# Patient Record
Sex: FEMALE | Race: WHITE | HISPANIC OR LATINO | Employment: OTHER | URBAN - METROPOLITAN AREA
[De-identification: names, ages, dates, MRNs, and addresses within clinical notes are randomized per-mention and may not be internally consistent; named-entity substitution may affect disease eponyms.]

---

## 2017-02-23 ENCOUNTER — OFFICE VISIT (OUTPATIENT)
Dept: FAMILY MEDICINE | Facility: CLINIC | Age: 77
End: 2017-02-23
Payer: MEDICARE

## 2017-02-23 VITALS
SYSTOLIC BLOOD PRESSURE: 136 MMHG | WEIGHT: 141.56 LBS | HEIGHT: 62 IN | HEART RATE: 57 BPM | DIASTOLIC BLOOD PRESSURE: 93 MMHG | OXYGEN SATURATION: 98 % | BODY MASS INDEX: 26.05 KG/M2

## 2017-02-23 DIAGNOSIS — I70.0 AORTIC ATHEROSCLEROSIS: ICD-10-CM

## 2017-02-23 DIAGNOSIS — Z00.00 ENCOUNTER FOR PREVENTIVE HEALTH EXAMINATION: Primary | ICD-10-CM

## 2017-02-23 DIAGNOSIS — E11.69 HYPERLIPIDEMIA ASSOCIATED WITH TYPE 2 DIABETES MELLITUS: ICD-10-CM

## 2017-02-23 DIAGNOSIS — E11.59 HYPERTENSION ASSOCIATED WITH DIABETES: ICD-10-CM

## 2017-02-23 DIAGNOSIS — I15.2 HYPERTENSION ASSOCIATED WITH DIABETES: ICD-10-CM

## 2017-02-23 DIAGNOSIS — Z28.21 INFLUENZA VACCINATION DECLINED: ICD-10-CM

## 2017-02-23 DIAGNOSIS — E78.5 HYPERLIPIDEMIA ASSOCIATED WITH TYPE 2 DIABETES MELLITUS: ICD-10-CM

## 2017-02-23 DIAGNOSIS — E11.65 CONTROLLED TYPE 2 DIABETES MELLITUS WITH HYPERGLYCEMIA, WITHOUT LONG-TERM CURRENT USE OF INSULIN: ICD-10-CM

## 2017-02-23 DIAGNOSIS — E03.9 HYPOTHYROIDISM, UNSPECIFIED TYPE: ICD-10-CM

## 2017-02-23 DIAGNOSIS — Z23 IMMUNIZATION DUE: ICD-10-CM

## 2017-02-23 PROCEDURE — 3075F SYST BP GE 130 - 139MM HG: CPT | Mod: S$GLB,,, | Performed by: NURSE PRACTITIONER

## 2017-02-23 PROCEDURE — 90732 PPSV23 VACC 2 YRS+ SUBQ/IM: CPT | Mod: S$GLB,,, | Performed by: NURSE PRACTITIONER

## 2017-02-23 PROCEDURE — G0009 ADMIN PNEUMOCOCCAL VACCINE: HCPCS | Mod: S$GLB,,, | Performed by: NURSE PRACTITIONER

## 2017-02-23 PROCEDURE — G0439 PPPS, SUBSEQ VISIT: HCPCS | Mod: S$GLB,,, | Performed by: NURSE PRACTITIONER

## 2017-02-23 PROCEDURE — 99999 PR PBB SHADOW E&M-EST. PATIENT-LVL IV: CPT | Mod: PBBFAC,,, | Performed by: NURSE PRACTITIONER

## 2017-02-23 PROCEDURE — 3080F DIAST BP >= 90 MM HG: CPT | Mod: S$GLB,,, | Performed by: NURSE PRACTITIONER

## 2017-02-23 PROCEDURE — 99499 UNLISTED E&M SERVICE: CPT | Mod: S$GLB,,, | Performed by: NURSE PRACTITIONER

## 2017-02-23 RX ORDER — CALCIUM CARBONATE 500(1250)
1 TABLET ORAL DAILY
COMMUNITY
End: 2018-11-13

## 2017-02-23 RX ORDER — VIT C/E/ZN/COPPR/LUTEIN/ZEAXAN 250MG-90MG
1000 CAPSULE ORAL DAILY
COMMUNITY

## 2017-02-23 NOTE — Clinical Note
Primary Care Providers: Zion Villavicencio MD, MD (General)  Your patient was seen today for a HRA visit. Gap(s) in care (HEDIS gaps) have been identified during this visit that require additional testing and possible follow up.  Orders Placed This Encounter     Pneumococcal polysaccharide vaccine 23-valent greater than or equal to 3yo subcutaneous/IM   These orders were placed using [facility] approved protocol and any results will be forwarded to your office for appropriate follow up. I have included a copy of my visit note; please review the note and feel free to contact me with any questions.   Thank you for allowing me to participate in the care of your patients. Liliana Basurto NP

## 2017-02-23 NOTE — MR AVS SNAPSHOT
[address]  [place] [address]  Phone: [phone]  Fax: [phone]                  Oneyda Shah   2017 10:00 AM   Office Visit    Descripción:  Female : 1940   Personal Médico:  Liliana Basurto NP   Departamento:  [facility]           Razón de la hemanth     Health Risk Assessment           Diagnósticos de Esta Visita        Comentarios    Encounter for preventive health examination    -  Primario     Immunization due                Lista de tareas           Citas próximas        Personal Médico Departamento Tfno del dpto    3/6/2017 9:00 AM Zion Villavicencio MD [facility] [phone]      Metas (5 Years of Data)     Ninguna      Follow-Up and Disposition     Return in 11 days (on 3/6/2017) for follow-up with PCP, HRA visit in 1 year.      [facility]     [facility] de Enfermeras - Asistencia   Enfermeras registradas de [facility] pueden ayudarle a reservar leydi hemanth, proveer educación para la fatmata, asesoría clínica, y otros servicios de asesoramiento.   Llame para leroy servicio gratuito a [phone].             Medicamentos           Mensaje sobre Medicamentos     Verificar los cambios y / o adiciones a newton régimen de medicación son los mismos que discutir con newton médico. Si cualquiera de estos cambios o adiciones son incorrectos, por favor notifique a newton proveedor de atención médica.        DEJAR de guero estos medicamentos     fluconazole (DIFLUCAN) 150 MG Tab Take 1 tablet (150 mg total) by mouth once daily. If symptoms persist take another dose in 3 days.    hydrocodone-acetaminophen 7.5-325mg (NORCO) 7.5-325 mg per tablet     SOLUS V2 CONTROL SOLUTION, LOW Soln     SOLUS V2 LANCING DEVICE Kit            Verifique que la siguiente lista de medicamentos es leydi representación exacta de los medicamentos que está tomando actualmente. Si no hay ningunos reportados, la lista puede estar  "en zepeda. Si no es correcta, por favor póngase en contacto con newton proveedor de atención médica. Lleve esta lista con usted en cholo de emergencia.           Medicamentos Actuales     amlodipine (NORVASC) 10 MG tablet Take 1 tablet (10 mg total) by mouth once daily.    aspirin (ECOTRIN) 81 MG EC tablet Take 81 mg by mouth once daily.      atenolol (TENORMIN) 50 MG tablet Take 1 tablet (50 mg total) by mouth once daily.    blood sugar diagnostic Strp Check daily, disp compatible brand with meter, 100 with 11 refills    blood-glucose meter Misc Check daily sugars, dispense insurance covered blood sugar meter    calcium carbonate (OS-RAVINDER) 500 mg calcium (1,250 mg) tablet Take 1 tablet by mouth once daily.    cholecalciferol, vitamin D3, (VITAMIN D3) 1,000 unit capsule Take 1,000 Units by mouth once daily.    ibuprofen (ADVIL,MOTRIN) 800 MG tablet TAKE 1 TABLET BY MOUTH TWICE DAILY AS NEEDED FOR PAIN    lancets Misc     levothyroxine (SYNTHROID) 25 MCG tablet Take 1 tablet (25 mcg total) by mouth once daily.    losartan (COZAAR) 100 MG tablet Take 1 tablet (100 mg total) by mouth once daily.    metformin (GLUCOPHAGE) 1000 MG tablet Take 1 tablet (1,000 mg total) by mouth 2 (two) times daily.           Información de referencia clínica           Anny signos vitales lor     PS [place] SpO2 BMI (IMC)    136/93 (BP Location: Right arm, Patient Position: Sitting, BP Method: Manual) 57 5' 2" (1.575 m) 64.2 kg (141 lb 8.6 oz) 98% 25.89 kg/m2      Blood Pressure          Most Recent Value    BP  (!)  136/93      Alergias     A partir del:  2/23/2017        Penicillins    Ace Inhibitors    Statins-hmg-coa Reductase Inhibitors      Vacunas     Administradas en la fecha de la visita:  2/23/2017        Nombre Fecha Dosis Fecha del VIS Vía    Pneumococcal Polysaccharide - 23 Valent  Incomplete 0.5 mL 4/24/2015 Intramuscular      Orders Placed During Today's Visit      Órdenes normales de esta visita    Pneumococcal " polysaccharide vaccine 23-valent greater than or equal to 3yo subcutaneous/IM       Instrucciones      Counseling and Referral of Other Preventative  (Italic type indicates deductible and co-insurance are waived)    Patient Name: Oneyda Shah  Today's Date: 2/23/2017      SERVICE LIMITATIONS RECOMMENDATION    Vaccines    · Pneumococcal (once after 65)    · Influenza (annually)    · Hepatitis B (if medium/high risk)    · Prevnar 13      Hepatitis B medium/high risk factors:       - End-stage renal disease       - Hemophiliacs who received Factor VII or         IX concentrates       - Clients of institutions for the mentally             retarded       - Persons who live in the same house as          a HepB carrier       - Homosexual men       - Illicit injectable drug abusers     Pneumococcal: Scheduled - see appointments     Influenza: Recommended to patient, declined     Hepatitis B: N/A Not recommended     Prevnar 13: Done, repeat at next scheduled date    Mammogram (biennial age 50-74)  Annually (age 40 or over)  Done this year, repeat every year    Pap (up to age 70 and after 70 if unknown history or abnormal study last 10 years)    N/A Not recommended     The USPSTF recommends against screening for cervical cancer in women older than age 65 years who have had adequate prior screening and are not otherwise at high risk for cervical cancer.      Colorectal cancer screening (to age 75)    · Fecal occult blood test (annual)  · Flexible sigmoidoscopy (5y)  · Screening colonoscopy (10y)  · Barium enema   N/A Pt thinks she had colonoscopy 5 years ago at [facility]. Get date and results    Diabetes self-management training (no USPSTF recommendations)  Requires referral by treating physician for patient with diabetes or renal disease. 10 hours of initial DSMT sessions of no less than 30 minutes each in a continuous 12-month period. 2 hours of follow-up DSMT in subsequent years.  N/A Not recommended    Bone mass  measurements (age 65 & older, biennial)  Requires diagnosis related to osteoporosis or estrogen deficiency. Biennial benefit unless patient has history of long-term glucocorticoid  Last done 09/03/15, recommend to repeat every 3  years    Glaucoma screening (no USPSTF recommendation)  Diabetes mellitus, family history   , age 50 or over    American, age 65 or over  N/A Done about Sep, Oct 2016. Get date    Medical nutrition therapy for diabetes or renal disease (no recommended schedule)  Requires referral by treating physician for patient with diabetes or renal disease or kidney transplant within the past 3 years.  Can be provided in same year as diabetes self-management training (DSMT), and CMS recommends medical nutrition therapy take place after DSMT. Up to 3 hours for initial year and 2 hours in subsequent years.  N/A Not recommended    Cardiovascular screening blood tests (every 5 years)  · Fasting lipid panel  Order as a panel if possible  Done this year, repeat every year    Diabetes screening tests (at least every 3 years, Medicare covers annually or at 6-month intervals for prediabetic patients)  · Fasting blood sugar (FBS) or glucose tolerance test (GTT)  Patient must be diagnosed with one of the following:       - Hypertension       - Dyslipidemia       - Obesity (BMI 30kg/m2)       - Previous elevated impaired FBS or GTT       ... or any two of the following:       - Overweight (BMI 25 but <30)       - Family history of diabetes       - Age 65 or older       - History of gestational diabetes or birth of baby weighing more than 9 pounds  Done this year, repeat every year    Abdominal aortic aneurysm screening (once)  · Sonogram   Limited to patients who meet one of the following criteria:       - Men who are 65-75 years old and have smoked more than 100 cigarette in their lifetime       - Anyone with a family history of abdominal aortic aneurysm       - Anyone recommended for  screening by the USPSTF  N/A Not recommended    HIV screening (annually for increased risk patients)  · HIV-1 and HIV-2 by EIA, or LATISHA, rapid antibody test or oral mucosa transudate  Patients must be at increased risk for HIV infection per USPSTF guidelines or pregnant. Tests covered annually for patient at increased risk or as requested by the patient. Pregnant patients may receive up to 3 tests during pregnancy.  Risks discussed, screening is not recommended    Smoking cessation counseling (up to 8 sessions per year)  Patients must be asymptomatic of tobacco-related conditions to receive as a preventative service.  Never Smoker    Subsequent annual wellness visit  At least 12 months since last AWV  Return in one year     The following information is provided to all patients.  This information is to help you find resources for any of the problems found today that may be affecting your health:                Living healthy guide: www.[facility].[place].[facility]      Understanding Diabetes: www.diabetes.org      Eating healthy: www.cdc.gov/healthyweight      [facility] home safety checklist: www.cdc.gov/steadi/patient.html      Agency on Aging: www.goea.[place].[facility]      Alcoholics anonymous (AA): www.aa.org      Physical Activity: www.vicente.nih.gov/np2mjzg      Tobacco use: www.quitwithusla.org          Language Assistance Services     ATTENTION: Language assistance services are available, free of charge. Please call [phone].      ATENCIÓN: Si habla español, tiene a newton disposición servicios gratuitos de asistencia lingüística. Llame al [phone].     [facility] Ý: N?u b?n nói Ti?ng Vi?t, có các d?ch v? h? tr? ngôn ng? mi?n phí dành cho b?n. G?i s? [phone].         [facility] Medicine cumple con las leyes federales aplicables de derechos civiles y no discrimina por motivos de katiana, color, origen nacional, edad, discapacidad, o sexo.                 Oneyda Shah   2/23/2017 10:00 AM   Office Visit    Description:   Female : 1940   Provider:  Liliana Basurto NP   Department[facility]           Reason for Visit     Health Risk Assessment           Diagnoses this Visit        Comments    Encounter for preventive health examination    -  Primary     Immunization due                To Do List           Future Appointments        Provider Department Dept Phone    3/6/2017 9:00 AM Zion Villavicencio MD [facility] [phone]      Goals     None      Follow-Up and Disposition     Return in 11 days (on 3/6/2017) for follow-up with PCP, HRA visit in 1 year.      [facility] On Call     [facility] On Call Nurse Care Line -  Assistance  Registered nurses in the [facility] provide clinical advisement, health education, appointment booking, and other advisory services.  Call for this free service at [phone].             Medications           Message regarding Medications     Verify the changes and/or additions to your medication regime listed below are the same as discussed with your clinician today.  If any of these changes or additions are incorrect, please notify your healthcare provider.        STOP taking these medications     fluconazole (DIFLUCAN) 150 MG Tab Take 1 tablet (150 mg total) by mouth once daily. If symptoms persist take another dose in 3 days.    hydrocodone-acetaminophen 7.5-325mg (NORCO) 7.5-325 mg per tablet     SOLUS V2 CONTROL SOLUTION, LOW Soln     SOLUS V2 LANCING DEVICE Kit            Verify that the below list of medications is an accurate representation of the medications you are currently taking.  If none reported, the list may be blank. If incorrect, please contact your healthcare provider. Carry this list with you in case of emergency.           Current Medications     amlodipine (NORVASC) 10 MG tablet Take 1 tablet (10 mg total) by mouth once daily.    aspirin (ECOTRIN) 81 MG EC tablet Take 81 mg by mouth once daily.      atenolol (TENORMIN) 50 MG  "tablet Take 1 tablet (50 mg total) by mouth once daily.    blood sugar diagnostic Strp Check daily, disp compatible brand with meter, 100 with 11 refills    blood-glucose meter Misc Check daily sugars, dispense insurance covered blood sugar meter    calcium carbonate (OS-RAVINDER) 500 mg calcium (1,250 mg) tablet Take 1 tablet by mouth once daily.    cholecalciferol, vitamin D3, (VITAMIN D3) 1,000 unit capsule Take 1,000 Units by mouth once daily.    ibuprofen (ADVIL,MOTRIN) 800 MG tablet TAKE 1 TABLET BY MOUTH TWICE DAILY AS NEEDED FOR PAIN    lancets Misc     levothyroxine (SYNTHROID) 25 MCG tablet Take 1 tablet (25 mcg total) by mouth once daily.    losartan (COZAAR) 100 MG tablet Take 1 tablet (100 mg total) by mouth once daily.    metformin (GLUCOPHAGE) 1000 MG tablet Take 1 tablet (1,000 mg total) by mouth 2 (two) times daily.           Clinical Reference Information           Your Vitals Were     BP Pulse Height Weight SpO2 BMI    136/93 (BP Location: Right arm, Patient Position: Sitting, BP Method: Manual) 57 5' 2" (1.575 m) 64.2 kg (141 lb 8.6 oz) 98% 25.89 kg/m2      Blood Pressure          Most Recent Value    BP  (!)  136/93      Allergies as of 2/23/2017     Penicillins    Ace Inhibitors    Statins-hmg-coa Reductase Inhibitors      Immunizations Administered on Date of Encounter - 2/23/2017     Name Date Dose VIS Date Route    Pneumococcal Polysaccharide - 23 Valent  Incomplete 0.5 mL 4/24/2015 Intramuscular      Orders Placed During Today's Visit      Normal Orders This Visit    Pneumococcal polysaccharide vaccine 23-valent greater than or equal to 1yo subcutaneous/IM       Instructions      Counseling and Referral of Other Preventative  (Italic type indicates deductible and co-insurance are waived)    Patient Name: Oneyda Shah  Today's Date: 2/23/2017      SERVICE LIMITATIONS RECOMMENDATION    Vaccines    · Pneumococcal (once after 65)    · Influenza (annually)    · Hepatitis B (if medium/high " risk)    · Prevnar 13      Hepatitis B medium/high risk factors:       - End-stage renal disease       - Hemophiliacs who received Factor VII or         IX concentrates       - Clients of institutions for the mentally             retarded       - Persons who live in the same house as          a HepB carrier       - Homosexual men       - Illicit injectable drug abusers     Pneumococcal: Scheduled - see appointments     Influenza: Recommended to patient, declined     Hepatitis B: N/A Not recommended     Prevnar 13: Done, repeat at next scheduled date    Mammogram (biennial age 50-74)  Annually (age 40 or over)  Done this year, repeat every year    Pap (up to age 70 and after 70 if unknown history or abnormal study last 10 years)    N/A Not recommended     The USPSTF recommends against screening for cervical cancer in women older than age 65 years who have had adequate prior screening and are not otherwise at high risk for cervical cancer.      Colorectal cancer screening (to age 75)    · Fecal occult blood test (annual)  · Flexible sigmoidoscopy (5y)  · Screening colonoscopy (10y)  · Barium enema   N/A Pt thinks she had colonoscopy 5 years ago at [facility]. Get date and results    Diabetes self-management training (no USPSTF recommendations)  Requires referral by treating physician for patient with diabetes or renal disease. 10 hours of initial DSMT sessions of no less than 30 minutes each in a continuous 12-month period. 2 hours of follow-up DSMT in subsequent years.  N/A Not recommended    Bone mass measurements (age 65 & older, biennial)  Requires diagnosis related to osteoporosis or estrogen deficiency. Biennial benefit unless patient has history of long-term glucocorticoid  Last done 09/03/15, recommend to repeat every 3  years    Glaucoma screening (no USPSTF recommendation)  Diabetes mellitus, family history   , age 50 or over    American, age 65 or over  N/A Done about Sep, Oct 2016. Get  date    Medical nutrition therapy for diabetes or renal disease (no recommended schedule)  Requires referral by treating physician for patient with diabetes or renal disease or kidney transplant within the past 3 years.  Can be provided in same year as diabetes self-management training (DSMT), and CMS recommends medical nutrition therapy take place after DSMT. Up to 3 hours for initial year and 2 hours in subsequent years.  N/A Not recommended    Cardiovascular screening blood tests (every 5 years)  · Fasting lipid panel  Order as a panel if possible  Done this year, repeat every year    Diabetes screening tests (at least every 3 years, Medicare covers annually or at 6-month intervals for prediabetic patients)  · Fasting blood sugar (FBS) or glucose tolerance test (GTT)  Patient must be diagnosed with one of the following:       - Hypertension       - Dyslipidemia       - Obesity (BMI 30kg/m2)       - Previous elevated impaired FBS or GTT       ... or any two of the following:       - Overweight (BMI 25 but <30)       - Family history of diabetes       - Age 65 or older       - History of gestational diabetes or birth of baby weighing more than 9 pounds  Done this year, repeat every year    Abdominal aortic aneurysm screening (once)  · Sonogram   Limited to patients who meet one of the following criteria:       - Men who are 65-75 years old and have smoked more than 100 cigarette in their lifetime       - Anyone with a family history of abdominal aortic aneurysm       - Anyone recommended for screening by the USPSTF  N/A Not recommended    HIV screening (annually for increased risk patients)  · HIV-1 and HIV-2 by EIA, or LATISHA, rapid antibody test or oral mucosa transudate  Patients must be at increased risk for HIV infection per USPSTF guidelines or pregnant. Tests covered annually for patient at increased risk or as requested by the patient. Pregnant patients may receive up to 3 tests during pregnancy.  Risks  discussed, screening is not recommended    Smoking cessation counseling (up to 8 sessions per year)  Patients must be asymptomatic of tobacco-related conditions to receive as a preventative service.  Never Smoker    Subsequent annual wellness visit  At least 12 months since last AWV  Return in one year     The following information is provided to all patients.  This information is to help you find resources for any of the problems found today that may be affecting your health:                Living healthy guide: www.[facility].[place].[facility]      Understanding Diabetes: www.diabetes.org      Eating healthy: www.cdc.gov/healthyweight      CDC home safety checklist: www.cdc.gov/steadi/patient.html      Agency on Aging: www.goea.[place].[facility]      Alcoholics anonymous (AA): www.aa.org      Physical Activity: www.vicente.nih.gov/lk4ryjp      Tobacco use: www.quitwithusla.org          Language Assistance Services     ATTENTION: Language assistance services are available, free of charge. Please call [phone].      ATENCIÓN: Si maryla yelena, tiene a newton disposición servicios gratuitos de asistencia lingüística. Llame al [phone].     CHÚ Ý: N?u b?n nói Ti?ng Vi?t, có các d?ch v? h? tr? ngôn ng? mi?n phí dành cho b?n. G?i s? [phone].         [facility] complies with applicable Federal civil rights laws and does not discriminate on the basis of race, color, national origin, age, disability, or sex.

## 2017-02-23 NOTE — PATIENT INSTRUCTIONS
Counseling and Referral of Other Preventative  (Italic type indicates deductible and co-insurance are waived)    Patient Name: Oneyda Shah  Today's Date: 2/23/2017      SERVICE LIMITATIONS RECOMMENDATION    Vaccines    · Pneumococcal (once after 65)    · Influenza (annually)    · Hepatitis B (if medium/high risk)    · Prevnar 13      Hepatitis B medium/high risk factors:       - End-stage renal disease       - Hemophiliacs who received Factor VII or         IX concentrates       - Clients of institutions for the mentally             retarded       - Persons who live in the same house as          a HepB carrier       - Homosexual men       - Illicit injectable drug abusers     Pneumococcal: Scheduled - see appointments     Influenza: Recommended to patient, declined     Hepatitis B: N/A Not recommended     Prevnar 13: Done, repeat at next scheduled date    Mammogram (biennial age 50-74)  Annually (age 40 or over)  Done this year, repeat every year    Pap (up to age 70 and after 70 if unknown history or abnormal study last 10 years)    N/A Not recommended     The USPSTF recommends against screening for cervical cancer in women older than age 65 years who have had adequate prior screening and are not otherwise at high risk for cervical cancer.      Colorectal cancer screening (to age 75)    · Fecal occult blood test (annual)  · Flexible sigmoidoscopy (5y)  · Screening colonoscopy (10y)  · Barium enema   N/A Pt thinks she had colonoscopy 5 years ago at [facility]. Get date and results    Diabetes self-management training (no USPSTF recommendations)  Requires referral by treating physician for patient with diabetes or renal disease. 10 hours of initial DSMT sessions of no less than 30 minutes each in a continuous 12-month period. 2 hours of follow-up DSMT in subsequent years.  N/A Not recommended    Bone mass measurements (age 65 & older, biennial)  Requires diagnosis related to osteoporosis or estrogen deficiency.  Biennial benefit unless patient has history of long-term glucocorticoid  Last done 09/03/15, recommend to repeat every 3  years    Glaucoma screening (no USPSTF recommendation)  Diabetes mellitus, family history   , age 50 or over    American, age 65 or over  N/A Done about Sep, Oct 2016. Get date    Medical nutrition therapy for diabetes or renal disease (no recommended schedule)  Requires referral by treating physician for patient with diabetes or renal disease or kidney transplant within the past 3 years.  Can be provided in same year as diabetes self-management training (DSMT), and CMS recommends medical nutrition therapy take place after DSMT. Up to 3 hours for initial year and 2 hours in subsequent years.  N/A Not recommended    Cardiovascular screening blood tests (every 5 years)  · Fasting lipid panel  Order as a panel if possible  Done this year, repeat every year    Diabetes screening tests (at least every 3 years, Medicare covers annually or at 6-month intervals for prediabetic patients)  · Fasting blood sugar (FBS) or glucose tolerance test (GTT)  Patient must be diagnosed with one of the following:       - Hypertension       - Dyslipidemia       - Obesity (BMI 30kg/m2)       - Previous elevated impaired FBS or GTT       ... or any two of the following:       - Overweight (BMI 25 but <30)       - Family history of diabetes       - Age 65 or older       - History of gestational diabetes or birth of baby weighing more than 9 pounds  Done this year, repeat every year    Abdominal aortic aneurysm screening (once)  · Sonogram   Limited to patients who meet one of the following criteria:       - Men who are 65-75 years old and have smoked more than 100 cigarette in their lifetime       - Anyone with a family history of abdominal aortic aneurysm       - Anyone recommended for screening by the USPSTF  N/A Not recommended    HIV screening (annually for increased risk patients)  · HIV-1 and  HIV-2 by EIA, or LATISHA, rapid antibody test or oral mucosa transudate  Patients must be at increased risk for HIV infection per USPSTF guidelines or pregnant. Tests covered annually for patient at increased risk or as requested by the patient. Pregnant patients may receive up to 3 tests during pregnancy.  Risks discussed, screening is not recommended    Smoking cessation counseling (up to 8 sessions per year)  Patients must be asymptomatic of tobacco-related conditions to receive as a preventative service.  Never Smoker    Subsequent annual wellness visit  At least 12 months since last AWV  Return in one year     The following information is provided to all patients.  This information is to help you find resources for any of the problems found today that may be affecting your health:                Living healthy guide: www.[facility].[place].[facility]      Understanding Diabetes: www.diabetes.org      Eating healthy: www.cdc.gov/healthyweight      CDC home safety checklist: www.cdc.gov/steadi/patient.html      Agency on Aging: www.goea.[place].[facility]      Alcoholics anonymous (AA): www.aa.org      Physical Activity: www.vicente.nih.gov/lg2mtbt      Tobacco use: www.quitwithusla.org

## 2017-02-24 PROBLEM — I15.2 HYPERTENSION ASSOCIATED WITH DIABETES: Status: ACTIVE | Noted: 2017-02-24

## 2017-02-24 PROBLEM — Z28.21 INFLUENZA VACCINATION DECLINED: Status: ACTIVE | Noted: 2017-02-24

## 2017-02-24 PROBLEM — E11.69 HYPERLIPIDEMIA ASSOCIATED WITH TYPE 2 DIABETES MELLITUS: Status: ACTIVE | Noted: 2017-02-24

## 2017-02-24 PROBLEM — E78.5 HYPERLIPIDEMIA ASSOCIATED WITH TYPE 2 DIABETES MELLITUS: Status: ACTIVE | Noted: 2017-02-24

## 2017-02-24 PROBLEM — E11.65 CONTROLLED TYPE 2 DIABETES MELLITUS WITH HYPERGLYCEMIA, WITHOUT LONG-TERM CURRENT USE OF INSULIN: Status: ACTIVE | Noted: 2017-02-24

## 2017-02-24 PROBLEM — E11.59 HYPERTENSION ASSOCIATED WITH DIABETES: Status: ACTIVE | Noted: 2017-02-24

## 2017-02-24 NOTE — PROGRESS NOTES
"Oneyda Shah presented for a  Medicare AWV and comprehensive Health Risk Assessment today. The following components were reviewed and updated:    · Medical history  · Family History  · Social history  · Allergies and Current Medications  · Health Risk Assessment  · Health Maintenance  · Care Team     ** See Completed Assessments for Annual Wellness Visit within the encounter summary.**       The following assessments were completed:  · Living Situation  · CAGE  · Depression Screening  · Timed Get Up and Go  · Whisper Test  · Cognitive Function Screening  · Nutrition Screening  · ADL Screening  · PAQ Screening    Vitals:    02/23/17 1004   BP: (!) 136/93   BP Location: Right arm   Patient Position: Sitting   BP Method: Manual   Pulse: (!) 57   SpO2: 98%   Weight: 64.2 kg (141 lb 8.6 oz)   Height: 5' 2" (1.575 m)     Body mass index is 25.89 kg/(m^2).     Physical Exam   Constitutional: She is oriented to person, place, and time. She appears well-developed and well-nourished. No distress.   HENT:   Head: Normocephalic and atraumatic.   Eyes: EOM are normal. Pupils are equal, round, and reactive to light.   Neck: Neck supple. No JVD present. No tracheal deviation present.   Cardiovascular: Regular rhythm, normal heart sounds and intact distal pulses.  Bradycardia present.    No murmur heard.  Pulmonary/Chest: Effort normal and breath sounds normal. No respiratory distress. She has no wheezes. She has no rales.   Abdominal: Soft. Bowel sounds are normal. She exhibits no distension and no mass. There is no tenderness.   Musculoskeletal: Normal range of motion. She exhibits no edema or tenderness.   Neurological: She is alert and oriented to person, place, and time. Coordination normal.   Skin: Skin is warm and dry. No erythema. No pallor.   Psychiatric: She has a normal mood and affect. Her behavior is normal. Judgment and thought content normal. Cognition and memory are normal. She expresses no homicidal and no " suicidal ideation.   Nursing note and vitals reviewed.        Diagnoses and health risks identified today and associated recommendations/orders:    1. Encounter for preventive health examination    2. Controlled type 2 diabetes mellitus with hyperglycemia, without long-term current use of insulin  Chronic; stable on medication.  Followed by PCP.    3. Hypertension associated with diabetes  Chronic; stable on medication.  Followed by PCP.    4. Hyperlipidemia associated with type 2 diabetes mellitus  Chronic; stable.  Followed by PCP.    5. Aortic atherosclerosis  Chronic; stable.  As seen on imaging dated 12/06/13.  Followed by PCP.    6. Hypothyroidism, unspecified type  Chronic; stable on medication.  Followed by PCP.    7. Immunization due  Pneumovax vaccination administered today in clinic.  - Pneumococcal polysaccharide vaccine 23-valent greater than or equal to 1yo subcutaneous/IM    8. Influenza vaccination declined    9. BMI 25.0-25.9,adult      Provided [place] with a 5-10 year written screening schedule and personal prevention plan. Recommendations were developed using the USPSTF age appropriate recommendations. Education, counseling, and referrals were provided as needed. After Visit Summary printed and given to patient which includes a list of additional screenings\tests needed.    Return in 11 days (on 3/6/2017) for follow-up with PCP, HRA visit in 1 year.    Liliana Basurto NP

## 2017-03-03 RX ORDER — ATENOLOL 50 MG/1
50 TABLET ORAL DAILY
Qty: 90 TABLET | Refills: 3 | Status: SHIPPED | OUTPATIENT
Start: 2017-03-03 | End: 2017-08-30 | Stop reason: SDUPTHER

## 2017-03-03 RX ORDER — AMLODIPINE BESYLATE 10 MG/1
10 TABLET ORAL DAILY
Qty: 90 TABLET | Refills: 3 | Status: SHIPPED | OUTPATIENT
Start: 2017-03-03 | End: 2018-02-15 | Stop reason: SDUPTHER

## 2017-03-03 RX ORDER — METFORMIN HYDROCHLORIDE 1000 MG/1
1000 TABLET ORAL 2 TIMES DAILY
Qty: 180 TABLET | Refills: 11 | Status: SHIPPED | OUTPATIENT
Start: 2017-03-03 | End: 2018-06-28 | Stop reason: SDUPTHER

## 2017-03-06 ENCOUNTER — OFFICE VISIT (OUTPATIENT)
Dept: FAMILY MEDICINE | Facility: CLINIC | Age: 77
End: 2017-03-06
Payer: MEDICARE

## 2017-03-06 ENCOUNTER — LAB VISIT (OUTPATIENT)
Dept: LAB | Facility: HOSPITAL | Age: 77
End: 2017-03-06
Attending: FAMILY MEDICINE
Payer: MEDICARE

## 2017-03-06 VITALS
BODY MASS INDEX: 26.06 KG/M2 | HEART RATE: 56 BPM | DIASTOLIC BLOOD PRESSURE: 70 MMHG | SYSTOLIC BLOOD PRESSURE: 119 MMHG | OXYGEN SATURATION: 97 % | HEIGHT: 61 IN | TEMPERATURE: 98 F | WEIGHT: 138 LBS | RESPIRATION RATE: 16 BRPM

## 2017-03-06 DIAGNOSIS — E11.65 CONTROLLED TYPE 2 DIABETES MELLITUS WITH HYPERGLYCEMIA, WITHOUT LONG-TERM CURRENT USE OF INSULIN: ICD-10-CM

## 2017-03-06 DIAGNOSIS — I70.0 AORTIC ATHEROSCLEROSIS: ICD-10-CM

## 2017-03-06 DIAGNOSIS — E03.9 HYPOTHYROIDISM, UNSPECIFIED TYPE: Primary | ICD-10-CM

## 2017-03-06 DIAGNOSIS — D64.9 ANEMIA, UNSPECIFIED TYPE: ICD-10-CM

## 2017-03-06 DIAGNOSIS — E78.5 HYPERLIPIDEMIA ASSOCIATED WITH TYPE 2 DIABETES MELLITUS: ICD-10-CM

## 2017-03-06 DIAGNOSIS — E11.69 HYPERLIPIDEMIA ASSOCIATED WITH TYPE 2 DIABETES MELLITUS: ICD-10-CM

## 2017-03-06 DIAGNOSIS — E55.9 VITAMIN D INSUFFICIENCY: ICD-10-CM

## 2017-03-06 DIAGNOSIS — E11.59 HYPERTENSION ASSOCIATED WITH DIABETES: ICD-10-CM

## 2017-03-06 DIAGNOSIS — I15.2 HYPERTENSION ASSOCIATED WITH DIABETES: ICD-10-CM

## 2017-03-06 DIAGNOSIS — K76.0 FATTY LIVER: ICD-10-CM

## 2017-03-06 DIAGNOSIS — E03.9 HYPOTHYROIDISM, UNSPECIFIED TYPE: ICD-10-CM

## 2017-03-06 LAB
ALBUMIN SERPL BCP-MCNC: 4.1 G/DL
ALP SERPL-CCNC: 78 U/L
ALT SERPL W/O P-5'-P-CCNC: 40 U/L
ANION GAP SERPL CALC-SCNC: 6 MMOL/L
AST SERPL-CCNC: 29 U/L
BASOPHILS # BLD AUTO: 0.01 K/UL
BASOPHILS NFR BLD: 0.1 %
BILIRUB SERPL-MCNC: 0.6 MG/DL
BUN SERPL-MCNC: 15 MG/DL
CALCIUM SERPL-MCNC: 10 MG/DL
CHLORIDE SERPL-SCNC: 105 MMOL/L
CHOLEST/HDLC SERPL: 5 {RATIO}
CO2 SERPL-SCNC: 29 MMOL/L
CREAT SERPL-MCNC: 1 MG/DL
DIFFERENTIAL METHOD: NORMAL
EOSINOPHIL # BLD AUTO: 0.2 K/UL
EOSINOPHIL NFR BLD: 2 %
ERYTHROCYTE [DISTWIDTH] IN BLOOD BY AUTOMATED COUNT: 13.2 %
EST. GFR  (AFRICAN AMERICAN): >60 ML/MIN/1.73 M^2
EST. GFR  (NON AFRICAN AMERICAN): 56 ML/MIN/1.73 M^2
ESTIMATED AVG GLUCOSE: 148 MG/DL
GLUCOSE SERPL-MCNC: 138 MG/DL
HBA1C MFR BLD HPLC: 6.8 %
HCT VFR BLD AUTO: 37.9 %
HDL/CHOLESTEROL RATIO: 20.1 %
HDLC SERPL-MCNC: 249 MG/DL
HDLC SERPL-MCNC: 50 MG/DL
HGB BLD-MCNC: 12.6 G/DL
LDLC SERPL CALC-MCNC: 172.2 MG/DL
LYMPHOCYTES # BLD AUTO: 3.2 K/UL
LYMPHOCYTES NFR BLD: 34.9 %
MCH RBC QN AUTO: 30.6 PG
MCHC RBC AUTO-ENTMCNC: 33.2 %
MCV RBC AUTO: 92 FL
MONOCYTES # BLD AUTO: 0.6 K/UL
MONOCYTES NFR BLD: 6.9 %
NEUTROPHILS # BLD AUTO: 5.1 K/UL
NEUTROPHILS NFR BLD: 55.9 %
NONHDLC SERPL-MCNC: 199 MG/DL
PLATELET # BLD AUTO: 287 K/UL
PMV BLD AUTO: 10.7 FL
POTASSIUM SERPL-SCNC: 4.9 MMOL/L
PROT SERPL-MCNC: 8.4 G/DL
RBC # BLD AUTO: 4.12 M/UL
SODIUM SERPL-SCNC: 140 MMOL/L
TRIGL SERPL-MCNC: 134 MG/DL
TSH SERPL DL<=0.005 MIU/L-ACNC: 3.6 UIU/ML
WBC # BLD AUTO: 9.09 K/UL

## 2017-03-06 PROCEDURE — 1160F RVW MEDS BY RX/DR IN RCRD: CPT | Mod: S$GLB,,, | Performed by: FAMILY MEDICINE

## 2017-03-06 PROCEDURE — 1126F AMNT PAIN NOTED NONE PRSNT: CPT | Mod: S$GLB,,, | Performed by: FAMILY MEDICINE

## 2017-03-06 PROCEDURE — 99999 PR PBB SHADOW E&M-EST. PATIENT-LVL III: CPT | Mod: PBBFAC,,, | Performed by: FAMILY MEDICINE

## 2017-03-06 PROCEDURE — 4010F ACE/ARB THERAPY RXD/TAKEN: CPT | Mod: S$GLB,,, | Performed by: FAMILY MEDICINE

## 2017-03-06 PROCEDURE — 3074F SYST BP LT 130 MM HG: CPT | Mod: S$GLB,,, | Performed by: FAMILY MEDICINE

## 2017-03-06 PROCEDURE — 3078F DIAST BP <80 MM HG: CPT | Mod: S$GLB,,, | Performed by: FAMILY MEDICINE

## 2017-03-06 PROCEDURE — 36415 COLL VENOUS BLD VENIPUNCTURE: CPT

## 2017-03-06 PROCEDURE — 80053 COMPREHEN METABOLIC PANEL: CPT

## 2017-03-06 PROCEDURE — 80061 LIPID PANEL: CPT

## 2017-03-06 PROCEDURE — 3044F HG A1C LEVEL LT 7.0%: CPT | Mod: S$GLB,,, | Performed by: FAMILY MEDICINE

## 2017-03-06 PROCEDURE — 2022F DILAT RTA XM EVC RTNOPTHY: CPT | Mod: S$GLB,,, | Performed by: FAMILY MEDICINE

## 2017-03-06 PROCEDURE — 85025 COMPLETE CBC W/AUTO DIFF WBC: CPT

## 2017-03-06 PROCEDURE — 84443 ASSAY THYROID STIM HORMONE: CPT

## 2017-03-06 PROCEDURE — 1157F ADVNC CARE PLAN IN RCRD: CPT | Mod: S$GLB,,, | Performed by: FAMILY MEDICINE

## 2017-03-06 PROCEDURE — 1159F MED LIST DOCD IN RCRD: CPT | Mod: S$GLB,,, | Performed by: FAMILY MEDICINE

## 2017-03-06 PROCEDURE — 99499 UNLISTED E&M SERVICE: CPT | Mod: S$GLB,,, | Performed by: FAMILY MEDICINE

## 2017-03-06 PROCEDURE — 99215 OFFICE O/P EST HI 40 MIN: CPT | Mod: S$GLB,,, | Performed by: FAMILY MEDICINE

## 2017-03-06 PROCEDURE — 83036 HEMOGLOBIN GLYCOSYLATED A1C: CPT

## 2017-03-06 NOTE — MR AVS SNAPSHOT
[address]  [place] [address]  Phone: [phone]  Fax: [phone]                  Oneyda Shah   3/6/2017 9:00 AM   Office Visit    Descripción:  Female : 1940   Personal Médico:  Zion Villavicencio MD   Departamento:  [facility]           Razón de la hemanth     Follow-up           Diagnósticos de Esta Visita        Comentarios    Hypothyroidism, unspecified type    -  Primario     Controlled type 2 diabetes mellitus with hyperglycemia, without long-term current use of insulin         Hypertension associated with diabetes         Hyperlipidemia associated with type 2 diabetes mellitus         Aortic atherosclerosis         Fatty liver         Vitamin D insufficiency         Anemia, unspecified type                Lista de tareas           Citas próximas        Personal Médico Departamento Tfno del dpto    3/6/2017 10:20 AM APPOINTMENT LAB, [place] [facility] [phone]    3/6/2017 10:30 AM APPOINTMENT LAB, [place] [facility] [phone]      Metas (5 Years of Data)     Record home blood pressure     Notes - Note created  3/6/2017  9:22 AM by Zion Villavicencio MD    GOAL BLOOD PRESSURE LESS THAN 140/90        [place]     [facility] [place] [facility] - Asistencia   Enfermeras registradas de [facility] pueden ayudarle a reservar leydi hemanth, proveer educación para la fatmata, asesoría clínica, y otros servicios de asesoramiento.   Llame para leroy servicio gratTsaile Health Centero a [phone].             Medicamentos           Mensaje sobre Medicamentos     Verificar los cambios y / o adiciones a newton régimen de medicación son los mismos que discutir con newton médico. Si cualquiera de estos cambios o adiciones son incorrectos, por favor notifique a newton proveedor de atención médica.             Verifique que la siguiente lista de medicamentos es leydi representación exacta de los medicamentos  "que está tomando actualmente. Si no hay ningunos reportados, la lista puede estar en zepeda. Si no es correcta, por favor póngase en contacto con newton proveedor de atención médica. Lleve esta lista con usted en cholo de emergencia.           Medicamentos Actuales     amlodipine (NORVASC) 10 MG tablet Take 1 tablet (10 mg total) by mouth once daily.    aspirin (ECOTRIN) 81 MG EC tablet Take 81 mg by mouth once daily.      atenolol (TENORMIN) 50 MG tablet Take 1 tablet (50 mg total) by mouth once daily.    blood sugar diagnostic Strp Check daily, disp compatible brand with meter, 100 with 11 refills    blood sugar diagnostic Strp 1 strip by Misc.(Non-Drug; Combo Route) route once daily.    blood-glucose meter Misc Check daily sugars, dispense insurance covered blood sugar meter    calcium carbonate (OS-RAVINDER) 500 mg calcium (1,250 mg) tablet Take 1 tablet by mouth once daily.    cholecalciferol, vitamin D3, (VITAMIN D3) 1,000 unit capsule Take 1,000 Units by mouth once daily.    ibuprofen (ADVIL,MOTRIN) 800 MG tablet TAKE 1 TABLET BY MOUTH TWICE DAILY AS NEEDED FOR PAIN    lancets Misc     levothyroxine (SYNTHROID) 25 MCG tablet Take 1 tablet (25 mcg total) by mouth once daily.    losartan (COZAAR) 100 MG tablet Take 1 tablet (100 mg total) by mouth once daily.    metformin (GLUCOPHAGE) 1000 MG tablet Take 1 tablet (1,000 mg total) by mouth 2 (two) times daily.           Información de referencia clínica           Anny signos vitales lor     PS Pulso Temperatura Resp [place]    119/70 56 98.3 °F (36.8 °C) (Oral) 16 5' 1" (1.549 m) 62.6 kg (138 lb)    SpO2 BMI (IM)                97% 26.07 kg/m2          Blood Pressure          Most Recent Value    BP  119/70      Alergias     A partir del:  3/6/2017        Penicillins    Ace Inhibitors    Statins-hmg-coa Reductase Inhibitors      Vacunas     Administradas en la fecha de la visita:  3/6/2017        None      Orders Placed During Today's Visit     " Exámenes/Procedimientos futuros Se espera el Vence    CBC auto differential  3/6/2017 3/6/2018    Comprehensive metabolic panel  3/6/2017 3/6/2018    Lipid panel  3/6/2017 3/6/2018    Microalbumin/creatinine urine ratio  3/6/2017 (Approximate) 3/6/2018    TSH  3/6/2017 3/6/2018    Hemoglobin A1c  As directed 3/6/2018      Language Assistance Services     ATTENTION: Language assistance services are available, free of charge. Please call [phone].      ATENCIÓN: Si habla español, tiene a newton disposición servicios gratuitos de asistencia lingüística. Llame al [phone].     CHÚ Ý: N?u b?n nói Ti?ng Vi?t, có các d?ch v? h? tr? ngôn ng? mi?n phí dành cho b?n. G?i s? [phone].         [facility] Medicine cumple con las leyes federales aplicables de derechos civiles y no discrimina por motivos de katiana, color, origen nacional, edad, discapacidad, o sexo.                 Oneyda Shah   3/6/2017 9:00 AM   Office Visit    Description:  Female : 1940   Provider:  Zion Villavicencio MD   Department:  [facility]           Reason for Visit     Follow-up           Diagnoses this Visit        Comments    Hypothyroidism, unspecified type    -  Primary     Controlled type 2 diabetes mellitus with hyperglycemia, without long-term current use of insulin         Hypertension associated with diabetes         Hyperlipidemia associated with type 2 diabetes mellitus         Aortic atherosclerosis         Fatty liver         Vitamin D insufficiency         Anemia, unspecified type                To Do List           Future Appointments        Provider Department Dept Phone    3/6/2017 10:20 AM APPOINTMENT LAB, [place] [facility] [phone]    3/6/2017 10:30 AM APPOINTMENT LAB, [place] [facility] [phone]      Goals     Record home blood pressure     Notes - Note created  3/6/2017  9:22 AM by Zion Villvaicencio MD    GOAL BLOOD PRESSURE LESS THAN  140/90        [facility]     [facility] Nurse Care Line - 24/7 Assistance  Registered nurses in the [facility] provide clinical advisement, health education, appointment booking, and other advisory services.  Call for this free service at [phone].             Medications           Message regarding Medications     Verify the changes and/or additions to your medication regime listed below are the same as discussed with your clinician today.  If any of these changes or additions are incorrect, please notify your healthcare provider.             Verify that the below list of medications is an accurate representation of the medications you are currently taking.  If none reported, the list may be blank. If incorrect, please contact your healthcare provider. Carry this list with you in case of emergency.           Current Medications     amlodipine (NORVASC) 10 MG tablet Take 1 tablet (10 mg total) by mouth once daily.    aspirin (ECOTRIN) 81 MG EC tablet Take 81 mg by mouth once daily.      atenolol (TENORMIN) 50 MG tablet Take 1 tablet (50 mg total) by mouth once daily.    blood sugar diagnostic Strp Check daily, disp compatible brand with meter, 100 with 11 refills    blood sugar diagnostic Strp 1 strip by Misc.(Non-Drug; Combo Route) route once daily.    blood-glucose meter Misc Check daily sugars, dispense insurance covered blood sugar meter    calcium carbonate (OS-RAVINDER) 500 mg calcium (1,250 mg) tablet Take 1 tablet by mouth once daily.    cholecalciferol, vitamin D3, (VITAMIN D3) 1,000 unit capsule Take 1,000 Units by mouth once daily.    ibuprofen (ADVIL,MOTRIN) 800 MG tablet TAKE 1 TABLET BY MOUTH TWICE DAILY AS NEEDED FOR PAIN    lancets Misc     levothyroxine (SYNTHROID) 25 MCG tablet Take 1 tablet (25 mcg total) by mouth once daily.    losartan (COZAAR) 100 MG tablet Take 1 tablet (100 mg total) by mouth once daily.    metformin (GLUCOPHAGE) 1000 MG tablet Take 1 tablet (1,000 mg  "total) by mouth 2 (two) times daily.           Clinical Reference Information           Your Vitals Were     BP Pulse Temp Resp Height Weight    119/70 56 98.3 °F (36.8 °C) (Oral) 16 5' 1" (1.549 m) 62.6 kg (138 lb)    SpO2 BMI                97% 26.07 kg/m2          Blood Pressure          Most Recent Value    BP  119/70      Allergies as of 3/6/2017     Penicillins    Ace Inhibitors    Statins-hmg-coa Reductase Inhibitors      Immunizations Administered on Date of Encounter - 3/6/2017     None      Orders Placed During Today's Visit     Future Labs/Procedures Expected by Expires    CBC auto differential  3/6/2017 3/6/2018    Comprehensive metabolic panel  3/6/2017 3/6/2018    Lipid panel  3/6/2017 3/6/2018    Microalbumin/creatinine urine ratio  3/6/2017 (Approximate) 3/6/2018    TSH  3/6/2017 3/6/2018    Hemoglobin A1c  As directed 3/6/2018      Language Assistance Services     ATTENTION: Language assistance services are available, free of charge. Please call [phone].      ATENCIÓN: Si habla español, tiene a newton disposición servicios gratuitos de asistencia lingüística. Llame al [phone].     CYNTHIA Ý: N?u b?n nói Ti?ng Vi?t, có các d?ch v? h? tr? ngôn ng? mi?n phí dành cho b?n. G?i s? [phone].         [facility] complies with applicable Federal civil rights laws and does not discriminate on the basis of race, color, national origin, age, disability, or sex.          "

## 2017-03-06 NOTE — PROGRESS NOTES
(Portions of this note were dictated using voice recognition software and may contain dictation related errors in spelling/grammar/syntax not found on text review)    CC:   Chief Complaint   Patient presents with    Follow-up     3 month       HPI: 76 y.o. female here for three-month follow-up.  Last visit in December 2016.    Diabetes, on metformin 1000 mg twice a day. A1c has been controlled.  Compliant with eye exams and dentist. No neuropathy. Sees the eye doctor and  Dentist  No numbness or tingling in her feet.      Hypertension: On losartan 50 mg daily (half of a 100 mg pill), atenolol 50 mg daily, amlodipine 10 mg daily. Blood pressure controlled on intake.  Was rechecked by me at 152/70.  Patient does not check her blood pressures on ventilatory basis.  Had some issues in the past with blood pressure control but she was somewhat intolerant to increasing some of her medications because of weakness and dizziness.     Hyperlipidemia: Intolerant to all statins tried along with Zetia. Not currently on therapy for this at the moment. We discussed tolerating a suboptimal LDL given her intolerance to medication     History of hypothyroidism, has been controlled with [place]av[place] Thyroid 30 mg. however insurance will not longer cover this.  Switched over to Synthroid 25 µg her pre-conversion TSH was elevated as above but she had not been on her Napavine Thyroid for some time secondary to the insurance coverage issue     elevated LFTs, hepatitis testing negative, imaging late 2013. ultrasound showed fatty liver. There was one abnormal lesion on the gallbladder with CT demonstrated no gallbladder abnormalities. lfts slightly high, stable overall.     Bone density demonstrating osteopenia, up-to-date from 2015     Did have vitamin D insufficiency with a level of 25 Takes vitamin D     Mild stable chronic anemia. Has had normal B12 levels and folate levels. Did have mildly depressed iron saturation in Jan 2016.  Last ferritin  was 46.  Last iron saturation was 17 (December 2016)    Past Medical History:   Diagnosis Date    Aortic atherosclerosis     Diabetes mellitus type II     Fatty liver     High cholesterol     HLD (hyperlipidemia)     HTN (hypertension)     Hypothyroidism     Osteopenia     Thyroid disease        Past Surgical History:   Procedure Laterality Date    BREAST BIOPSY      BREAST SURGERY      biopsy    HYSTERECTOMY      still w/ovaries    ROTATOR CUFF REPAIR Left        Family History   Problem Relation Age of Onset    Diabetes Mother     Hypertension Mother     Diabetes Brother     Coronary artery disease Sister 55     MI    Liver cancer Sister     No Known Problems Father     Diabetes Daughter     No Known Problems Son     No Known Problems Brother        Social History     Social History    Marital status:      Spouse name: N/A    Number of children: N/A    Years of education: N/A     Occupational History    Not on file.     Social History Main Topics    Smoking status: Never Smoker    Smokeless tobacco: Not on file    Alcohol use No    Drug use: No    Sexual activity: Yes     Partners: Male     Other Topics Concern    Not on file     Social History Narrative     SCREENINGS   GYN: Dr. Dickey.   DEXA scan: September 3 2015, osteopenia   Mammogram 12/19/16  Colonoscopy: 2009, Dr. Wright, normal.      immunizations   Tetanus up to date   PVX: UTD   Prevnar: 1/2016    Lab Results   Component Value Date    WBC 8.68 12/05/2016    HGB 12.5 12/05/2016    HCT 38.0 12/05/2016     12/05/2016    CHOL 248 (H) 08/24/2016    TRIG 134 08/24/2016    HDL 42 08/24/2016    ALT 46 (H) 12/05/2016    AST 30 12/05/2016     (H) 12/05/2016    K 4.5 12/05/2016     12/05/2016    CREATININE 0.9 12/05/2016    BUN 13 12/05/2016    CO2 29 12/05/2016    TSH 5.198 (H) 12/05/2016    HGBA1C 6.3 (H) 12/05/2016    LDLCALC 179.2 (H) 08/24/2016     (H) 12/05/2016     Shasha level in December  2016 was 26    ROS:  GENERAL: No fever, chills, fatigability or weight loss.  SKIN: No rashes, no itching.  HEAD: No headaches.  EYES: No visual changes  EARS: No ear pain or changes in hearing.  NOSE: No congestion or rhinorrhea.  MOUTH & THROAT: No hoarseness, change in voice, or sore throat.  NODES: Denies swollen glands.  CHEST: Denies BARTON, cyanosis, wheezing, cough and sputum production.  CARDIOVASCULAR: Denies chest pain, PND, orthopnea.  ABDOMEN: Very occasional right upper quadrant pain possibly from PHN.  URINARY: No flank pain, dysuria or hematuria.  PERIPHERAL VASCULAR: No claudication or cyanosis.  MUSCULOSKELETAL: No joint stiffness or swelling. Denies back pain.  NEUROLOGIC: No weakness or numbness.    Vital signs reviewed  PE:   APPEARANCE: Well nourished, well developed, in no acute distress.    HEAD: Normocephalic, atraumatic.  EYES: PERRL. EOMI.   Conjunctivae noninjected.  EARS: TM's intact. Light reflex normal. No retraction or perforation  NOSE: Mucosa pink. Airway clear.  MOUTH & THROAT: No tonsillar enlargement. No pharyngeal erythema or exudate.   NECK: Supple with no cervical lymphadenopathy.  No carotid bruits.  No thyromegaly  CHEST: Good inspiratory effort. Lungs clear to auscultation with no wheezes or crackles.  CARDIOVASCULAR: Normal S1, S2. No rubs, murmurs, or gallops.  ABDOMEN: Bowel sounds normal. Not distended. Soft. No tenderness or masses. No organomegaly.  EXTREMITIES: No edema, cyanosis, or clubbing.  DIABETIC FOOT EXAM: Protective Sensation (w/ 10 gram monofilament):  Right: Intact  Left: Intact    Visual Inspection:  Normal -  Bilateral    Pedal Pulses:   Right: Present  Left: Present    Posterior tibialis:   Right:Present  Left: Present          IMPRESSION  1. Hypothyroidism, unspecified type    2. Controlled type 2 diabetes mellitus with hyperglycemia, without long-term current use of insulin    3. Hypertension associated with diabetes    4. Hyperlipidemia associated with  type 2 diabetes mellitus    5. Aortic atherosclerosis    6. Fatty liver    7. Vitamin D insufficiency    8. Anemia, unspecified type            PLAN  data review: Last office notes, labs, imaging, health maintenance as above    Orders Placed This Encounter   Procedures    Comprehensive metabolic panel    CBC auto differential    Lipid panel    TSH    Microalbumin/creatinine urine ratio    Hemoglobin A1c     Diabetes health maintenance:  -- advise regular and consistent glucose monitoring and medication compliance.  -- advise daily foot checks  -- advise yearly ophthalmologic exams  -- advise adequate dietary and exercise modification  -- advise regular dental visits  -- advise daily low-dose aspirin use if patient is not on other anticoagulants and there are no other contraindications.  -- Medication management: We will continue metformin 1000 mg twice a day.  Check labs.  Status has been controlled recently     Hyperlipidemia: Tolerating elevated lipids secondary to intolerance to multiple medications for lipid lowering as above    Hypertension:   Continue current therapy.  Advise ambulatory monitoring.  Goal blood pressure less than 140/90.    Hypothyroidism: Check TSH since transitioning over to Synthroid from [place] [facility]    Fatty liver: Continue monitoring LFTs    Mild bone deficiency anemia: Continue to monitor.  Last CBC showed normal H&H    Aortic atherosclerosis, likely from multiple risk factors as above.  Continue low-dose aspirin.  Unfortunately cannot recommend statin therapy as above    Health maintenance reviewed as above.  Will need DEXA scan at the end of this year

## 2017-03-07 ENCOUNTER — PATIENT MESSAGE (OUTPATIENT)
Dept: FAMILY MEDICINE | Facility: CLINIC | Age: 77
End: 2017-03-07

## 2017-03-08 RX ORDER — LEVOTHYROXINE SODIUM 25 UG/1
25 TABLET ORAL DAILY
Qty: 90 TABLET | Refills: 3 | Status: SHIPPED | OUTPATIENT
Start: 2017-03-08 | End: 2017-03-22 | Stop reason: SDUPTHER

## 2017-03-08 RX ORDER — IBUPROFEN 800 MG/1
800 TABLET ORAL 2 TIMES DAILY PRN
Qty: 60 TABLET | Refills: 0 | Status: SHIPPED | OUTPATIENT
Start: 2017-03-08 | End: 2017-03-20 | Stop reason: SDUPTHER

## 2017-03-09 RX ORDER — DEXTROSE 4 G
TABLET,CHEWABLE ORAL
Qty: 1 EACH | Refills: 0 | Status: SHIPPED | OUTPATIENT
Start: 2017-03-09 | End: 2019-05-22 | Stop reason: SDUPTHER

## 2017-03-09 RX ORDER — LANCETS
1 EACH MISCELLANEOUS DAILY
Qty: 90 EACH | Refills: 11 | Status: SHIPPED | OUTPATIENT
Start: 2017-03-09 | End: 2018-04-30 | Stop reason: SDUPTHER

## 2017-03-14 ENCOUNTER — PATIENT MESSAGE (OUTPATIENT)
Dept: FAMILY MEDICINE | Facility: CLINIC | Age: 77
End: 2017-03-14

## 2017-03-20 RX ORDER — IBUPROFEN 800 MG/1
800 TABLET ORAL 2 TIMES DAILY PRN
Qty: 60 TABLET | Refills: 0 | Status: SHIPPED | OUTPATIENT
Start: 2017-03-20 | End: 2018-03-23 | Stop reason: SDUPTHER

## 2017-03-20 NOTE — TELEPHONE ENCOUNTER
----- Message from Patricia Moore sent at 3/20/2017  8:16 AM CDT -----  Contact: [place]/[phone]  She needs a refill on the Ibuprofen 800 mg.

## 2017-03-23 RX ORDER — LEVOTHYROXINE SODIUM 25 UG/1
25 TABLET ORAL DAILY
Qty: 90 TABLET | Refills: 1 | Status: SHIPPED | OUTPATIENT
Start: 2017-03-23 | End: 2017-07-14 | Stop reason: SDUPTHER

## 2017-03-24 RX ORDER — IBUPROFEN 800 MG/1
800 TABLET ORAL 2 TIMES DAILY PRN
Qty: 180 TABLET | Refills: 0 | Status: SHIPPED | OUTPATIENT
Start: 2017-03-24 | End: 2018-01-26 | Stop reason: SDUPTHER

## 2017-05-10 RX ORDER — LOSARTAN POTASSIUM 100 MG/1
TABLET ORAL
Qty: 90 TABLET | Refills: 3 | Status: SHIPPED | OUTPATIENT
Start: 2017-05-10 | End: 2018-08-14

## 2017-06-12 ENCOUNTER — TELEPHONE (OUTPATIENT)
Dept: FAMILY MEDICINE | Facility: CLINIC | Age: 77
End: 2017-06-12

## 2017-06-12 NOTE — TELEPHONE ENCOUNTER
----- Message from Nikhil Jimenez sent at 6/12/2017  2:16 PM CDT -----  Contact: [phone]/self  Pt requesting to speak with the nurse about her 3 month f/u appointment.  Please advise

## 2017-07-10 ENCOUNTER — OFFICE VISIT (OUTPATIENT)
Dept: FAMILY MEDICINE | Facility: CLINIC | Age: 77
End: 2017-07-10
Payer: MEDICARE

## 2017-07-10 VITALS
OXYGEN SATURATION: 96 % | HEIGHT: 61 IN | WEIGHT: 144.63 LBS | HEART RATE: 65 BPM | SYSTOLIC BLOOD PRESSURE: 136 MMHG | DIASTOLIC BLOOD PRESSURE: 72 MMHG | BODY MASS INDEX: 27.3 KG/M2

## 2017-07-10 DIAGNOSIS — E11.59 HYPERTENSION ASSOCIATED WITH DIABETES: ICD-10-CM

## 2017-07-10 DIAGNOSIS — K76.0 FATTY LIVER: ICD-10-CM

## 2017-07-10 DIAGNOSIS — E03.9 HYPOTHYROIDISM, UNSPECIFIED TYPE: ICD-10-CM

## 2017-07-10 DIAGNOSIS — E13.29: Primary | ICD-10-CM

## 2017-07-10 DIAGNOSIS — R80.9: Primary | ICD-10-CM

## 2017-07-10 DIAGNOSIS — I15.2 HYPERTENSION ASSOCIATED WITH DIABETES: ICD-10-CM

## 2017-07-10 PROCEDURE — 99499 UNLISTED E&M SERVICE: CPT | Mod: S$GLB,,, | Performed by: FAMILY MEDICINE

## 2017-07-10 PROCEDURE — 1159F MED LIST DOCD IN RCRD: CPT | Mod: S$GLB,,, | Performed by: FAMILY MEDICINE

## 2017-07-10 PROCEDURE — 1126F AMNT PAIN NOTED NONE PRSNT: CPT | Mod: S$GLB,,, | Performed by: FAMILY MEDICINE

## 2017-07-10 PROCEDURE — 4010F ACE/ARB THERAPY RXD/TAKEN: CPT | Mod: S$GLB,,, | Performed by: FAMILY MEDICINE

## 2017-07-10 PROCEDURE — 3044F HG A1C LEVEL LT 7.0%: CPT | Mod: S$GLB,,, | Performed by: FAMILY MEDICINE

## 2017-07-10 PROCEDURE — 99214 OFFICE O/P EST MOD 30 MIN: CPT | Mod: S$GLB,,, | Performed by: FAMILY MEDICINE

## 2017-07-10 PROCEDURE — 99999 PR PBB SHADOW E&M-EST. PATIENT-LVL III: CPT | Mod: PBBFAC,,, | Performed by: FAMILY MEDICINE

## 2017-07-10 NOTE — PROGRESS NOTES
(Portions of this note were dictated using voice recognition software and may contain dictation related errors in spelling/grammar/syntax not found on text review)    CC:   Chief Complaint   Patient presents with    Follow-up       HPI: 76 y.o. female     Diabetes, on metformin 1000 mg twice a day. A1c has been controlled.  Compliant with eye exams and dentist. No neuropathy. Sees the eye doctor and  Dentist  No numbness or tingling in her feet.      Hypertension: On losartan up to 100 mg now daily, atenolol 50 mg daily, amlodipine 10 mg daily. Blood pressure controlled      Hyperlipidemia: Intolerant to all statins tried along with Zetia. Not currently on therapy for this at the moment. We discussed tolerating a suboptimal LDL given her intolerance to medication     History of hypothyroidism, Synthroid 25 µg      elevated LFTs, hepatitis testing negative, imaging late 2013. ultrasound showed fatty liver. There was one abnormal lesion on the gallbladder with CT demonstrated no gallbladder abnormalities. lfts slightly high, stable overall.     Bone density demonstrating osteopenia, up-to-date from 9/ 2015     Did have vitamin D insufficiency with a level of 26 (12/2016) Takes vitamin D      Very occasionally gets some abdominal pain sometimes epigastric or bilateral lower quadrant.  No association with food.  Pain is not constant.  No systemic symptoms.    Past Medical History:   Diagnosis Date    Aortic atherosclerosis     Diabetes mellitus type II     Fatty liver     High cholesterol     HLD (hyperlipidemia)     HTN (hypertension)     Hypothyroidism     Osteopenia     Thyroid disease        Past Surgical History:   Procedure Laterality Date    BREAST BIOPSY      BREAST SURGERY      biopsy    HYSTERECTOMY      still w/ovaries    ROTATOR CUFF REPAIR Left        Family History   Problem Relation Age of Onset    Diabetes Mother     Hypertension Mother     Diabetes Brother     Coronary artery disease  Sister 55     MI    Liver cancer Sister     No Known Problems Father     Diabetes Daughter     No Known Problems Son     No Known Problems Brother        Social History     Social History    Marital status:      Spouse name: N/A    Number of children: N/A    Years of education: N/A     Occupational History    Not on file.     Social History Main Topics    Smoking status: Never Smoker    Smokeless tobacco: Not on file    Alcohol use No    Drug use: No    Sexual activity: Yes     Partners: Male     Other Topics Concern    Not on file     Social History Narrative    No narrative on file     SCREENINGS   GYN: Dr. Dickey.   DEXA scan: September 3 2015, osteopenia   Mammogram 12/19/16  Colonoscopy: 2009, Dr. Wright, normal.      immunizations   Tetanus up to date   PVX: UTD   Prevnar: 1/2016    Lab Results   Component Value Date    WBC 9.09 03/06/2017    HGB 12.6 03/06/2017    HCT 37.9 03/06/2017     03/06/2017    CHOL 249 (H) 03/06/2017    TRIG 134 03/06/2017    HDL 50 03/06/2017    ALT 40 03/06/2017    AST 29 03/06/2017     03/06/2017    K 4.9 03/06/2017     03/06/2017    CREATININE 1.0 03/06/2017    BUN 15 03/06/2017    CO2 29 03/06/2017    TSH 3.604 03/06/2017    HGBA1C 6.8 (H) 03/06/2017    LDLCALC 172.2 (H) 03/06/2017     (H) 03/06/2017     [facility] 3/6/17: 39.8    ROS:  GENERAL: No fever, chills, fatigability or weight loss.  SKIN: No rashes, no itching.  HEAD: No headaches.  EYES: No visual changes  EARS: No ear pain or changes in hearing.  NOSE: No congestion or rhinorrhea.  MOUTH & THROAT: No hoarseness, change in voice, or sore throat.  NODES: Denies swollen glands.  CHEST: Denies BARTON, cyanosis, wheezing, cough and sputum production.  CARDIOVASCULAR: Denies chest pain, PND, orthopnea.  ABDOMEN: Above.  URINARY: No flank pain, dysuria or hematuria.  PERIPHERAL VASCULAR: No claudication or cyanosis.  MUSCULOSKELETAL: No joint stiffness or swelling. Denies back  pain.  NEUROLOGIC: No weakness or numbness.    Vital signs reviewed  PE:   APPEARANCE: Well nourished, well developed, in no acute distress.    HEAD: Normocephalic, atraumatic.  EYES: PERRL. EOMI.   Conjunctivae noninjected.  EARS: TM's intact. Light reflex normal. No retraction or perforation  NOSE: Mucosa pink. Airway clear.  MOUTH & THROAT: No tonsillar enlargement. No pharyngeal erythema or exudate.   NECK: Supple with no cervical lymphadenopathy.    CHEST: Good inspiratory effort. Lungs clear to auscultation with no wheezes or crackles.  CARDIOVASCULAR: Normal S1, S2. No rubs, murmurs, or gallops.  ABDOMEN: Bowel sounds normal. Not distended. Soft. No tenderness or masses. No organomegaly.  EXTREMITIES: No edema, cyanosis, or clubbing.      IMPRESSION  Encounter Diagnoses   Name Primary?   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  -- advise daily foot checks  -- advise yearly ophthalmologic exams  -- advise adequate dietary and exercise modification  -- advise regular dental visits  -- advise daily low-dose aspirin use if patient is not on other anticoagulants and there are no other contraindications.  -- Medication management: Continue metformin 1000 mg twice a day.  Was enrolled in a study for patients with proteinuria although she states that when doing further testing there she was told that everything was okay.  I will recheck a microalbumin to creatinine ratio given mild microalbuminuria noted on last test here.  Reassess A1c as below    Retention control with current treatment as above    Hypothyroidism: Recheck TSH given change to Synthroid 25 µg from her old [place] Thyroid last year    Very sporadic abdominal pain.  Benign exam.  If symptoms are getting  worse, return for repeat evaluation.  She had some bilateral lower quadrant pain reported very sporadically, could consider pelvic exam and pelvic ultrasound depending on progression of pain.  Of note, she is reported to see Dr. Dickey in OB/GYN although states that she has not seen him in many years now.    Orders Placed This Encounter   Procedures    Comprehensive metabolic panel    Hemoglobin A1c    Microalbumin/creatinine urine ratio    TSH

## 2017-07-12 ENCOUNTER — LAB VISIT (OUTPATIENT)
Dept: LAB | Facility: HOSPITAL | Age: 77
End: 2017-07-12
Attending: FAMILY MEDICINE
Payer: MEDICARE

## 2017-07-12 DIAGNOSIS — K76.0 FATTY LIVER: ICD-10-CM

## 2017-07-12 DIAGNOSIS — E11.59 HYPERTENSION ASSOCIATED WITH DIABETES: ICD-10-CM

## 2017-07-12 DIAGNOSIS — E13.29: ICD-10-CM

## 2017-07-12 DIAGNOSIS — E03.9 HYPOTHYROIDISM, UNSPECIFIED TYPE: ICD-10-CM

## 2017-07-12 DIAGNOSIS — R80.9: ICD-10-CM

## 2017-07-12 DIAGNOSIS — I15.2 HYPERTENSION ASSOCIATED WITH DIABETES: ICD-10-CM

## 2017-07-12 LAB
ALBUMIN SERPL BCP-MCNC: 4.1 G/DL
ALP SERPL-CCNC: 83 U/L
ALT SERPL W/O P-5'-P-CCNC: 37 U/L
ANION GAP SERPL CALC-SCNC: 10 MMOL/L
AST SERPL-CCNC: 29 U/L
BILIRUB SERPL-MCNC: 0.6 MG/DL
BUN SERPL-MCNC: 14 MG/DL
CALCIUM SERPL-MCNC: 10.3 MG/DL
CHLORIDE SERPL-SCNC: 104 MMOL/L
CO2 SERPL-SCNC: 29 MMOL/L
CREAT SERPL-MCNC: 1 MG/DL
EST. GFR  (AFRICAN AMERICAN): >60 ML/MIN/1.73 M^2
EST. GFR  (NON AFRICAN AMERICAN): 56 ML/MIN/1.73 M^2
GLUCOSE SERPL-MCNC: 149 MG/DL
POTASSIUM SERPL-SCNC: 4.5 MMOL/L
PROT SERPL-MCNC: 8.5 G/DL
SODIUM SERPL-SCNC: 143 MMOL/L
T4 FREE SERPL-MCNC: 1.06 NG/DL
TSH SERPL DL<=0.005 MIU/L-ACNC: 5.6 UIU/ML

## 2017-07-12 PROCEDURE — 84439 ASSAY OF FREE THYROXINE: CPT

## 2017-07-12 PROCEDURE — 80053 COMPREHEN METABOLIC PANEL: CPT

## 2017-07-12 PROCEDURE — 83036 HEMOGLOBIN GLYCOSYLATED A1C: CPT

## 2017-07-12 PROCEDURE — 36415 COLL VENOUS BLD VENIPUNCTURE: CPT

## 2017-07-12 PROCEDURE — 84443 ASSAY THYROID STIM HORMONE: CPT

## 2017-07-13 LAB
ESTIMATED AVG GLUCOSE: 148 MG/DL
HBA1C MFR BLD HPLC: 6.8 %

## 2017-07-14 ENCOUNTER — PATIENT MESSAGE (OUTPATIENT)
Dept: FAMILY MEDICINE | Facility: CLINIC | Age: 77
End: 2017-07-14

## 2017-07-14 RX ORDER — LEVOTHYROXINE SODIUM 50 UG/1
50 TABLET ORAL DAILY
Qty: 90 TABLET | Refills: 3 | Status: SHIPPED | OUTPATIENT
Start: 2017-07-14 | End: 2018-07-19 | Stop reason: SDUPTHER

## 2017-08-08 ENCOUNTER — TELEPHONE (OUTPATIENT)
Dept: FAMILY MEDICINE | Facility: CLINIC | Age: 77
End: 2017-08-08

## 2017-08-08 NOTE — TELEPHONE ENCOUNTER
----- Message from Symone Rivers sent at 8/8/2017  1:05 PM CDT -----  Contact: [phone]/ self   Pt called stating she went to the pharmacy to  her tyroid medication levothyroxine and she notice that Dr Villavicencio change the MG to 50 Mg . Pt used to take 25 mg . Pt wants to know why Dr Villavicencio change her medication , states he never told her anything about changing the dose . Please advise

## 2017-08-08 NOTE — TELEPHONE ENCOUNTER
Confirmed with patient that Dr Villavicencio does want her to increase her synthroid to 50ug due to labs showing an underactive thyroid.  Also advised patient that this was sent through [facility] on 7/14/2017. Patient verbalized understanding.

## 2017-08-22 DIAGNOSIS — Z12.11 COLON CANCER SCREENING: ICD-10-CM

## 2017-08-30 ENCOUNTER — TELEPHONE (OUTPATIENT)
Dept: FAMILY MEDICINE | Facility: CLINIC | Age: 77
End: 2017-08-30

## 2017-08-30 RX ORDER — CHLORHEXIDINE GLUCONATE ORAL RINSE 1.2 MG/ML
SOLUTION DENTAL
Refills: 0 | COMMUNITY
Start: 2017-07-28 | End: 2018-03-27

## 2017-08-30 RX ORDER — ATENOLOL 50 MG/1
50 TABLET ORAL DAILY
Qty: 90 TABLET | Refills: 3 | Status: SHIPPED | OUTPATIENT
Start: 2017-08-30 | End: 2018-05-23 | Stop reason: SDUPTHER

## 2017-08-30 NOTE — TELEPHONE ENCOUNTER
----- Message from Stefanie Hill sent at 8/30/2017  2:52 PM CDT -----  Contact: [phone]/self  Patient would like a refill of atenolol (TENORMIN) 50 MG tablet sent to [facility] on [address]. Please advise..

## 2017-08-30 NOTE — TELEPHONE ENCOUNTER
----- Message from Stefanie Hill sent at 8/30/2017  2:52 PM CDT -----  Contact: [phone]/self  Patient would like a refill of atenolol (TENORMIN) 50 MG tablet sent to [facility] on [address]. Please advise..

## 2017-08-31 NOTE — TELEPHONE ENCOUNTER
----- Message from Shasha Hall sent at 8/31/2017 10:16 AM CDT -----  Contact: Self [phone]  Patient is calling to talk to nurse concerning her medication.

## 2017-08-31 NOTE — TELEPHONE ENCOUNTER
----- Message from Shasha Hall sent at 8/31/2017 10:16 AM CDT -----  Contact: Self [phone]  Patient is calling to talk to nurse concerning her medication.

## 2017-08-31 NOTE — TELEPHONE ENCOUNTER
Patient went to pharmacy to  atenolol and they advised her that they do not have 50 mg.  Can this be dispensed differently?  Please advise.

## 2017-09-26 ENCOUNTER — OFFICE VISIT (OUTPATIENT)
Dept: OPTOMETRY | Facility: CLINIC | Age: 77
End: 2017-09-26
Payer: MEDICARE

## 2017-09-26 DIAGNOSIS — H52.03 HYPEROPIA WITH PRESBYOPIA OF BOTH EYES: ICD-10-CM

## 2017-09-26 DIAGNOSIS — H52.4 HYPEROPIA WITH PRESBYOPIA OF BOTH EYES: ICD-10-CM

## 2017-09-26 DIAGNOSIS — E11.9 TYPE 2 DIABETES MELLITUS WITHOUT OPHTHALMIC MANIFESTATIONS: Primary | ICD-10-CM

## 2017-09-26 DIAGNOSIS — H25.13 NUCLEAR SCLEROSIS, BILATERAL: ICD-10-CM

## 2017-09-26 DIAGNOSIS — H11.002 PTERYGIUM EYE, LEFT: ICD-10-CM

## 2017-09-26 PROCEDURE — 92004 COMPRE OPH EXAM NEW PT 1/>: CPT | Mod: S$GLB,,, | Performed by: OPTOMETRIST

## 2017-09-26 PROCEDURE — 99499 UNLISTED E&M SERVICE: CPT | Mod: S$GLB,,, | Performed by: OPTOMETRIST

## 2017-09-26 PROCEDURE — 99999 PR PBB SHADOW E&M-EST. PATIENT-LVL II: CPT | Mod: PBBFAC,,, | Performed by: OPTOMETRIST

## 2017-09-26 PROCEDURE — 92015 DETERMINE REFRACTIVE STATE: CPT | Mod: S$GLB,,, | Performed by: OPTOMETRIST

## 2017-09-26 RX ORDER — ATENOLOL 25 MG/1
TABLET ORAL
COMMUNITY
Start: 2017-09-01 | End: 2017-10-10

## 2017-09-27 NOTE — PROGRESS NOTES
HPI     Last eye exam was approximately 1 year ago.  Pt here for diabetic eye exam. C/o OS tearing, mild pain last week,   treated with oral allergy medication. Mild changes in vision, she likes to   sew and kike, wants to check accuracy of current rx.  Patient denies   flashes, floaters. unsure which gtts she is using. Ptyerguim OS for a   while, unsure how long. Glasses are 1 year old. Father lost vision in one   of his eyes, went white and he could no longer see.     Hemoglobin A1C       Date                     Value               Ref Range             Status                07/12/2017               6.8 (H)             4.0 - 5.6 %           Final                  Last edited by Laura Carcamo, OD on 9/26/2017  2:53 PM. (History)            Assessment /Plan     For exam results, see Encounter Report.    Type 2 diabetes mellitus without ophthalmic manifestations    Nuclear sclerosis, bilateral    Pterygium eye, left    Hyperopia with presbyopia of both eyes            1.  No retinopathy--monitor yearly.   Educated pt eye health correlates with blood sugar control.    2.  Educated on cataracts and affects on vision.  Monitor.  3.  Recommend artificial tears at least 2x/day.  Monitor for now.  4.  Bifocal rx given          RTC 1 year for dm exam.

## 2017-10-09 NOTE — PROGRESS NOTES
(Portions of this note were dictated using voice recognition software and may contain dictation related errors in spelling/grammar/syntax not found on text review)    CC:   Chief Complaint   Patient presents with    Follow-up       HPI: 76 y.o. female  Last office visit 7/10/17    Diabetes, on metformin 1000 mg twice a day. A1c has been controlled.  Compliant with eye exams and dentist. No neuropathy. Sees the eye doctor and  Dentist.  No numbness or tingling in her feet.      Hypertension: On losartan 100 mg daily, atenolol 50 mg daily, amlodipine 10 mg daily      Hyperlipidemia: Intolerant to all statins tried along with Zetia. Not currently on therapy for this at the moment. We discussed tolerating a suboptimal LDL given her intolerance to medication     History of hypothyroidism, Synthroid 50 µg increased from 25 dt hi tsh     elevated LFTs, hepatitis testing negative, imaging late 2013. ultrasound showed fatty liver. There was one abnormal lesion on the gallbladder with CT demonstrated no gallbladder abnormalities. lfts slightly high, stable overall.     Bone density demonstrating osteopenia, up-to-date from 9/ 2015     Did have vitamin D insufficiency with a level of 26 (12/2016) Takes vitamin D    Past Medical History:   Diagnosis Date    Aortic atherosclerosis     Diabetes mellitus type II     Fatty liver     High cholesterol     HLD (hyperlipidemia)     HTN (hypertension)     Hypothyroidism     Osteopenia     Thyroid disease        Past Surgical History:   Procedure Laterality Date    BREAST BIOPSY      BREAST SURGERY      biopsy    HYSTERECTOMY      still w/ovaries    ROTATOR CUFF REPAIR Left        Family History   Problem Relation Age of Onset    Diabetes Mother     Hypertension Mother     Diabetes Brother     Coronary artery disease Sister 55     MI    Liver cancer Sister     No Known Problems Father     Diabetes Daughter     No Known Problems Son     No Known Problems Brother         Social History     Social History    Marital status:      Spouse name: N/A    Number of children: N/A    Years of education: N/A     Occupational History    Not on file.     Social History Main Topics    Smoking status: Never Smoker    Smokeless tobacco: Never Used    Alcohol use No    Drug use: No    Sexual activity: Yes     Partners: Male     Other Topics Concern    Not on file     Social History Narrative    No narrative on file     SCREENINGS   GYN: Dr. Dickey.   DEXA scan: September 3 2015, osteopenia   Mammogram 12/19/16  Colonoscopy: 2009, Dr. Wright, normal.      immunizations   Tetanus up to date   PVX: UTD   Prevnar: 1/2016  Flu: declines    Lab Results   Component Value Date    WBC 9.09 03/06/2017    HGB 12.6 03/06/2017    HCT 37.9 03/06/2017     03/06/2017    CHOL 249 (H) 03/06/2017    TRIG 134 03/06/2017    HDL 50 03/06/2017    ALT 37 07/12/2017    AST 29 07/12/2017     07/12/2017    K 4.5 07/12/2017     07/12/2017    CREATININE 1.0 07/12/2017    BUN 14 07/12/2017    CO2 29 07/12/2017    TSH 5.603 (H) 07/12/2017    HGBA1C 6.8 (H) 07/12/2017    LDLCALC 172.2 (H) 03/06/2017     (H) 07/12/2017     ROS:  GENERAL: No fever, chills, fatigability or weight loss.  SKIN: No rashes, no itching.  HEAD: No headaches.  EYES: No visual changes  EARS: No ear pain or changes in hearing.  NOSE: No congestion or rhinorrhea.  MOUTH & THROAT: No hoarseness, change in voice, or sore throat.  NODES: Denies swollen glands.  CHEST: Denies BARTON, cyanosis, wheezing, cough and sputum production.  CARDIOVASCULAR: Denies chest pain, PND, orthopnea.  ABDOMEN: No nausea, vomiting, or changes in bowel function.  URINARY: No flank pain, dysuria or hematuria.  PERIPHERAL VASCULAR: No claudication or cyanosis.  MUSCULOSKELETAL: No joint stiffness or swelling. Denies back pain.  NEUROLOGIC: No weakness or numbness.    Vital signs reviewed  PE:   APPEARANCE: Well nourished, well developed,  in no acute distress.    HEAD: Normocephalic, atraumatic.  EYES: PERRL. EOMI.   Conjunctivae noninjected.  EARS: TM's intact. Light reflex normal. No retraction or perforation  NOSE: Mucosa pink. Airway clear.  MOUTH & THROAT: No tonsillar enlargement. No pharyngeal erythema or exudate.   NECK: Supple with no cervical lymphadenopathy.    CHEST: Good inspiratory effort. Lungs clear to auscultation with no wheezes or crackles.  CARDIOVASCULAR: Normal S1, S2. No rubs, murmurs, or gallops.  ABDOMEN: Bowel sounds normal. Not distended. Soft. No tenderness or masses. No organomegaly.  EXTREMITIES: No edema, cyanosis, or clubbing.      IMPRESSION  1. Osteopenia, unspecified location    2. Bone disorder    3. Hypertension associated with diabetes    4. Hyperlipidemia associated with type 2 diabetes mellitus    5. Hypothyroidism, unspecified type    6. Fatty liver    7. Controlled type 2 diabetes mellitus with hyperglycemia, without long-term current use of insulin            PLAN  Orders Placed This Encounter   Procedures    DXA Bone Density Spine And Hip    CBC auto differential    Comprehensive metabolic panel    Lipid panel    Hemoglobin A1c    TSH    Vitamin D     Diabetes health maintenance:  -- advise regular and consistent glucose monitoring and medication compliance.  -- advise daily foot checks  -- advise yearly ophthalmologic exams  -- advise adequate dietary and exercise modification  -- advise regular dental visits  -- advise daily low-dose aspirin use if patient is not on other anticoagulants and there are no other contraindications.  -- Medication management: Continue metformin twice a day 1000 mg.  Check labs    Hyperlipidemia: We will have to settle for suboptimal control given lack of tolerance to multiple medications    Hypertension controlled.  Continue current therapy    Recheck DEXA scan given osteopenia    Hypothyroid: Recheck TSH    Health maintenance: Flu shot advise the patient declines

## 2017-10-10 ENCOUNTER — LAB VISIT (OUTPATIENT)
Dept: LAB | Facility: HOSPITAL | Age: 77
End: 2017-10-10
Attending: FAMILY MEDICINE
Payer: MEDICARE

## 2017-10-10 ENCOUNTER — OFFICE VISIT (OUTPATIENT)
Dept: FAMILY MEDICINE | Facility: CLINIC | Age: 77
End: 2017-10-10
Payer: MEDICARE

## 2017-10-10 VITALS
OXYGEN SATURATION: 97 % | BODY MASS INDEX: 27.01 KG/M2 | SYSTOLIC BLOOD PRESSURE: 133 MMHG | WEIGHT: 143.06 LBS | HEART RATE: 58 BPM | HEIGHT: 61 IN | DIASTOLIC BLOOD PRESSURE: 70 MMHG

## 2017-10-10 DIAGNOSIS — I15.2 HYPERTENSION ASSOCIATED WITH DIABETES: ICD-10-CM

## 2017-10-10 DIAGNOSIS — M85.80 OSTEOPENIA, UNSPECIFIED LOCATION: Primary | ICD-10-CM

## 2017-10-10 DIAGNOSIS — K76.0 FATTY LIVER: ICD-10-CM

## 2017-10-10 DIAGNOSIS — E11.59 HYPERTENSION ASSOCIATED WITH DIABETES: ICD-10-CM

## 2017-10-10 DIAGNOSIS — M85.80 OSTEOPENIA, UNSPECIFIED LOCATION: ICD-10-CM

## 2017-10-10 DIAGNOSIS — M89.9 BONE DISORDER: ICD-10-CM

## 2017-10-10 DIAGNOSIS — E11.65 CONTROLLED TYPE 2 DIABETES MELLITUS WITH HYPERGLYCEMIA, WITHOUT LONG-TERM CURRENT USE OF INSULIN: ICD-10-CM

## 2017-10-10 DIAGNOSIS — E78.5 HYPERLIPIDEMIA ASSOCIATED WITH TYPE 2 DIABETES MELLITUS: ICD-10-CM

## 2017-10-10 DIAGNOSIS — E03.9 HYPOTHYROIDISM, UNSPECIFIED TYPE: ICD-10-CM

## 2017-10-10 DIAGNOSIS — E11.69 HYPERLIPIDEMIA ASSOCIATED WITH TYPE 2 DIABETES MELLITUS: ICD-10-CM

## 2017-10-10 LAB
25(OH)D3+25(OH)D2 SERPL-MCNC: 35 NG/ML
ALBUMIN SERPL BCP-MCNC: 3.8 G/DL
ALP SERPL-CCNC: 81 U/L
ALT SERPL W/O P-5'-P-CCNC: 47 U/L
ANION GAP SERPL CALC-SCNC: 9 MMOL/L
AST SERPL-CCNC: 34 U/L
BASOPHILS # BLD AUTO: 0.02 K/UL
BASOPHILS NFR BLD: 0.2 %
BILIRUB SERPL-MCNC: 0.6 MG/DL
BUN SERPL-MCNC: 20 MG/DL
CALCIUM SERPL-MCNC: 9.6 MG/DL
CHLORIDE SERPL-SCNC: 105 MMOL/L
CHOLEST SERPL-MCNC: 230 MG/DL
CHOLEST/HDLC SERPL: 5.3 {RATIO}
CO2 SERPL-SCNC: 28 MMOL/L
CREAT SERPL-MCNC: 0.9 MG/DL
DIFFERENTIAL METHOD: ABNORMAL
EOSINOPHIL # BLD AUTO: 0.2 K/UL
EOSINOPHIL NFR BLD: 2 %
ERYTHROCYTE [DISTWIDTH] IN BLOOD BY AUTOMATED COUNT: 13.5 %
EST. GFR  (AFRICAN AMERICAN): >60 ML/MIN/1.73 M^2
EST. GFR  (NON AFRICAN AMERICAN): >60 ML/MIN/1.73 M^2
ESTIMATED AVG GLUCOSE: 160 MG/DL
GLUCOSE SERPL-MCNC: 144 MG/DL
HBA1C MFR BLD HPLC: 7.2 %
HCT VFR BLD AUTO: 35 %
HDLC SERPL-MCNC: 43 MG/DL
HDLC SERPL: 18.7 %
HGB BLD-MCNC: 11.7 G/DL
LDLC SERPL CALC-MCNC: 159.4 MG/DL
LYMPHOCYTES # BLD AUTO: 2.8 K/UL
LYMPHOCYTES NFR BLD: 33 %
MCH RBC QN AUTO: 29.8 PG
MCHC RBC AUTO-ENTMCNC: 33.4 G/DL
MCV RBC AUTO: 89 FL
MONOCYTES # BLD AUTO: 0.7 K/UL
MONOCYTES NFR BLD: 8.5 %
NEUTROPHILS # BLD AUTO: 4.8 K/UL
NEUTROPHILS NFR BLD: 55.9 %
NONHDLC SERPL-MCNC: 187 MG/DL
PLATELET # BLD AUTO: 288 K/UL
PMV BLD AUTO: 10.9 FL
POTASSIUM SERPL-SCNC: 3.8 MMOL/L
PROT SERPL-MCNC: 8 G/DL
RBC # BLD AUTO: 3.93 M/UL
SODIUM SERPL-SCNC: 142 MMOL/L
TRIGL SERPL-MCNC: 138 MG/DL
TSH SERPL DL<=0.005 MIU/L-ACNC: 1.72 UIU/ML
WBC # BLD AUTO: 8.52 K/UL

## 2017-10-10 PROCEDURE — 36415 COLL VENOUS BLD VENIPUNCTURE: CPT

## 2017-10-10 PROCEDURE — 80053 COMPREHEN METABOLIC PANEL: CPT

## 2017-10-10 PROCEDURE — 99214 OFFICE O/P EST MOD 30 MIN: CPT | Mod: S$GLB,,, | Performed by: FAMILY MEDICINE

## 2017-10-10 PROCEDURE — 83036 HEMOGLOBIN GLYCOSYLATED A1C: CPT

## 2017-10-10 PROCEDURE — 82306 VITAMIN D 25 HYDROXY: CPT

## 2017-10-10 PROCEDURE — 84443 ASSAY THYROID STIM HORMONE: CPT

## 2017-10-10 PROCEDURE — 99999 PR PBB SHADOW E&M-EST. PATIENT-LVL IV: CPT | Mod: PBBFAC,,, | Performed by: FAMILY MEDICINE

## 2017-10-10 PROCEDURE — 85025 COMPLETE CBC W/AUTO DIFF WBC: CPT

## 2017-10-10 PROCEDURE — 80061 LIPID PANEL: CPT

## 2017-10-10 PROCEDURE — 99499 UNLISTED E&M SERVICE: CPT | Mod: S$GLB,,, | Performed by: FAMILY MEDICINE

## 2017-10-10 NOTE — PATIENT INSTRUCTIONS
Maintaining your health, diabetes checklist    -Make sure to follow a proper healthy diet to help control your blood sugars    -Try to get at least 30 minutes of moderate aerobic exercise at least 3-4 days a week    -Take all your medications as prescribed.     -If you are not on blood thinners like Coumadin, don't have a history of bleeding ulcers, and don't have any aspirin allergy, you should take an 81 mg Aspirin daily. (if you are unsure if you have a reason not to be on aspirin, call the office to double check)    -Monitor your blood sugars regularly and write them down in a journal so you can see if there are any patterns with your blood sugar control and other factors such as diet and activity levels.    -Make sure to follow up with your dentist for regular checkups (Diabetes and infections such as tooth and gum infections don't go well together. Also there is a link between tooth and gum disease and heart disease).    -Make sure to follow up with your eye doctor for a full dilated eye exam at least once a year. (Diabetes often can cause eye problems and is a leading cause of blindness)    -Check your feet everyday for a couple of minutes. Make sure you don't have any cuts, scrapes, bruises, or any other skin damage. (Diabetes puts you at high risk for infection, and often times can damage small nerves in your feet to where you may have a problem but not experience any pain or discomfort)

## 2017-10-11 ENCOUNTER — PATIENT MESSAGE (OUTPATIENT)
Dept: FAMILY MEDICINE | Facility: CLINIC | Age: 77
End: 2017-10-11

## 2017-10-12 ENCOUNTER — PATIENT MESSAGE (OUTPATIENT)
Dept: FAMILY MEDICINE | Facility: CLINIC | Age: 77
End: 2017-10-12

## 2017-10-12 ENCOUNTER — HOSPITAL ENCOUNTER (OUTPATIENT)
Dept: RADIOLOGY | Facility: HOSPITAL | Age: 77
Discharge: HOME | End: 2017-10-12
Attending: FAMILY MEDICINE
Payer: MEDICARE

## 2017-10-12 DIAGNOSIS — M85.80 OSTEOPENIA, UNSPECIFIED LOCATION: ICD-10-CM

## 2017-10-12 DIAGNOSIS — M89.9 BONE DISORDER: ICD-10-CM

## 2017-10-12 PROCEDURE — 77080 DXA BONE DENSITY AXIAL: CPT | Mod: 26,,, | Performed by: RADIOLOGY

## 2017-10-12 PROCEDURE — 77080 DXA BONE DENSITY AXIAL: CPT | Mod: TC

## 2017-11-20 ENCOUNTER — TELEPHONE (OUTPATIENT)
Dept: FAMILY MEDICINE | Facility: CLINIC | Age: 77
End: 2017-11-20

## 2017-11-20 DIAGNOSIS — R41.3 MEMORY DISTURBANCE: Primary | ICD-10-CM

## 2017-11-20 NOTE — TELEPHONE ENCOUNTER
Family concerned due to patients intermittant memory loss. She recently was driving and realized she didn't know where she was at nor where she was going. Can you recommend a specialist?

## 2017-11-20 NOTE — TELEPHONE ENCOUNTER
----- Message from Machelle Flynn sent at 11/20/2017 10:02 AM CST -----  Contact: Annalee (Daughter) -[phone]  Please call, patients daughter states that she is experiencing intermittent memory loss. She has been having spells of forgetting where she is or where she is going. Would like to speak to doctor and see who she recommends patient can see for this.

## 2017-12-19 ENCOUNTER — LAB VISIT (OUTPATIENT)
Dept: LAB | Facility: HOSPITAL | Age: 77
End: 2017-12-19
Attending: PSYCHIATRY & NEUROLOGY
Payer: MEDICARE

## 2017-12-19 ENCOUNTER — OFFICE VISIT (OUTPATIENT)
Dept: NEUROLOGY | Facility: CLINIC | Age: 77
End: 2017-12-19
Payer: MEDICARE

## 2017-12-19 VITALS
SYSTOLIC BLOOD PRESSURE: 130 MMHG | DIASTOLIC BLOOD PRESSURE: 73 MMHG | WEIGHT: 141.31 LBS | HEART RATE: 62 BPM | HEIGHT: 61 IN | BODY MASS INDEX: 26.68 KG/M2

## 2017-12-19 DIAGNOSIS — R41.3 MEMORY LOSS: ICD-10-CM

## 2017-12-19 DIAGNOSIS — R41.3 MEMORY LOSS: Primary | ICD-10-CM

## 2017-12-19 LAB — VIT B12 SERPL-MCNC: 408 PG/ML

## 2017-12-19 PROCEDURE — 99203 OFFICE O/P NEW LOW 30 MIN: CPT | Mod: S$GLB,,, | Performed by: PSYCHIATRY & NEUROLOGY

## 2017-12-19 PROCEDURE — 99999 PR PBB SHADOW E&M-EST. PATIENT-LVL IV: CPT | Mod: PBBFAC,,, | Performed by: PSYCHIATRY & NEUROLOGY

## 2017-12-19 PROCEDURE — 82607 VITAMIN B-12: CPT

## 2017-12-19 PROCEDURE — 36415 COLL VENOUS BLD VENIPUNCTURE: CPT

## 2017-12-19 NOTE — LETTER
December 19, 2017      Zion Villavicencio MD  [address]  [address]  [facility] [address]           [facility]  [address] [address]  Phone: [phone]  Fax: [phone]          Patient: Oneyda Shah   MR Number: [id number]   YOB: 1940   Date of Visit: 12/19/2017       Dear Dr. Zion Villavicencio:    Thank you for referring Oneyda Shah to me for evaluation. Attached you will find relevant portions of my assessment and plan of care.    If you have questions, please do not hesitate to call me. I look forward to following Oneyda Shah along with you.    Sincerely,    Villa Peña MD    Enclosure  CC:  No Recipients    If you would like to receive this communication electronically, please contact [email or web address] or [phone] to request more information on [employer] access.    For providers and/or their staff who would like to refer a patient to [facility], please contact us through our one-stop-shop provider referral line, [facility], [place], at [phone].    If you feel you have received this communication in error or would no longer like to receive these types of communications, please e-mail [email or web address]

## 2017-12-20 NOTE — PROGRESS NOTES
"  Oneyda Shah is a 76 y.o. year old female that presents for evaluation of memory loss  Chief Complaint   Patient presents with    Memory Loss   .     HPI:  I had the pleasure of seeing Mrs Oneyda Shah in consultation for short term memory loss. She is accompany by her daughters.  Mrs Shah has a medical history as detailed below as well as short term memory changes that became noticeable to her family at least a year ago and had become  More concerning to them.  Daughter reports that patient gets disoriented, drives around her neighborhood  and then doesn't know where she is or where she is going. She forgets many things, for instance wearing her false teeth when she is going out.  They report episodes in which she got confused about taking her medications, does not keep track of important dates in he family, misplaces things, had lost her wallet, and has " increased frankness, no filters when saying things to other people".  Sometimes losses her train of thoughts but does very well remembering conversations, the content of her conversations always makes sense, and her vocabulary is rather intact.  She is able to do her daily chores without trouble and keeps her house clean and organized. Cooks and no reported fires in the kitchen. Never being found wandering.Able to take care of her own finances and balance her checking account.  Gait is appropriate without falls. Bladder function intact.  No dream enacting behavior, tremors, or altered sense of smell. No fasciculations or visual distortions.   She snores, noted to gasp for air, and sometimes feels sleepy during the daytime.  No prior history of stroke, significant head injury, or alcohol intake.  Patient is completely functional and independent at this time.  Most recent labs reviewed and include normal TSH and normal.      Past Medical History:   Diagnosis Date    Aortic atherosclerosis     Diabetes mellitus type II     Fatty liver     High " "cholesterol     HLD (hyperlipidemia)     HTN (hypertension)     Hypothyroidism     Osteopenia     Thyroid disease      Social History     Social History    Marital status:      Spouse name: N/A    Number of children: N/A    Years of education: N/A     Occupational History    Not on file.     Social History Main Topics    Smoking status: Never Smoker    Smokeless tobacco: Never Used    Alcohol use No    Drug use: No    Sexual activity: Yes     Partners: Male     Other Topics Concern    Not on file     Social History Narrative    No narrative on file     Past Surgical History:   Procedure Laterality Date    BREAST BIOPSY      BREAST SURGERY      biopsy    HYSTERECTOMY      still w/ovaries    ROTATOR CUFF REPAIR Left      Family History   Problem Relation Age of Onset    Diabetes Mother     Hypertension Mother     Diabetes Brother     Coronary artery disease Sister 55     MI    Liver cancer Sister     No Known Problems Father     Diabetes Daughter     No Known Problems Son     No Known Problems Brother            Review of Systems  General ROS: negative for chills, fever or weight loss  Psychological ROS: negative for hallucination, depression or suicidal ideation  Ophthalmic ROS: negative for blurry vision, photophobia or eye pain  ENT ROS: negative for epistaxis, sore throat or rhinorrhea  Respiratory ROS: no cough, shortness of breath, or wheezing  Cardiovascular ROS: no chest pain or dyspnea on exertion  Gastrointestinal ROS: no abdominal pain, change in bowel habits, or black/ bloody stools  Genito-Urinary ROS: no dysuria, trouble voiding, or hematuria  Musculoskeletal ROS: negative for gait disturbance or muscular weakness  Neurological ROS: no syncope or seizures; no ataxia  Dermatological ROS: negative for pruritis, rash and jaundice      Physical Exam:  /73   Pulse 62   Ht 5' 1" (1.549 m)   Wt 64.1 kg (141 lb 5 oz)   BMI 26.70 kg/m²   General appearance: alert, " cooperative, no distress  Constitutional:Oriented to person, place, and time.appears well-developed and well-nourished.   HEENT: Normocephalic, atraumatic, neck symmetrical, no nasal discharge   Eyes: conjunctivae/corneas clear, PERRL, EOM's intact  Lungs: clear to auscultation bilaterally, no dullness to percussion bilaterally  Heart: regular rate and rhythm without rub; no displacement of the PMI   Abdomen: soft, non-tender; bowel sounds normoactive; no organomegaly  Extremities: extremities symmetric; no clubbing, cyanosis, or edema  Integument: Skin color, texture, turgor normal; no rashes; hair distrubution normal  Neurologic:      Mental status: alert and awake, oriented x 4, comprehension, naming, and repetition intact. No right to left confusion .No dysarthria. MMSE 27/30 (missed the 3 words at 5 min)  CN 2-12: pupils 4 mm bilaterally, reactive to light. Fundi without papilledema. Visual fields full to confrontation. EOM full without nystagmus. Face sensation normal in all distributions. Face symmetric. Hearing grossly intact. Palate elevates well. Tongue midline without atrophy or fasciculations.  Motor: 5/5 all over  Sensory: intact in all modalities.  DTR's: 2+ all over.  Plantars: no tested.  Coordination: finger to nose and heel-knee-shin intact.  Gait: no ataxia or bradykinesia  Psychiatric: no pressured speech; normal affect; no evidence of impaired cognition     LABS:    Complete Blood Count  Lab Results   Component Value Date    RBC 3.93 (L) 10/10/2017    HGB 11.7 (L) 10/10/2017    HCT 35.0 (L) 10/10/2017    MCV 89 10/10/2017    MCH 29.8 10/10/2017    MCHC 33.4 10/10/2017    RDW 13.5 10/10/2017     10/10/2017    MPV 10.9 10/10/2017    GRAN 4.8 10/10/2017    GRAN 55.9 10/10/2017    LYMPH 2.8 10/10/2017    LYMPH 33.0 10/10/2017    MONO 0.7 10/10/2017    MONO 8.5 10/10/2017    EOS 0.2 10/10/2017    BASO 0.02 10/10/2017    EOSINOPHIL 2.0 10/10/2017    BASOPHIL 0.2 10/10/2017    DIFFMETHOD  Automated 10/10/2017       Comprehensive Metabolic Panel  Lab Results   Component Value Date     (H) 10/10/2017    BUN 20 10/10/2017    CREATININE 0.9 10/10/2017     10/10/2017    K 3.8 10/10/2017     10/10/2017    PROT 8.0 10/10/2017    ALBUMIN 3.8 10/10/2017    BILITOT 0.6 10/10/2017    AST 34 10/10/2017    ALKPHOS 81 10/10/2017    CO2 28 10/10/2017    ALT 47 (H) 10/10/2017    ANIONGAP 9 10/10/2017    EGFRNONAA >60 10/10/2017    ESTGFRAFRICA >60 10/10/2017       TSH  Lab Results   Component Value Date    TSH 1.722 10/10/2017         Assessment:  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    ICD-10-CM ICD-9-CM    1. Memory loss R41.3 780.93 MRI Brain Without Contrast      Neuropsychological testing      VITAMIN B12     The encounter diagnosis was Memory loss.      Plan:  Orders Placed This Encounter   Procedures    MRI Brain Without Contrast    VITAMIN B12    Neuropsychological testing           Villa Peña MD

## 2017-12-26 ENCOUNTER — HOSPITAL ENCOUNTER (OUTPATIENT)
Dept: RADIOLOGY | Facility: HOSPITAL | Age: 77
Discharge: HOME | End: 2017-12-26
Attending: PSYCHIATRY & NEUROLOGY
Payer: MEDICARE

## 2017-12-26 DIAGNOSIS — R41.3 MEMORY LOSS: ICD-10-CM

## 2017-12-26 PROCEDURE — 70551 MRI BRAIN STEM W/O DYE: CPT | Mod: TC

## 2017-12-26 PROCEDURE — 70551 MRI BRAIN STEM W/O DYE: CPT | Mod: 26,,, | Performed by: RADIOLOGY

## 2018-01-12 ENCOUNTER — TELEPHONE (OUTPATIENT)
Dept: FAMILY MEDICINE | Facility: CLINIC | Age: 78
End: 2018-01-12

## 2018-01-12 NOTE — TELEPHONE ENCOUNTER
----- Message from Stefanie Hill sent at 1/11/2018  5:40 PM CST -----  Contact: [phone]/self  Patient would like to be seen sooner than the next available appointment. Please advise.

## 2018-01-12 NOTE — TELEPHONE ENCOUNTER
Returned patient's call.  Advised that there were no available slots prior to appt scheduled.  Advised would contact her if we got any cancellations.

## 2018-01-12 NOTE — TELEPHONE ENCOUNTER
----- Message from Stefanie Hill sent at 1/12/2018  3:30 PM CST -----  Contact: [phone]/self  Patient called in returning your call. Please advise.

## 2018-01-16 ENCOUNTER — TELEPHONE (OUTPATIENT)
Dept: NEUROLOGY | Facility: CLINIC | Age: 78
End: 2018-01-16

## 2018-01-16 NOTE — TELEPHONE ENCOUNTER
----- Message from Machelle Flynn sent at 1/16/2018 11:35 AM CST -----  Contact: Annalee (patients daughter) -[phone]  Patients daughter called stating that doctor ordered for patient to have an extensive cognitive test done. She would like to know when this appointment will be. Test was ordered in December.

## 2018-01-19 DIAGNOSIS — R41.3 MEMORY LOSS: Primary | ICD-10-CM

## 2018-01-26 ENCOUNTER — OFFICE VISIT (OUTPATIENT)
Dept: FAMILY MEDICINE | Facility: CLINIC | Age: 78
End: 2018-01-26
Payer: MEDICARE

## 2018-01-26 VITALS
TEMPERATURE: 99 F | HEIGHT: 61 IN | SYSTOLIC BLOOD PRESSURE: 134 MMHG | HEART RATE: 56 BPM | WEIGHT: 141.13 LBS | BODY MASS INDEX: 26.65 KG/M2 | OXYGEN SATURATION: 99 % | DIASTOLIC BLOOD PRESSURE: 76 MMHG

## 2018-01-26 DIAGNOSIS — I15.2 HYPERTENSION ASSOCIATED WITH DIABETES: Primary | ICD-10-CM

## 2018-01-26 DIAGNOSIS — E03.9 HYPOTHYROIDISM, UNSPECIFIED TYPE: ICD-10-CM

## 2018-01-26 DIAGNOSIS — E78.5 HYPERLIPIDEMIA ASSOCIATED WITH TYPE 2 DIABETES MELLITUS: ICD-10-CM

## 2018-01-26 DIAGNOSIS — E11.69 HYPERLIPIDEMIA ASSOCIATED WITH TYPE 2 DIABETES MELLITUS: ICD-10-CM

## 2018-01-26 DIAGNOSIS — E11.59 HYPERTENSION ASSOCIATED WITH DIABETES: Primary | ICD-10-CM

## 2018-01-26 PROCEDURE — 99499 UNLISTED E&M SERVICE: CPT | Mod: S$GLB,,, | Performed by: FAMILY MEDICINE

## 2018-01-26 PROCEDURE — 99999 PR PBB SHADOW E&M-EST. PATIENT-LVL III: CPT | Mod: PBBFAC,,, | Performed by: FAMILY MEDICINE

## 2018-01-26 PROCEDURE — 99214 OFFICE O/P EST MOD 30 MIN: CPT | Mod: S$GLB,,, | Performed by: FAMILY MEDICINE

## 2018-01-26 RX ORDER — IBUPROFEN 800 MG/1
800 TABLET ORAL 2 TIMES DAILY PRN
Qty: 60 TABLET | Refills: 0 | Status: SHIPPED | OUTPATIENT
Start: 2018-01-26 | End: 2018-02-23 | Stop reason: SDUPTHER

## 2018-01-26 RX ORDER — ATENOLOL 25 MG/1
TABLET ORAL
Refills: 2 | COMMUNITY
Start: 2017-11-28 | End: 2018-01-26

## 2018-01-26 NOTE — PROGRESS NOTES
(Portions of this note were dictated using voice recognition software and may contain dictation related errors in spelling/grammar/syntax not found on text review)    CC:   Chief Complaint   Patient presents with    Follow-up       HPI: 77 y.o. female lov 10/2017    Diabetes, on metformin 1000 mg twice a day. A1c has been controlled.  Compliant with eye exams and dentist. No neuropathy. Sees the eye doctor and  Dentist.  No numbness or tingling in her feet.      Hypertension: On losartan 100 mg daily, atenolol 50 mg daily, amlodipine 10 mg daily      Hyperlipidemia: Intolerant to all statins tried along with Zetia. Not currently on therapy for this at the moment. We discussed tolerating a suboptimal LDL given her intolerance to medication     History of hypothyroidism, Synthroid 50 µg increased from 25 dt hi tsh     elevated LFTs, hepatitis testing negative, imaging late 2013. ultrasound showed fatty liver. There was one abnormal lesion on the gallbladder with CT demonstrated no gallbladder abnormalities. lfts slightly high, stable overall. occ will get some ruq pain but sporadic and not consistent     Bone density demonstrating osteopenia in 2015, utd 2017     Did have vitamin D insufficiency in past. Takes vitamin D    Past Medical History:   Diagnosis Date    Aortic atherosclerosis     Diabetes mellitus type II     Fatty liver     High cholesterol     HLD (hyperlipidemia)     HTN (hypertension)     Hypothyroidism     Osteopenia     Thyroid disease        Past Surgical History:   Procedure Laterality Date    BREAST BIOPSY      BREAST SURGERY      biopsy    HYSTERECTOMY      still w/ovaries    ROTATOR CUFF REPAIR Left        Family History   Problem Relation Age of Onset    Diabetes Mother     Hypertension Mother     Diabetes Brother     Coronary artery disease Sister 55     MI    Liver cancer Sister     No Known Problems Father     Diabetes Daughter     No Known Problems Son     No Known  Problems Brother        Social History     Social History    Marital status:      Spouse name: N/A    Number of children: N/A    Years of education: N/A     Occupational History    Not on file.     Social History Main Topics    Smoking status: Never Smoker    Smokeless tobacco: Never Used    Alcohol use No    Drug use: No    Sexual activity: Yes     Partners: Male     Other Topics Concern    Not on file     Social History Narrative    No narrative on file     SCREENINGS   GYN: Dr. Dickey.   DEXA scan: 2017 normal   Mammogram 12/19/16  Colonoscopy: 2009, Dr. Wright, normal.      immunizations   Tetanus up to date   PVX: UTD   Prevnar: 1/2016  Flu: declines    Lab Results   Component Value Date    WBC 8.52 10/10/2017    HGB 11.7 (L) 10/10/2017    HCT 35.0 (L) 10/10/2017     10/10/2017    CHOL 230 (H) 10/10/2017    TRIG 138 10/10/2017    HDL 43 10/10/2017    ALT 47 (H) 10/10/2017    AST 34 10/10/2017     10/10/2017    K 3.8 10/10/2017     10/10/2017    CREATININE 0.9 10/10/2017    CALCIUM 9.6 10/10/2017    BUN 20 10/10/2017    CO2 28 10/10/2017    TSH 1.722 10/10/2017    HGBA1C 7.2 (H) 10/10/2017    LDLCALC 159.4 (H) 10/10/2017     (H) 10/10/2017     vitamin D was 35  Vitamin B12 was 408      ROS:  GENERAL: No fever, chills, fatigability or weight loss.  SKIN: No rashes, no itching.  HEAD: No headaches.  EYES: No visual changes  EARS: No ear pain or changes in hearing.  NOSE: No congestion or rhinorrhea.  MOUTH & THROAT: No hoarseness, change in voice, or sore throat.  NODES: Denies swollen glands.  CHEST: Denies BARTON, cyanosis, wheezing, cough and sputum production.  CARDIOVASCULAR: Denies chest pain, PND, orthopnea.  ABDOMEN: Above  URINARY: No flank pain, dysuria or hematuria.  PERIPHERAL VASCULAR: No claudication or cyanosis.  MUSCULOSKELETAL: No joint stiffness or swelling. Denies back pain.  NEUROLOGIC: No weakness or numbness.    Vital signs reviewed  PE:   APPEARANCE:  Well nourished, well developed, in no acute distress.    HEAD: Normocephalic, atraumatic.  EYES: PERRL. EOMI.   Conjunctivae noninjected.  EARS: TM's intact. Light reflex normal. No retraction or perforation  NOSE: Mucosa pink. Airway clear.  MOUTH & THROAT: No tonsillar enlargement. No pharyngeal erythema or exudate.   NECK: Supple with no cervical lymphadenopathy.    CHEST: Good inspiratory effort. Lungs clear to auscultation with no wheezes or crackles.  CARDIOVASCULAR: Normal S1, S2. No rubs, murmurs, or gallops.  ABDOMEN: Bowel sounds normal. Not distended. Soft. No tenderness or masses. No organomegaly.  EXTREMITIES: No edema, cyanosis, or clubbing.  DIABETIC FOOT EXAM: Protective Sensation (w/ 10 gram monofilament):  Right: Intact  Left: Intact    Visual Inspection:  Normal -  Bilateral    Pedal Pulses:   Right: Present  Left: Present    Posterior tibialis:   Right:Present  Left: Present          IMPRESSION  1. Hypertension associated with diabetes    2. Hyperlipidemia associated with type 2 diabetes mellitus    3. Hypothyroidism, unspecified type    4. Uncontrolled type 2 diabetes mellitus without complication, without long-term current use of insulin              PLAN  Diabetes health maintenance:  -- advise regular and consistent glucose monitoring and medication compliance.  -- advise daily foot checks  -- advise yearly ophthalmologic exams  -- advise adequate dietary and exercise modification  -- advise regular dental visits  -- advise daily low-dose aspirin use if patient is not on other anticoagulants and there are no other contraindications.  -- Medication management: Continue metformin 1000 twice a day.  Patient states that she is working on improving her eating habits to hopefully improve her A1c level    Hypertension controlled.  Continue current therapy    Hyperlipidemia: Unfortunate cannot take statins or Zetia because of intolerance.  We will hold off on these medications and will have to  tolerate suboptimal lipid control.  Try best to monitor her eating habits and get exercise to best control cholesterol    Hypothyroidism: Continue Synthroid.  Check TSH    Orders Placed This Encounter   Procedures    Comprehensive metabolic panel    Hemoglobin A1c    TSH    CBC auto differential

## 2018-01-29 ENCOUNTER — LAB VISIT (OUTPATIENT)
Dept: LAB | Facility: HOSPITAL | Age: 78
End: 2018-01-29
Attending: FAMILY MEDICINE
Payer: MEDICARE

## 2018-01-29 ENCOUNTER — PATIENT MESSAGE (OUTPATIENT)
Dept: FAMILY MEDICINE | Facility: CLINIC | Age: 78
End: 2018-01-29

## 2018-01-29 DIAGNOSIS — I15.2 HYPERTENSION ASSOCIATED WITH DIABETES: ICD-10-CM

## 2018-01-29 DIAGNOSIS — E11.59 HYPERTENSION ASSOCIATED WITH DIABETES: ICD-10-CM

## 2018-01-29 DIAGNOSIS — E78.5 HYPERLIPIDEMIA ASSOCIATED WITH TYPE 2 DIABETES MELLITUS: ICD-10-CM

## 2018-01-29 DIAGNOSIS — E11.69 HYPERLIPIDEMIA ASSOCIATED WITH TYPE 2 DIABETES MELLITUS: ICD-10-CM

## 2018-01-29 DIAGNOSIS — E03.9 HYPOTHYROIDISM, UNSPECIFIED TYPE: ICD-10-CM

## 2018-01-29 LAB
ALBUMIN SERPL BCP-MCNC: 3.7 G/DL
ALP SERPL-CCNC: 81 U/L
ALT SERPL W/O P-5'-P-CCNC: 40 U/L
ANION GAP SERPL CALC-SCNC: 8 MMOL/L
AST SERPL-CCNC: 30 U/L
BASOPHILS # BLD AUTO: 0.01 K/UL
BASOPHILS NFR BLD: 0.1 %
BILIRUB SERPL-MCNC: 0.5 MG/DL
BUN SERPL-MCNC: 13 MG/DL
CALCIUM SERPL-MCNC: 10 MG/DL
CHLORIDE SERPL-SCNC: 105 MMOL/L
CO2 SERPL-SCNC: 28 MMOL/L
CREAT SERPL-MCNC: 0.9 MG/DL
DIFFERENTIAL METHOD: ABNORMAL
EOSINOPHIL # BLD AUTO: 0.2 K/UL
EOSINOPHIL NFR BLD: 2.4 %
ERYTHROCYTE [DISTWIDTH] IN BLOOD BY AUTOMATED COUNT: 13.5 %
EST. GFR  (AFRICAN AMERICAN): >60 ML/MIN/1.73 M^2
EST. GFR  (NON AFRICAN AMERICAN): >60 ML/MIN/1.73 M^2
ESTIMATED AVG GLUCOSE: 146 MG/DL
GLUCOSE SERPL-MCNC: 142 MG/DL
HBA1C MFR BLD HPLC: 6.7 %
HCT VFR BLD AUTO: 35.8 %
HGB BLD-MCNC: 11.9 G/DL
LYMPHOCYTES # BLD AUTO: 3.1 K/UL
LYMPHOCYTES NFR BLD: 37 %
MCH RBC QN AUTO: 29.6 PG
MCHC RBC AUTO-ENTMCNC: 33.2 G/DL
MCV RBC AUTO: 89 FL
MONOCYTES # BLD AUTO: 0.7 K/UL
MONOCYTES NFR BLD: 8.7 %
NEUTROPHILS # BLD AUTO: 4.3 K/UL
NEUTROPHILS NFR BLD: 51.7 %
PLATELET # BLD AUTO: 279 K/UL
PMV BLD AUTO: 11.3 FL
POTASSIUM SERPL-SCNC: 4.8 MMOL/L
PROT SERPL-MCNC: 7.8 G/DL
RBC # BLD AUTO: 4.02 M/UL
SODIUM SERPL-SCNC: 141 MMOL/L
TSH SERPL DL<=0.005 MIU/L-ACNC: 3.15 UIU/ML
WBC # BLD AUTO: 8.38 K/UL

## 2018-01-29 PROCEDURE — 36415 COLL VENOUS BLD VENIPUNCTURE: CPT

## 2018-01-29 PROCEDURE — 80053 COMPREHEN METABOLIC PANEL: CPT

## 2018-01-29 PROCEDURE — 85025 COMPLETE CBC W/AUTO DIFF WBC: CPT

## 2018-01-29 PROCEDURE — 83036 HEMOGLOBIN GLYCOSYLATED A1C: CPT

## 2018-01-29 PROCEDURE — 84443 ASSAY THYROID STIM HORMONE: CPT

## 2018-02-15 RX ORDER — AMLODIPINE BESYLATE 10 MG/1
TABLET ORAL
Qty: 90 TABLET | Refills: 3 | Status: SHIPPED | OUTPATIENT
Start: 2018-02-15 | End: 2018-08-14

## 2018-02-23 RX ORDER — IBUPROFEN 800 MG/1
TABLET ORAL
Qty: 60 TABLET | Refills: 0 | Status: SHIPPED | OUTPATIENT
Start: 2018-02-23 | End: 2018-04-29 | Stop reason: SDUPTHER

## 2018-03-07 ENCOUNTER — TELEPHONE (OUTPATIENT)
Dept: NEUROLOGY | Facility: CLINIC | Age: 78
End: 2018-03-07

## 2018-03-07 DIAGNOSIS — R41.3 MEMORY LOSS: Primary | ICD-10-CM

## 2018-03-07 NOTE — TELEPHONE ENCOUNTER
The patient needs the orders for the neuro psych testing, also the patient would like the MRI results the patient would like the daughter to be called [phone] her name is Annalee

## 2018-03-19 ENCOUNTER — PATIENT MESSAGE (OUTPATIENT)
Dept: FAMILY MEDICINE | Facility: CLINIC | Age: 78
End: 2018-03-19

## 2018-03-20 ENCOUNTER — TELEPHONE (OUTPATIENT)
Dept: FAMILY MEDICINE | Facility: CLINIC | Age: 78
End: 2018-03-20

## 2018-03-20 NOTE — TELEPHONE ENCOUNTER
----- Message from Machelle Flynn sent at 3/20/2018  1:11 PM CDT -----  Contact: Annalee (daughter)- [phone]  Patients daughter called requesting for patient to be seen by a cardiologist. Appointment was made  to see Dr Livingston on 3/27 at 9 am. She states that patient has been experiencing fluctuating blood pressure. The last few times she has checked it, it has been low. Patient has not been taking blood pressure medication due to her pressure being low. Patient is scared and is requesting to speak with Dr Villavicencio .

## 2018-03-20 NOTE — TELEPHONE ENCOUNTER
Patient states that her blood pressure had been reading low.  She stopped taking her b/p medication due to her feeling like she is having heart palpitations. Today her b/p readings were as follows: 129/67, 144/63, and 137/57.

## 2018-03-23 RX ORDER — IBUPROFEN 800 MG/1
TABLET ORAL
Qty: 60 TABLET | Refills: 0 | Status: SHIPPED | OUTPATIENT
Start: 2018-03-23 | End: 2018-07-01 | Stop reason: SDUPTHER

## 2018-03-27 ENCOUNTER — OFFICE VISIT (OUTPATIENT)
Dept: CARDIOLOGY | Facility: CLINIC | Age: 78
End: 2018-03-27
Payer: MEDICARE

## 2018-03-27 VITALS
HEIGHT: 64 IN | DIASTOLIC BLOOD PRESSURE: 75 MMHG | HEART RATE: 76 BPM | BODY MASS INDEX: 23.9 KG/M2 | SYSTOLIC BLOOD PRESSURE: 175 MMHG | WEIGHT: 140 LBS

## 2018-03-27 DIAGNOSIS — E11.65 CONTROLLED TYPE 2 DIABETES MELLITUS WITH HYPERGLYCEMIA, WITHOUT LONG-TERM CURRENT USE OF INSULIN: ICD-10-CM

## 2018-03-27 DIAGNOSIS — I15.2 HYPERTENSION DUE TO ENDOCRINE DISORDER: Primary | ICD-10-CM

## 2018-03-27 DIAGNOSIS — R00.2 HEART PALPITATIONS: ICD-10-CM

## 2018-03-27 DIAGNOSIS — E03.9 HYPOTHYROIDISM, UNSPECIFIED TYPE: ICD-10-CM

## 2018-03-27 DIAGNOSIS — I70.0 AORTIC ATHEROSCLEROSIS: ICD-10-CM

## 2018-03-27 DIAGNOSIS — E78.5 HYPERLIPIDEMIA, UNSPECIFIED HYPERLIPIDEMIA TYPE: ICD-10-CM

## 2018-03-27 PROCEDURE — 99499 UNLISTED E&M SERVICE: CPT | Mod: S$GLB,,, | Performed by: INTERNAL MEDICINE

## 2018-03-27 PROCEDURE — 93000 ELECTROCARDIOGRAM COMPLETE: CPT | Mod: S$GLB,,, | Performed by: INTERNAL MEDICINE

## 2018-03-27 PROCEDURE — 3077F SYST BP >= 140 MM HG: CPT | Mod: CPTII,S$GLB,, | Performed by: INTERNAL MEDICINE

## 2018-03-27 PROCEDURE — 3044F HG A1C LEVEL LT 7.0%: CPT | Mod: CPTII,S$GLB,, | Performed by: INTERNAL MEDICINE

## 2018-03-27 PROCEDURE — 3078F DIAST BP <80 MM HG: CPT | Mod: CPTII,S$GLB,, | Performed by: INTERNAL MEDICINE

## 2018-03-27 PROCEDURE — 99204 OFFICE O/P NEW MOD 45 MIN: CPT | Mod: S$GLB,,, | Performed by: INTERNAL MEDICINE

## 2018-03-27 PROCEDURE — 99999 PR PBB SHADOW E&M-EST. PATIENT-LVL III: CPT | Mod: PBBFAC,,, | Performed by: INTERNAL MEDICINE

## 2018-03-27 NOTE — PROGRESS NOTES
Subjective:    Patient ID:  Oneyda Shah is a 77 y.o. female who presents for evaluation of Hypertension      HPI  76 y/o Tuvaluan speaking female with hx of HTN, HLD, DM, hypothyroidism who presents for evaluation of HTN. She has a BP log with her and -130 while taking her meds and 130-60. She has not been taking her meds regularly bc she thinks her BP is too low. She has also been having palpitations. Had palpitations years ago and was started on atenolol with improvement. She denies CP, BARTON, orthopnea, PND, syncope, LE edema.     Review of Systems   Constitution: Negative for weakness and malaise/fatigue.   HENT: Negative for congestion.    Eyes: Negative for blurred vision.   Cardiovascular: Positive for dyspnea on exertion and palpitations. Negative for chest pain, claudication, cyanosis, irregular heartbeat, leg swelling, near-syncope, orthopnea, paroxysmal nocturnal dyspnea and syncope.   Respiratory: Negative for shortness of breath.    Endocrine: Negative for polyuria.   Hematologic/Lymphatic: Negative for bleeding problem.   Skin: Negative for itching and rash.   Musculoskeletal: Negative for joint swelling, muscle cramps and muscle weakness.   Gastrointestinal: Negative for abdominal pain, hematemesis, hematochezia, melena, nausea and vomiting.   Genitourinary: Negative for dysuria and hematuria.   Neurological: Negative for dizziness, focal weakness, headaches, light-headedness and loss of balance.   Psychiatric/Behavioral: Negative for depression. The patient is not nervous/anxious.         Objective:    Physical Exam   Constitutional: She is oriented to person, place, and time. She appears well-developed and well-nourished.   HENT:   Head: Normocephalic and atraumatic.   Neck: Neck supple. No JVD present.   Cardiovascular: Normal rate, regular rhythm and normal heart sounds.    Pulses:       Carotid pulses are 2+ on the right side, and 2+ on the left side.       Radial pulses are 2+ on the  right side, and 2+ on the left side.        Femoral pulses are 2+ on the right side, and 2+ on the left side.       Dorsalis pedis pulses are 2+ on the right side, and 2+ on the left side.        Posterior tibial pulses are 2+ on the right side, and 2+ on the left side.   Pulmonary/Chest: Effort normal and breath sounds normal.   Abdominal: Soft. Bowel sounds are normal.   Musculoskeletal: She exhibits no edema.   Neurological: She is alert and oriented to person, place, and time.   Skin: Skin is warm and dry.   Psychiatric: She has a normal mood and affect. Her behavior is normal. Thought content normal.         Assessment:       1. Hypertension due to endocrine disorder    2. Heart palpitations    3. Hyperlipidemia, unspecified hyperlipidemia type    4. Aortic atherosclerosis    5. Controlled type 2 diabetes mellitus with hyperglycemia, without long-term current use of insulin    6. Hypothyroidism, unspecified type      78 y/o female with hx and presentation as above. We discussed the importance of medication compliance for HTN. Will evaluate symptoms with 2DE and holter monitor.       Plan:       -2DE with CFD  -Holter monitor x 24 hours  -F/u phone review

## 2018-04-05 ENCOUNTER — HOSPITAL ENCOUNTER (OUTPATIENT)
Dept: CARDIOLOGY | Facility: HOSPITAL | Age: 78
Discharge: HOME | End: 2018-04-05
Attending: INTERNAL MEDICINE
Payer: MEDICARE

## 2018-04-05 DIAGNOSIS — I15.2 HYPERTENSION DUE TO ENDOCRINE DISORDER: ICD-10-CM

## 2018-04-05 LAB
DIASTOLIC DYSFUNCTION: YES
ESTIMATED PA SYSTOLIC PRESSURE: 26.81
MITRAL VALVE MOBILITY: NORMAL
RETIRED EF AND QEF - SEE NOTES: 65 (ref 55–65)
TRICUSPID VALVE REGURGITATION: ABNORMAL

## 2018-04-05 PROCEDURE — 93225 XTRNL ECG REC<48 HRS REC: CPT

## 2018-04-05 PROCEDURE — 93227 XTRNL ECG REC<48 HR R&I: CPT | Mod: ,,, | Performed by: INTERNAL MEDICINE

## 2018-04-05 PROCEDURE — 93306 TTE W/DOPPLER COMPLETE: CPT

## 2018-04-05 PROCEDURE — 93306 TTE W/DOPPLER COMPLETE: CPT | Mod: 26,,, | Performed by: INTERNAL MEDICINE

## 2018-04-06 ENCOUNTER — PATIENT MESSAGE (OUTPATIENT)
Dept: CARDIOLOGY | Facility: CLINIC | Age: 78
End: 2018-04-06

## 2018-04-10 ENCOUNTER — TELEPHONE (OUTPATIENT)
Dept: FAMILY MEDICINE | Facility: CLINIC | Age: 78
End: 2018-04-10

## 2018-04-10 ENCOUNTER — PATIENT MESSAGE (OUTPATIENT)
Dept: CARDIOLOGY | Facility: CLINIC | Age: 78
End: 2018-04-10

## 2018-04-10 NOTE — TELEPHONE ENCOUNTER
----- Message from Estefani Jeffers sent at 4/10/2018  1:12 PM CDT -----  Contact: Self/ [phone]    Patient would like to be seen sooner than the next available appointment. She needs a 3 month follow up. And she will be out of town starting May 3.    Please call and advise.

## 2018-04-29 RX ORDER — IBUPROFEN 800 MG/1
TABLET ORAL
Qty: 60 TABLET | Refills: 0 | Status: SHIPPED | OUTPATIENT
Start: 2018-04-29 | End: 2018-06-01 | Stop reason: SDUPTHER

## 2018-04-30 ENCOUNTER — PATIENT MESSAGE (OUTPATIENT)
Dept: FAMILY MEDICINE | Facility: CLINIC | Age: 78
End: 2018-04-30

## 2018-04-30 ENCOUNTER — LAB VISIT (OUTPATIENT)
Dept: LAB | Facility: HOSPITAL | Age: 78
End: 2018-04-30
Attending: FAMILY MEDICINE
Payer: MEDICARE

## 2018-04-30 ENCOUNTER — OFFICE VISIT (OUTPATIENT)
Dept: FAMILY MEDICINE | Facility: CLINIC | Age: 78
End: 2018-04-30
Payer: MEDICARE

## 2018-04-30 VITALS
WEIGHT: 138.44 LBS | HEIGHT: 64 IN | HEART RATE: 54 BPM | DIASTOLIC BLOOD PRESSURE: 80 MMHG | SYSTOLIC BLOOD PRESSURE: 122 MMHG | TEMPERATURE: 98 F | BODY MASS INDEX: 23.64 KG/M2 | OXYGEN SATURATION: 98 %

## 2018-04-30 DIAGNOSIS — E11.65 CONTROLLED TYPE 2 DIABETES MELLITUS WITH HYPERGLYCEMIA, WITHOUT LONG-TERM CURRENT USE OF INSULIN: ICD-10-CM

## 2018-04-30 DIAGNOSIS — I15.2 HYPERTENSION ASSOCIATED WITH DIABETES: ICD-10-CM

## 2018-04-30 DIAGNOSIS — E03.9 HYPOTHYROIDISM, UNSPECIFIED TYPE: ICD-10-CM

## 2018-04-30 DIAGNOSIS — I70.0 AORTIC ATHEROSCLEROSIS: ICD-10-CM

## 2018-04-30 DIAGNOSIS — E78.5 HYPERLIPIDEMIA ASSOCIATED WITH TYPE 2 DIABETES MELLITUS: ICD-10-CM

## 2018-04-30 DIAGNOSIS — E11.69 HYPERLIPIDEMIA ASSOCIATED WITH TYPE 2 DIABETES MELLITUS: ICD-10-CM

## 2018-04-30 DIAGNOSIS — E11.59 HYPERTENSION ASSOCIATED WITH DIABETES: ICD-10-CM

## 2018-04-30 DIAGNOSIS — K76.0 FATTY LIVER: ICD-10-CM

## 2018-04-30 DIAGNOSIS — E11.65 CONTROLLED TYPE 2 DIABETES MELLITUS WITH HYPERGLYCEMIA, WITHOUT LONG-TERM CURRENT USE OF INSULIN: Primary | ICD-10-CM

## 2018-04-30 LAB
ALBUMIN SERPL BCP-MCNC: 4 G/DL
ALP SERPL-CCNC: 87 U/L
ALT SERPL W/O P-5'-P-CCNC: 40 U/L
ANION GAP SERPL CALC-SCNC: 8 MMOL/L
AST SERPL-CCNC: 35 U/L
BILIRUB SERPL-MCNC: 0.6 MG/DL
BUN SERPL-MCNC: 17 MG/DL
CALCIUM SERPL-MCNC: 10.1 MG/DL
CHLORIDE SERPL-SCNC: 103 MMOL/L
CO2 SERPL-SCNC: 29 MMOL/L
CREAT SERPL-MCNC: 0.9 MG/DL
EST. GFR  (AFRICAN AMERICAN): >60 ML/MIN/1.73 M^2
EST. GFR  (NON AFRICAN AMERICAN): >60 ML/MIN/1.73 M^2
ESTIMATED AVG GLUCOSE: 146 MG/DL
GLUCOSE SERPL-MCNC: 122 MG/DL
HBA1C MFR BLD HPLC: 6.7 %
POTASSIUM SERPL-SCNC: 4.6 MMOL/L
PROT SERPL-MCNC: 8 G/DL
SODIUM SERPL-SCNC: 140 MMOL/L

## 2018-04-30 PROCEDURE — 99999 PR PBB SHADOW E&M-EST. PATIENT-LVL IV: CPT | Mod: PBBFAC,,, | Performed by: FAMILY MEDICINE

## 2018-04-30 PROCEDURE — 3074F SYST BP LT 130 MM HG: CPT | Mod: CPTII,S$GLB,, | Performed by: FAMILY MEDICINE

## 2018-04-30 PROCEDURE — 99214 OFFICE O/P EST MOD 30 MIN: CPT | Mod: S$GLB,,, | Performed by: FAMILY MEDICINE

## 2018-04-30 PROCEDURE — 3079F DIAST BP 80-89 MM HG: CPT | Mod: CPTII,S$GLB,, | Performed by: FAMILY MEDICINE

## 2018-04-30 PROCEDURE — 3044F HG A1C LEVEL LT 7.0%: CPT | Mod: CPTII,S$GLB,, | Performed by: FAMILY MEDICINE

## 2018-04-30 PROCEDURE — 36415 COLL VENOUS BLD VENIPUNCTURE: CPT

## 2018-04-30 PROCEDURE — 83036 HEMOGLOBIN GLYCOSYLATED A1C: CPT

## 2018-04-30 PROCEDURE — 99499 UNLISTED E&M SERVICE: CPT | Mod: HCNC,S$GLB,, | Performed by: FAMILY MEDICINE

## 2018-04-30 PROCEDURE — 80053 COMPREHEN METABOLIC PANEL: CPT

## 2018-04-30 RX ORDER — BLOOD SUGAR DIAGNOSTIC
STRIP MISCELLANEOUS
Qty: 100 STRIP | Refills: 2 | Status: SHIPPED | OUTPATIENT
Start: 2018-04-30 | End: 2019-05-22 | Stop reason: SDUPTHER

## 2018-04-30 RX ORDER — LANCETS
EACH MISCELLANEOUS
Qty: 100 EACH | Refills: 11 | Status: SHIPPED | OUTPATIENT
Start: 2018-04-30 | End: 2019-05-22 | Stop reason: SDUPTHER

## 2018-04-30 NOTE — PROGRESS NOTES
(Portions of this note were dictated using voice recognition software and may contain dictation related errors in spelling/grammar/syntax not found on text review)    CC: No chief complaint on file.      HPI: 77 y.o. female last visit in January.  Here for chronic health check    Diabetes, on metformin 1000 mg twice a day. A1c has been controlled.  Compliant with eye exams and dentist. No neuropathy. Sees the eye doctor and  Dentist.  No numbness or tingling in her feet.      Hypertension: On losartan 100 mg daily, atenolol 50 mg daily, amlodipine 10 mg daily  .  Traditionally blood pressures have been better controlled.  She is seen cardiology in march and had significantly elevated blood pressure which she was not taking her medications regularly because she was concerned her blood pressure might be going too low.  Normal LVEF of 60-65% on echo, did have diastolic dysfunction likely because of her hypertension.  Holter monitor was normal.  She is currently on only half of the losartan and half of the amlodipine as she states that her blood pressures were going low at home.  Her lowest blood pressure was 104 systolic.  She did not feel poorly during this time.  Even having reduced her medications as above, heard a pressures at home are typically in the 110 the 120 range systolic over 60-70 range diastolic    Hyperlipidemia: Intolerant to all statins tried along with Zetia. Not currently on therapy for this at the moment. We discussed tolerating a suboptimal LDL given her intolerance to medication     History of hypothyroidism, Synthroid 50 µg increased from 25 dt hi tsh     elevated LFTs, hepatitis testing negative, imaging late 2013. ultrasound showed fatty liver. There was one abnormal lesion on the gallbladder with CT demonstrated no gallbladder abnormalities. lfts slightly high, stable overall. occ will get some ruq pain but sporadic and not consistent     Bone density demonstrating osteopenia in 2015, utd  2017     Did have vitamin D insufficiency in past. Takes vitamin D.  Last D level in October 2017 was normal at 35.     has seen neurology December 2017 for memory loss, consideration of possible MCI.  MRI showed some mild generalized cerebral volume loss and chronic microvascular ischemic changes, no acute intracranial pathology.  Neuropsychological testing was ordered but does not appear to have been done.  Vitamin D level in October was 35, B12 level in December 2017 was 408.    Past Medical History:   Diagnosis Date    Aortic atherosclerosis     Diabetes mellitus type II     Fatty liver     High cholesterol     HLD (hyperlipidemia)     HTN (hypertension)     Hypothyroidism     Osteopenia     Thyroid disease        Past Surgical History:   Procedure Laterality Date    BREAST BIOPSY      BREAST SURGERY      biopsy    HYSTERECTOMY      still w/ovaries    ROTATOR CUFF REPAIR Left        Family History   Problem Relation Age of Onset    Diabetes Mother     Hypertension Mother     Diabetes Brother     Coronary artery disease Sister 55     MI    Liver cancer Sister     No Known Problems Father     Diabetes Daughter     No Known Problems Son     No Known Problems Brother        Social History     Social History    Marital status:      Spouse name: N/A    Number of children: N/A    Years of education: N/A     Occupational History    Not on file.     Social History Main Topics    Smoking status: Never Smoker    Smokeless tobacco: Never Used    Alcohol use No    Drug use: No    Sexual activity: Yes     Partners: Male     Other Topics Concern    Not on file     Social History Narrative    No narrative on file        SCREENINGS   GYN: Dr. Dickey.   DEXA scan: 2017 normal   Mammogram 12/19/16  Colonoscopy: 2009, Dr. Wright, normal.      immunizations   Tetanus up to date   PVX: UTD   Prevnar: 1/2016  Flu: declines    Lab Results   Component Value Date    WBC 8.38 01/29/2018    HGB 11.9  (L) 01/29/2018    HCT 35.8 (L) 01/29/2018     01/29/2018    CHOL 230 (H) 10/10/2017    TRIG 138 10/10/2017    HDL 43 10/10/2017    ALT 40 01/29/2018    AST 30 01/29/2018     01/29/2018    K 4.8 01/29/2018     01/29/2018    CREATININE 0.9 01/29/2018    CALCIUM 10.0 01/29/2018    BUN 13 01/29/2018    CO2 28 01/29/2018    TSH 3.149 01/29/2018    HGBA1C 6.7 (H) 01/29/2018    LDLCALC 159.4 (H) 10/10/2017     (H) 01/29/2018       Hemoglobin   Date Value Ref Range Status   01/29/2018 11.9 (L) 12.0 - 16.0 g/dL Final   10/10/2017 11.7 (L) 12.0 - 16.0 g/dL Final   03/06/2017 12.6 12.0 - 16.0 g/dL Final   12/05/2016 12.5 12.0 - 16.0 g/dL Final   08/24/2016 12.2 12.0 - 16.0 g/dL Final     Hemoglobin A1C   Date Value Ref Range Status   01/29/2018 6.7 (H) 4.0 - 5.6 % Final     Comment:     According to ADA guidelines, hemoglobin A1c <7.0% represents  optimal control in non-pregnant diabetic patients. Different  metrics may apply to specific patient populations.   Standards of Medical Care in Diabetes-2016.  For the purpose of screening for the presence of diabetes:  <5.7%     Consistent with the absence of diabetes  5.7-6.4%  Consistent with increasing risk for diabetes   (prediabetes)  >or=6.5%  Consistent with diabetes  Currently, no consensus exists for use of hemoglobin A1c  for diagnosis of diabetes for children.  This Hemoglobin A1c assay has significant interference with fetal   hemoglobin   (HbF). The results are invalid for patients with abnormal amounts of   HbF,   including those with known Hereditary Persistence   of Fetal Hemoglobin. Heterozygous hemoglobin variants (HbAS, HbAC,   HbAD, HbAE, HbA2) do not significantly interfere with this assay;   however, presence of multiple variants in a sample may impact the %   interference.     10/10/2017 7.2 (H) 4.0 - 5.6 % Final     Comment:     According to ADA guidelines, hemoglobin A1c <7.0% represents  optimal control in non-pregnant diabetic  patients. Different  metrics may apply to specific patient populations.   Standards of Medical Care in Diabetes-2016.  For the purpose of screening for the presence of diabetes:  <5.7%     Consistent with the absence of diabetes  5.7-6.4%  Consistent with increasing risk for diabetes   (prediabetes)  >or=6.5%  Consistent with diabetes  Currently, no consensus exists for use of hemoglobin A1c  for diagnosis of diabetes for children.  This Hemoglobin A1c assay has significant interference with fetal   hemoglobin   (HbF). The results are invalid for patients with abnormal amounts of   HbF,   including those with known Hereditary Persistence   of Fetal Hemoglobin. Heterozygous hemoglobin variants (HbAS, HbAC,   HbAD, HbAE, HbA2) do not significantly interfere with this assay;   however, presence of multiple variants in a sample may impact the %   interference.     07/12/2017 6.8 (H) 4.0 - 5.6 % Final     Comment:     According to ADA guidelines, hemoglobin A1c <7.0% represents  optimal control in non-pregnant diabetic patients. Different  metrics may apply to specific patient populations.   Standards of Medical Care in Diabetes-2016.  For the purpose of screening for the presence of diabetes:  <5.7%     Consistent with the absence of diabetes  5.7-6.4%  Consistent with increasing risk for diabetes   (prediabetes)  >or=6.5%  Consistent with diabetes  Currently, no consensus exists for use of hemoglobin A1c  for diagnosis of diabetes for children.  This Hemoglobin A1c assay has significant interference with fetal   hemoglobin   (HbF). The results are invalid for patients with abnormal amounts of   HbF,   including those with known Hereditary Persistence   of Fetal Hemoglobin. Heterozygous hemoglobin variants (HbAS, HbAC,   HbAD, HbAE, HbA2) do not significantly interfere with this assay;   however, presence of multiple variants in a sample may impact the %   interference.           ROS:  GENERAL: No fever, chills,  fatigability or weight loss.  SKIN: No rashes, no itching.  HEAD: No headaches.  EYES: No visual changes  EARS: No ear pain or changes in hearing.  NOSE: No congestion or rhinorrhea.  MOUTH & THROAT: No hoarseness, change in voice, or sore throat.  NODES: Denies swollen glands.  CHEST: Denies BARTON, cyanosis, wheezing, cough and sputum production.  CARDIOVASCULAR: Denies chest pain, PND, orthopnea.  ABDOMEN: No nausea, vomiting, or changes in bowel function.  URINARY: No flank pain, dysuria or hematuria.  PERIPHERAL VASCULAR: No claudication or cyanosis.  MUSCULOSKELETAL: No joint stiffness or swelling. Denies back pain.  NEUROLOGIC: No weakness or numbness.    Vital signs reviewed  PE:   APPEARANCE: Well nourished, well developed, in no acute distress.    HEAD: Normocephalic, atraumatic.  EYES: PERRL. EOMI.   Conjunctivae noninjected.  EARS: TM's intact. Light reflex normal. No retraction or perforation  NOSE: Mucosa pink. Airway clear.  MOUTH & THROAT: No tonsillar enlargement. No pharyngeal erythema or exudate.   NECK: Supple with no cervical lymphadenopathy.    CHEST: Good inspiratory effort. Lungs clear to auscultation with no wheezes or crackles.  CARDIOVASCULAR: Normal S1, S2. No rubs, murmurs, or gallops.  ABDOMEN: Bowel sounds normal. Not distended. Soft. No tenderness or masses. No organomegaly.  EXTREMITIES: No edema, cyanosis, or clubbing.      IMPRESSION  1. Controlled type 2 diabetes mellitus with hyperglycemia, without long-term current use of insulin    2. Aortic atherosclerosis    3. Hypertension associated with diabetes    4. Hyperlipidemia associated with type 2 diabetes mellitus    5. Hypothyroidism, unspecified type            PLAN  Diabetes health maintenance:  -- advise regular and consistent glucose monitoring and medication compliance.  -- advise daily foot checks  -- advise yearly ophthalmologic exams  -- advise adequate dietary and exercise modification  -- advise regular dental visits  --  advise daily low-dose aspirin use if patient is not on other anticoagulants and there are no other contraindications.  -- Medication management: Continue metformin 1000 g twice a day.  Check labs.    Hypertension: Reviewed home readings.  Repeat blood pressure is improved at 122/80.  Continue losartan half of the 100 mg formulation daily, amlodipine half of the 10 mg formulation daily, and atenolol 50 mg daily    lipidemia: Unfortunately intolerant of all statins and Zetia.  We will have to accept suboptimal lipid control.  She is trying to do her best to eat a healthy diet    Aortic atherosclerosis: Continue aspirin.  Unfortunately cannot take statin as above.  Ensure proper blood pressure control and diabetes control

## 2018-05-01 ENCOUNTER — CLINICAL SUPPORT (OUTPATIENT)
Dept: OTOLARYNGOLOGY | Facility: CLINIC | Age: 78
End: 2018-05-01
Payer: MEDICARE

## 2018-05-01 ENCOUNTER — OFFICE VISIT (OUTPATIENT)
Dept: OTOLARYNGOLOGY | Facility: CLINIC | Age: 78
End: 2018-05-01
Payer: MEDICARE

## 2018-05-01 VITALS
WEIGHT: 139.44 LBS | BODY MASS INDEX: 23.81 KG/M2 | SYSTOLIC BLOOD PRESSURE: 135 MMHG | HEIGHT: 64 IN | HEART RATE: 65 BPM | DIASTOLIC BLOOD PRESSURE: 75 MMHG | TEMPERATURE: 100 F

## 2018-05-01 DIAGNOSIS — H90.3 ASYMMETRIC SNHL (SENSORINEURAL HEARING LOSS): Primary | ICD-10-CM

## 2018-05-01 DIAGNOSIS — H69.93 ETD (EUSTACHIAN TUBE DYSFUNCTION), BILATERAL: ICD-10-CM

## 2018-05-01 DIAGNOSIS — H61.21 IMPACTED CERUMEN OF RIGHT EAR: ICD-10-CM

## 2018-05-01 DIAGNOSIS — J30.89 NON-SEASONAL ALLERGIC RHINITIS, UNSPECIFIED TRIGGER: ICD-10-CM

## 2018-05-01 PROCEDURE — 3078F DIAST BP <80 MM HG: CPT | Mod: CPTII,S$GLB,, | Performed by: OTOLARYNGOLOGY

## 2018-05-01 PROCEDURE — 99999 PR PBB SHADOW E&M-EST. PATIENT-LVL IV: CPT | Mod: PBBFAC,,, | Performed by: OTOLARYNGOLOGY

## 2018-05-01 PROCEDURE — 92553 AUDIOMETRY AIR & BONE: CPT | Mod: S$GLB,,, | Performed by: AUDIOLOGIST-HEARING AID FITTER

## 2018-05-01 PROCEDURE — 69210 REMOVE IMPACTED EAR WAX UNI: CPT | Mod: S$GLB,,, | Performed by: OTOLARYNGOLOGY

## 2018-05-01 PROCEDURE — 99214 OFFICE O/P EST MOD 30 MIN: CPT | Mod: 25,S$GLB,, | Performed by: OTOLARYNGOLOGY

## 2018-05-01 PROCEDURE — 92567 TYMPANOMETRY: CPT | Mod: S$GLB,,, | Performed by: AUDIOLOGIST-HEARING AID FITTER

## 2018-05-01 PROCEDURE — 99999 PR PBB SHADOW E&M-EST. PATIENT-LVL I: CPT | Mod: PBBFAC,,, | Performed by: AUDIOLOGIST-HEARING AID FITTER

## 2018-05-01 PROCEDURE — 3075F SYST BP GE 130 - 139MM HG: CPT | Mod: CPTII,S$GLB,, | Performed by: OTOLARYNGOLOGY

## 2018-05-01 RX ORDER — FLUTICASONE PROPIONATE 50 MCG
2 SPRAY, SUSPENSION (ML) NASAL DAILY
Qty: 1 BOTTLE | Refills: 12 | Status: SHIPPED | OUTPATIENT
Start: 2018-05-01 | End: 2018-11-13

## 2018-05-01 RX ORDER — LEVOCETIRIZINE DIHYDROCHLORIDE 5 MG/1
5 TABLET, FILM COATED ORAL NIGHTLY
Qty: 30 TABLET | Refills: 11 | Status: SHIPPED | OUTPATIENT
Start: 2018-05-01 | End: 2018-11-13

## 2018-05-01 NOTE — PROGRESS NOTES
Chief Complaint   Patient presents with    Ear Fullness     right ear    .     HPI:  Oneyda Shah is a very pleasant 77 y.o. female here to see me today for the first time for evaluation of hearing loss.  She reports hearing loss that has been gradually progressing over the last 5 years.  She has noted any difference in hearing between the ears, with the right ear being the worse hearing ear. She wears a hearing aid on the right ear and feels that her hearing is worsening.   She has not noted any tinnitus in either ear.  She has not had any recent issues with ear pain or ear drainage.  She denies a family history of hearing loss, and has not had any previous otologic surgery.  She denies any history of significant loud noise exposure.She denies issues with dizziness.   She notes aural fullness and is concerned about possible fluid in her ears or wax.  She also relays that she has nasal congestion that has been present for several years but worse in the last week.  She notes difficulty breathing through bilateral nostrils. She denies facial pain or pressure or post-nasal drip.  She notes intermittent rhinorrhea. She has not been on any medication for this.        Past Medical History:   Diagnosis Date    Aortic atherosclerosis     Diabetes mellitus type II     Fatty liver     High cholesterol     HLD (hyperlipidemia)     HTN (hypertension)     Hypothyroidism     Osteopenia     Thyroid disease      Social History     Social History    Marital status:      Spouse name: N/A    Number of children: N/A    Years of education: N/A     Occupational History    Not on file.     Social History Main Topics    Smoking status: Never Smoker    Smokeless tobacco: Never Used    Alcohol use No    Drug use: No    Sexual activity: Yes     Partners: Male     Other Topics Concern    Not on file     Social History Narrative    No narrative on file     Past Surgical History:   Procedure Laterality Date     BREAST BIOPSY      BREAST SURGERY      biopsy    HYSTERECTOMY      still w/ovaries    ROTATOR CUFF REPAIR Left      Family History   Problem Relation Age of Onset    Diabetes Mother     Hypertension Mother     Diabetes Brother     Coronary artery disease Sister 55     MI    Liver cancer Sister     No Known Problems Father     Diabetes Daughter     No Known Problems Son     No Known Problems Brother          Review of Systems  General: negative for chills, fever or weight loss  Psychological: negative for mood changes or depression  Ophthalmic: negative for blurry vision, photophobia or eye pain  ENT: see HPI  Respiratory: no cough, shortness of breath, or wheezing  Cardiovascular: no chest pain or dyspnea on exertion  Gastrointestinal: no abdominal pain, change in bowel habits, or black/ bloody stools  Musculoskeletal: negative for gait disturbance or muscular weakness  Neurological: no syncope or seizures; no ataxia  Dermatological: negative for puritis,  rash and jaundice  Hematologic/lymphatic: no easy bruising, no new lumps or bumps      Physical Exam:    Vitals:    05/01/18 1038   BP: 135/75   Pulse: 65   Temp: 99.7 °F (37.6 °C)       Constitutional: Well appearing / communicating without difficutly.  NAD.  Eyes: EOM I Bilaterally  Head/Face: Normocephalic.  Negative paranasal sinus pressure/tenderness.  Salivary glands WNL.  House Brackmann I Bilaterally.    Right Ear: Auricle normal appearance. External Auditory Canal within normal limits no lesions or masses,TM w/o masses/lesions/perforations. TM mobility noted.   Left Ear: Auricle normal appearance. External Auditory Canal within normal limits no lesions or masses,TM w/o masses/lesions/perforations. TM mobility noted.  Rinne Air conduction >bone conduction bilaterally, Kang midline.   Nose: No gross nasal septal deviation. Inferior Turbinates 3+ bilaterally. No septal perforation. No masses/lesions. External nasal skin appears normal without  masses/lesions.  Oral Cavity: Gingiva/lips within normal limits.  Dentition/gingiva healthy appearing. Mucus membranes moist. Floor of mouth soft, no masses palpated. Oral Tongue mobile. Hard Palate appears normal.    Oropharynx: Base of tongue appears normal. No masses/lesions noted. Tonsillar fossa/pharyngeal wall without lesions. Posterior oropharynx WNL.  Soft palate without masses. Midline uvula.   Neck/Lymphatic: No LAD I-VI bilaterally.  No thyromegaly.  No masses noted on exam.    Mirror laryngoscopy/nasopharyngoscopy: Active gag reflex.  Unable to perform.    Neuro/Psychiatric: AOx3.  Normal mood and affect.   Cardiovascular: Normal carotid pulses bilaterally, no increasing jugular venous distention noted at cervical region bilaterally.    Respiratory: Normal respiratory effort, no stridor, no retractions noted.    Diagnostic testing reviewed:   Audiogram reviewed personally by myself and in detail with the patient today.       MRI brain 12/19/2017: No evidence of acute intracranial pathology. Mild generalized cerebral volume loss and chronic microvascular ischemic changes.      Assessment:    ICD-10-CM ICD-9-CM    1. Asymmetric SNHL (sensorineural hearing loss) H90.5 389.16    2. Non-seasonal allergic rhinitis, unspecified trigger J30.89 477.8    3. ETD (Eustachian tube dysfunction), bilateral H69.83 381.81    4. Impacted cerumen of right ear H61.21 380.4      The primary encounter diagnosis was Asymmetric SNHL (sensorineural hearing loss). Diagnoses of Non-seasonal allergic rhinitis, unspecified trigger, ETD (Eustachian tube dysfunction), bilateral, and Impacted cerumen of right ear were also pertinent to this visit.      Plan:  No orders of the defined types were placed in this encounter.      We reviewed the recent audiogram, and the fact that there is a distinct asymmetry of at least 10 dB across all  frequencies. I have reviewed her recent MRI which does not show evidence of any retrocochlear lesions.   I have recommended continued annual audiologic monitoring and that she follow up with her audiologist to adjust her hearings aids according to her most recent audiogram.     We had a long discussion regarding the anatomy and function of the eustachian tube.  We discussed that the eustachian tube acts as a pump to keep the appropriate amount of pressure behind the ear drum.  I gave the patient a prescription for a Flonase nasal steroid spray to be used on a daily basis, and we discussed that it will take 2-3 weeks of daily use to achieve maximal effectiveness.     AR: Start Xyzal.    Cerumen removal today from right ear.  She was advised to refrain from q-tips and that she may use OTC cerumen removal aids as needed.     Thank you kindly for allowing me to participate in the patient's care.     25 minutes was spent with the patient with more than half of the time spent counseling toward above.     Lurdes Hi MD

## 2018-05-01 NOTE — PROCEDURES
Ear Cerumen Removal  Date/Time: 5/1/2018 12:29 PM  Performed by: KEEGAN JAIMES  Authorized by: KEEGAN JAIMES     Consent Done?:  Yes (Verbal)    Procedure Note    DIAGNOSIS: Right Cerumen Impaction    Description:  The patient was seated in an exam chair.  An ear speculum was placed in the right EAC and was examined under the microscope.  Suction and/or loop curettes were used to remove a large cerumen impaction.  The tympanic membrane was visualized and was normal in appearance.  The patient tolerated the procedure well without complications.

## 2018-05-01 NOTE — PROGRESS NOTES
Jasbir Cunningham, F-AAA  [facility] [address]  [address]  [place] [address]      Patient: Oneyda Shah   MRN: [id number]  : 1940  BARTNO: 2018       AUDIOLOGICAL EVALUATION    CASE HISTORY:    Oneyda Shah, 77 y.o., was seen on the above date for an audiological evaluation. Patient reported her right ear to feel full. A hearing test completed in  revealed an asymmetric hearing loss. No further history significant to hearing loss was reported.    TEST RESULTS:    Otoscopy was unremarkable for both ears. Imittance testing revealed stiff middle ear compliance (Type B) in both ears. Could not test speech due to language barrier.    IMPRESSION:   Audiological testing indicated that Oneyda Shah has a moderate sloping to profound sensorineural hearing loss in her right ear and a mild  to moderate sensorineural hearing loss in her left ear.    RECOMMENDATIONS:   It is recommended that she:  Continue to receive audiological monitoring annually.  Follow up regularly with her audiologist for her hearing aid.  Follow up medically with a physician to assess her symptoms.     If you should have any questions or concerns regarding the above information, please do not hesitate to contact me at [phone].      _______________________________  Arlene Cunningham, CCC-A  Clinical Audiologist    enclosure: audiogram

## 2018-05-24 RX ORDER — ATENOLOL 50 MG/1
TABLET ORAL
Qty: 90 TABLET | Refills: 3 | Status: SHIPPED | OUTPATIENT
Start: 2018-05-24 | End: 2019-03-21 | Stop reason: SDUPTHER

## 2018-06-01 RX ORDER — IBUPROFEN 800 MG/1
TABLET ORAL
Qty: 60 TABLET | Refills: 0 | Status: SHIPPED | OUTPATIENT
Start: 2018-06-01 | End: 2018-08-14 | Stop reason: SDUPTHER

## 2018-06-28 RX ORDER — METFORMIN HYDROCHLORIDE 1000 MG/1
TABLET ORAL
Qty: 180 TABLET | Refills: 11 | Status: SHIPPED | OUTPATIENT
Start: 2018-06-28 | End: 2019-09-05 | Stop reason: SDUPTHER

## 2018-07-01 RX ORDER — IBUPROFEN 800 MG/1
TABLET ORAL
Qty: 60 TABLET | Refills: 0 | Status: SHIPPED | OUTPATIENT
Start: 2018-07-01 | End: 2019-08-21 | Stop reason: SDUPTHER

## 2018-07-19 RX ORDER — LEVOTHYROXINE SODIUM 50 UG/1
TABLET ORAL
Qty: 90 TABLET | Refills: 3 | Status: SHIPPED | OUTPATIENT
Start: 2018-07-19 | End: 2019-09-03 | Stop reason: SDUPTHER

## 2018-08-03 ENCOUNTER — TELEPHONE (OUTPATIENT)
Dept: FAMILY MEDICINE | Facility: CLINIC | Age: 78
End: 2018-08-03

## 2018-08-03 NOTE — TELEPHONE ENCOUNTER
----- Message from Lydia Palmer sent at 8/3/2018  1:11 PM CDT -----  Contact: [phone]/self  Patient would like to be seen sooner than the next available appointment. Please advise.

## 2018-08-14 ENCOUNTER — OFFICE VISIT (OUTPATIENT)
Dept: FAMILY MEDICINE | Facility: CLINIC | Age: 78
End: 2018-08-14
Payer: MEDICARE

## 2018-08-14 VITALS
DIASTOLIC BLOOD PRESSURE: 71 MMHG | WEIGHT: 140.63 LBS | TEMPERATURE: 98 F | OXYGEN SATURATION: 98 % | SYSTOLIC BLOOD PRESSURE: 127 MMHG | HEART RATE: 60 BPM | BODY MASS INDEX: 24.01 KG/M2 | HEIGHT: 64 IN

## 2018-08-14 DIAGNOSIS — I15.2 HYPERTENSION ASSOCIATED WITH DIABETES: ICD-10-CM

## 2018-08-14 DIAGNOSIS — E11.65 CONTROLLED TYPE 2 DIABETES MELLITUS WITH HYPERGLYCEMIA, WITHOUT LONG-TERM CURRENT USE OF INSULIN: Primary | ICD-10-CM

## 2018-08-14 DIAGNOSIS — E11.29 CONTROLLED TYPE 2 DIABETES MELLITUS WITH MICROALBUMINURIA, WITHOUT LONG-TERM CURRENT USE OF INSULIN: ICD-10-CM

## 2018-08-14 DIAGNOSIS — E11.59 HYPERTENSION ASSOCIATED WITH DIABETES: ICD-10-CM

## 2018-08-14 DIAGNOSIS — R80.9 CONTROLLED TYPE 2 DIABETES MELLITUS WITH MICROALBUMINURIA, WITHOUT LONG-TERM CURRENT USE OF INSULIN: ICD-10-CM

## 2018-08-14 PROCEDURE — 99214 OFFICE O/P EST MOD 30 MIN: CPT | Mod: S$GLB,,, | Performed by: FAMILY MEDICINE

## 2018-08-14 PROCEDURE — 3044F HG A1C LEVEL LT 7.0%: CPT | Mod: CPTII,S$GLB,, | Performed by: FAMILY MEDICINE

## 2018-08-14 PROCEDURE — 3078F DIAST BP <80 MM HG: CPT | Mod: CPTII,S$GLB,, | Performed by: FAMILY MEDICINE

## 2018-08-14 PROCEDURE — 99499 UNLISTED E&M SERVICE: CPT | Mod: HCNC,S$GLB,, | Performed by: FAMILY MEDICINE

## 2018-08-14 PROCEDURE — 3074F SYST BP LT 130 MM HG: CPT | Mod: CPTII,S$GLB,, | Performed by: FAMILY MEDICINE

## 2018-08-14 PROCEDURE — 99999 PR PBB SHADOW E&M-EST. PATIENT-LVL IV: CPT | Mod: PBBFAC,,, | Performed by: FAMILY MEDICINE

## 2018-08-14 RX ORDER — LOSARTAN POTASSIUM 50 MG/1
50 TABLET ORAL DAILY
Qty: 90 TABLET | Refills: 3 | Status: SHIPPED | OUTPATIENT
Start: 2018-08-14 | End: 2018-12-05

## 2018-08-14 RX ORDER — ATENOLOL 25 MG/1
TABLET ORAL
COMMUNITY
Start: 2018-05-22 | End: 2018-08-14

## 2018-08-14 RX ORDER — AMLODIPINE BESYLATE 5 MG/1
5 TABLET ORAL DAILY
Qty: 90 TABLET | Refills: 3 | Status: SHIPPED | OUTPATIENT
Start: 2018-08-14 | End: 2018-12-20 | Stop reason: SDUPTHER

## 2018-08-14 NOTE — PROGRESS NOTES
(Portions of this note were dictated using voice recognition software and may contain dictation related errors in spelling/grammar/syntax not found on text review)    CC:   Chief Complaint   Patient presents with    Follow-up     3 month       HPI: 77 y.o. female last visit April 2018.  Here for 3 month check    Diabetes, on metformin 1000 mg twice a day. A1c has been controlled.  Compliant with eye exams and dentist. No neuropathy. Sees the eye doctor and  Dentist.  No numbness or tingling in her feet.      Hypertension: On losartan 100 mg daily, atenolol 50 mg daily, amlodipine 10 mg daily  .  Only taking half of the losartan 100 mg and amlodipine 10 mg.  Takes the full atenolol 50 mg.  Blood pressures at home are as below.  Has had an echo that showed Normal LVEF of 60-65% on echo, did have diastolic dysfunction likely because of her hypertension.  Holter monitor was normal.       Hyperlipidemia: Intolerant to all statins tried along with Zetia. Not currently on therapy for this at the moment. We discussed tolerating a suboptimal LDL given her intolerance to medication     History of hypothyroidism, Synthroid 50 µg increased from 25 dt hi tsh     elevated LFTs, hepatitis testing negative, imaging late 2013. ultrasound showed fatty liver. There was one abnormal lesion on the gallbladder with CT demonstrated no gallbladder abnormalities. lfts slightly high, stable overall. occ will get some ruq pain but sporadic and not consistent     Bone density demonstrating osteopenia in 2015, utd 2017     Did have vitamin D insufficiency in past. Takes vitamin D.  Last D level in October 2017 was normal at 35.     has seen neurology December 2017 for memory loss, consideration of possible MCI.  MRI showed some mild generalized cerebral volume loss and chronic microvascular ischemic changes, no acute intracranial pathology.  Neuropsychological testing was ordered but does not appear to have been done.  Vitamin D level in  October was 35, B12 level in December 2017 was 408.               Past Medical History:   Diagnosis Date    Aortic atherosclerosis     Diabetes mellitus type II     Fatty liver     High cholesterol     HLD (hyperlipidemia)     HTN (hypertension)     Hypothyroidism     Osteopenia     Thyroid disease        Past Surgical History:   Procedure Laterality Date    BREAST BIOPSY      BREAST SURGERY      biopsy    HYSTERECTOMY      still w/ovaries    ROTATOR CUFF REPAIR Left        Family History   Problem Relation Age of Onset    Diabetes Mother     Hypertension Mother     Diabetes Brother     Coronary artery disease Sister 55        MI    Liver cancer Sister     No Known Problems Father     Diabetes Daughter     No Known Problems Son     No Known Problems Brother        Social History     Socioeconomic History    Marital status:      Spouse name: Not on file    Number of children: Not on file    Years of education: Not on file    Highest education level: Not on file   Social Needs    Financial resource strain: Not on file    Food insecurity - worry: Not on file    Food insecurity - inability: Not on file    Transportation needs - medical: Not on file    Transportation needs - non-medical: Not on file   Occupational History    Not on file   Tobacco Use    Smoking status: Never Smoker    Smokeless tobacco: Never Used   Substance and Sexual Activity    Alcohol use: No    Drug use: No    Sexual activity: Yes     Partners: Male   Other Topics Concern    Not on file   Social History Narrative    Not on file     Lab Results   Component Value Date    WBC 8.38 01/29/2018    HGB 11.9 (L) 01/29/2018    HCT 35.8 (L) 01/29/2018     01/29/2018    CHOL 230 (H) 10/10/2017    TRIG 138 10/10/2017    HDL 43 10/10/2017    ALT 40 04/30/2018    AST 35 04/30/2018     04/30/2018    K 4.6 04/30/2018     04/30/2018    CREATININE 0.9 04/30/2018    CALCIUM 10.1 04/30/2018    BUN 17  04/30/2018    CO2 29 04/30/2018    TSH 3.149 01/29/2018    HGBA1C 6.7 (H) 04/30/2018    LDLCALC 159.4 (H) 10/10/2017     (H) 04/30/2018           ROS:  GENERAL: No fever, chills, fatigability or weight loss.  SKIN: No rashes, no itching.  HEAD: No headaches.  EYES: No visual changes  EARS: No ear pain or changes in hearing.  NOSE: No congestion or rhinorrhea.  MOUTH & THROAT: No hoarseness, change in voice, or sore throat.  NODES: Denies swollen glands.  CHEST: Denies BARTON, cyanosis, wheezing, cough and sputum production.  CARDIOVASCULAR: Denies chest pain, PND, orthopnea.  ABDOMEN:  Occasional right upper quadrant pain similar to what is listed above.  URINARY: No flank pain, dysuria or hematuria.  PERIPHERAL VASCULAR: No claudication or cyanosis.  MUSCULOSKELETAL:  Occasional leg pain.  NEUROLOGIC: No weakness or numbness.    Vital signs reviewed  PE:   APPEARANCE: Well nourished, well developed, in no acute distress.    HEAD: Normocephalic, atraumatic.  EYES: PERRL. EOMI.   Conjunctivae noninjected.  EARS: TM's intact. Light reflex normal. No retraction or perforation  NOSE: Mucosa pink. Airway clear.  MOUTH & THROAT: No tonsillar enlargement. No pharyngeal erythema or exudate.   NECK: Supple with no cervical lymphadenopathy.  No carotid bruits.  No thyromegaly  CHEST: Good inspiratory effort. Lungs clear to auscultation with no wheezes or crackles.  CARDIOVASCULAR: Normal S1, S2. No rubs, murmurs, or gallops.  ABDOMEN: Bowel sounds normal. Not distended. Soft. No tenderness or masses. No organomegaly.  EXTREMITIES: No edema, cyanosis, or clubbing.  DIABETIC FOOT EXAM: Protective Sensation (w/ 10 gram monofilament):  Right: Intact  Left: Intact    Visual Inspection:  Normal -  Bilateral    Pedal Pulses:   Right: Present  Left: Present    Posterior tibialis:   Right:Present  Left: Present          IMPRESSION        PLAN  Diabetes health maintenance:  -- advise regular and consistent glucose monitoring and  medication compliance.  -- advise daily foot checks  -- advise yearly ophthalmologic exams  -- advise adequate dietary and exercise modification  -- advise regular dental visits  -- advise daily low-dose aspirin use if patient is not on other anticoagulants and there are no other contraindications.  -- Medication management: Continue metformin 1000 mg twice a day.  Check labs.     Hypertension: chg losartan to 50 mg daily and amlodipine to 5 mg daily so she doesn't have to break these in half. Continue atenolol 50 mg daily.      Dyslipidemia: Unfortunately intolerant of all statins and Zetia.  We will have to accept suboptimal lipid control.  She is trying to do her best to eat a healthy diet     Aortic atherosclerosis: Continue aspirin.  Unfortunately cannot take statin as above.  Ensure proper blood pressure control and diabetes control        SCREENINGS   GYN: Dr. Dickey.   DEXA scan: 2017 normal   Mammogram 12/19/16, declines rpt  Colonoscopy: 2009, Dr. Wright, normal.      immunizations   Tetanus up to date   PVX: 2017   Prevnar: 1/2016  Flu: declines

## 2018-08-15 ENCOUNTER — LAB VISIT (OUTPATIENT)
Dept: LAB | Facility: HOSPITAL | Age: 78
End: 2018-08-15
Attending: FAMILY MEDICINE
Payer: MEDICARE

## 2018-08-15 DIAGNOSIS — I15.2 HYPERTENSION ASSOCIATED WITH DIABETES: ICD-10-CM

## 2018-08-15 DIAGNOSIS — E11.29 CONTROLLED TYPE 2 DIABETES MELLITUS WITH MICROALBUMINURIA, WITHOUT LONG-TERM CURRENT USE OF INSULIN: ICD-10-CM

## 2018-08-15 DIAGNOSIS — E11.59 HYPERTENSION ASSOCIATED WITH DIABETES: ICD-10-CM

## 2018-08-15 DIAGNOSIS — R80.9 CONTROLLED TYPE 2 DIABETES MELLITUS WITH MICROALBUMINURIA, WITHOUT LONG-TERM CURRENT USE OF INSULIN: ICD-10-CM

## 2018-08-15 LAB
ALBUMIN SERPL BCP-MCNC: 3.8 G/DL
ALP SERPL-CCNC: 90 U/L
ALT SERPL W/O P-5'-P-CCNC: 31 U/L
ANION GAP SERPL CALC-SCNC: 7 MMOL/L
AST SERPL-CCNC: 26 U/L
BILIRUB SERPL-MCNC: 0.5 MG/DL
BUN SERPL-MCNC: 17 MG/DL
CALCIUM SERPL-MCNC: 9.7 MG/DL
CHLORIDE SERPL-SCNC: 105 MMOL/L
CO2 SERPL-SCNC: 29 MMOL/L
CREAT SERPL-MCNC: 1 MG/DL
EST. GFR  (AFRICAN AMERICAN): >60 ML/MIN/1.73 M^2
EST. GFR  (NON AFRICAN AMERICAN): 55 ML/MIN/1.73 M^2
ESTIMATED AVG GLUCOSE: 154 MG/DL
GLUCOSE SERPL-MCNC: 138 MG/DL
HBA1C MFR BLD HPLC: 7 %
POTASSIUM SERPL-SCNC: 4.5 MMOL/L
PROT SERPL-MCNC: 7.7 G/DL
SODIUM SERPL-SCNC: 141 MMOL/L

## 2018-08-15 PROCEDURE — 36415 COLL VENOUS BLD VENIPUNCTURE: CPT

## 2018-08-15 PROCEDURE — 83036 HEMOGLOBIN GLYCOSYLATED A1C: CPT

## 2018-08-15 PROCEDURE — 80053 COMPREHEN METABOLIC PANEL: CPT

## 2018-08-24 ENCOUNTER — PATIENT MESSAGE (OUTPATIENT)
Dept: FAMILY MEDICINE | Facility: CLINIC | Age: 78
End: 2018-08-24

## 2018-10-09 ENCOUNTER — TELEPHONE (OUTPATIENT)
Dept: FAMILY MEDICINE | Facility: CLINIC | Age: 78
End: 2018-10-09

## 2018-10-09 DIAGNOSIS — I15.2 HYPERTENSION ASSOCIATED WITH DIABETES: ICD-10-CM

## 2018-10-09 DIAGNOSIS — E11.59 HYPERTENSION ASSOCIATED WITH DIABETES: ICD-10-CM

## 2018-10-09 DIAGNOSIS — E03.9 HYPOTHYROIDISM, UNSPECIFIED TYPE: ICD-10-CM

## 2018-10-09 DIAGNOSIS — E11.65 CONTROLLED TYPE 2 DIABETES MELLITUS WITH HYPERGLYCEMIA, WITHOUT LONG-TERM CURRENT USE OF INSULIN: Primary | ICD-10-CM

## 2018-10-09 NOTE — TELEPHONE ENCOUNTER
----- Message from Iva Basurto sent at 10/9/2018  4:08 PM CDT -----  Contact: self [phone]  Patient would like to be seen sooner than the next available appointment. She stated she is normally seen every three months. Please advise.

## 2018-10-12 DIAGNOSIS — E11.9 TYPE 2 DIABETES MELLITUS WITHOUT COMPLICATION: ICD-10-CM

## 2018-10-15 ENCOUNTER — TELEPHONE (OUTPATIENT)
Dept: FAMILY MEDICINE | Facility: CLINIC | Age: 78
End: 2018-10-15

## 2018-10-15 NOTE — TELEPHONE ENCOUNTER
Called patient to schedule labs prior to appt.  Was able to schedule patient on 11/06/18. Patient verbalized understanding.

## 2018-10-15 NOTE — TELEPHONE ENCOUNTER
Orders Placed This Encounter   Procedures    Comprehensive metabolic panel    Hemoglobin A1c    CBC auto differential    TSH     1 wk prior to appt   (last labs in aug, can do nov)

## 2018-10-29 ENCOUNTER — TELEPHONE (OUTPATIENT)
Dept: ADMINISTRATIVE | Facility: HOSPITAL | Age: 78
End: 2018-10-29

## 2018-10-29 DIAGNOSIS — E11.65 CONTROLLED TYPE 2 DIABETES MELLITUS WITH HYPERGLYCEMIA, WITHOUT LONG-TERM CURRENT USE OF INSULIN: Primary | ICD-10-CM

## 2018-11-06 ENCOUNTER — LAB VISIT (OUTPATIENT)
Dept: LAB | Facility: HOSPITAL | Age: 78
End: 2018-11-06
Attending: FAMILY MEDICINE
Payer: MEDICARE

## 2018-11-06 DIAGNOSIS — E11.59 HYPERTENSION ASSOCIATED WITH DIABETES: ICD-10-CM

## 2018-11-06 DIAGNOSIS — I15.2 HYPERTENSION ASSOCIATED WITH DIABETES: ICD-10-CM

## 2018-11-06 DIAGNOSIS — E11.65 CONTROLLED TYPE 2 DIABETES MELLITUS WITH HYPERGLYCEMIA, WITHOUT LONG-TERM CURRENT USE OF INSULIN: ICD-10-CM

## 2018-11-06 DIAGNOSIS — E03.9 HYPOTHYROIDISM, UNSPECIFIED TYPE: ICD-10-CM

## 2018-11-06 DIAGNOSIS — E11.9 TYPE 2 DIABETES MELLITUS WITHOUT COMPLICATION: ICD-10-CM

## 2018-11-06 LAB
ALBUMIN SERPL BCP-MCNC: 3.9 G/DL
ALP SERPL-CCNC: 91 U/L
ALT SERPL W/O P-5'-P-CCNC: 37 U/L
ANION GAP SERPL CALC-SCNC: 10 MMOL/L
AST SERPL-CCNC: 29 U/L
BASOPHILS # BLD AUTO: 0.02 K/UL
BASOPHILS NFR BLD: 0.3 %
BILIRUB SERPL-MCNC: 0.5 MG/DL
BUN SERPL-MCNC: 15 MG/DL
CALCIUM SERPL-MCNC: 10 MG/DL
CHLORIDE SERPL-SCNC: 105 MMOL/L
CHOLEST SERPL-MCNC: 243 MG/DL
CHOLEST/HDLC SERPL: 5.5 {RATIO}
CO2 SERPL-SCNC: 28 MMOL/L
CREAT SERPL-MCNC: 1 MG/DL
DIFFERENTIAL METHOD: ABNORMAL
EOSINOPHIL # BLD AUTO: 0.2 K/UL
EOSINOPHIL NFR BLD: 2.6 %
ERYTHROCYTE [DISTWIDTH] IN BLOOD BY AUTOMATED COUNT: 13.1 %
EST. GFR  (AFRICAN AMERICAN): >60 ML/MIN/1.73 M^2
EST. GFR  (NON AFRICAN AMERICAN): 55 ML/MIN/1.73 M^2
ESTIMATED AVG GLUCOSE: 154 MG/DL
GLUCOSE SERPL-MCNC: 151 MG/DL
HBA1C MFR BLD HPLC: 7 %
HCT VFR BLD AUTO: 35.9 %
HDLC SERPL-MCNC: 44 MG/DL
HDLC SERPL: 18.1 %
HGB BLD-MCNC: 11.9 G/DL
LDLC SERPL CALC-MCNC: 169.6 MG/DL
LYMPHOCYTES # BLD AUTO: 2.7 K/UL
LYMPHOCYTES NFR BLD: 35.2 %
MCH RBC QN AUTO: 30 PG
MCHC RBC AUTO-ENTMCNC: 33.1 G/DL
MCV RBC AUTO: 90 FL
MONOCYTES # BLD AUTO: 0.8 K/UL
MONOCYTES NFR BLD: 11 %
NEUTROPHILS # BLD AUTO: 3.9 K/UL
NEUTROPHILS NFR BLD: 50.8 %
NONHDLC SERPL-MCNC: 199 MG/DL
PLATELET # BLD AUTO: 286 K/UL
PMV BLD AUTO: 10.8 FL
POTASSIUM SERPL-SCNC: 4.5 MMOL/L
PROT SERPL-MCNC: 8.1 G/DL
RBC # BLD AUTO: 3.97 M/UL
SODIUM SERPL-SCNC: 143 MMOL/L
TRIGL SERPL-MCNC: 147 MG/DL
TSH SERPL DL<=0.005 MIU/L-ACNC: 2.83 UIU/ML
WBC # BLD AUTO: 7.62 K/UL

## 2018-11-06 PROCEDURE — 36415 COLL VENOUS BLD VENIPUNCTURE: CPT | Mod: HCNC

## 2018-11-06 PROCEDURE — 80053 COMPREHEN METABOLIC PANEL: CPT | Mod: HCNC

## 2018-11-06 PROCEDURE — 83036 HEMOGLOBIN GLYCOSYLATED A1C: CPT | Mod: HCNC

## 2018-11-06 PROCEDURE — 84443 ASSAY THYROID STIM HORMONE: CPT | Mod: HCNC

## 2018-11-06 PROCEDURE — 80061 LIPID PANEL: CPT | Mod: HCNC

## 2018-11-06 PROCEDURE — 85025 COMPLETE CBC W/AUTO DIFF WBC: CPT | Mod: HCNC

## 2018-11-13 ENCOUNTER — OFFICE VISIT (OUTPATIENT)
Dept: FAMILY MEDICINE | Facility: CLINIC | Age: 78
End: 2018-11-13
Payer: MEDICARE

## 2018-11-13 VITALS
SYSTOLIC BLOOD PRESSURE: 166 MMHG | TEMPERATURE: 99 F | OXYGEN SATURATION: 98 % | HEART RATE: 57 BPM | DIASTOLIC BLOOD PRESSURE: 71 MMHG | HEIGHT: 60 IN | BODY MASS INDEX: 27.14 KG/M2 | WEIGHT: 138.25 LBS

## 2018-11-13 DIAGNOSIS — K76.0 FATTY LIVER: ICD-10-CM

## 2018-11-13 DIAGNOSIS — E03.9 HYPOTHYROIDISM, UNSPECIFIED TYPE: ICD-10-CM

## 2018-11-13 DIAGNOSIS — E11.59 HYPERTENSION ASSOCIATED WITH DIABETES: Primary | ICD-10-CM

## 2018-11-13 DIAGNOSIS — E11.65 CONTROLLED TYPE 2 DIABETES MELLITUS WITH HYPERGLYCEMIA, WITHOUT LONG-TERM CURRENT USE OF INSULIN: ICD-10-CM

## 2018-11-13 DIAGNOSIS — E78.5 HYPERLIPIDEMIA ASSOCIATED WITH TYPE 2 DIABETES MELLITUS: ICD-10-CM

## 2018-11-13 DIAGNOSIS — E11.69 HYPERLIPIDEMIA ASSOCIATED WITH TYPE 2 DIABETES MELLITUS: ICD-10-CM

## 2018-11-13 DIAGNOSIS — I15.2 HYPERTENSION ASSOCIATED WITH DIABETES: Primary | ICD-10-CM

## 2018-11-13 PROCEDURE — 99214 OFFICE O/P EST MOD 30 MIN: CPT | Mod: HCNC,S$GLB,, | Performed by: FAMILY MEDICINE

## 2018-11-13 PROCEDURE — 3077F SYST BP >= 140 MM HG: CPT | Mod: CPTII,HCNC,S$GLB, | Performed by: FAMILY MEDICINE

## 2018-11-13 PROCEDURE — 3045F PR MOST RECENT HEMOGLOBIN A1C LEVEL 7.0-9.0%: CPT | Mod: CPTII,HCNC,S$GLB, | Performed by: FAMILY MEDICINE

## 2018-11-13 PROCEDURE — 3078F DIAST BP <80 MM HG: CPT | Mod: CPTII,HCNC,S$GLB, | Performed by: FAMILY MEDICINE

## 2018-11-13 PROCEDURE — 1101F PT FALLS ASSESS-DOCD LE1/YR: CPT | Mod: CPTII,HCNC,S$GLB, | Performed by: FAMILY MEDICINE

## 2018-11-13 PROCEDURE — 99999 PR PBB SHADOW E&M-EST. PATIENT-LVL III: CPT | Mod: PBBFAC,HCNC,, | Performed by: FAMILY MEDICINE

## 2018-11-13 NOTE — LETTER
November 13, 2018      Other  [address]  [place]  [place] [address]           [address]  [place] [address]  Phone: [phone]  Fax: [phone]          Patient: Oneyda Shah   MR Number: [id number]   YOB: 1940   Date of Visit: 11/13/2018       Dear Other:    Thank you for referring Oneyda Shah to me for evaluation. Attached you will find relevant portions of my assessment and plan of care.    If you have questions, please do not hesitate to call me. I look forward to following Oneyda Shah along with you.    Sincerely,    Zion Villavicencio MD    Enclosure  CC:  No Recipients    If you would like to receive this communication electronically, please contact [email or web address] or [phone] to request more information on [employer] Link access.    For providers and/or their staff who would like to refer a patient to [facility], please contact us through our one-stop-shop provider referral line, [facility], at [phone].    If you feel you have received this communication in error or would no longer like to receive these types of communications, please e-mail [email or web address]

## 2018-11-13 NOTE — PROGRESS NOTES
(Portions of this note were dictated using voice recognition software and may contain dictation related errors in spelling/grammar/syntax not found on text review)    CC:   Chief Complaint   Patient presents with    Follow-up     3 month f/u, declines flu, eye exam       HPI: 77 y.o. female last visit August of 2018    Diabetes, on metformin 1000 mg twice a day. borderline A1c at 7 0.  Compliant with eye exams and dentist. No neuropathy. Sees the eye doctor and Dentist, last eye exam:   No numbness or tingling in her feet.      Hypertension: On losartan 50 mg daily, atenolol 50 mg daily, amlodipine 5 mg daily .  States that she is consistent with this dosing, takes her meds in the afternoon.  Blood pressure is elevated she states that it is about time for her to take her medications so she wonders if they have all worn out.  She checks her blood pressures at home usually gets good readings in the 130s/60s.    Has had an echo that showed Normal LVEF of 60-65% on echo, did have diastolic dysfunction likely because of her hypertension.  Holter monitor was normal.       Hyperlipidemia: Intolerant to all statins tried along with Zetia. Not currently on therapy for this at the moment. We discussed tolerating a suboptimal LDL given her intolerance to medication     History of hypothyroidism, Synthroid 50 µg      elevated LFTs, hepatitis testing negative, imaging late 2013. ultrasound showed fatty liver. There was one abnormal lesion on the gallbladder with CT demonstrated no gallbladder abnormalities. lfts slightly high, stable overall. occ will get some ruq pain but sporadic and not consistent     Bone density demonstrating osteopenia in 2015, utd 2017     Did have vitamin D insufficiency in past. Takes vitamin D.  Last D level in October 2017 was normal at 35.     has seen neurology December 2017 for memory loss, consideration of possible MCI.  MRI showed some mild generalized cerebral volume loss and chronic  microvascular ischemic changes, no acute intracranial pathology.  Neuropsychological testing was ordered but does not appear to have been done.  Vitamin D level in October was 35, B12 level in December 2017 was 408.    Past Medical History:   Diagnosis Date    Aortic atherosclerosis     Diabetes mellitus type II     Fatty liver     High cholesterol     HLD (hyperlipidemia)     HTN (hypertension)     Hypothyroidism     Osteopenia     Thyroid disease        Past Surgical History:   Procedure Laterality Date    BREAST BIOPSY      BREAST SURGERY      biopsy    HYSTERECTOMY      still w/ovaries    ROTATOR CUFF REPAIR Left        Family History   Problem Relation Age of Onset    Diabetes Mother     Hypertension Mother     Diabetes Brother     Coronary artery disease Sister 55        MI    Liver cancer Sister     No Known Problems Father     Diabetes Daughter     No Known Problems Son     No Known Problems Brother        Social History     Socioeconomic History    Marital status:      Spouse name: Not on file    Number of children: Not on file    Years of education: Not on file    Highest education level: Not on file   Social Needs    Financial resource strain: Not on file    Food insecurity - worry: Not on file    Food insecurity - inability: Not on file    Transportation needs - medical: Not on file    Transportation needs - non-medical: Not on file   Occupational History    Not on file   Tobacco Use    Smoking status: Never Smoker    Smokeless tobacco: Never Used   Substance and Sexual Activity    Alcohol use: No    Drug use: No    Sexual activity: Yes     Partners: Male   Other Topics Concern    Not on file   Social History Narrative    Not on file     Lab Results   Component Value Date    WBC 7.62 11/06/2018    HGB 11.9 (L) 11/06/2018    HCT 35.9 (L) 11/06/2018     11/06/2018    CHOL 243 (H) 11/06/2018    TRIG 147 11/06/2018    HDL 44 11/06/2018    ALT 37 11/06/2018     AST 29 11/06/2018     11/06/2018    K 4.5 11/06/2018     11/06/2018    CREATININE 1.0 11/06/2018    CALCIUM 10.0 11/06/2018    BUN 15 11/06/2018    CO2 28 11/06/2018    TSH 2.834 11/06/2018    HGBA1C 7.0 (H) 11/06/2018    LDLCALC 169.6 (H) 11/06/2018     (H) 11/06/2018       Hemoglobin A1C   Date Value Ref Range Status   11/06/2018 7.0 (H) 4.0 - 5.6 % Final     Comment:     ADA Screening Guidelines:  5.7-6.4%  Consistent with prediabetes  >or=6.5%  Consistent with diabetes  High levels of fetal hemoglobin interfere with the HbA1C  assay. Heterozygous hemoglobin variants (HbS, HgC, etc)do  not significantly interfere with this assay.   However, presence of multiple variants may affect accuracy.     08/15/2018 7.0 (H) 4.0 - 5.6 % Final     Comment:     ADA Screening Guidelines:  5.7-6.4%  Consistent with prediabetes  >or=6.5%  Consistent with diabetes  High levels of fetal hemoglobin interfere with the HbA1C  assay. Heterozygous hemoglobin variants (HbS, HgC, etc)do  not significantly interfere with this assay.   However, presence of multiple variants may affect accuracy.     04/30/2018 6.7 (H) 4.0 - 5.6 % Final     Comment:     According to ADA guidelines, hemoglobin A1c <7.0% represents  optimal control in non-pregnant diabetic patients. Different  metrics may apply to specific patient populations.   Standards of Medical Care in Diabetes-2016.  For the purpose of screening for the presence of diabetes:  <5.7%     Consistent with the absence of diabetes  5.7-6.4%  Consistent with increasing risk for diabetes   (prediabetes)  >or=6.5%  Consistent with diabetes  Currently, no consensus exists for use of hemoglobin A1c  for diagnosis of diabetes for children.  This Hemoglobin A1c assay has significant interference with fetal   hemoglobin   (HbF). The results are invalid for patients with abnormal amounts of   HbF,   including those with known Hereditary Persistence   of Fetal Hemoglobin.  Heterozygous hemoglobin variants (HbAS, HbAC,   HbAD, HbAE, HbA2) do not significantly interfere with this assay;   however, presence of multiple variants in a sample may impact the %   interference.           ROS:  GENERAL: No fever, chills, fatigability or weight loss.  SKIN: No rashes, no itching.  HEAD: No headaches.  EYES: No visual changes  EARS: No ear pain or changes in hearing.  NOSE: No congestion or rhinorrhea.  MOUTH & THROAT: No hoarseness, change in voice, or sore throat.  NODES: Denies swollen glands.  CHEST: Denies BARTON, cyanosis, wheezing, cough and sputum production.  CARDIOVASCULAR: Denies chest pain, PND, orthopnea.  ABDOMEN:  Above  URINARY: No flank pain, dysuria or hematuria.  PERIPHERAL VASCULAR: No claudication or cyanosis.  MUSCULOSKELETAL: No joint stiffness or swelling. Denies back pain.  NEUROLOGIC: No weakness or numbness.    Vital signs reviewed  PE:   APPEARANCE: Well nourished, well developed, in no acute distress.    HEAD: Normocephalic, atraumatic.  EYES: PERRL. EOMI.   Conjunctivae noninjected.  EARS: TM's intact. Light reflex normal. No retraction or perforation  NOSE: Mucosa pink. Airway clear.  MOUTH & THROAT: No tonsillar enlargement. No pharyngeal erythema or exudate.   NECK: Supple with no cervical lymphadenopathy.  No carotid bruits.  No thyromegaly  CHEST: Good inspiratory effort. Lungs clear to auscultation with no wheezes or crackles.  CARDIOVASCULAR: Normal S1, S2. No rubs, murmurs, or gallops.  ABDOMEN: Bowel sounds normal. Not distended. Soft. No tenderness or masses. No organomegaly.  EXTREMITIES: No edema, cyanosis, or clubbing.      IMPRESSION  1. Hypertension associated with diabetes    2. Hypothyroidism, unspecified type    3. Controlled type 2 diabetes mellitus with hyperglycemia, without long-term current use of insulin    4. Hyperlipidemia associated with type 2 diabetes mellitus    5. Fatty liver            PLAN  Diabetes:  Continue metformin 1000 b.i.d..   Continue to monitor A1c which is borderline    Hypertension:  Elevated pressures today but better controlled at home per patient.  She forgot to bring her blood pressure log which she did at last appointment.  Not sure if her readings are elevated because she is about due to take her medications this afternoon.  I would recommended 2-3 week nurse's visit for recheck of blood pressure, preferably in the morning.  Bring blood pressure log for review and home blood pressure cuff for comparison office readings    Dyslipidemia:  Unfortunately can't take statins or Zetia.  Will have to tolerate suboptimal levels    Fatty liver:  Healthy diet, regular exercise, weight control    Hypothyroidism:  Continue Synthroid 50 mcg daily    SCREENINGS     DEXA scan: 2017 normal   Mammogram 12/19/16, declines rpt  Colonoscopy: 2009, Dr. Wright, normal.      immunizations   Tetanus up to date   PVX: 2017   Prevnar: 1/2016  Flu: declines

## 2018-11-26 ENCOUNTER — OFFICE VISIT (OUTPATIENT)
Dept: OPTOMETRY | Facility: CLINIC | Age: 78
End: 2018-11-26
Payer: COMMERCIAL

## 2018-11-26 DIAGNOSIS — H52.4 HYPEROPIA WITH PRESBYOPIA OF BOTH EYES: ICD-10-CM

## 2018-11-26 DIAGNOSIS — E11.9 TYPE 2 DIABETES MELLITUS WITHOUT OPHTHALMIC MANIFESTATIONS: Primary | ICD-10-CM

## 2018-11-26 DIAGNOSIS — H25.13 NUCLEAR SCLEROSIS, BILATERAL: ICD-10-CM

## 2018-11-26 DIAGNOSIS — H11.002 PTERYGIUM EYE, LEFT: ICD-10-CM

## 2018-11-26 DIAGNOSIS — H52.03 HYPEROPIA WITH PRESBYOPIA OF BOTH EYES: ICD-10-CM

## 2018-11-26 PROCEDURE — 92014 COMPRE OPH EXAM EST PT 1/>: CPT | Mod: S$GLB,,, | Performed by: OPTOMETRIST

## 2018-11-26 PROCEDURE — 92015 DETERMINE REFRACTIVE STATE: CPT | Mod: S$GLB,,, | Performed by: OPTOMETRIST

## 2018-11-26 PROCEDURE — 99999 PR PBB SHADOW E&M-EST. PATIENT-LVL II: CPT | Mod: PBBFAC,,, | Performed by: OPTOMETRIST

## 2018-11-26 NOTE — PROGRESS NOTES
HPI     DLS: 9/26/17  Pt states Va is good as long as she has her glasses on   No f/f   Artifical tears   DM  LBS: did not check today   Hemoglobin A1C       Date                     Value               Ref Range             Status                11/06/2018               7.0 (H)             4.0 - 5.6 %           Final              Comment:    ADA Screening Guidelines:  5.7-6.4%  Consistent with   prediabetes  >or=6.5%  Consistent with diabetes  High levels of fetal   hemoglobin interfere with the HbA1C  assay. Heterozygous hemoglobin   variants (HbS, HgC, etc)do  not significantly interfere with this assay.     However, presence of multiple variants may affect accuracy.         08/15/2018               7.0 (H)             4.0 - 5.6 %           Final              Comment:    ADA Screening Guidelines:  5.7-6.4%  Consistent with   prediabetes  >or=6.5%  Consistent with diabetes  High levels of fetal   hemoglobin interfere with the HbA1C  assay. Heterozygous hemoglobin   variants (HbS, HgC, etc)do  not significantly interfere with this assay.     However, presence of multiple variants may affect accuracy.         04/30/2018               6.7 (H)             4.0 - 5.6 %           Final              Comment:    According to ADA guidelines, hemoglobin A1c <7.0% represents  optimal   control in non-pregnant diabetic patients. Different  metrics may apply to   specific patient populations.   Standards of Medical Care in   Diabetes-2016.  For the purpose of screening for the presence of   diabetes:  <5.7%     Consistent with the absence of diabetes  5.7-6.4%    Consistent with increasing risk for diabetes   (prediabetes)  >or=6.5%    Consistent with diabetes  Currently, no consensus exists for use of   hemoglobin A1c  for diagnosis of diabetes for children.  This Hemoglobin   A1c assay has significant interference with fetal   hemoglobin   (HbF).   The results are invalid for patients with abnormal amounts of   HbF,  "    including those with known Hereditary Persistence   of Fetal Hemoglobin.   Heterozygous hemoglobin variants (HbAS, HbAC,   HbAD, HbAE, HbA2) do not   significantly interfere with this assay;   however, presence of multiple   variants in a sample may impact the %   interference.    ----------      Last edited by Fabián Pulliam, OD on 11/26/2018  1:35 PM. (History)        ROS     Positive for: Endocrine (DM)    Negative for: Constitutional, Gastrointestinal, Neurological, Skin,   Genitourinary, Musculoskeletal, HENT, Cardiovascular, Eyes, Respiratory,   Psychiatric, Allergic/Imm, Heme/Lymph    Last edited by Fabián Pulliam, OD on 11/26/2018  1:41 PM. (History)        Assessment /Plan     For exam results, see Encounter Report.    Type 2 diabetes mellitus without ophthalmic manifestations    Nuclear sclerosis, bilateral    Pterygium eye, left    Hyperopia with presbyopia of both eyes      1. Cat OS>OD--pt wishes new Rx (pt says she has a "lazy left eye", but think she means it is just not seeing as well.  I asked her if the vision in that eye was bad when she was a child, and she said "no".  Also BCVA last year OS was 20/20 w Dr Carcamo  2. Pterygium OS  3. DM- WITHOUT RETINOPATHY.  Advised yearly DFE    PLAN:    rtc 1 yr                   "

## 2018-12-05 ENCOUNTER — OFFICE VISIT (OUTPATIENT)
Dept: FAMILY MEDICINE | Facility: CLINIC | Age: 78
End: 2018-12-05
Payer: MEDICARE

## 2018-12-05 VITALS — DIASTOLIC BLOOD PRESSURE: 74 MMHG | SYSTOLIC BLOOD PRESSURE: 162 MMHG

## 2018-12-05 DIAGNOSIS — I10 ESSENTIAL HYPERTENSION: Primary | ICD-10-CM

## 2018-12-05 DIAGNOSIS — F43.9 STRESS AT HOME: ICD-10-CM

## 2018-12-05 PROCEDURE — 99999 PR PBB SHADOW E&M-EST. PATIENT-LVL II: CPT | Mod: PBBFAC,HCNC,, | Performed by: NURSE PRACTITIONER

## 2018-12-05 PROCEDURE — 99214 OFFICE O/P EST MOD 30 MIN: CPT | Mod: HCNC,S$GLB,, | Performed by: NURSE PRACTITIONER

## 2018-12-05 PROCEDURE — 3078F DIAST BP <80 MM HG: CPT | Mod: CPTII,HCNC,S$GLB, | Performed by: NURSE PRACTITIONER

## 2018-12-05 PROCEDURE — 3077F SYST BP >= 140 MM HG: CPT | Mod: CPTII,HCNC,S$GLB, | Performed by: NURSE PRACTITIONER

## 2018-12-05 PROCEDURE — 1101F PT FALLS ASSESS-DOCD LE1/YR: CPT | Mod: CPTII,HCNC,S$GLB, | Performed by: NURSE PRACTITIONER

## 2018-12-05 RX ORDER — LOSARTAN POTASSIUM 50 MG/1
100 TABLET ORAL DAILY
Qty: 90 TABLET | Refills: 3 | Status: SHIPPED | OUTPATIENT
Start: 2018-12-05 | End: 2019-05-17 | Stop reason: SDUPTHER

## 2018-12-05 NOTE — PROGRESS NOTES
Subjective:       Patient ID: Oneyda Shah is a 77 y.o. female.    Chief Complaint: Hypertension    Hypertension   This is a new problem. The current episode started more than 1 year ago. The problem has been gradually worsening since onset. The problem is uncontrolled. Associated symptoms include anxiety. Agents associated with hypertension include thyroid hormones. Risk factors for coronary artery disease include diabetes mellitus, dyslipidemia, post-menopausal state, sedentary lifestyle and stress. Past treatments include beta blockers, calcium channel blockers and angiotensin blockers. The current treatment provides moderate improvement. Compliance problems include exercise.      Review of Systems   Psychiatric/Behavioral: The patient is nervous/anxious.    All other systems reviewed and are negative.      Objective:      Physical Exam   Constitutional: She is oriented to person, place, and time. She appears well-developed and well-nourished. No distress.   HENT:   Head: Normocephalic and atraumatic.   Eyes: EOM are normal. Pupils are equal, round, and reactive to light.   Neck: Neck supple. No JVD present. No tracheal deviation present.   Cardiovascular: Normal rate, regular rhythm, normal heart sounds and intact distal pulses.   No murmur heard.  Pulmonary/Chest: Effort normal and breath sounds normal. No respiratory distress. She has no wheezes. She has no rales.   Abdominal: Soft. Bowel sounds are normal. She exhibits no distension and no mass. There is no tenderness.   Musculoskeletal: Normal range of motion. She exhibits no edema or tenderness.   Neurological: She is alert and oriented to person, place, and time. Coordination normal.   Skin: Skin is warm and dry. No erythema. No pallor.   Psychiatric: She has a normal mood and affect. Her behavior is normal. Judgment and thought content normal. Cognition and memory are normal. She expresses no homicidal and no suicidal ideation.   Nursing note and  vitals reviewed.            Assessment:       1. Essential hypertension    2. Stress at home        Plan:       [place] was seen today for hypertension.    Diagnoses and all orders for this visit:    Essential hypertension  Chronic; uncontrolled on medication.  Losartan increased to 100mg daily.  Continue amlodipine and atenolol as previously prescribed. Followed by PCP.  -     Diabetes Digital Medicine (DDMP) Enrollment Order  -     Hypertension Digital Medicine (HDMP) Enrollment Order  -     Hypertension Digital Medicine (HDMP): Assign Onboarding Questionnaires  -     losartan (COZAAR) 50 MG tablet; Take 2 tablets (100 mg total) by mouth once daily.    Stress at home  Acute; stable.  Pt has been under stress recently in last 2 months.  Her daughter is awaiting results of a liver biopsy and her son is awaiting a deportation.  Stress relieving techniques like physical activity were discussed.    RTC in 2 weeks with blood pressure log for blood pressure check.

## 2018-12-19 ENCOUNTER — CLINICAL SUPPORT (OUTPATIENT)
Dept: FAMILY MEDICINE | Facility: CLINIC | Age: 78
End: 2018-12-19
Payer: MEDICARE

## 2018-12-19 ENCOUNTER — TELEPHONE (OUTPATIENT)
Dept: FAMILY MEDICINE | Facility: CLINIC | Age: 78
End: 2018-12-19

## 2018-12-19 NOTE — TELEPHONE ENCOUNTER
Pt came in for a BP check. Pt's Pt was 157/74. BP was checked in the Left arm, Sitting and Automatic. Pt left a log of her BP's for Dr. Villavicencio. Pt states that she has been compliant with her medications. Pls advise.

## 2018-12-19 NOTE — PROGRESS NOTES
Patient arrived to clinic for a blood pressure check.  Blood pressure 157/74.  Blood pressure sent to Dr Villavicencio for further recommendations.

## 2018-12-20 VITALS — DIASTOLIC BLOOD PRESSURE: 61 MMHG | SYSTOLIC BLOOD PRESSURE: 132 MMHG

## 2018-12-20 RX ORDER — AMLODIPINE BESYLATE 10 MG/1
10 TABLET ORAL DAILY
Qty: 90 TABLET | Refills: 3 | Status: SHIPPED | OUTPATIENT
Start: 2018-12-20 | End: 2019-03-17 | Stop reason: SDUPTHER

## 2018-12-20 NOTE — TELEPHONE ENCOUNTER
Called patient to notify of medication change.  Also assisted with scheduling follow up.  Patient verbalized understanding.

## 2018-12-20 NOTE — TELEPHONE ENCOUNTER
Reviewed blood pressure readings, see below.  Looks like her losartan was just recently increased on 12/05 to 100 mg daily.  BP still a bit elevated at times.  Would like her to increase her amlodipine to 10 mg daily.  Will send new prescription to pharmacy.  Would advise nurse. check of blood pressure in around 4 weeks.  She can continue to log her blood pressures and bring the report of the readings.  Goal blood pressure should be below 140/90.

## 2019-01-09 ENCOUNTER — OFFICE VISIT (OUTPATIENT)
Dept: URGENT CARE | Facility: CLINIC | Age: 79
End: 2019-01-09
Payer: MEDICARE

## 2019-01-09 VITALS
WEIGHT: 138 LBS | BODY MASS INDEX: 27.09 KG/M2 | TEMPERATURE: 98 F | OXYGEN SATURATION: 96 % | HEART RATE: 60 BPM | DIASTOLIC BLOOD PRESSURE: 67 MMHG | SYSTOLIC BLOOD PRESSURE: 170 MMHG | RESPIRATION RATE: 18 BRPM | HEIGHT: 60 IN

## 2019-01-09 DIAGNOSIS — J02.9 SORE THROAT: ICD-10-CM

## 2019-01-09 DIAGNOSIS — I10 ELEVATED BLOOD PRESSURE READING IN OFFICE WITH DIAGNOSIS OF HYPERTENSION: ICD-10-CM

## 2019-01-09 DIAGNOSIS — J02.0 STREP PHARYNGITIS: Primary | ICD-10-CM

## 2019-01-09 LAB
CTP QC/QA: YES
S PYO RRNA THROAT QL PROBE: POSITIVE

## 2019-01-09 PROCEDURE — 87880 POCT RAPID STREP A: ICD-10-PCS | Mod: QW,S$GLB,, | Performed by: PHYSICIAN ASSISTANT

## 2019-01-09 PROCEDURE — 1101F PT FALLS ASSESS-DOCD LE1/YR: CPT | Mod: CPTII,S$GLB,, | Performed by: PHYSICIAN ASSISTANT

## 2019-01-09 PROCEDURE — 87880 STREP A ASSAY W/OPTIC: CPT | Mod: QW,S$GLB,, | Performed by: PHYSICIAN ASSISTANT

## 2019-01-09 PROCEDURE — 3077F SYST BP >= 140 MM HG: CPT | Mod: CPTII,S$GLB,, | Performed by: PHYSICIAN ASSISTANT

## 2019-01-09 PROCEDURE — 99214 PR OFFICE/OUTPT VISIT, EST, LEVL IV, 30-39 MIN: ICD-10-PCS | Mod: S$GLB,,, | Performed by: PHYSICIAN ASSISTANT

## 2019-01-09 PROCEDURE — 3077F PR MOST RECENT SYSTOLIC BLOOD PRESSURE >= 140 MM HG: ICD-10-PCS | Mod: CPTII,S$GLB,, | Performed by: PHYSICIAN ASSISTANT

## 2019-01-09 PROCEDURE — 3078F DIAST BP <80 MM HG: CPT | Mod: CPTII,S$GLB,, | Performed by: PHYSICIAN ASSISTANT

## 2019-01-09 PROCEDURE — 99214 OFFICE O/P EST MOD 30 MIN: CPT | Mod: S$GLB,,, | Performed by: PHYSICIAN ASSISTANT

## 2019-01-09 PROCEDURE — 3078F PR MOST RECENT DIASTOLIC BLOOD PRESSURE < 80 MM HG: ICD-10-PCS | Mod: CPTII,S$GLB,, | Performed by: PHYSICIAN ASSISTANT

## 2019-01-09 PROCEDURE — 1101F PR PT FALLS ASSESS DOC 0-1 FALLS W/OUT INJ PAST YR: ICD-10-PCS | Mod: CPTII,S$GLB,, | Performed by: PHYSICIAN ASSISTANT

## 2019-01-09 RX ORDER — CLINDAMYCIN HYDROCHLORIDE 300 MG/1
300 CAPSULE ORAL 3 TIMES DAILY
Qty: 30 CAPSULE | Refills: 0 | Status: SHIPPED | OUTPATIENT
Start: 2019-01-09 | End: 2019-01-19

## 2019-01-09 NOTE — PROGRESS NOTES
Subjective:       Patient ID: Oneyda Shah is a 78 y.o. female.    Vitals:  height is 5' (1.524 m) and weight is 62.6 kg (138 lb). Her temperature is 97.6 °F (36.4 °C). Her blood pressure is 170/67 (abnormal) and her pulse is 60. Her respiration is 18 and oxygen saturation is 96%.     Chief Complaint: Sore Throat    Patient presents with sore throat for 3 days.  She states her granddaughter had strep.      Sore Throat    This is a new problem. The current episode started in the past 7 days (3 days). The problem has been gradually worsening. There has been no fever. The pain is at a severity of 4/10. The pain is mild. Associated symptoms include trouble swallowing. Pertinent negatives include no abdominal pain, congestion, coughing, diarrhea, ear pain, headaches, neck pain, shortness of breath, stridor or vomiting. She has had no exposure to strep or mono. She has tried nothing for the symptoms.       Constitution: Negative for chills, sweating, fatigue and fever.   HENT: Positive for sore throat and trouble swallowing. Negative for ear pain, congestion, sinus pain, sinus pressure and voice change.    Neck: Negative for neck pain and painful lymph nodes.   Cardiovascular: Negative for chest trauma and palpitations.   Eyes: Negative for eye redness.   Respiratory: Negative for chest tightness, cough, sputum production, bloody sputum, COPD, shortness of breath, stridor, wheezing and asthma.    Gastrointestinal: Negative for abdominal pain, nausea, vomiting and diarrhea.   Musculoskeletal: Negative for trauma, joint pain and muscle ache.   Skin: Negative for color change and rash.   Allergic/Immunologic: Negative for seasonal allergies and asthma.   Neurological: Negative for speech difficulty and headaches.   Hematologic/Lymphatic: Negative for swollen lymph nodes.       Objective:      Physical Exam   Constitutional: She is oriented to person, place, and time. She appears well-developed and well-nourished. She is  cooperative.  Non-toxic appearance. She does not appear ill. No distress.   HENT:   Head: Normocephalic and atraumatic.   Right Ear: Hearing, tympanic membrane, external ear and ear canal normal.   Left Ear: Hearing, tympanic membrane, external ear and ear canal normal.   Nose: Nose normal. No mucosal edema, rhinorrhea or nasal deformity. No epistaxis. Right sinus exhibits no maxillary sinus tenderness and no frontal sinus tenderness. Left sinus exhibits no maxillary sinus tenderness and no frontal sinus tenderness.   Mouth/Throat: Uvula is midline and mucous membranes are normal. No trismus in the jaw. Normal dentition. No uvula swelling. Posterior oropharyngeal edema and posterior oropharyngeal erythema present.   Eyes: Conjunctivae and lids are normal. Right eye exhibits no discharge. Left eye exhibits no discharge. No scleral icterus.   Sclera clear bilat   Neck: Trachea normal, normal range of motion, full passive range of motion without pain and phonation normal. Neck supple.   Cardiovascular: Normal rate, regular rhythm, normal heart sounds, intact distal pulses and normal pulses.   Pulmonary/Chest: Effort normal and breath sounds normal. No respiratory distress.   Abdominal: Soft. Normal appearance and bowel sounds are normal. She exhibits no distension, no pulsatile midline mass and no mass. There is no tenderness.   Musculoskeletal: Normal range of motion. She exhibits no edema or deformity.   Neurological: She is alert and oriented to person, place, and time. She exhibits normal muscle tone. Coordination normal.   Skin: Skin is warm, dry and intact. She is not diaphoretic. No pallor.   Psychiatric: She has a normal mood and affect. Her speech is normal and behavior is normal. Judgment and thought content normal. Cognition and memory are normal.   Nursing note and vitals reviewed.        Results for orders placed or performed in visit on 01/09/19   POCT rapid strep A   Result Value Ref Range    Rapid Strep  A Screen Positive (A) Negative     Acceptable Yes        Assessment:       1. Strep pharyngitis    2. Sore throat    3. Elevated blood pressure reading in office with diagnosis of hypertension        Plan:         Strep pharyngitis  -     (Magic mouthwash) 1:1:1 Benadryl 12.5mg/5ml liq, aluminum & magnesium hydroxide-simehticone (Maalox), lidocaine viscous 2%; Swish and spit 5 mLs every 4 (four) hours as needed (sore throat).  Dispense: 60 mL; Refill: 0  -     clindamycin (CLEOCIN) 300 MG capsule; Take 1 capsule (300 mg total) by mouth 3 (three) times daily. for 10 days  Dispense: 30 capsule; Refill: 0    Sore throat  -     POCT rapid strep A    Elevated blood pressure reading in office with diagnosis of hypertension      Patient Instructions     Faringitis Por Estreptococos [Pharyngitis, Strep - Confirmed]    [place] prueba evelyne positiva a la infección por estreptococos. Se trata de leydi enfermedad contagiosa. La puede contagiar al toser, al besar o al tocar a otras personas después de haberse tocado la boca o la nariz. Los síntomas incluyen dolor de garganta que empeora al tragar, dolor en todo el cuerpo, dolor de jackson y fiebre. [place] tratamiento, le darán un antibiótico con el que debería empezar a sentirse mejor dentro de 1 ó 2 días.  Cuidados En [place]:  · Descanse en newton casa y [place] abundante cantidad de líquidos para evitar la deshidratación.  · Deberá ausentarse del trabajo o la escuela los dos primeros días del tratamiento con antibióticos. Después de eso, la enfermedad ya no es contagiosa y, si se siente mejor, puede regresar a la escuela o el trabajo.  · [place] los antibióticos halley los 10 días completos, incluso aunque se sienta prashant después de los primeros swetha de tratamiento. [place] es muy importante para evitar las afecciones del corazón o los riñones que pueden surgir amie complicación de leydi infección de garganta por estreptococo no curada.  · Niños: Use acetaminofén (Tylenol) para aliviar  la fiebre, el nerviosismo y el malestar. En niños mayores de seis meses puede usar ibuprofeno (kelton Motrin infantil) en vez de Tylenol.  [NOTA: Si el cathy tiene leydi enfermedad hepática o renal crónica, o ha tenido alguna vez leydi úlcera estomacal o sangrado gastrointestinal, consulte con newton médico antes de darle estos medicamentos.] (La aspirina no debe usarse nunca en personas menores de 18 años enfermas con fiebre, ya que puede causar daños graves al hígado.)Adultos: Puede usar acetaminofén (Tylenol) o ibuprofeno (Motrin o Advil) para controlar la fiebre o el dolor, a menos que le hayan recetado otro medicamento. [NOTA: Si tiene leydi enfermedad hepática o renal crónica, o ha tenido alguna vez leydi úlcera estomacal o sangrado gastrointestinal, consulte con newton médico antes de gueor estos medicamentos.]  · Hay comprimidos o sprays para la garganta (Chloraseptic y otros) que ayudarán a reducir el dolor. También puede hacer gárgaras con agua tibia con sal para aliviar el dolor de garganta. Disuelva ½ cucharadita de sal en 1 vaso de agua tibia. Resulta especialmente útil hacerlo antes de las comidas.  Programe leydi VISITA DE CONTROL con newton médico, o según le indique nuestro personal médico, si no empieza a sentirse mejor halley la semana próxima.  Busque Prontamente Atención Médica  si algo de lo siguiente ocurre:  · Fiebre de 100.4°F (38°C) o más serge, tomada oralmente, que no mejora con los medicamentos.  · Dolor nuevo o que va empeorando en los oídos, los senos paranasales o la jackson.  · Bultos dolorosos en la parte de atrás del gadiel.  · No puede tragar líquidos ni abrir prashant la boca debido al dolor de garganta.  · Tiene problemas para respirar o hace ruidos al respirar.  · Disfonía (cambios en la voz).  · Leydi nueva erupción cutánea (salpullido).  Date Last Reviewed: 4/13/2015  © 3239-2454 [employer]. [address] [place] [address], [place] [address]. Todos los derechos reservados. Esta información no pretende  sustituir la atención médica profesional. Sólo newton médico puede diagnosticar y tratar un problema de fatmata.

## 2019-01-09 NOTE — PATIENT INSTRUCTIONS
Faringitis Por Estreptococos [Pharyngitis, Strep - Confirmed]    [place] prueba evelyne positiva a la infección por estreptococos. Se trata de leydi enfermedad contagiosa. La puede contagiar al toser, al besar o al tocar a otras personas después de haberse tocado la boca o la nariz. Los síntomas incluyen dolor de garganta que empeora al tragar, dolor en todo el cuerpo, dolor de jackson y fiebre. [place] tratamiento, le darán un antibiótico con el que debería empezar a sentirse mejor dentro de 1 ó 2 días.  Cuidados En [place]:  · Descanse en [place] casa y [place] abundante cantidad de líquidos para evitar la deshidratación.  · Deberá ausentarse del trabajo o la escuela los dos primeros días del tratamiento con antibióticos. Después de eso, la enfermedad ya no es contagiosa y, si se siente mejor, puede regresar a la escuela o el trabajo.  · [place] los antibióticos halley los 10 días completos, incluso aunque se sienta prashant después de los primeros swetha de tratamiento. [place] es muy importante para evitar las afecciones del corazón o los riñones que pueden surgir [place] complicación de leydi infección de garganta por estreptococo no curada.  · Niños: Use acetaminofén (Tylenol) para aliviar la fiebre, el nerviosismo y el malestar. En niños mayores de seis meses puede usar ibuprofeno (kelton Motrin infantil) en vez de Tylenol.  [NOTA: Si el cathy tiene leydi enfermedad hepática o renal crónica, o ha tenido alguna vez leydi úlcera estomacal o sangrado gastrointestinal, consulte con newton médico antes de darle estos medicamentos.] (La aspirina no debe usarse nunca en personas menores de 18 años enfermas con fiebre, ya que puede causar daños graves al hígado.)Adultos: Puede usar acetaminofén (Tylenol) o ibuprofeno (Motrin o Advil) para controlar la fiebre o el dolor, a menos que le hayan recetado otro medicamento. [NOTA: Si tiene leydi enfermedad hepática o renal crónica, o ha tenido alguna vez leydi úlcera estomacal o sangrado gastrointestinal, consulte con newton  médico antes de guero estos medicamentos.]  · Hay comprimidos o sprays para la garganta (Chloraseptic y otros) que ayudarán a reducir el dolor. También puede hacer gárgaras con agua tibia con sal para aliviar el dolor de garganta. Disuelva ½ cucharadita de sal en 1 vaso de agua tibia. Resulta especialmente útil hacerlo antes de las comidas.  Programe leydi VISITA DE CONTROL con newton médico, o según le indique nuestro personal médico, si no empieza a sentirse mejor halley la semana próxima.  Busque Prontamente Atención Médica  si algo de lo siguiente ocurre:  · Fiebre de 100.4°F (38°C) o más serge, tomada oralmente, que no mejora con los medicamentos.  · Dolor nuevo o que va empeorando en los oídos, los senos paranasales o la jackson.  · Bultos dolorosos en la parte de atrás del gadiel.  · No puede tragar líquidos ni abrir prashant la boca debido al dolor de garganta.  · Tiene problemas para respirar o hace ruidos al respirar.  · Disfonía (cambios en la voz).  · Leydi nueva erupción cutánea (salpullido).  Date Last Reviewed: 4/13/2015  © 0258-7376 [employer]. [address] [place] [address], [place] [address]. Todos los derechos reservados. Esta información no pretende sustituir la atención médica profesional. Sólo newton médico puede diagnosticar y tratar un problema de fatmata.

## 2019-01-17 ENCOUNTER — CLINICAL SUPPORT (OUTPATIENT)
Dept: FAMILY MEDICINE | Facility: CLINIC | Age: 79
End: 2019-01-17
Payer: MEDICARE

## 2019-01-17 VITALS — SYSTOLIC BLOOD PRESSURE: 142 MMHG | DIASTOLIC BLOOD PRESSURE: 78 MMHG

## 2019-01-17 PROCEDURE — 99999 PR PBB SHADOW E&M-EST. PATIENT-LVL I: CPT | Mod: PBBFAC,HCNC,,

## 2019-01-17 PROCEDURE — 99999 PR PBB SHADOW E&M-EST. PATIENT-LVL I: ICD-10-PCS | Mod: PBBFAC,HCNC,,

## 2019-01-17 NOTE — PROGRESS NOTES
Patient arrived to clinic for blood pressure check.  Patient's b/p with machine is 148/70.  Waited 5 minutes to do manual.  Manual b/p:  142/78.

## 2019-02-13 ENCOUNTER — TELEPHONE (OUTPATIENT)
Dept: FAMILY MEDICINE | Facility: CLINIC | Age: 79
End: 2019-02-13

## 2019-02-13 NOTE — TELEPHONE ENCOUNTER
----- Message from Estefani Jeffers sent at 2/13/2019  8:55 AM CST -----  Contact: self/ [phone]  Patient asked if she can be seen tomorrow since provider will be out on Friday.    Please call.

## 2019-02-14 ENCOUNTER — OFFICE VISIT (OUTPATIENT)
Dept: FAMILY MEDICINE | Facility: CLINIC | Age: 79
End: 2019-02-14
Payer: MEDICARE

## 2019-02-14 ENCOUNTER — LAB VISIT (OUTPATIENT)
Dept: LAB | Facility: HOSPITAL | Age: 79
End: 2019-02-14
Attending: FAMILY MEDICINE
Payer: MEDICARE

## 2019-02-14 VITALS
HEIGHT: 61 IN | WEIGHT: 139.75 LBS | TEMPERATURE: 98 F | DIASTOLIC BLOOD PRESSURE: 78 MMHG | OXYGEN SATURATION: 96 % | BODY MASS INDEX: 26.39 KG/M2 | SYSTOLIC BLOOD PRESSURE: 138 MMHG | HEART RATE: 54 BPM

## 2019-02-14 DIAGNOSIS — E03.9 HYPOTHYROIDISM, UNSPECIFIED TYPE: ICD-10-CM

## 2019-02-14 DIAGNOSIS — G72.0 STATIN MYOPATHY: ICD-10-CM

## 2019-02-14 DIAGNOSIS — E11.65 CONTROLLED TYPE 2 DIABETES MELLITUS WITH HYPERGLYCEMIA, WITHOUT LONG-TERM CURRENT USE OF INSULIN: ICD-10-CM

## 2019-02-14 DIAGNOSIS — I10 ESSENTIAL HYPERTENSION: ICD-10-CM

## 2019-02-14 DIAGNOSIS — T46.6X5A STATIN MYOPATHY: ICD-10-CM

## 2019-02-14 DIAGNOSIS — K76.0 FATTY LIVER: ICD-10-CM

## 2019-02-14 DIAGNOSIS — E11.65 CONTROLLED TYPE 2 DIABETES MELLITUS WITH HYPERGLYCEMIA, WITHOUT LONG-TERM CURRENT USE OF INSULIN: Primary | ICD-10-CM

## 2019-02-14 DIAGNOSIS — M94.0 COSTOCHONDRITIS: ICD-10-CM

## 2019-02-14 DIAGNOSIS — I70.0 AORTIC ATHEROSCLEROSIS: ICD-10-CM

## 2019-02-14 LAB
ALBUMIN SERPL BCP-MCNC: 4 G/DL
ALP SERPL-CCNC: 83 U/L
ALT SERPL W/O P-5'-P-CCNC: 31 U/L
ANION GAP SERPL CALC-SCNC: 8 MMOL/L
AST SERPL-CCNC: 27 U/L
BILIRUB SERPL-MCNC: 0.5 MG/DL
BUN SERPL-MCNC: 18 MG/DL
CALCIUM SERPL-MCNC: 10 MG/DL
CHLORIDE SERPL-SCNC: 104 MMOL/L
CO2 SERPL-SCNC: 28 MMOL/L
CREAT SERPL-MCNC: 1 MG/DL
EST. GFR  (AFRICAN AMERICAN): >60 ML/MIN/1.73 M^2
EST. GFR  (NON AFRICAN AMERICAN): 55 ML/MIN/1.73 M^2
ESTIMATED AVG GLUCOSE: 157 MG/DL
GLUCOSE SERPL-MCNC: 136 MG/DL
HBA1C MFR BLD HPLC: 7.1 %
POTASSIUM SERPL-SCNC: 4.8 MMOL/L
PROT SERPL-MCNC: 7.9 G/DL
SODIUM SERPL-SCNC: 140 MMOL/L

## 2019-02-14 PROCEDURE — 3075F SYST BP GE 130 - 139MM HG: CPT | Mod: HCNC,CPTII,S$GLB, | Performed by: FAMILY MEDICINE

## 2019-02-14 PROCEDURE — 99499 RISK ADDL DX/OHS AUDIT: ICD-10-PCS | Mod: HCNC,S$GLB,, | Performed by: FAMILY MEDICINE

## 2019-02-14 PROCEDURE — 99214 PR OFFICE/OUTPT VISIT, EST, LEVL IV, 30-39 MIN: ICD-10-PCS | Mod: HCNC,S$GLB,, | Performed by: FAMILY MEDICINE

## 2019-02-14 PROCEDURE — 3078F DIAST BP <80 MM HG: CPT | Mod: HCNC,CPTII,S$GLB, | Performed by: FAMILY MEDICINE

## 2019-02-14 PROCEDURE — 80053 COMPREHEN METABOLIC PANEL: CPT | Mod: HCNC

## 2019-02-14 PROCEDURE — 1101F PR PT FALLS ASSESS DOC 0-1 FALLS W/OUT INJ PAST YR: ICD-10-PCS | Mod: HCNC,CPTII,S$GLB, | Performed by: FAMILY MEDICINE

## 2019-02-14 PROCEDURE — 83036 HEMOGLOBIN GLYCOSYLATED A1C: CPT | Mod: HCNC

## 2019-02-14 PROCEDURE — 3078F PR MOST RECENT DIASTOLIC BLOOD PRESSURE < 80 MM HG: ICD-10-PCS | Mod: HCNC,CPTII,S$GLB, | Performed by: FAMILY MEDICINE

## 2019-02-14 PROCEDURE — 3075F PR MOST RECENT SYSTOLIC BLOOD PRESS GE 130-139MM HG: ICD-10-PCS | Mod: HCNC,CPTII,S$GLB, | Performed by: FAMILY MEDICINE

## 2019-02-14 PROCEDURE — 99999 PR PBB SHADOW E&M-EST. PATIENT-LVL IV: CPT | Mod: PBBFAC,HCNC,, | Performed by: FAMILY MEDICINE

## 2019-02-14 PROCEDURE — 99999 PR PBB SHADOW E&M-EST. PATIENT-LVL IV: ICD-10-PCS | Mod: PBBFAC,HCNC,, | Performed by: FAMILY MEDICINE

## 2019-02-14 PROCEDURE — 36415 COLL VENOUS BLD VENIPUNCTURE: CPT | Mod: HCNC

## 2019-02-14 PROCEDURE — 99214 OFFICE O/P EST MOD 30 MIN: CPT | Mod: HCNC,S$GLB,, | Performed by: FAMILY MEDICINE

## 2019-02-14 PROCEDURE — 1101F PT FALLS ASSESS-DOCD LE1/YR: CPT | Mod: HCNC,CPTII,S$GLB, | Performed by: FAMILY MEDICINE

## 2019-02-14 PROCEDURE — 99499 UNLISTED E&M SERVICE: CPT | Mod: HCNC,S$GLB,, | Performed by: FAMILY MEDICINE

## 2019-02-14 NOTE — PROGRESS NOTES
(Portions of this note were dictated using voice recognition software and may contain dictation related errors in spelling/grammar/syntax not found on text review)    CC:   Chief Complaint   Patient presents with    Follow-up     tdap       HPI: 78 y.o. female here for three-month follow-up    Diabetes, on metformin 1000 mg twice a day. borderline A1c at 7 0.  Compliant with eye exams and dentist. No neuropathy. Sees the eye doctor and Dentist, last eye exam:   No numbness or tingling in her feet.      Hypertension: On losartan 50 mg daily, atenolol 50 mg daily, amlodipine 5 mg daily .   blood pressure control     Hyperlipidemia: Intolerant to all statins tried along with Zetia. Not currently on therapy for this at the moment. We discussed tolerating a suboptimal LDL given her intolerance to medication     History of hypothyroidism, Synthroid 50 µg , last TSH was stable from November      fatty liver. There was one abnormal lesion on the gallbladder with CT demonstrated no gallbladder abnormalities. lfts slightly high, stable overall. occ will get some ruq pain but sporadic and not consistent       Past Medical History:   Diagnosis Date    Aortic atherosclerosis     Diabetes mellitus type II     Fatty liver     High cholesterol     HLD (hyperlipidemia)     HTN (hypertension)     Hypothyroidism     Osteopenia     Thyroid disease        Past Surgical History:   Procedure Laterality Date    BREAST BIOPSY      BREAST SURGERY      biopsy    HYSTERECTOMY      still w/ovaries    ROTATOR CUFF REPAIR Left        Family History   Problem Relation Age of Onset    Diabetes Mother     Hypertension Mother     Diabetes Brother     Coronary artery disease Sister 55        MI    Liver cancer Sister     No Known Problems Father     Diabetes Daughter     No Known Problems Son     No Known Problems Brother        Social History     Socioeconomic History    Marital status:      Spouse name: Not on file     Number of children: Not on file    Years of education: Not on file    Highest education level: Not on file   Social Needs    Financial resource strain: Not on file    Food insecurity - worry: Not on file    Food insecurity - inability: Not on file    Transportation needs - medical: Not on file    Transportation needs - non-medical: Not on file   Occupational History    Not on file   Tobacco Use    Smoking status: Never Smoker    Smokeless tobacco: Never Used   Substance and Sexual Activity    Alcohol use: No    Drug use: No    Sexual activity: Yes     Partners: Male   Other Topics Concern    Not on file   Social History Narrative    Not on file     Lab Results   Component Value Date    WBC 7.62 11/06/2018    HGB 11.9 (L) 11/06/2018    HCT 35.9 (L) 11/06/2018     11/06/2018    CHOL 243 (H) 11/06/2018    TRIG 147 11/06/2018    HDL 44 11/06/2018    ALT 37 11/06/2018    AST 29 11/06/2018     11/06/2018    K 4.5 11/06/2018     11/06/2018    CREATININE 1.0 11/06/2018    CALCIUM 10.0 11/06/2018    ALBUMIN 3.9 11/06/2018    BUN 15 11/06/2018    CO2 28 11/06/2018    TSH 2.834 11/06/2018    HGBA1C 7.0 (H) 11/06/2018    LDLCALC 169.6 (H) 11/06/2018     (H) 11/06/2018           ROS:  GENERAL: No fever, chills, fatigability or weight loss.  SKIN: No rashes, no itching.  HEAD: No headaches.  EYES: No visual changes  EARS: No ear pain or changes in hearing.  NOSE: No congestion or rhinorrhea.  MOUTH & THROAT: No hoarseness, change in voice, or sore throat.  NODES: Denies swollen glands.  CHEST:  Occasional sharp left-sided chest wall pain lastly couple seconds and goes away  CARDIOVASCULAR: Denies chest pain, PND, orthopnea.  ABDOMEN: No nausea, vomiting, or changes in bowel function.  Had 1 episode of sharp right lower quadrant pain that lasted very transiently went away.  No further symptoms  URINARY: No flank pain, dysuria or hematuria.  PERIPHERAL VASCULAR: No claudication or  cyanosis.  MUSCULOSKELETAL: No joint stiffness or swelling. Denies back pain.  NEUROLOGIC: No weakness or numbness.    Vital signs reviewed  PE:   APPEARANCE: Well nourished, well developed, in no acute distress.    HEAD: Normocephalic, atraumatic.  EYES:  Conjunctivae noninjected.  NECK: Supple with no cervical lymphadenopathy.    CHEST: Good inspiratory effort. Lungs clear to auscultation with no wheezes or crackles.  Left costochondral junction tenderness, reproduces pain  CARDIOVASCULAR: Normal S1, S2. No rubs, murmurs, or gallops.  ABDOMEN: Bowel sounds normal. Not distended. Soft. No tenderness or masses. No organomegaly.  EXTREMITIES: No edema, cyanosis, or clubbing.      IMPRESSION  1. Controlled type 2 diabetes mellitus with hyperglycemia, without long-term current use of insulin    2. Essential hypertension    3. Hypothyroidism, unspecified type    4. Fatty liver    5. Aortic atherosclerosis    6. Costochondritis            PLAN  Diabetes health maintenance:  -- advise regular and consistent glucose monitoring and medication compliance.  -- advise daily foot checks  -- advise yearly ophthalmologic exams  -- advise adequate dietary and exercise modification  -- advise regular dental visits  -- advise daily low-dose aspirin use if patient is not on other anticoagulants and there are no other contraindications.  -- Medication management:  Continue metformin 2 g daily.  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  Can continue aspirin, blood pressure and diabetes control    Orders Placed This Encounter   Procedures    Hemoglobin A1c    Comprehensive metabolic panel             SCREENINGS      DEXA scan: 2017 normal   Mammogram 12/19/16, declines rpt  Colonoscopy: 2009, Dr. Wright, normal.      immunizations   Tetanus :Check with your pharmacy regarding getting the  tetanus (Tdap) vaccine (once every 10 years)  PVX: 2017   Prevnar: 1/2016  Flu: declines  Zoster: utd

## 2019-03-15 NOTE — TELEPHONE ENCOUNTER
Pt stated that she needs all her medication sent to [facility], I advised pt that all of her medication is sent to [facility] but two which is her amlodipine and her ibuprofen, pt states she needs a refill on her amlodipine. I told her that I will send in a request to . She verbalized understanding.

## 2019-03-15 NOTE — TELEPHONE ENCOUNTER
----- Message from Lydia Palmer sent at 3/15/2019  3:20 PM CDT -----  Contact: [phone]/self  Patient requesting to speak with you regarding medications. She does not know the name. Please advise.

## 2019-03-17 RX ORDER — AMLODIPINE BESYLATE 10 MG/1
10 TABLET ORAL DAILY
Qty: 90 TABLET | Refills: 3 | Status: SHIPPED | OUTPATIENT
Start: 2019-03-17 | End: 2020-04-20

## 2019-03-22 RX ORDER — ATENOLOL 50 MG/1
TABLET ORAL
Qty: 90 TABLET | Refills: 3 | Status: SHIPPED | OUTPATIENT
Start: 2019-03-22 | End: 2019-04-02 | Stop reason: SDUPTHER

## 2019-03-26 ENCOUNTER — OFFICE VISIT (OUTPATIENT)
Dept: URGENT CARE | Facility: CLINIC | Age: 79
End: 2019-03-26
Payer: MEDICARE

## 2019-03-26 VITALS
SYSTOLIC BLOOD PRESSURE: 146 MMHG | OXYGEN SATURATION: 95 % | BODY MASS INDEX: 26.24 KG/M2 | HEIGHT: 61 IN | HEART RATE: 66 BPM | DIASTOLIC BLOOD PRESSURE: 66 MMHG | RESPIRATION RATE: 16 BRPM | WEIGHT: 139 LBS | TEMPERATURE: 100 F

## 2019-03-26 DIAGNOSIS — R50.9 FEVER, UNSPECIFIED FEVER CAUSE: Primary | ICD-10-CM

## 2019-03-26 DIAGNOSIS — R42 DIZZINESS: ICD-10-CM

## 2019-03-26 DIAGNOSIS — R68.89 FLU-LIKE SYMPTOMS: ICD-10-CM

## 2019-03-26 DIAGNOSIS — R05.9 COUGH: ICD-10-CM

## 2019-03-26 LAB
BILIRUB UR QL STRIP: NEGATIVE
CTP QC/QA: YES
FLUAV AG NPH QL: NEGATIVE
FLUBV AG NPH QL: NEGATIVE
GLUCOSE SERPL-MCNC: 168 MG/DL (ref 70–110)
GLUCOSE SERPL-MCNC: 184 MG/DL (ref 70–110)
GLUCOSE UR QL STRIP: NEGATIVE
KETONES UR QL STRIP: NEGATIVE
LEUKOCYTE ESTERASE UR QL STRIP: NEGATIVE
PH, POC UA: 6 (ref 5–8)
POC ANION GAP: 15 MMOL/L (ref 10–20)
POC BLOOD, URINE: NEGATIVE
POC BUN: 10 MMOL/L (ref 8–26)
POC CHLORIDE: 101 MMOL/L (ref 98–109)
POC CREATININE: 0.9 MG/DL (ref 0.6–1.3)
POC HEMATOCRIT: 35 %PCV (ref 37–47)
POC HEMOGLOBIN: 11.9 G/DL (ref 12.5–16)
POC ICA: 1.16 MMOL/L (ref 1.12–1.32)
POC NITRATES, URINE: NEGATIVE
POC POTASSIUM: 4.8 MMOL/L (ref 3.5–4.9)
POC SODIUM: 138 MMOL/L (ref 138–146)
POC TCO2: 28 MMOL/L (ref 24–29)
PROT UR QL STRIP: POSITIVE
SP GR UR STRIP: 1.01 (ref 1–1.03)
UROBILINOGEN UR STRIP-ACNC: NORMAL (ref 0.1–1.1)

## 2019-03-26 PROCEDURE — 93010 EKG 12-LEAD: ICD-10-PCS | Mod: S$GLB,,, | Performed by: STUDENT IN AN ORGANIZED HEALTH CARE EDUCATION/TRAINING PROGRAM

## 2019-03-26 PROCEDURE — 82962 POCT GLUCOSE, HAND-HELD DEVICE: ICD-10-PCS | Mod: S$GLB,,, | Performed by: PHYSICIAN ASSISTANT

## 2019-03-26 PROCEDURE — 3077F PR MOST RECENT SYSTOLIC BLOOD PRESSURE >= 140 MM HG: ICD-10-PCS | Mod: CPTII,S$GLB,, | Performed by: PHYSICIAN ASSISTANT

## 2019-03-26 PROCEDURE — 99499 UNLISTED E&M SERVICE: CPT | Mod: S$GLB,,, | Performed by: PHYSICIAN ASSISTANT

## 2019-03-26 PROCEDURE — 93005 EKG 12-LEAD: ICD-10-PCS | Mod: S$GLB,,, | Performed by: FAMILY MEDICINE

## 2019-03-26 PROCEDURE — 1101F PT FALLS ASSESS-DOCD LE1/YR: CPT | Mod: CPTII,S$GLB,, | Performed by: PHYSICIAN ASSISTANT

## 2019-03-26 PROCEDURE — 87804 INFLUENZA ASSAY W/OPTIC: CPT | Mod: QW,S$GLB,, | Performed by: PHYSICIAN ASSISTANT

## 2019-03-26 PROCEDURE — 3078F PR MOST RECENT DIASTOLIC BLOOD PRESSURE < 80 MM HG: ICD-10-PCS | Mod: CPTII,S$GLB,, | Performed by: PHYSICIAN ASSISTANT

## 2019-03-26 PROCEDURE — 99499 RISK ADDL DX/OHS AUDIT: ICD-10-PCS | Mod: S$GLB,,, | Performed by: PHYSICIAN ASSISTANT

## 2019-03-26 PROCEDURE — 80047 BASIC METABLC PNL IONIZED CA: CPT | Mod: QW,S$GLB,, | Performed by: PHYSICIAN ASSISTANT

## 2019-03-26 PROCEDURE — 93010 ELECTROCARDIOGRAM REPORT: CPT | Mod: S$GLB,,, | Performed by: STUDENT IN AN ORGANIZED HEALTH CARE EDUCATION/TRAINING PROGRAM

## 2019-03-26 PROCEDURE — 3078F DIAST BP <80 MM HG: CPT | Mod: CPTII,S$GLB,, | Performed by: PHYSICIAN ASSISTANT

## 2019-03-26 PROCEDURE — 82962 GLUCOSE BLOOD TEST: CPT | Mod: S$GLB,,, | Performed by: PHYSICIAN ASSISTANT

## 2019-03-26 PROCEDURE — 99213 PR OFFICE/OUTPT VISIT, EST, LEVL III, 20-29 MIN: ICD-10-PCS | Mod: 25,S$GLB,, | Performed by: PHYSICIAN ASSISTANT

## 2019-03-26 PROCEDURE — 99213 OFFICE O/P EST LOW 20 MIN: CPT | Mod: 25,S$GLB,, | Performed by: PHYSICIAN ASSISTANT

## 2019-03-26 PROCEDURE — 87804 POCT INFLUENZA A/B: ICD-10-PCS | Mod: QW,S$GLB,, | Performed by: PHYSICIAN ASSISTANT

## 2019-03-26 PROCEDURE — 81003 URINALYSIS AUTO W/O SCOPE: CPT | Mod: QW,S$GLB,, | Performed by: PHYSICIAN ASSISTANT

## 2019-03-26 PROCEDURE — 93005 ELECTROCARDIOGRAM TRACING: CPT | Mod: S$GLB,,, | Performed by: FAMILY MEDICINE

## 2019-03-26 PROCEDURE — 81003 POCT URINALYSIS, DIPSTICK, AUTOMATED, W/O SCOPE: ICD-10-PCS | Mod: QW,S$GLB,, | Performed by: PHYSICIAN ASSISTANT

## 2019-03-26 PROCEDURE — 1101F PR PT FALLS ASSESS DOC 0-1 FALLS W/OUT INJ PAST YR: ICD-10-PCS | Mod: CPTII,S$GLB,, | Performed by: PHYSICIAN ASSISTANT

## 2019-03-26 PROCEDURE — 3077F SYST BP >= 140 MM HG: CPT | Mod: CPTII,S$GLB,, | Performed by: PHYSICIAN ASSISTANT

## 2019-03-26 PROCEDURE — 80047 POCT CHEMISTRY PANEL: ICD-10-PCS | Mod: QW,S$GLB,, | Performed by: PHYSICIAN ASSISTANT

## 2019-03-26 RX ORDER — IBUPROFEN 200 MG
400 TABLET ORAL
Status: COMPLETED | OUTPATIENT
Start: 2019-03-26 | End: 2019-03-26

## 2019-03-26 RX ORDER — AZITHROMYCIN 250 MG/1
TABLET, FILM COATED ORAL
Qty: 6 TABLET | Refills: 0 | Status: SHIPPED | OUTPATIENT
Start: 2019-03-26 | End: 2019-08-19

## 2019-03-26 RX ADMIN — Medication 400 MG: at 10:03

## 2019-03-26 NOTE — PROGRESS NOTES
"Subjective:       Patient ID: Oneyda Shah is a 78 y.o. female.    Vitals:  height is 5' 1" (1.549 m) and weight is 63 kg (139 lb). Her oral temperature is 99.5 °F (37.5 °C). Her blood pressure is 146/66 (abnormal) and her pulse is 66. Her respiration is 16 and oxygen saturation is 95%.     Chief Complaint: Cough    CC: Cough; Dizziness    HPI:  78-year-old female with DM2, hypertension presents for consideration of cough for 3 days.  She reports nonproductive cough, nasal congestion subjective fever, chills, body aches  which began on Saturday.  She also reports 1 episode of dizziness when she was going from the toilet to the tub.  She denies fall, head injury or loss of consciousness.  She reports attempted treatment with Robitussin with mild relief, last dose taken this morning.    Cough   This is a new problem. Episode onset: 4 days. The problem has been gradually worsening. The problem occurs every few minutes. The cough is productive of sputum. Associated symptoms include chills, a fever, headaches and myalgias. Pertinent negatives include no chest pain, ear pain, eye redness, hemoptysis, postnasal drip, rash, sore throat, shortness of breath or wheezing. Nothing aggravates the symptoms. She has tried OTC cough suppressant for the symptoms. The treatment provided no relief.       Constitution: Positive for appetite change, chills, fatigue and fever. Negative for sweating.   HENT: Positive for congestion. Negative for ear pain, ear discharge, foreign body in ear, hearing loss, mouth sores, postnasal drip, sinus pain, sinus pressure, sore throat and voice change.    Neck: Negative for painful lymph nodes.   Cardiovascular: Negative for chest pain.   Eyes: Negative for eye redness.   Respiratory: Positive for cough. Negative for chest tightness, sputum production, bloody sputum, COPD, shortness of breath, stridor, wheezing and asthma.    Gastrointestinal: Positive for nausea. Negative for abdominal pain, " vomiting, constipation, diarrhea and rectal bleeding.   Genitourinary: Negative for dysuria, vaginal discharge, vaginal bleeding, vaginal odor and genital sore.   Musculoskeletal: Positive for muscle ache.   Skin: Negative for rash.   Allergic/Immunologic: Negative for seasonal allergies and asthma.   Neurological: Positive for dizziness and headaches. Negative for light-headedness, passing out, disorientation, altered mental status, loss of consciousness, numbness and seizures.   Hematologic/Lymphatic: Negative for swollen lymph nodes.   Psychiatric/Behavioral: Negative for altered mental status and disorientation.       Objective:      Physical Exam   Constitutional: She is oriented to person, place, and time. Vital signs are normal. She appears well-developed and well-nourished. She is cooperative.  Non-toxic appearance. She does not have a sickly appearance. She appears ill. No distress.   HENT:   Head: Normocephalic and atraumatic.   Right Ear: Tympanic membrane, external ear and ear canal normal.   Left Ear: Tympanic membrane, external ear and ear canal normal.   Nose: Nose normal. No mucosal edema, rhinorrhea or nasal deformity. No epistaxis. Right sinus exhibits no maxillary sinus tenderness and no frontal sinus tenderness. Left sinus exhibits no maxillary sinus tenderness and no frontal sinus tenderness.   Mouth/Throat: Uvula is midline, oropharynx is clear and moist and mucous membranes are normal. No trismus in the jaw. Normal dentition. No uvula swelling. No oropharyngeal exudate, posterior oropharyngeal edema or posterior oropharyngeal erythema.   + nasal congestion   Eyes: Pupils are equal, round, and reactive to light. Conjunctivae, EOM and lids are normal. Right eye exhibits no discharge. Left eye exhibits no discharge. No scleral icterus.   Sclera clear bilat   Neck: Trachea normal, normal range of motion, full passive range of motion without pain and phonation normal. Neck supple.   Cardiovascular:  Normal rate, regular rhythm, normal heart sounds, intact distal pulses and normal pulses.   Pulmonary/Chest: Effort normal and breath sounds normal. No stridor. No respiratory distress. She has no decreased breath sounds. She has no wheezes. She has no rhonchi. She has no rales.   Abdominal: Soft. Normal appearance and bowel sounds are normal. She exhibits no distension, no pulsatile midline mass and no mass. There is no tenderness. There is no rigidity, no rebound, no guarding and no CVA tenderness. No hernia.   Musculoskeletal: Normal range of motion. She exhibits no edema or deformity.   Patient ambulatory moving all extremities.   Neurological: She is alert and oriented to person, place, and time. She has normal strength. No cranial nerve deficit or sensory deficit. She exhibits normal muscle tone. Coordination and gait normal. GCS eye subscore is 4. GCS verbal subscore is 5. GCS motor subscore is 6.   Skin: Skin is warm, dry and intact. No rash noted. She is not diaphoretic. No pallor.   Psychiatric: She has a normal mood and affect. Her speech is normal and behavior is normal. Judgment and thought content normal. Cognition and memory are normal.   Nursing note and vitals reviewed.      Assessment:       1. Fever, unspecified fever cause    2. Dizziness    3. Flu-like symptoms    4. Cough        Plan:         Fever, unspecified fever cause  -     POCT Influenza A/B    Dizziness  -     POCT Glucose, Hand-Held Device  -     POCT Urinalysis, Dipstick, Automated, W/O Scope  -     POCT Chemistry Panel  -     IN OFFICE EKG 12-LEAD (to Muse)    Flu-like symptoms  -     ibuprofen tablet 400 mg    Cough  -     azithromycin (ZITHROMAX Z-NATHANIEL) 250 MG tablet; Take 2 tablets (500 mg) on  Day 1,  followed by 1 tablet (250 mg) once daily on Days 2 through 5.  Dispense: 6 tablet; Refill: 0     Influenza negative. Urinalysis is unrevealing for infection or hematuria.  There is no evidence of anemia or electrolyte abnormality.   EKG shows normal sinus rhythm without dysrhythmia or evidence of acute ischemia.  Motrin given in the clinic for symptomatic relief.  There is no evidence of dehydration.  I will treat prophylactically to cover for pneumonia.  I will recommend symptomatic care with Tylenol, Motrin and Robitussin cough syrup.  I will also suggest increase fluid rehydration.  I have recommended close follow-up with primary care.  I feel this patient is stable for discharge. Patient is has been accompanied her throughout the visit and translated Qatari.        Results for orders placed or performed in visit on 03/26/19   POCT Influenza A/B   Result Value Ref Range    Rapid Influenza A Ag Negative Negative    Rapid Influenza B Ag Negative Negative     Acceptable Yes    POCT Glucose, Hand-Held Device   Result Value Ref Range    POC Glucose 168 (A) 70 - 110 MG/DL   POCT Urinalysis, Dipstick, Automated, W/O Scope   Result Value Ref Range    POC Blood, Urine Negative Negative    POC Bilirubin, Urine Negative Negative    POC Urobilinogen, Urine normal 0.1 - 1.1    POC Ketones, Urine Negative Negative    POC Protein, Urine Positive (A) Negative    POC Nitrates, Urine Negative Negative    POC Glucose, Urine Negative Negative    pH, UA 6.0 5 - 8    POC Specific Gravity, Urine 1.015 1.003 - 1.029    POC Leukocytes, Urine Negative Negative   POCT Chemistry Panel   Result Value Ref Range    POC Sodium 138 138 - 146 MMOL/L    POC Potassium 4.8 3.5 - 4.9 MMOL/L    POC Chloride 101 98 - 109 MMOL/L    POC BUN 10 (A) 8 - 26 MMOL/L    POC Glucose 184 (A) 70 - 110 MG/DL    POC Creatinine 0.9 0.6 - 1.3 mg/dL    POC iCA 1.16 1.12 - 1.32 MMOL/L    POC TCO2 28 24 - 29 MMOL/L    POC Hematocrit 35 (A) 37 - 47 %PCV    POC Hemoglobin 11.9 (A) 12.5 - 16 g/dL    POC Anion Gap 15 10.0 - 20 MMOL/L       The EKG shows a normal, regular Sinus Rhythm, at a rate of 65, there are not ST Changes. There is a previous EKG for comparison; appears similar  "without any new changes. This EKG was interpreted by me.           Viral Syndrome (Adult)  A viral illness may cause a number of symptoms. The symptoms depend on the part of the body that the virus affects. If it settles in your nose, throat, and lungs, it may cause cough, sore throat, congestion, and sometimes headache. If it settles in your stomach and intestinal tract, it may cause vomiting and diarrhea. Sometimes it causes vague symptoms like "aching all over," feeling tired, loss of appetite, or fever.  A viral illness usually lasts 1 to 2 weeks, but sometimes it lasts longer. In some cases, a more serious infection can look like a viral syndrome in the first few days of the illness. You may need another exam and additional tests to know the difference. Watch for the warning signs listed below.  Home care  Follow these guidelines for taking care of yourself at home:  · If symptoms are severe, rest at home for the first 2 to 3 days.  · Stay away from cigarette smoke - both your smoke and the smoke from others.  · You may use over-the-counter acetaminophen or ibuprofen for fever, muscle aching, and headache, unless another medicine was prescribed for this. If you have chronic liver or kidney disease or ever had a stomach ulcer or GI bleeding, talk with your doctor before using these medicines. No one who is younger than 18 and ill with a fever should take aspirin. It may cause severe disease or death.  · Your appetite may be poor, so a light diet is fine. Avoid dehydration by drinking 8 to 12 8-ounce glasses of fluids each day. This may include water; orange juice; lemonade; apple, grape, and cranberry juice; clear fruit drinks; electrolyte replacement and sports drinks; and decaffeinated teas and coffee. If you have been diagnosed with a kidney disease, ask your doctor how much and what types of fluids you should drink to prevent dehydration. If you have kidney disease, drinking too much fluid can cause it build " up in the your body and be dangerous to your health.  · Over-the-counter remedies won't shorten the length of the illness but may be helpful for cough, sore throat; and nasal and sinus congestion. Don't use decongestants if you have high blood pressure.  Follow-up care  Follow up with your healthcare provider if you do not improve over the next week.  Call 911  Get emergency medical care if any of the following occur:  · Convulsion  · Feeling weak, dizzy, or like you are going to faint  · Chest pain, shortness of breath, wheezing, or difficulty breathing  When to seek medical advice  Call your healthcare provider right away if any of these occur:  · Cough with lots of colored sputum (mucus) or blood in your sputum  · Chest pain, shortness of breath, wheezing, or difficulty breathing  · Severe headache; face, neck, or ear pain  · Severe, constant pain in the lower right side of your belly (abdominal)  · Continued vomiting (cant keep liquids down)  · Frequent diarrhea (more than 5 times a day); blood (red or black color) or mucus in diarrhea  · Feeling weak, dizzy, or like you are going to faint  · Extreme thirst  · Fever of 100.4°F (38°C) or higher, or as directed by your healthcare provider  Date Last Reviewed: 9/25/2015 © 2000-2017 [employer]. [address] [place] [address], [place] [address]. All rights reserved. This information is not intended as a substitute for professional medical care. Always follow your healthcare professional's instructions.      Make sure you are getting rest and drinking lots of fluids-water, sugar-free Gatorade/Powerade.    Take Antibiotics as directed.    You can treat your symptoms with Robitussin cough syrup.    You can also take Ibuprofen every 6-8 hours and Tylenol every 4 hours for body aches/fever control.    Follow-up with your primary care provider as discussed    If for some reason your symptoms worsen or for any new or concerning symptoms, please return to this  clinic or go to the emergency room.

## 2019-03-26 NOTE — PATIENT INSTRUCTIONS
"  Viral Syndrome (Adult)  A viral illness may cause a number of symptoms. The symptoms depend on the part of the body that the virus affects. If it settles in your nose, throat, and lungs, it may cause cough, sore throat, congestion, and sometimes headache. If it settles in your stomach and intestinal tract, it may cause vomiting and diarrhea. Sometimes it causes vague symptoms like "aching all over," feeling tired, loss of appetite, or fever.  A viral illness usually lasts 1 to 2 weeks, but sometimes it lasts longer. In some cases, a more serious infection can look like a viral syndrome in the first few days of the illness. You may need another exam and additional tests to know the difference. Watch for the warning signs listed below.  Home care  Follow these guidelines for taking care of yourself at home:  · If symptoms are severe, rest at home for the first 2 to 3 days.  · Stay away from cigarette smoke - both your smoke and the smoke from others.  · You may use over-the-counter acetaminophen or ibuprofen for fever, muscle aching, and headache, unless another medicine was prescribed for this. If you have chronic liver or kidney disease or ever had a stomach ulcer or GI bleeding, talk with your doctor before using these medicines. No one who is younger than 18 and ill with a fever should take aspirin. It may cause severe disease or death.  · Your appetite may be poor, so a light diet is fine. Avoid dehydration by drinking 8 to 12 8-ounce glasses of fluids each day. This may include water; orange juice; lemonade; apple, grape, and cranberry juice; clear fruit drinks; electrolyte replacement and sports drinks; and decaffeinated teas and coffee. If you have been diagnosed with a kidney disease, ask your doctor how much and what types of fluids you should drink to prevent dehydration. If you have kidney disease, drinking too much fluid can cause it build up in the your body and be dangerous to your " health.  · Over-the-counter remedies won't shorten the length of the illness but may be helpful for cough, sore throat; and nasal and sinus congestion. Don't use decongestants if you have high blood pressure.  Follow-up care  Follow up with your healthcare provider if you do not improve over the next week.  Call 911  Get emergency medical care if any of the following occur:  · Convulsion  · Feeling weak, dizzy, or like you are going to faint  · Chest pain, shortness of breath, wheezing, or difficulty breathing  When to seek medical advice  Call your healthcare provider right away if any of these occur:  · Cough with lots of colored sputum (mucus) or blood in your sputum  · Chest pain, shortness of breath, wheezing, or difficulty breathing  · Severe headache; face, neck, or ear pain  · Severe, constant pain in the lower right side of your belly (abdominal)  · Continued vomiting (cant keep liquids down)  · Frequent diarrhea (more than 5 times a day); blood (red or black color) or mucus in diarrhea  · Feeling weak, dizzy, or like you are going to faint  · Extreme thirst  · Fever of 100.4°F (38°C) or higher, or as directed by your healthcare provider  Date Last Reviewed: 9/25/2015  © 5827-0525 [employer]. [address] [place] [address], [place] [address]. All rights reserved. This information is not intended as a substitute for professional medical care. Always follow your healthcare professional's instructions.      Make sure you are getting rest and drinking lots of fluids-water, sugar-free Gatorade/Powerade.    Take Antibiotics as directed.    You can treat your symptoms with Robitussin cough syrup.    You can also take Ibuprofen every 6-8 hours and Tylenol every 4 hours for body aches/fever control.    Follow-up with your primary care provider as discussed    If for some reason your symptoms worsen or for any new or concerning symptoms, please return to this clinic or go to the emergency room.

## 2019-04-02 ENCOUNTER — TELEPHONE (OUTPATIENT)
Dept: FAMILY MEDICINE | Facility: CLINIC | Age: 79
End: 2019-04-02

## 2019-04-02 RX ORDER — ATENOLOL 50 MG/1
50 TABLET ORAL DAILY
Qty: 30 TABLET | Refills: 0 | Status: SHIPPED | OUTPATIENT
Start: 2019-04-02 | End: 2019-04-26 | Stop reason: SDUPTHER

## 2019-04-02 NOTE — TELEPHONE ENCOUNTER
Pharmacy called stating they are sending out a prescirption for atenolol but it will not get to patient prior to patient running out.  They are requesting a small amount of 10-12 tablets be sent to local pharmacy.  Please advise.

## 2019-04-02 NOTE — TELEPHONE ENCOUNTER
Called pt to let her know a RX for atenolol was sent to [facility] [address]. No answer left message requesting a call back.

## 2019-04-28 RX ORDER — ATENOLOL 50 MG/1
TABLET ORAL
Qty: 30 TABLET | Refills: 11 | Status: SHIPPED | OUTPATIENT
Start: 2019-04-28 | End: 2020-03-10

## 2019-05-17 ENCOUNTER — OFFICE VISIT (OUTPATIENT)
Dept: FAMILY MEDICINE | Facility: CLINIC | Age: 79
End: 2019-05-17
Payer: MEDICARE

## 2019-05-17 VITALS
HEART RATE: 66 BPM | BODY MASS INDEX: 26.47 KG/M2 | HEIGHT: 61 IN | TEMPERATURE: 98 F | DIASTOLIC BLOOD PRESSURE: 68 MMHG | WEIGHT: 140.19 LBS | SYSTOLIC BLOOD PRESSURE: 144 MMHG | OXYGEN SATURATION: 97 %

## 2019-05-17 DIAGNOSIS — I10 ESSENTIAL HYPERTENSION: ICD-10-CM

## 2019-05-17 DIAGNOSIS — S46.811A TRAPEZIUS STRAIN, RIGHT, INITIAL ENCOUNTER: ICD-10-CM

## 2019-05-17 DIAGNOSIS — E03.9 HYPOTHYROIDISM, UNSPECIFIED TYPE: ICD-10-CM

## 2019-05-17 DIAGNOSIS — M72.2 FIBROMATOSIS, PLANTAR: ICD-10-CM

## 2019-05-17 PROCEDURE — 1101F PT FALLS ASSESS-DOCD LE1/YR: CPT | Mod: HCNC,CPTII,S$GLB, | Performed by: FAMILY MEDICINE

## 2019-05-17 PROCEDURE — 99214 PR OFFICE/OUTPT VISIT, EST, LEVL IV, 30-39 MIN: ICD-10-PCS | Mod: HCNC,S$GLB,, | Performed by: FAMILY MEDICINE

## 2019-05-17 PROCEDURE — 3078F DIAST BP <80 MM HG: CPT | Mod: HCNC,CPTII,S$GLB, | Performed by: FAMILY MEDICINE

## 2019-05-17 PROCEDURE — 99499 UNLISTED E&M SERVICE: CPT | Mod: HCNC,S$GLB,, | Performed by: FAMILY MEDICINE

## 2019-05-17 PROCEDURE — 1101F PR PT FALLS ASSESS DOC 0-1 FALLS W/OUT INJ PAST YR: ICD-10-PCS | Mod: HCNC,CPTII,S$GLB, | Performed by: FAMILY MEDICINE

## 2019-05-17 PROCEDURE — 3077F SYST BP >= 140 MM HG: CPT | Mod: HCNC,CPTII,S$GLB, | Performed by: FAMILY MEDICINE

## 2019-05-17 PROCEDURE — 3078F PR MOST RECENT DIASTOLIC BLOOD PRESSURE < 80 MM HG: ICD-10-PCS | Mod: HCNC,CPTII,S$GLB, | Performed by: FAMILY MEDICINE

## 2019-05-17 PROCEDURE — 99214 OFFICE O/P EST MOD 30 MIN: CPT | Mod: HCNC,S$GLB,, | Performed by: FAMILY MEDICINE

## 2019-05-17 PROCEDURE — 99999 PR PBB SHADOW E&M-EST. PATIENT-LVL IV: CPT | Mod: PBBFAC,HCNC,, | Performed by: FAMILY MEDICINE

## 2019-05-17 PROCEDURE — 99499 RISK ADDL DX/OHS AUDIT: ICD-10-PCS | Mod: HCNC,S$GLB,, | Performed by: FAMILY MEDICINE

## 2019-05-17 PROCEDURE — 3077F PR MOST RECENT SYSTOLIC BLOOD PRESSURE >= 140 MM HG: ICD-10-PCS | Mod: HCNC,CPTII,S$GLB, | Performed by: FAMILY MEDICINE

## 2019-05-17 PROCEDURE — 99999 PR PBB SHADOW E&M-EST. PATIENT-LVL IV: ICD-10-PCS | Mod: PBBFAC,HCNC,, | Performed by: FAMILY MEDICINE

## 2019-05-17 RX ORDER — LOSARTAN POTASSIUM 100 MG/1
100 TABLET ORAL DAILY
Qty: 90 TABLET | Refills: 3 | Status: SHIPPED | OUTPATIENT
Start: 2019-05-17 | End: 2019-08-19 | Stop reason: SDUPTHER

## 2019-05-17 NOTE — PROGRESS NOTES
(Portions of this note were dictated using voice recognition software and may contain dictation related errors in spelling/grammar/syntax not found on text review)    CC:   Chief Complaint   Patient presents with    Annual Exam    Immunizations     Tetanus and Pneumo    Medication Management     Atenolol and Losartan       HPI: 78 y.o. female here for medical follow-up    Diabetes, on metformin 1000 mg twice a day. borderline high A1c at 7 1.  Compliant with eye exams and dentist. No neuropathy.  , last eye exam:  November 2018   No numbness or tingling in her feet.      Hypertension: On losartan 100 mg daily, atenolol 50 mg daily, amlodipine 5 mg daily .   blood pressure readings from home as below.  Slightly suboptimal today.  Sometimes gets readings at on heard and 40 or higher but many times less than 140 systolic.  Diastolic consistently less than 90.       Hyperlipidemia: Intolerant to all statins tried along with Zetia. Not currently on therapy for this at the moment. We discussed tolerating a suboptimal LDL given her intolerance to medication     History of hypothyroidism, Synthroid 50 µg , last TSH was stable from November     fatty liver. There was one abnormal lesion on the gallbladder with CT demonstrated no gallbladder abnormalities. lfts have been prior slightly elevated, stable overall. occ will get some ruq pain but sporadic and not consistent     2 month history of right neck and arm pain.  No injury but states that she wonders if it is from sleeping on it.  No numbness or tingling.    Also has noted nodule on needs her right foot which can be painful at times.    Occasionally notices some discomfort along the lateral aspect of the right knee around her lateral patellar edge                Past Medical History:   Diagnosis Date    Aortic atherosclerosis     Diabetes mellitus type II     Fatty liver     High cholesterol     HLD (hyperlipidemia)     HTN (hypertension)     Hypothyroidism      Hypothyroidism     Osteopenia     Statin myopathy        Past Surgical History:   Procedure Laterality Date    BREAST BIOPSY      BREAST SURGERY      biopsy    HYSTERECTOMY      still w/ovaries    ROTATOR CUFF REPAIR Left        Family History   Problem Relation Age of Onset    Diabetes Mother     Hypertension Mother     Diabetes Brother     Coronary artery disease Sister 55        MI    Liver cancer Sister     No Known Problems Father     Diabetes Daughter     No Known Problems Son     No Known Problems Brother        Social History     Socioeconomic History    Marital status:      Spouse name: Not on file    Number of children: Not on file    Years of education: Not on file    Highest education level: Not on file   Occupational History    Not on file   Social Needs    Financial resource strain: Not on file    Food insecurity:     Worry: Not on file     Inability: Not on file    Transportation needs:     Medical: Not on file     Non-medical: Not on file   Tobacco Use    Smoking status: Never Smoker    Smokeless tobacco: Never Used   Substance and Sexual Activity    Alcohol use: No    Drug use: No    Sexual activity: Yes     Partners: Male   Lifestyle    Physical activity:     Days per week: Not on file     Minutes per session: Not on file    Stress: Not on file   Relationships    Social connections:     Talks on phone: Not on file     Gets together: Not on file     Attends Tenriism service: Not on file     Active member of club or organization: Not on file     Attends meetings of clubs or organizations: Not on file     Relationship status: Not on file   Other Topics Concern    Not on file   Social History Narrative    Not on file     Lab Results   Component Value Date    WBC 7.62 11/06/2018    HGB 11.9 (L) 11/06/2018    HCT 35.9 (L) 11/06/2018    MCV 90 11/06/2018     11/06/2018    CHOL 243 (H) 11/06/2018    TRIG 147 11/06/2018    HDL 44 11/06/2018    ALT 31  02/14/2019    AST 27 02/14/2019    BILITOT 0.5 02/14/2019    ALKPHOS 83 02/14/2019     02/14/2019    K 4.8 02/14/2019     02/14/2019    CREATININE 1.0 02/14/2019    CALCIUM 10.0 02/14/2019    ALBUMIN 4.0 02/14/2019    BUN 18 02/14/2019    CO2 28 02/14/2019    TSH 2.834 11/06/2018    HGBA1C 7.1 (H) 02/14/2019    LDLCALC 169.6 (H) 11/06/2018     (H) 02/14/2019           ROS:  GENERAL: No fever, chills, fatigability or weight loss.  SKIN: No rashes, no itching.  HEAD: No headaches.  EYES: No visual changes  EARS: No ear pain or changes in hearing.  NOSE: No congestion or rhinorrhea.  MOUTH & THROAT: No hoarseness, change in voice, or sore throat.  NODES: Denies swollen glands.  CHEST: Denies BARTON, cyanosis, wheezing, cough and sputum production.  CARDIOVASCULAR: Denies chest pain, PND, orthopnea.  ABDOMEN: No nausea, vomiting, or changes in bowel function.  URINARY: No flank pain, dysuria or hematuria.  PERIPHERAL VASCULAR: No claudication or cyanosis.  MUSCULOSKELETAL:  Above  NEUROLOGIC: No weakness or numbness.    Vital signs reviewed  PE:   APPEARANCE: Well nourished, well developed, in no acute distress.    HEAD: Normocephalic, atraumatic.  EYES: PERRL. EOMI.   Conjunctivae noninjected.  EARS: TM's intact. Light reflex normal. No retraction or perforation  NOSE: Mucosa pink. Airway clear.  MOUTH & THROAT: No tonsillar enlargement. No pharyngeal erythema or exudate.   NECK: Supple with no cervical lymphadenopathy.    CHEST: Good inspiratory effort. Lungs clear to auscultation with no wheezes or crackles.  CARDIOVASCULAR: Normal S1, S2. No rubs, murmurs, or gallops.  ABDOMEN: Bowel sounds normal. Not distended. Soft. No tenderness or masses. No organomegaly.  EXTREMITIES: No edema  MSK:  Right shoulder:  No muscle atrophy.  Pain at right trapezius.  Negative painful arc.  Negative empty can test.  Negative external rotation test.  Negative Neer's and Ramírez impingement testing.  No AC joint  tenderness.  Her right knee:  No deformity.  No effusion.  Negative Lachman's and Mercedes's testing.  She does have some lateral joint line tenderness. No patellar crepitus to flexion and extension.  No patellar tendon or quadriceps tendon tenderness.  Right foot:  Does have a plantar fascia nodule noted which is tender to palpation, more pronounced with her big toe dorsiflexed.    IMPRESSION  1. Uncontrolled type 2 diabetes mellitus without complication, without long-term current use of insulin    2. Essential hypertension    3. Trapezius strain, right, initial encounter    4. Fibromatosis, plantar    5. Hypothyroidism, unspecified type            PLAN  Orders Placed This Encounter   Procedures    CBC auto differential    Comprehensive metabolic panel    Hemoglobin A1c    TSH     Diabetes:  Continue metformin 2 g daily.  Check labs    Hypertension:  Suboptimal today although Home readings many times are better controlled.  Continue current therapy with losartan 100 mg daily, atenolol 50 mg daily, amlodipine 10 mg daily for now.  Persistently more elevated than goal of less than 140/90, may increase atenolol to 100 mg daily    Dyslipidemia:  Can't tolerate statins or Zetia.    Hypothyroidism:  Continue Synthroid.  Check labs    Likely right trapezius strain.  Advise stretching exercises, provided.  Can try local heat    Plantar fascia nodule:  Stretches provided.  Advise trial of ice application.  Notify for persistent symptoms        SCREENINGS      DEXA scan: 2017 normal   Mammogram 12/19/16, declines rpt  Colonoscopy: 2009, Dr. Wright, normal.      immunizations   Tetanus :Check with your pharmacy regarding getting the tetanus (Tdap) vaccine (once every 10 years)  PVX: 2017   Prevnar: 1/2016  Flu: declines  Zoster: utd

## 2019-05-17 NOTE — LETTER
May 17, 2019      Other  [address]  [place]  [place] [address]           [address]  [place] [address]  Phone: [phone]  Fax: [phone]          Patient: Oneyda Shah   MR Number: [id number]   YOB: 1940   Date of Visit: 5/17/2019       Dear Other:    Thank you for referring Oneyda Shah to me for evaluation. Attached you will find relevant portions of my assessment and plan of care.    If you have questions, please do not hesitate to call me. I look forward to following Oneyda Shah along with you.    Sincerely,    Zion Villavicencio MD    Enclosure  CC:  No Recipients    If you would like to receive this communication electronically, please contact [email or web address] or [phone] to request more information on [employer] access.    For providers and/or their staff who would like to refer a patient to [facility], please contact us through our one-stop-shop provider referral line, [facility], at [phone].    If you feel you have received this communication in error or would no longer like to receive these types of communications, please e-mail [email or web address]

## 2019-05-17 NOTE — PATIENT INSTRUCTIONS
Check with your pharmacy regarding getting the tetanus (Tdap) vaccine (once every 10 years)]        Shoulder Clock Exercise    To start, stand tall with your ears, shoulders, and hips in line. Your feet should be slightly apart, positioned just under your hips. Focus your eyes directly in front of you.  this position for a few seconds before starting your exercise. This helps increase your awareness of proper posture.  · Imagine that your right shoulder is the center of a clock. With the outer point of your shoulder, roll it around to slowly trace the outer edge of the clock.  · Move clockwise first, then counterclockwise.  · Repeat 3 to 5 times. Switch shoulders.   Date Last Reviewed: 10/2/2015  © 3000-9722 [employer]. [address] [place] [address]. All rights reserved. This information is not intended as a substitute for professional medical care. Always follow your healthcare professional's instructions.        Shoulder Shrug Exercise    To start, sit in a chair with your feet flat on the floor. Shift your weight slightly forward to avoid rounding your back. Relax. Keep your ears, shoulders, and hips aligned:  · Raise both of your shoulders as high as you can, as if you were trying to touch them to your ears. Keep your head and neck still and relaxed.  · Hold for a count of 10. Release.  · Repeat 5 times.  For your safety, check with your healthcare provider before starting an exercise program.   Date Last Reviewed: 8/16/2015  © 2769-2058 [employer]. [address] [place] [address]. All rights reserved. This information is not intended as a substitute for professional medical care. Always follow your healthcare professional's instructions.        Neck Exercises: Neck Flex  To start, sit in a chair with your feet flat on the floor. Your weight should be slightly forward so that youre balanced evenly on your buttocks. Relax your shoulders and keep your  head level. Avoid arching your back or rounding your shoulders. Using a chair with arms may help you keep your balance:  · Rest the back of your left hand against your lower back. Place your right palm on the top of your head.  · Gently pull your head forward and down until you feel a stretch in the muscles in the back of your neck. Dont force the motion.  · Hold for 20 seconds, then return to starting position. Switch arms.    Date Last Reviewed: 10/2/2015  © 2819-3387 [employer]. [address] [place] [address]. All rights reserved. This information is not intended as a substitute for professional medical care. Always follow your healthcare professional's instructions.        Neck Clock (Flexibility)    1. Lie on your back on the floor, with your knees bent and your feet flat on the floor. Place a rolled-up towel or neck roll under your neck.  2. Close your eyes and imagine a clock face. With your nose, slowly trace the outer edge of the clock in a clockwise direction. Move your neck smoothly. Dont force your head or neck.  3. Repeat 5 times, or as instructed.  4. Then switch to a counterclockwise direction, and repeat the exercise 5 times, or as instructed.     Challenge yourself  You can also do this exercise while sitting at your work desk. Sit up straight with your back supported firmly against your chair. You can do the exercise several times throughout your day.   Date Last Reviewed: 3/10/2016  © 4794-4022 [employer]. [address] [place] [address]. All rights reserved. This information is not intended as a substitute for professional medical care. Always follow your healthcare professional's instructions.        Head Tilt / Upper Trapezius Stretch (Flexibility)    5. Sit up straight in a chair with your head and neck in a neutral position, ears in line with shoulders. Hold the edge of your chair seat with your right hand. Tuck your chin in  slightly.  6. Tilt your head to the left, while looking straight ahead.  7. Put your left hand on the right side of your head. Gently pull your head to the left. Hold for 30 to 60 seconds. Use gentle pressure to increase the stretch. Dont force your head into position.  8. Return your head and neck to the neutral position.  9. Repeat this exercise 2 times, or as instructed.  10. Switch sides and repeat 2 times, or as instructed.  Challenge yourself  Tuck one end of a towel under your left arm. Then bring the other end over your right shoulder. Pull the towel down on your right shoulder with both hands as you side-bend your head to the left. Repeat with the other side.   Date Last Reviewed: 3/10/2016  © 5061-3308 [employer]. [address] [place] [address], [place] [address]. All rights reserved. This information is not intended as a substitute for professional medical care. Always follow your healthcare professional's instructions.          Plantar fasciitis rehabilitation exercises    You may begin exercising the muscles of your foot right away by gently stretching them as follows:     Prone hip extension: Lie on your stomach with your legs straight out behind you. Tighten the buttocks and thigh muscles of your injured leg and lift it off the floor about 8 inches. Keep your knee straight. Hold for 5 seconds. Then lower your leg and relax. Do 3 sets of 10.   Towel stretch: Sit on a hard surface with one leg stretched out in front of you. Loop a towel around your toes and the ball of your foot and pull the towel toward your body keeping your knee straight. Hold this position for 15 to 30 seconds then relax. Repeat 3 times.   When the towel stretch becomes too easy, you may begin doing the standing calf stretch.   Standing calf stretch: Facing a wall, put your hands against the wall at about eye level. Keep one leg back with the heel on the floor, and the other leg forward. Turn your back foot slightly inward  (as if you were pigeon-toed) as you slowly lean into the wall until you feel a stretch in the back of your calf. Hold for 15 to 30 seconds. Repeat 3 times and then switch the position of your legs and repeat the exercise 3 times. Do this exercise several times each day.   Sitting plantar fascia stretch: Sit in a chair and cross one foot over your other knee. Grab the base of your toes and pull them back toward your leg until you feel a comfortable stretch. Hold 15 seconds and repeat 3 times.     When you can stand comfortably on your injured foot, you can begin standing to stretch the bottom of your foot using the plantar fascia stretch.       Achilles stretch: Stand with the ball of one foot on a stair. Reach for the bottom step with your heel until you feel a stretch in the arch of your foot. Hold this position for 15 to 30 seconds and then relax. Repeat 3 times.     After you have stretched the bottom muscles of your foot, you can begin strengthening the top muscles of your foot.  Frozen can roll: Roll your bare injured foot back and forth from your heel to your mid-arch over a frozen juice can. Repeat for 3 to 5 minutes. This exercise is particularly helpful if done first thing in the morning.   Towel pickup: With your heel on the ground,  a towel with your toes. Release. Repeat 10 to 20 times. When this gets easy, add more resistance by placing a book or small weight on the towel.   Balance and reach exercises   Stand upright next to a chair with your injured leg farthest from the chair. This will provide you with support if you need it. Stand just on the foot of your injured leg. Try to raise the arch of this foot while keeping your toes on the floor.   1. Keep your foot in this position and reach forward in front of you with the hand farthest away from the chair, allowing your knee to bend. Repeat this 10 times while maintaining the arch height. This exercise can be made more difficult by reaching  farther in front of you. Do 2 sets.   2.  the same position as above. While maintaining your arch height, reach the hand farthest away from the chair across your body toward the chair. The farther you reach, the more challenging the exercise. Do 2 sets of 10.   Heel raise: Balance yourself while standing behind a chair or counter. Using the chair to help you, raise your body up onto your toes and hold for 5 seconds. Then slowly lower yourself down without holding onto the chair. Hold onto the chair or counter if you need to. When this exercise becomes less painful, try lowering on one leg only. Repeat 10 times. Do 3 sets of 10.   Side-lying leg lift: Lying on your uninjured side, tighten the front thigh muscles on your top leg and lift that leg 8 to 10 inches away from the other leg. Keep the leg straight and lower slowly. Do 3 sets of 10.     Written by Flower Giles MS, PT, and Chelsea Cordova PT, [facility], [facility], for [employer].   Published by [employer].  © 2009 [employer] and/or its affiliates. All Rights Reserved

## 2019-05-20 ENCOUNTER — LAB VISIT (OUTPATIENT)
Dept: LAB | Facility: HOSPITAL | Age: 79
End: 2019-05-20
Attending: FAMILY MEDICINE
Payer: MEDICARE

## 2019-05-20 DIAGNOSIS — I10 ESSENTIAL HYPERTENSION: ICD-10-CM

## 2019-05-20 LAB
ALBUMIN SERPL BCP-MCNC: 3.8 G/DL (ref 3.5–5.2)
ALP SERPL-CCNC: 82 U/L (ref 55–135)
ALT SERPL W/O P-5'-P-CCNC: 27 U/L (ref 10–44)
ANION GAP SERPL CALC-SCNC: 5 MMOL/L (ref 8–16)
AST SERPL-CCNC: 23 U/L (ref 10–40)
BASOPHILS # BLD AUTO: 0.02 K/UL (ref 0–0.2)
BASOPHILS NFR BLD: 0.2 % (ref 0–1.9)
BILIRUB SERPL-MCNC: 0.4 MG/DL (ref 0.1–1)
BUN SERPL-MCNC: 16 MG/DL (ref 8–23)
CALCIUM SERPL-MCNC: 10.1 MG/DL (ref 8.7–10.5)
CHLORIDE SERPL-SCNC: 105 MMOL/L (ref 95–110)
CO2 SERPL-SCNC: 30 MMOL/L (ref 23–29)
CREAT SERPL-MCNC: 0.9 MG/DL (ref 0.5–1.4)
DIFFERENTIAL METHOD: ABNORMAL
EOSINOPHIL # BLD AUTO: 0.2 K/UL (ref 0–0.5)
EOSINOPHIL NFR BLD: 2.7 % (ref 0–8)
ERYTHROCYTE [DISTWIDTH] IN BLOOD BY AUTOMATED COUNT: 13.9 % (ref 11.5–14.5)
EST. GFR  (AFRICAN AMERICAN): >60 ML/MIN/1.73 M^2
EST. GFR  (NON AFRICAN AMERICAN): >60 ML/MIN/1.73 M^2
ESTIMATED AVG GLUCOSE: 160 MG/DL (ref 68–131)
GLUCOSE SERPL-MCNC: 146 MG/DL (ref 70–110)
HBA1C MFR BLD HPLC: 7.2 % (ref 4–5.6)
HCT VFR BLD AUTO: 35.1 % (ref 37–48.5)
HGB BLD-MCNC: 11.3 G/DL (ref 12–16)
LYMPHOCYTES # BLD AUTO: 3.4 K/UL (ref 1–4.8)
LYMPHOCYTES NFR BLD: 41.6 % (ref 18–48)
MCH RBC QN AUTO: 28.8 PG (ref 27–31)
MCHC RBC AUTO-ENTMCNC: 32.2 G/DL (ref 32–36)
MCV RBC AUTO: 89 FL (ref 82–98)
MONOCYTES # BLD AUTO: 0.6 K/UL (ref 0.3–1)
MONOCYTES NFR BLD: 7.2 % (ref 4–15)
NEUTROPHILS # BLD AUTO: 3.9 K/UL (ref 1.8–7.7)
NEUTROPHILS NFR BLD: 48.1 % (ref 38–73)
PLATELET # BLD AUTO: 281 K/UL (ref 150–350)
PMV BLD AUTO: 10.7 FL (ref 9.2–12.9)
POTASSIUM SERPL-SCNC: 4.7 MMOL/L (ref 3.5–5.1)
PROT SERPL-MCNC: 7.8 G/DL (ref 6–8.4)
RBC # BLD AUTO: 3.93 M/UL (ref 4–5.4)
SODIUM SERPL-SCNC: 140 MMOL/L (ref 136–145)
WBC # BLD AUTO: 8.17 K/UL (ref 3.9–12.7)

## 2019-05-20 PROCEDURE — 36415 COLL VENOUS BLD VENIPUNCTURE: CPT | Mod: HCNC

## 2019-05-20 PROCEDURE — 83036 HEMOGLOBIN GLYCOSYLATED A1C: CPT | Mod: HCNC

## 2019-05-20 PROCEDURE — 85025 COMPLETE CBC W/AUTO DIFF WBC: CPT | Mod: HCNC

## 2019-05-20 PROCEDURE — 80053 COMPREHEN METABOLIC PANEL: CPT | Mod: HCNC

## 2019-05-22 RX ORDER — DEXTROSE 4 G
TABLET,CHEWABLE ORAL
Qty: 1 EACH | Refills: 0 | Status: SHIPPED | OUTPATIENT
Start: 2019-05-22 | End: 2020-01-02 | Stop reason: SDUPTHER

## 2019-05-22 RX ORDER — LANCETS
EACH MISCELLANEOUS
Qty: 100 EACH | Refills: 11 | Status: SHIPPED | OUTPATIENT
Start: 2019-05-22 | End: 2021-10-13

## 2019-05-23 ENCOUNTER — PATIENT MESSAGE (OUTPATIENT)
Dept: FAMILY MEDICINE | Facility: CLINIC | Age: 79
End: 2019-05-23

## 2019-05-23 NOTE — TELEPHONE ENCOUNTER
Dear Oneyda Shah    Your recent labs were reviewed and released to your account. Your lab results were overall within acceptable range although your diabetes test was very slightly more elevated at 7.2.  A favorable diabetes test (hemoglobin A1c) is typically less than 7.  This is not immediately very concerning although I do think that with closely monitoring your diet, along with regular exercise, can further improve your blood sugar control.  Please continue your metformin 1000 mg twice a day.  Please see the attached instructions on monitoring carbohydrate intake in your meals.  Typically I would like to see your carbohydrate intake be about 45 to 60 g, which is equal to 3 to 4 servings, per meal.  If you're able to monitor your blood sugar, I would recommend doing this once fasting in the morning, and then you can occasionally check it also 2 hr after your biggest meal.  Goals for fasting sugar are typically less than 140, and 2 hr after meal sugars less than 180.  We should recheck your labs in about 3 months    Please let me know if you have any questions.    Zion Villavicencio MD

## 2019-05-23 NOTE — PATIENT INSTRUCTIONS
Diabetes.org (american diabetes association website    SAMPLE BLOOD SUGAR CHART                 DATE  Before breakfast Before lunch Before dinner Before bedtime                                                                                                                                                  CARBOHYDRATE GOALS: around 3-4 servings per meal (1 serving is 15 grams of total carbohydrates)

## 2019-06-28 ENCOUNTER — OFFICE VISIT (OUTPATIENT)
Dept: PODIATRY | Facility: CLINIC | Age: 79
End: 2019-06-28
Payer: MEDICARE

## 2019-06-28 VITALS
WEIGHT: 140 LBS | HEIGHT: 61 IN | DIASTOLIC BLOOD PRESSURE: 71 MMHG | BODY MASS INDEX: 26.43 KG/M2 | HEART RATE: 54 BPM | SYSTOLIC BLOOD PRESSURE: 137 MMHG

## 2019-06-28 DIAGNOSIS — M21.611 HALLUX ABDUCTOVALGUS WITH BUNIONS, RIGHT: ICD-10-CM

## 2019-06-28 DIAGNOSIS — L60.0 ONYCHOCRYPTOSIS: ICD-10-CM

## 2019-06-28 DIAGNOSIS — E11.9 TYPE 2 DIABETES MELLITUS WITHOUT COMPLICATION, WITHOUT LONG-TERM CURRENT USE OF INSULIN: Primary | ICD-10-CM

## 2019-06-28 DIAGNOSIS — M20.11 HALLUX ABDUCTOVALGUS WITH BUNIONS, RIGHT: ICD-10-CM

## 2019-06-28 DIAGNOSIS — M72.2 PLANTAR FASCIAL FIBROMATOSIS OF RIGHT FOOT: ICD-10-CM

## 2019-06-28 PROCEDURE — 3075F PR MOST RECENT SYSTOLIC BLOOD PRESS GE 130-139MM HG: ICD-10-PCS | Mod: HCNC,CPTII,S$GLB, | Performed by: PODIATRIST

## 2019-06-28 PROCEDURE — 99203 OFFICE O/P NEW LOW 30 MIN: CPT | Mod: HCNC,S$GLB,, | Performed by: PODIATRIST

## 2019-06-28 PROCEDURE — 3288F PR FALLS RISK ASSESSMENT DOCUMENTED: ICD-10-PCS | Mod: HCNC,CPTII,S$GLB, | Performed by: PODIATRIST

## 2019-06-28 PROCEDURE — 99999 PR PBB SHADOW E&M-EST. PATIENT-LVL III: CPT | Mod: PBBFAC,HCNC,, | Performed by: PODIATRIST

## 2019-06-28 PROCEDURE — 3075F SYST BP GE 130 - 139MM HG: CPT | Mod: HCNC,CPTII,S$GLB, | Performed by: PODIATRIST

## 2019-06-28 PROCEDURE — 3078F PR MOST RECENT DIASTOLIC BLOOD PRESSURE < 80 MM HG: ICD-10-PCS | Mod: HCNC,CPTII,S$GLB, | Performed by: PODIATRIST

## 2019-06-28 PROCEDURE — 1100F PR PT FALLS ASSESS DOC 2+ FALLS/FALL W/INJURY/YR: ICD-10-PCS | Mod: HCNC,CPTII,S$GLB, | Performed by: PODIATRIST

## 2019-06-28 PROCEDURE — 99203 PR OFFICE/OUTPT VISIT, NEW, LEVL III, 30-44 MIN: ICD-10-PCS | Mod: HCNC,S$GLB,, | Performed by: PODIATRIST

## 2019-06-28 PROCEDURE — 99999 PR PBB SHADOW E&M-EST. PATIENT-LVL III: ICD-10-PCS | Mod: PBBFAC,HCNC,, | Performed by: PODIATRIST

## 2019-06-28 PROCEDURE — 1100F PTFALLS ASSESS-DOCD GE2>/YR: CPT | Mod: HCNC,CPTII,S$GLB, | Performed by: PODIATRIST

## 2019-06-28 PROCEDURE — 3288F FALL RISK ASSESSMENT DOCD: CPT | Mod: HCNC,CPTII,S$GLB, | Performed by: PODIATRIST

## 2019-06-28 PROCEDURE — 3078F DIAST BP <80 MM HG: CPT | Mod: HCNC,CPTII,S$GLB, | Performed by: PODIATRIST

## 2019-06-28 NOTE — PATIENT INSTRUCTIONS
Diabetic Foot Care  Diabetes can lead to a number of foot complications. Fortunately, you can prevent most of these with a little extra foot care. If diabetes is not well controlled, it can cause damage to blood vessels and result in poor circulation to the foot. When the skin does not get enough blood flow, it becomes prone to pressure sores and ulcers, which heal slowly.  Diabetes can also damage nerves, interfering with the ability to feel pain and pressure. When you cant feel your foot normally, it is easy to injure your skin, bones, and joints without knowing it. For these reasons diabetes increases the risk of fungal infections, bunions, and ulcers. An ulcer is a sore or break in the skin. With ulcers, often the skin seems to have worn away. Deep ulcers can lead to bone infection.  Gangrene is the most serious foot complication of diabetes. It usually occurs on the tips of the toes as blackened areas of skin. The black area is dead tissue. In severe cases, gangrene spreads to involve the entire toe, other toes, and the entire foot. Foot or toe amputation may be required. Good foot care and blood sugar control can prevent this.  Home care  · Wear comfortable, well-fitting shoes.  · Wash your feet daily with warm water and mild soap.  · After drying, apply a moisturizing cream or lotion to the top and bottom of your feet. Don't put lotion between toes.  · Check your feet daily for skin breaks, blisters, swelling, or redness. Look between your toes as well. If you cannot see the bottoms of your feet, ask someone to look or use a mirror.  · Wear cotton socks and change them every day.  · Trim toenails carefully, and do not cut your cuticles.  · Strive to keep your blood sugar under control with a combination of medicines, diet, and activity.  · If you smoke and have diabetes, it is very important that you stop. Smoking reduces blood flow to your foot.  · Schedule foot exams at least every year, or more often if  you have foot problems.  · Put your feet up when sitting, wiggle toes, and move ankles to help improve blood flow.  Avoid activities that increase your risk of foot injury:  · Do not walk barefoot.  · Do not use heating pads or hot water bottles on your feet.  · Do not put your foot in a hot tub without first checking the temperature with your hand.  Follow-up care  Follow up with your healthcare provider, or as advised. Be sure to take off your shoes and socks before your appointment starts so your healthcare provider will be sure to check your feet. Report any cut, puncture, scrape, blister, or other injury to your foot. Also report if you have a bunion, hammertoes, ingrown toenail, or ulcer on your foot.  When to seek medical advice  Call your healthcare provider right away if any of these occur:  · Black skin color anywhere on the foot  · Open ulcer with pus draining from the wound  · Increasing foot or leg pain  · New areas of redness or swelling or tender areas of the foot  · Fever of 100.4°F (38°C) or greater  Date Last Reviewed: 5/25/2016  © 1818-7202 [employer]. [address] [place] [address], [place] [address]. All rights reserved. This information is not intended as a substitute for professional medical care. Always follow your healthcare professional's instructions.

## 2019-06-28 NOTE — PROGRESS NOTES
Subjective:      Patient ID: Oneyda Shah is a 78 y.o. female.    Chief Complaint: Diabetes Mellitus (Dr. Villavicencio 5/17/19) and Diabetic Foot Exam    [place]ia is a 78 y.o. female who presents to the clinic for evaluation and treatment of diabetic feet. Virginia has a past medical history of Aortic atherosclerosis, Diabetes mellitus type II, Fatty liver, High cholesterol, HLD (hyperlipidemia), HTN (hypertension), Hypothyroidism, Hypothyroidism, Osteopenia, and Statin myopathy.  Complains of a palpable mass on the bottom of the right foot.  Denies any trauma.    PCP: Zion Villavicencio MD    Date Last Seen by PCP:  05/19/2019    Current shoe gear: Flip-flops    Hemoglobin A1C   Date Value Ref Range Status   05/20/2019 7.2 (H) 4.0 - 5.6 % Final     Comment:     ADA Screening Guidelines:  5.7-6.4%  Consistent with prediabetes  >or=6.5%  Consistent with diabetes  High levels of fetal hemoglobin interfere with the HbA1C  assay. Heterozygous hemoglobin variants (HbS, HgC, etc)do  not significantly interfere with this assay.   However, presence of multiple variants may affect accuracy.     02/14/2019 7.1 (H) 4.0 - 5.6 % Final     Comment:     ADA Screening Guidelines:  5.7-6.4%  Consistent with prediabetes  >or=6.5%  Consistent with diabetes  High levels of fetal hemoglobin interfere with the HbA1C  assay. Heterozygous hemoglobin variants (HbS, HgC, etc)do  not significantly interfere with this assay.   However, presence of multiple variants may affect accuracy.     11/06/2018 7.0 (H) 4.0 - 5.6 % Final     Comment:     ADA Screening Guidelines:  5.7-6.4%  Consistent with prediabetes  >or=6.5%  Consistent with diabetes  High levels of fetal hemoglobin interfere with the HbA1C  assay. Heterozygous hemoglobin variants (HbS, HgC, etc)do  not significantly interfere with this assay.   However, presence of multiple variants may affect accuracy.       Vitals:    06/28/19 0910   BP: 137/71   Pulse: (!) 54   Weight: 63.5 kg (140 lb)  "  Height: 5' 1" (1.549 m)   PainSc: 0-No pain      Past Medical History:   Diagnosis Date    Aortic atherosclerosis     Diabetes mellitus type II     Fatty liver     High cholesterol     HLD (hyperlipidemia)     HTN (hypertension)     Hypothyroidism     Hypothyroidism     Osteopenia     Statin myopathy        Past Surgical History:   Procedure Laterality Date    BREAST BIOPSY      BREAST SURGERY      biopsy    HYSTERECTOMY      still w/ovaries    ROTATOR CUFF REPAIR Left        Family History   Problem Relation Age of Onset    Diabetes Mother     Hypertension Mother     Diabetes Brother     Coronary artery disease Sister 55        MI    Liver cancer Sister     No Known Problems Father     Diabetes Daughter     No Known Problems Son     No Known Problems Brother        Social History     Socioeconomic History    Marital status:      Spouse name: Not on file    Number of children: Not on file    Years of education: Not on file    Highest education level: Not on file   Occupational History    Not on file   Social Needs    Financial resource strain: Not on file    Food insecurity:     Worry: Not on file     Inability: Not on file    Transportation needs:     Medical: Not on file     Non-medical: Not on file   Tobacco Use    Smoking status: Never Smoker    Smokeless tobacco: Never Used   Substance and Sexual Activity    Alcohol use: No    Drug use: No    Sexual activity: Yes     Partners: Male   Lifestyle    Physical activity:     Days per week: Not on file     Minutes per session: Not on file    Stress: Not on file   Relationships    Social connections:     Talks on phone: Not on file     Gets together: Not on file     Attends Confucianist service: Not on file     Active member of club or organization: Not on file     Attends meetings of clubs or organizations: Not on file     Relationship status: Not on file   Other Topics Concern    Not on file   Social History Narrative    " Not on file       Current Outpatient Medications   Medication Sig Dispense Refill    amLODIPine (NORVASC) 10 MG tablet Take 1 tablet (10 mg total) by mouth once daily. 90 tablet 3    aspirin (ECOTRIN) 81 MG EC tablet Take 81 mg by mouth once daily.        atenolol (TENORMIN) 50 MG tablet TAKE 1 TABLET EVERY DAY (Patient taking differently: Pt states that she takes twice daily) 30 tablet 11    blood sugar diagnostic (ACCU-CHEK GERTRUDE PLUS TEST STRP) Strp TEST ONE TIME DAILY 100 strip 2    blood-glucose meter Misc Check daily sugars, dispense insurance covered blood sugar meter 1 each 0    cholecalciferol, vitamin D3, (VITAMIN D3) 1,000 unit capsule Take 1,000 Units by mouth once daily.      ibuprofen (ADVIL,MOTRIN) 800 MG tablet TAKE 1 TABLET(800 MG) BY MOUTH TWICE DAILY AS NEEDED 60 tablet 0    lancets (ACCU-CHEK SOFTCLIX LANCETS) Misc CHECK BLOOD GLUCOSE EVERY  each 11    levothyroxine (SYNTHROID) 50 MCG tablet TAKE 1 TABLET ONE TIME DAILY 90 tablet 3    losartan (COZAAR) 100 MG tablet Take 1 tablet (100 mg total) by mouth once daily. 90 tablet 3    metFORMIN (GLUCOPHAGE) 1000 MG tablet TAKE 1 TABLET TWICE DAILY 180 tablet 11    azithromycin (ZITHROMAX Z-NATHANIEL) 250 MG tablet Take 2 tablets (500 mg) on  Day 1,  followed by 1 tablet (250 mg) once daily on Days 2 through 5. 6 tablet 0     No current facility-administered medications for this visit.        Review of patient's allergies indicates:   Allergen Reactions    Penicillins Other (See Comments)    Ace inhibitors      cough    Statins-hmg-coa reductase inhibitors      Myalgias,           Review of Systems   Constitution: Negative for chills, fever and malaise/fatigue.   HENT: Negative for congestion.    Cardiovascular: Negative for chest pain, claudication and leg swelling.   Respiratory: Negative for cough and shortness of breath.    Skin: Negative for color change, itching and nail changes.   Musculoskeletal: Negative for back pain, joint  pain, muscle cramps and muscle weakness.   Gastrointestinal: Negative for nausea and vomiting.   Neurological: Negative for numbness, paresthesias and weakness.   Psychiatric/Behavioral: Negative for altered mental status.           Objective:      Physical Exam   Constitutional: She is oriented to person, place, and time. She appears well-developed and well-nourished. No distress.   Cardiovascular:   Pulses:       Dorsalis pedis pulses are 2+ on the right side, and 2+ on the left side.        Posterior tibial pulses are 2+ on the right side, and 2+ on the left side.   CFT< 3 secs all toes bilateral foot, skin temp warm bilateral foot, diminished digital hair growth bilateral foot, no lower extremity edema bilateral.     Musculoskeletal:        Right foot: There is decreased range of motion and deformity.        Left foot: There is no deformity.   Large bony prominence palpated medial 1st metatarsal head right foot.  Track bound hallux abductovalgus slightly under Lapping the 2nd toe right foot.    Palpable mass is emanating from the medial band of the plantar fascia at the distal arch right foot.    Slight pain at the distal medial right hallux nail border with surrounding hyperkeratotic skin.   Feet:   Right Foot:   Protective Sensation: 10 sites tested. 10 sites sensed.   Skin Integrity: Positive for callus. Negative for ulcer, blister, skin breakdown, erythema, warmth or dry skin.   Left Foot:   Protective Sensation: 10 sites tested. 10 sites sensed.   Skin Integrity: Negative for ulcer, blister, skin breakdown, erythema, warmth, callus or dry skin.   Neurological: She is alert and oriented to person, place, and time. She has normal strength. No sensory deficit.   Skin: Skin is warm, dry and intact. Capillary refill takes less than 2 seconds. No ecchymosis and no rash noted. She is not diaphoretic. No cyanosis. No pallor. Nails show no clubbing.   Distal right hallux nail border with mild incurvation and  surrounding hyperkeratotic skin. No signs infection noted.    Nails 1-5 bilateral are thickened, well trimmed and pain with nail Polish.    No open lesions or macerations bilateral foot.             Assessment:       Encounter Diagnoses   Name Primary?    Type 2 diabetes mellitus without complication, without long-term current use of insulin Yes    Plantar fascial fibromatosis of right foot     Hallux abductovalgus with bunions, right     Onychocryptosis          Plan:       Virginia was seen today for diabetes mellitus and diabetic foot exam.    Diagnoses and all orders for this visit:    Type 2 diabetes mellitus without complication, without long-term current use of insulin    Plantar fascial fibromatosis of right foot    Hallux abductovalgus with bunions, right    Onychocryptosis      I counseled the patient on her conditions, their implications and medical management.    Shoe inspection. Diabetic Foot Education. Patient reminded of the importance of good nutrition and blood sugar control to help prevent podiatric complications of diabetes. Patient instructed on proper foot hygeine. We discussed wearing proper shoe gear, daily foot inspections, never walking without protective shoe gear, never putting sharp instruments to feet.    As a courtesy with the patient's permission as sterile nail Nipper was utilized to complete a slant back nail procedure of the distal medial nail border right hallux.  She tolerated procedure well without any apparent complications.  No pain during the procedure. Discussed keeping this area of the nail trimmed short.    Discussed appropriate sizing of shoes to ensure that there is no rubbing on the toe.  Due to her bunion deformity and amount of rotation of the toe she is walking on the side of the toe did and this is a contributing factor to the onychocryptosis and pain along that nail border.  Discussed also complaining a bunionectomy and correcting the bunion deformity in order  to help alleviate this pain in the future if it persists.    Discussed continued conservative care versus surgical options for plantar fascial fibromatosis.  Prescribed compound gel with for Verapamil to be used twice daily for next 3-4 months.    Recommend return to clinic once a year for annual diabetic foot checks and as needed per discussion.

## 2019-08-19 ENCOUNTER — OFFICE VISIT (OUTPATIENT)
Dept: FAMILY MEDICINE | Facility: CLINIC | Age: 79
End: 2019-08-19
Payer: MEDICARE

## 2019-08-19 VITALS
WEIGHT: 139.56 LBS | HEART RATE: 59 BPM | OXYGEN SATURATION: 98 % | DIASTOLIC BLOOD PRESSURE: 66 MMHG | BODY MASS INDEX: 26.35 KG/M2 | HEIGHT: 61 IN | SYSTOLIC BLOOD PRESSURE: 134 MMHG

## 2019-08-19 DIAGNOSIS — T46.6X5A STATIN MYOPATHY: ICD-10-CM

## 2019-08-19 DIAGNOSIS — E03.9 HYPOTHYROIDISM, UNSPECIFIED TYPE: ICD-10-CM

## 2019-08-19 DIAGNOSIS — I10 ESSENTIAL HYPERTENSION: ICD-10-CM

## 2019-08-19 DIAGNOSIS — I70.0 AORTIC ATHEROSCLEROSIS: ICD-10-CM

## 2019-08-19 DIAGNOSIS — K76.0 FATTY LIVER: ICD-10-CM

## 2019-08-19 DIAGNOSIS — G72.0 STATIN MYOPATHY: ICD-10-CM

## 2019-08-19 PROCEDURE — 99214 OFFICE O/P EST MOD 30 MIN: CPT | Mod: HCNC,S$GLB,, | Performed by: FAMILY MEDICINE

## 2019-08-19 PROCEDURE — 3078F DIAST BP <80 MM HG: CPT | Mod: HCNC,CPTII,S$GLB, | Performed by: FAMILY MEDICINE

## 2019-08-19 PROCEDURE — 1101F PR PT FALLS ASSESS DOC 0-1 FALLS W/OUT INJ PAST YR: ICD-10-PCS | Mod: HCNC,CPTII,S$GLB, | Performed by: FAMILY MEDICINE

## 2019-08-19 PROCEDURE — 99999 PR PBB SHADOW E&M-EST. PATIENT-LVL III: CPT | Mod: PBBFAC,HCNC,, | Performed by: FAMILY MEDICINE

## 2019-08-19 PROCEDURE — 1101F PT FALLS ASSESS-DOCD LE1/YR: CPT | Mod: HCNC,CPTII,S$GLB, | Performed by: FAMILY MEDICINE

## 2019-08-19 PROCEDURE — 3075F PR MOST RECENT SYSTOLIC BLOOD PRESS GE 130-139MM HG: ICD-10-PCS | Mod: HCNC,CPTII,S$GLB, | Performed by: FAMILY MEDICINE

## 2019-08-19 PROCEDURE — 3075F SYST BP GE 130 - 139MM HG: CPT | Mod: HCNC,CPTII,S$GLB, | Performed by: FAMILY MEDICINE

## 2019-08-19 PROCEDURE — 3078F PR MOST RECENT DIASTOLIC BLOOD PRESSURE < 80 MM HG: ICD-10-PCS | Mod: HCNC,CPTII,S$GLB, | Performed by: FAMILY MEDICINE

## 2019-08-19 PROCEDURE — 99999 PR PBB SHADOW E&M-EST. PATIENT-LVL III: ICD-10-PCS | Mod: PBBFAC,HCNC,, | Performed by: FAMILY MEDICINE

## 2019-08-19 PROCEDURE — 99214 PR OFFICE/OUTPT VISIT, EST, LEVL IV, 30-39 MIN: ICD-10-PCS | Mod: HCNC,S$GLB,, | Performed by: FAMILY MEDICINE

## 2019-08-19 RX ORDER — LOSARTAN POTASSIUM 100 MG/1
100 TABLET ORAL DAILY
Qty: 90 TABLET | Refills: 3 | Status: SHIPPED | OUTPATIENT
Start: 2019-08-19 | End: 2020-08-18 | Stop reason: SDUPTHER

## 2019-08-19 NOTE — PATIENT INSTRUCTIONS
Check with your pharmacy regarding getting the tetanus (Tdap) vaccine (once every 10 years)      Look into getting the Shingles vaccine (SHINGRIX) at your local pharmacy (2 part series, done once at/after age 50)

## 2019-08-19 NOTE — PROGRESS NOTES
(Portions of this note were dictated using voice recognition software and may contain dictation related errors in spelling/grammar/syntax not found on text review)    CC:   Chief Complaint   Patient presents with    Follow-up       HPI: 78 y.o. female here for medical follow-up    Diabetes, on metformin 1000 mg twice a day. borderline high A1c at 7 2.  Compliant with eye exams and dentist. No neuropathy.  , last eye exam:  November 2018   No numbness or tingling in her feet.      Hypertension: On losartan 100 mg daily, atenolol 50 mg daily, amlodipine 5 mg daily .       Hyperlipidemia: Intolerant to all statins tried along with Zetia. Not currently on therapy for this at the moment. We discussed tolerating a suboptimal LDL given her intolerance to medication     History of hypothyroidism, Synthroid 50 µg , last TSH was stable      fatty liver. There was one abnormal lesion on the gallbladder with CT demonstrated no gallbladder abnormalities. lfts have been prior slightly elevated, stable overall. occ will get some ruq pain but sporadic and not consistent     2 month history of right neck and arm pain.  No injury but states that she wonders if it is from sleeping on it.  No numbness or tingling.    Was dizzy few days ago, possibly related to going out in extreme heat. Had to go inside and settle, felt better.           Past Medical History:   Diagnosis Date    Aortic atherosclerosis     Diabetes mellitus type II     Fatty liver     High cholesterol     HLD (hyperlipidemia)     HTN (hypertension)     Hypothyroidism     Hypothyroidism     Osteopenia     Statin myopathy        Past Surgical History:   Procedure Laterality Date    BREAST BIOPSY      BREAST SURGERY      biopsy    HYSTERECTOMY      still w/ovaries    ROTATOR CUFF REPAIR Left        Family History   Problem Relation Age of Onset    Diabetes Mother     Hypertension Mother     Diabetes Brother     Coronary artery disease Sister 55        MI     Liver cancer Sister     No Known Problems Father     Diabetes Daughter     No Known Problems Son     No Known Problems Brother        Social History     Socioeconomic History    Marital status:      Spouse name: Not on file    Number of children: Not on file    Years of education: Not on file    Highest education level: Not on file   Occupational History    Not on file   Social Needs    Financial resource strain: Not on file    Food insecurity:     Worry: Not on file     Inability: Not on file    Transportation needs:     Medical: Not on file     Non-medical: Not on file   Tobacco Use    Smoking status: Never Smoker    Smokeless tobacco: Never Used   Substance and Sexual Activity    Alcohol use: No    Drug use: No    Sexual activity: Yes     Partners: Male   Lifestyle    Physical activity:     Days per week: Not on file     Minutes per session: Not on file    Stress: Not on file   Relationships    Social connections:     Talks on phone: Not on file     Gets together: Not on file     Attends [employer]an service: Not on file     Active member of club or organization: Not on file     Attends meetings of clubs or organizations: Not on file     Relationship status: Not on file   Other Topics Concern    Not on file   Social History Narrative    Not on file     Lab Results   Component Value Date    WBC 8.17 05/20/2019    HGB 11.3 (L) 05/20/2019    HCT 35.1 (L) 05/20/2019    MCV 89 05/20/2019     05/20/2019    CHOL 243 (H) 11/06/2018    TRIG 147 11/06/2018    HDL 44 11/06/2018    ALT 27 05/20/2019    AST 23 05/20/2019    BILITOT 0.4 05/20/2019    ALKPHOS 82 05/20/2019     05/20/2019    K 4.7 05/20/2019     05/20/2019    CREATININE 0.9 05/20/2019    CALCIUM 10.1 05/20/2019    ALBUMIN 3.8 05/20/2019    BUN 16 05/20/2019    CO2 30 (H) 05/20/2019    TSH 2.834 11/06/2018    HGBA1C 7.2 (H) 05/20/2019    LDLCALC 169.6 (H) 11/06/2018     (H) 05/20/2019            ROS:  GENERAL:above  SKIN: No rashes, no itching.  HEAD: No headaches.  EYES: No visual changes  EARS: No ear pain or changes in hearing.  NOSE: No congestion or rhinorrhea.  MOUTH & THROAT: No hoarseness, change in voice, or sore throat.  NODES: Denies swollen glands.  CHEST: Denies BARTON, cyanosis, wheezing, cough and sputum production.  CARDIOVASCULAR: Denies chest pain, PND, orthopnea.  ABDOMEN: No nausea, vomiting, or changes in bowel function.  URINARY: No flank pain, dysuria or hematuria.  PERIPHERAL VASCULAR: No claudication or cyanosis.  MUSCULOSKELETAL:  Above  NEUROLOGIC: No weakness or numbness.    Vital signs reviewed  PE:   APPEARANCE: Well nourished, well developed, in no acute distress.    HEAD: Normocephalic, atraumatic.  EYES: PERRL. EOMI.   Conjunctivae noninjected.  EARS: TM's intact. Light reflex normal. No retraction or perforation  NOSE: Mucosa pink. Airway clear.  MOUTH & THROAT: No tonsillar enlargement. No pharyngeal erythema or exudate.   NECK: Supple with no cervical lymphadenopathy.    CHEST: Good inspiratory effort. Lungs clear to auscultation with no wheezes or crackles.  CARDIOVASCULAR: Normal S1, S2. No rubs, murmurs, or gallops.  ABDOMEN: Bowel sounds normal. Not distended. Soft. No tenderness or masses. No organomegaly.  EXTREMITIES: No edema       IMPRESSION  1. Uncontrolled type 2 diabetes mellitus without complication, without long-term current use of insulin    2. Essential hypertension    3. Hypothyroidism, unspecified type    4. Aortic atherosclerosis    5. Fatty liver    6. Statin myopathy            PLAN  Orders Placed This Encounter   Procedures    Comprehensive metabolic panel    Hemoglobin A1c    TSH     Diabetes:  Continue metformin 2 g daily.  Check labs    Hypertension:  Continue current therapy with losartan 100 mg daily, atenolol 50 mg daily, amlodipine 10 mg daily.     Dyslipidemia:  Can't tolerate statins or Zetia.    Hypothyroidism:  Continue Synthroid.   Check labs    Dizziness, possibly related to being out in the heat.  No current issues.  Will check labs above    SCREENINGS      DEXA scan: 2017 normal   Mammogram 12/19/16, declines rpt  Colonoscopy: 2009, Dr. Wright, normal.      immunizations   Tetanus :Check with your pharmacy regarding getting the tetanus (Tdap) vaccine (once every 10 years)  PVX: 2017   Prevnar: 1/2016  Flu: declines  Zoster: utd

## 2019-08-20 ENCOUNTER — LAB VISIT (OUTPATIENT)
Dept: LAB | Facility: HOSPITAL | Age: 79
End: 2019-08-20
Attending: FAMILY MEDICINE
Payer: MEDICARE

## 2019-08-20 ENCOUNTER — TELEPHONE (OUTPATIENT)
Dept: FAMILY MEDICINE | Facility: CLINIC | Age: 79
End: 2019-08-20

## 2019-08-20 DIAGNOSIS — I10 ESSENTIAL HYPERTENSION: ICD-10-CM

## 2019-08-20 DIAGNOSIS — I70.0 AORTIC ATHEROSCLEROSIS: ICD-10-CM

## 2019-08-20 DIAGNOSIS — E87.5 HYPERKALEMIA: Primary | ICD-10-CM

## 2019-08-20 DIAGNOSIS — E03.9 HYPOTHYROIDISM, UNSPECIFIED TYPE: ICD-10-CM

## 2019-08-20 LAB
ALBUMIN SERPL BCP-MCNC: 4 G/DL (ref 3.5–5.2)
ALP SERPL-CCNC: 87 U/L (ref 55–135)
ALT SERPL W/O P-5'-P-CCNC: 32 U/L (ref 10–44)
ANION GAP SERPL CALC-SCNC: 9 MMOL/L (ref 8–16)
AST SERPL-CCNC: 31 U/L (ref 10–40)
BILIRUB SERPL-MCNC: 0.4 MG/DL (ref 0.1–1)
BUN SERPL-MCNC: 21 MG/DL (ref 8–23)
CALCIUM SERPL-MCNC: 10.1 MG/DL (ref 8.7–10.5)
CHLORIDE SERPL-SCNC: 106 MMOL/L (ref 95–110)
CO2 SERPL-SCNC: 27 MMOL/L (ref 23–29)
CREAT SERPL-MCNC: 1.1 MG/DL (ref 0.5–1.4)
EST. GFR  (AFRICAN AMERICAN): 56 ML/MIN/1.73 M^2
EST. GFR  (NON AFRICAN AMERICAN): 49 ML/MIN/1.73 M^2
ESTIMATED AVG GLUCOSE: 166 MG/DL (ref 68–131)
GLUCOSE SERPL-MCNC: 130 MG/DL (ref 70–110)
HBA1C MFR BLD HPLC: 7.4 % (ref 4–5.6)
POTASSIUM SERPL-SCNC: 5.4 MMOL/L (ref 3.5–5.1)
PROT SERPL-MCNC: 7.9 G/DL (ref 6–8.4)
SODIUM SERPL-SCNC: 142 MMOL/L (ref 136–145)
TSH SERPL DL<=0.005 MIU/L-ACNC: 2.68 UIU/ML (ref 0.4–4)

## 2019-08-20 PROCEDURE — 80053 COMPREHEN METABOLIC PANEL: CPT | Mod: HCNC

## 2019-08-20 PROCEDURE — 84443 ASSAY THYROID STIM HORMONE: CPT | Mod: HCNC

## 2019-08-20 PROCEDURE — 83036 HEMOGLOBIN GLYCOSYLATED A1C: CPT | Mod: HCNC

## 2019-08-20 PROCEDURE — 36415 COLL VENOUS BLD VENIPUNCTURE: CPT | Mod: HCNC

## 2019-08-20 NOTE — TELEPHONE ENCOUNTER
----- Message from Shukri Elizondo sent at 8/20/2019  4:45 PM CDT -----  Contact: self  Pt missed nurse call and would like a callback.

## 2019-08-20 NOTE — TELEPHONE ENCOUNTER
Left message requesting callback.  Calling to review labs as follows:    pls tell pt that labs show still mildly uncontrolled dm, a1c 7.4. Would like less than 7.0 if possible--healthy diet, regular exercise, continue her current metformin     Kidney fx slightly sluggish and potassium level was high--need to repeat in 2 wks (pls verify correct dosing of pt losartan listed as 100 mg once daily but pt said yesterday she was prior getting 50 mg and she was taking 2 a day (please make sure she wasn't actually taking the 100 mg two a day which could account for abnormal labs if she was on too high a dose.

## 2019-08-20 NOTE — TELEPHONE ENCOUNTER
Reviewed labs with patient.  Patient is taking (Qty 2) 50mg tablets of Losartan. Patient also scheduled for repeat labs.

## 2019-08-20 NOTE — TELEPHONE ENCOUNTER
pls tell pt that labs show still mildly uncontrolled dm, a1c 7.4. Would like less than 7.0 if possible--healthy diet, regular exercise, continue her current metformin    Kidney fx slightly sluggish and potassium level was high--need to repeat in 2 wks (pls verify correct dosing of pt losartan listed as 100 mg once daily but pt said yesterday she was prior getting 50 mg and she was taking 2 a day (please make sure she wasn't actually taking the 100 mg two a day which could account for abnormal labs if she was on too high a dose.    Orders Placed This Encounter   Procedures    Basic metabolic panel     In 2 wk

## 2019-08-21 RX ORDER — IBUPROFEN 800 MG/1
TABLET ORAL
Qty: 60 TABLET | Refills: 0 | Status: SHIPPED | OUTPATIENT
Start: 2019-08-21 | End: 2019-08-28 | Stop reason: SDUPTHER

## 2019-08-28 RX ORDER — IBUPROFEN 800 MG/1
TABLET ORAL
Qty: 90 TABLET | Refills: 0 | Status: SHIPPED | OUTPATIENT
Start: 2019-08-28 | End: 2019-09-05 | Stop reason: SDUPTHER

## 2019-08-28 NOTE — TELEPHONE ENCOUNTER
----- Message from Amber Law sent at 8/28/2019  1:34 PM CDT -----  Contact: Flor blanchard/ [facility] [phone]  Type:  RX Refill Request    Who Called: Flor   Refill or New Rx:refill   RX Name and Strength:ibuprofen (ADVIL,MOTRIN) 800 MG tablet   How is the patient currently taking it? (ex. 1XDay):TAKE 1 TABLET(800 MG) BY MOUTH TWICE DAILY AS NEEDED  Is this a 30 day or 90 day RX:  Preferred Pharmacy with phone number:[facility]  Local or Mail Order:Mail  Ordering Provider:Dr. Villavicencio  Would the patient rather a call back or a response via [facility]? Call back   Best Call Back Number:[phone]  Additional Information: Patient is requesting a 90 day supply

## 2019-09-02 ENCOUNTER — NURSE TRIAGE (OUTPATIENT)
Dept: ADMINISTRATIVE | Facility: CLINIC | Age: 79
End: 2019-09-02

## 2019-09-02 NOTE — TELEPHONE ENCOUNTER
Reason for Disposition   Message left on identified voice mail    Additional Information   Negative: Caller is angry or rude (e.g., hangs up, verbally abusive, yelling)   Negative: Caller hangs up   Negative: Caller has already spoken with the PCP and has no further questions.   Negative: Caller has already spoken with another triager and has no further questions.   Negative: Caller has already spoken with another triager or PCP AND has further questions AND triager able to answer questions.   Negative: Busy signal.  First attempt to contact caller.  Follow-up call scheduled within 15 minutes.   Negative: No answer.  First attempt to contact caller.  Follow-up call scheduled within 15 minutes.    Protocols used: NO CONTACT OR DUPLICATE CONTACT CALL-A-AH

## 2019-09-03 ENCOUNTER — PATIENT MESSAGE (OUTPATIENT)
Dept: FAMILY MEDICINE | Facility: CLINIC | Age: 79
End: 2019-09-03

## 2019-09-03 ENCOUNTER — LAB VISIT (OUTPATIENT)
Dept: LAB | Facility: HOSPITAL | Age: 79
End: 2019-09-03
Attending: FAMILY MEDICINE
Payer: MEDICARE

## 2019-09-03 DIAGNOSIS — E87.5 HYPERKALEMIA: ICD-10-CM

## 2019-09-03 LAB
ANION GAP SERPL CALC-SCNC: 11 MMOL/L (ref 8–16)
BUN SERPL-MCNC: 18 MG/DL (ref 8–23)
CALCIUM SERPL-MCNC: 9.9 MG/DL (ref 8.7–10.5)
CHLORIDE SERPL-SCNC: 106 MMOL/L (ref 95–110)
CO2 SERPL-SCNC: 26 MMOL/L (ref 23–29)
CREAT SERPL-MCNC: 1 MG/DL (ref 0.5–1.4)
EST. GFR  (AFRICAN AMERICAN): >60 ML/MIN/1.73 M^2
EST. GFR  (NON AFRICAN AMERICAN): 55 ML/MIN/1.73 M^2
GLUCOSE SERPL-MCNC: 124 MG/DL (ref 70–110)
POTASSIUM SERPL-SCNC: 5 MMOL/L (ref 3.5–5.1)
SODIUM SERPL-SCNC: 143 MMOL/L (ref 136–145)

## 2019-09-03 PROCEDURE — 36415 COLL VENOUS BLD VENIPUNCTURE: CPT | Mod: HCNC

## 2019-09-03 PROCEDURE — 80048 BASIC METABOLIC PNL TOTAL CA: CPT | Mod: HCNC

## 2019-09-03 RX ORDER — LEVOTHYROXINE SODIUM 50 UG/1
TABLET ORAL
Qty: 90 TABLET | Refills: 3 | Status: SHIPPED | OUTPATIENT
Start: 2019-09-03 | End: 2019-09-05 | Stop reason: SDUPTHER

## 2019-09-03 NOTE — TELEPHONE ENCOUNTER
Dear Oneyda Shah    Your recent labs were reviewed and released to your account. Your potassium level is improved.  Kidney function is slightly sluggish but improved from last time.  Please make sure that your diabetes and blood pressure are well controlled    Zion Villavicencio MD

## 2019-09-05 RX ORDER — EZETIMIBE 10 MG/1
TABLET ORAL
Status: CANCELLED | OUTPATIENT
Start: 2019-09-05

## 2019-09-06 RX ORDER — METFORMIN HYDROCHLORIDE 1000 MG/1
1000 TABLET ORAL 2 TIMES DAILY
Qty: 180 TABLET | Refills: 11 | Status: SHIPPED | OUTPATIENT
Start: 2019-09-06 | End: 2019-09-07 | Stop reason: SDUPTHER

## 2019-09-06 RX ORDER — LEVOTHYROXINE SODIUM 50 UG/1
50 TABLET ORAL DAILY
Qty: 90 TABLET | Refills: 3 | Status: SHIPPED | OUTPATIENT
Start: 2019-09-06 | End: 2020-08-18 | Stop reason: SDUPTHER

## 2019-09-06 RX ORDER — IBUPROFEN 800 MG/1
TABLET ORAL
Qty: 90 TABLET | Refills: 0 | Status: SHIPPED | OUTPATIENT
Start: 2019-09-06 | End: 2020-02-21

## 2019-09-09 RX ORDER — METFORMIN HYDROCHLORIDE 1000 MG/1
TABLET ORAL
Qty: 180 TABLET | Refills: 11 | Status: SHIPPED | OUTPATIENT
Start: 2019-09-09 | End: 2020-08-18 | Stop reason: SDUPTHER

## 2019-09-16 RX ORDER — IBUPROFEN 800 MG/1
TABLET ORAL
Qty: 60 TABLET | Refills: 0 | OUTPATIENT
Start: 2019-09-16

## 2019-10-30 ENCOUNTER — PATIENT MESSAGE (OUTPATIENT)
Dept: OTOLARYNGOLOGY | Facility: CLINIC | Age: 79
End: 2019-10-30

## 2019-10-31 ENCOUNTER — PATIENT OUTREACH (OUTPATIENT)
Dept: ADMINISTRATIVE | Facility: OTHER | Age: 79
End: 2019-10-31

## 2019-11-04 ENCOUNTER — OFFICE VISIT (OUTPATIENT)
Dept: OTOLARYNGOLOGY | Facility: CLINIC | Age: 79
End: 2019-11-04
Payer: MEDICARE

## 2019-11-04 ENCOUNTER — CLINICAL SUPPORT (OUTPATIENT)
Dept: OTOLARYNGOLOGY | Facility: CLINIC | Age: 79
End: 2019-11-04
Payer: MEDICARE

## 2019-11-04 VITALS
TEMPERATURE: 98 F | HEART RATE: 62 BPM | HEIGHT: 61 IN | WEIGHT: 139 LBS | SYSTOLIC BLOOD PRESSURE: 154 MMHG | BODY MASS INDEX: 26.24 KG/M2 | DIASTOLIC BLOOD PRESSURE: 77 MMHG

## 2019-11-04 DIAGNOSIS — J34.3 NASAL TURBINATE HYPERTROPHY: ICD-10-CM

## 2019-11-04 DIAGNOSIS — H90.3 ASYMMETRIC SNHL (SENSORINEURAL HEARING LOSS): Primary | ICD-10-CM

## 2019-11-04 DIAGNOSIS — H69.93 ETD (EUSTACHIAN TUBE DYSFUNCTION), BILATERAL: ICD-10-CM

## 2019-11-04 PROCEDURE — 1100F PR PT FALLS ASSESS DOC 2+ FALLS/FALL W/INJURY/YR: ICD-10-PCS | Mod: HCNC,CPTII,S$GLB, | Performed by: OTOLARYNGOLOGY

## 2019-11-04 PROCEDURE — 99999 PR PBB SHADOW E&M-EST. PATIENT-LVL I: CPT | Mod: PBBFAC,HCNC,, | Performed by: AUDIOLOGIST-HEARING AID FITTER

## 2019-11-04 PROCEDURE — 92567 PR TYMPA2METRY: ICD-10-PCS | Mod: HCNC,S$GLB,, | Performed by: AUDIOLOGIST-HEARING AID FITTER

## 2019-11-04 PROCEDURE — 3077F PR MOST RECENT SYSTOLIC BLOOD PRESSURE >= 140 MM HG: ICD-10-PCS | Mod: HCNC,CPTII,S$GLB, | Performed by: OTOLARYNGOLOGY

## 2019-11-04 PROCEDURE — 99999 PR PBB SHADOW E&M-EST. PATIENT-LVL I: ICD-10-PCS | Mod: PBBFAC,HCNC,, | Performed by: AUDIOLOGIST-HEARING AID FITTER

## 2019-11-04 PROCEDURE — 3288F PR FALLS RISK ASSESSMENT DOCUMENTED: ICD-10-PCS | Mod: HCNC,CPTII,S$GLB, | Performed by: OTOLARYNGOLOGY

## 2019-11-04 PROCEDURE — 99213 OFFICE O/P EST LOW 20 MIN: CPT | Mod: HCNC,S$GLB,, | Performed by: OTOLARYNGOLOGY

## 2019-11-04 PROCEDURE — 99999 PR PBB SHADOW E&M-EST. PATIENT-LVL III: CPT | Mod: PBBFAC,HCNC,, | Performed by: OTOLARYNGOLOGY

## 2019-11-04 PROCEDURE — 92567 TYMPANOMETRY: CPT | Mod: HCNC,S$GLB,, | Performed by: AUDIOLOGIST-HEARING AID FITTER

## 2019-11-04 PROCEDURE — 99999 PR PBB SHADOW E&M-EST. PATIENT-LVL III: ICD-10-PCS | Mod: PBBFAC,HCNC,, | Performed by: OTOLARYNGOLOGY

## 2019-11-04 PROCEDURE — 3288F FALL RISK ASSESSMENT DOCD: CPT | Mod: HCNC,CPTII,S$GLB, | Performed by: OTOLARYNGOLOGY

## 2019-11-04 PROCEDURE — 3078F DIAST BP <80 MM HG: CPT | Mod: HCNC,CPTII,S$GLB, | Performed by: OTOLARYNGOLOGY

## 2019-11-04 PROCEDURE — 92553 PR AUDIOMETRY, AIR & BONE: ICD-10-PCS | Mod: HCNC,S$GLB,, | Performed by: AUDIOLOGIST-HEARING AID FITTER

## 2019-11-04 PROCEDURE — 92553 AUDIOMETRY AIR & BONE: CPT | Mod: HCNC,S$GLB,, | Performed by: AUDIOLOGIST-HEARING AID FITTER

## 2019-11-04 PROCEDURE — 99213 PR OFFICE/OUTPT VISIT, EST, LEVL III, 20-29 MIN: ICD-10-PCS | Mod: HCNC,S$GLB,, | Performed by: OTOLARYNGOLOGY

## 2019-11-04 PROCEDURE — 1100F PTFALLS ASSESS-DOCD GE2>/YR: CPT | Mod: HCNC,CPTII,S$GLB, | Performed by: OTOLARYNGOLOGY

## 2019-11-04 PROCEDURE — 3078F PR MOST RECENT DIASTOLIC BLOOD PRESSURE < 80 MM HG: ICD-10-PCS | Mod: HCNC,CPTII,S$GLB, | Performed by: OTOLARYNGOLOGY

## 2019-11-04 PROCEDURE — 3077F SYST BP >= 140 MM HG: CPT | Mod: HCNC,CPTII,S$GLB, | Performed by: OTOLARYNGOLOGY

## 2019-11-04 RX ORDER — LEVOCETIRIZINE DIHYDROCHLORIDE 5 MG/1
5 TABLET, FILM COATED ORAL NIGHTLY
Qty: 30 TABLET | Refills: 11 | Status: SHIPPED | OUTPATIENT
Start: 2019-11-04 | End: 2020-08-18

## 2019-11-04 RX ORDER — FLUTICASONE PROPIONATE 50 MCG
1 SPRAY, SUSPENSION (ML) NASAL 2 TIMES DAILY
Qty: 16 G | Refills: 2 | Status: SHIPPED | OUTPATIENT
Start: 2019-11-04 | End: 2020-08-18

## 2019-11-04 NOTE — PROGRESS NOTES
Otolaryngology Clinic Note    Oneyda Shah  Encounter Date: 11/4/2019   YOB: 1940  Referring Physician: Kady Lawrence Md  [address]  [address]  [place] [address]   PCP: Zion Villavicencio MD    Chief Complaint:   Chief Complaint   Patient presents with    Hearing Loss     right ear       HPI: Oneyda Shah is a 78 y.o. female here for follow up of hearing loss. Last seen in 5/2018 by Dr Paris. She was noted to have an asymmetrical hearing loss and ETD. MRI in the past did not reveal any retrocochlear pathology. She has some nasal congestion at times but is not using flonase. Not doing auto-insufflation. She does report feeling her throat gets dry occasionally. Denies throat pain, voice change or otalgia. No dizziness or tinnitus.     Review of Systems   Constitutional: Negative for chills, fever and weight loss.   HENT: Positive for hearing loss. Negative for congestion, ear discharge, ear pain, sinus pain and tinnitus.    Eyes: Negative for blurred vision and double vision.   Respiratory: Negative for cough and shortness of breath.    Cardiovascular: Negative for chest pain and palpitations.   Gastrointestinal: Negative for nausea and vomiting.   Musculoskeletal: Negative for joint pain and neck pain.   Skin: Negative for rash.   Neurological: Negative for dizziness, focal weakness and headaches.   Psychiatric/Behavioral: Negative for depression. The patient is not nervous/anxious.         Review of patient's allergies indicates:   Allergen Reactions    Penicillins Other (See Comments)    Ace inhibitors      cough    Statins-hmg-coa reductase inhibitors      Myalgias,       Past Medical History:   Diagnosis Date    Aortic atherosclerosis     Diabetes mellitus type II     Fatty liver     High cholesterol     HLD (hyperlipidemia)     HTN (hypertension)     Hypothyroidism     Hypothyroidism     Osteopenia     Statin myopathy        Past Surgical History:   Procedure  Laterality Date    BREAST BIOPSY      BREAST SURGERY      biopsy    HYSTERECTOMY      still w/ovaries    ROTATOR CUFF REPAIR Left        Social History     Socioeconomic History    Marital status:      Spouse name: Not on file    Number of children: Not on file    Years of education: Not on file    Highest education level: Not on file   Occupational History    Not on file   Social Needs    Financial resource strain: Not on file    Food insecurity:     Worry: Not on file     Inability: Not on file    Transportation needs:     Medical: Not on file     Non-medical: Not on file   Tobacco Use    Smoking status: Never Smoker    Smokeless tobacco: Never Used   Substance and Sexual Activity    Alcohol use: No    Drug use: No    Sexual activity: Yes     Partners: Male   Lifestyle    Physical activity:     Days per week: Not on file     Minutes per session: Not on file    Stress: Not on file   Relationships    Social connections:     Talks on phone: Not on file     Gets together: Not on file     Attends [employer] service: Not on file     Active member of club or organization: Not on file     Attends meetings of clubs or organizations: Not on file     Relationship status: Not on file   Other Topics Concern    Not on file   Social History Narrative    Not on file       Family History   Problem Relation Age of Onset    Diabetes Mother     Hypertension Mother     Diabetes Brother     Coronary artery disease Sister 55        MI    Liver cancer Sister     No Known Problems Father     Diabetes Daughter     No Known Problems Son     No Known Problems Brother        Outpatient Encounter Medications as of 11/4/2019   Medication Sig Dispense Refill    amLODIPine (NORVASC) 10 MG tablet Take 1 tablet (10 mg total) by mouth once daily. 90 tablet 3    aspirin (ECOTRIN) 81 MG EC tablet Take 81 mg by mouth once daily.        atenolol (TENORMIN) 50 MG tablet TAKE 1 TABLET EVERY DAY 30 tablet 11     blood sugar diagnostic (ACCU-CHEK GERTRUDE PLUS TEST STRP) Strp TEST ONE TIME DAILY 100 strip 2    blood-glucose meter Misc Check daily sugars, dispense insurance covered blood sugar meter 1 each 0    cholecalciferol, vitamin D3, (VITAMIN D3) 1,000 unit capsule Take 1,000 Units by mouth once daily.      ibuprofen (ADVIL,MOTRIN) 800 MG tablet TAKE 1 TABLET(800 MG) BY MOUTH TWICE DAILY AS NEEDED 90 tablet 0    lancets (ACCU-CHEK SOFTCLIX LANCETS) Misc CHECK BLOOD GLUCOSE EVERY  each 11    levothyroxine (SYNTHROID) 50 MCG tablet Take 1 tablet (50 mcg total) by mouth once daily. 90 tablet 3    losartan (COZAAR) 100 MG tablet Take 1 tablet (100 mg total) by mouth once daily. 90 tablet 3    metFORMIN (GLUCOPHAGE) 1000 MG tablet TAKE 1 TABLET TWICE DAILY 180 tablet 11    fluticasone propionate (FLONASE) 50 mcg/actuation nasal spray 1 spray (50 mcg total) by Each Nostril route 2 (two) times daily. 16 g 2    levocetirizine (XYZAL) 5 MG tablet Take 1 tablet (5 mg total) by mouth every evening. 30 tablet 11    [DISCONTINUED] ZETIA 10 mg tablet        No facility-administered encounter medications on file as of 11/4/2019.        Physical Exam:    Vitals:    11/04/19 1529   BP: (!) 154/77   Pulse: 62   Temp: 98 °F (36.7 °C)       Constitutional  General Appearance: well nourished, well-developed, alert, oriented, in no acute distress  Communication: ability, understanding, voice quality normal  Head and Face  Inspection: normocephalic, atraumatic, no scars, lesions or masses    Palpation: no stepoffs, sinus tenderness or masses  Parotid glands: no masses, stones, swelling or tenderness  Eyes  Ocular Motility / Alignment: normal alignment, motility, no proptosis, enophthalmus or nystagmus  Conjunctiva: not injected  Eyelids: no entropion or ectropion, no edema  Ears  Hearing: speech reception thresholds grossly normal  External Ears: no auricle lesions, non-tender, mobile to palpation  Otoscopy:  Right Ear: no  tympanic membrane lesions, perforations, or effusion, normal EAC - a very small amount of non-obstructing cerumen  Left Ear: no tympanic membrane lesions, perforations, or effusion, normal EAC  Nose  External Nose: no lesions, tenderness, trauma or deformity  Intranasal Exam: no edema, erythema, discharge, mass or obstruction  Oral Cavity / Oropharynx  Lips: upper and lower lips pink and moist  Gingiva: healthy  Oral Mucosa: moist, no mucosal lesions  Floor of Mouth: normal, no lesions, salivary ducts patent  Tongue: moist, normal mobility, no lesions  Palate: soft and hard palates without lesions or ulcers  Oropharynx: tonsils and walls without erythema, exudate, lesions, fullness or obstruction  Neck  Inspection and Palpation: no erythema, induration, emphysema, tenderness or masses  Larynx and Trachea: normal position and mobility   Thyroid: no tenderness, enlargement or nodules  Submandibular Glands: no masses or tenderness  Lymphatic:  Anterior, Posterior, Submandibular, Submental, Supraclavicular: no lymphadenopathy present  Chest / Respiratory  Chest: no stridor or retractions, normal effort and expansion  Cardiovascular:  Pulses: 2+ radial pulses, regular rhythm and rate  Auscultation: deferred  Neurological  Cranial Nerves: grossly intact  General: no focal deficits  Psychiatric  Orientation: oriented to time, place and person  Mood and Affect: no depression, anxiety or agitation  Extremities  Inspection: moves all extremities well    Procedures:  No notes on file    Pertinent Data:  ? LABS:   ? AUDIO:        ? PATH:      I personally reviewed the following pertinent data at today's visit: Audiogram    Imaging:   ? XRAY:  ? Ultrasound:  ? CT Scan:  ? MRI Scan:  ? PET/CT Scan:    I personally reviewed the following images:    Summary of Outside Records:      Assessment and Plan:  Oneyda Shah is a 78 y.o. female with     Asymmetric SNHL (sensorineural hearing loss)    ETD (Eustachian tube dysfunction),  bilateral  -     fluticasone propionate (FLONASE) 50 mcg/actuation nasal spray; 1 spray (50 mcg total) by Each Nostril route 2 (two) times daily.  Dispense: 16 g; Refill: 2  -     levocetirizine (XYZAL) 5 MG tablet; Take 1 tablet (5 mg total) by mouth every evening.  Dispense: 30 tablet; Refill: 11    Nasal turbinate hypertrophy  -     fluticasone propionate (FLONASE) 50 mcg/actuation nasal spray; 1 spray (50 mcg total) by Each Nostril route 2 (two) times daily.  Dispense: 16 g; Refill: 2  -     levocetirizine (XYZAL) 5 MG tablet; Take 1 tablet (5 mg total) by mouth every evening.  Dispense: 30 tablet; Refill: 11       I have reviewed the patient's audiogram with them. I would recommend an annual audiogram. She should continue hearing aid on the right. Counseled on ETD - given handout on gentle autoinsufflation, use of flonase and antihistamine to help with the congestion that may be contributing.          CASPER Rubalcava MD  [facility]

## 2019-11-04 NOTE — PATIENT INSTRUCTIONS
Eustachian Tube Dysfunction    What is Eustachian tube dysfunction (ETD)?   ETD occurs when there is a dysfunction of the Eustachian tube. This condition prevents the release of pressure and fluid from the middle space (the space behind the tympanic membrane or ear drum). This can occur when there is increased swelling or fluid/mucus buildup at the opening of the Eustachian tube in the back of the nose. It can occur during common colds, upper respiratory tract infections, allergies, impeded nasal airflow or chronic drainage of mucus on the tube opening.    What is the Eustachian tube?  The Eustachian Tube (ET) is a small passage way that connects the middle ear to the nasopharynx. The tube is usually closed and opens when a person eats, swallows, yawns or attempts to pop his or her ears. The tube function to equalize the pressure between the ear and the surrounding environment. Therefore it helps prevent pressure and fluid from building up inside the ears.        What are the symptoms of ETD?   Common symptoms include:  Ear pressure  Hearing loss - ear feels plugged  Popping sensation in the ears  Ringing in ears, tinnitus  Ear pain  Dizziness on occasion    How is ETD diagnosed?   ETD can be diagnosed through a thorough head and neck examination. The physician will look in the ears to see the eardrum and into the nasal cavity. On many occasion, a good history can diagnose the condition as well. Another option is a hearing test (audiogram and tympanogram) to confirm the presence of negative pressure and/or fluid in the middle ear space.    How is it treated?   There are several treatment options for ETD ranging from simple to more invasive. In most cases, once the underlying cause of the ETD (cold, allergies, or flu) resolves, ETD symptoms will also improve.    Treatments include:   The patient can autoinsufflate - to do this, the patient pinches his or her nostrils and then exhales with a close mouth. Gentle  blowing during this maneuver will also force air into the Eustachian tube and thus pop it open   Oral decongestants, topical decongestant or oral steroids may be prescribed to decrease the swelling at the Eustachian tube.   Topical nasal steroids (fluticasone, plus many others) and oral antihistamines (loratadine, plus many others) may be used to treat allergies.   Ear tubes (small tubes that are place into the tympanic membrane) directly equalize the pressure and drain the fluid behind the ear drum. This procedure may be performed in the office for adult patients.

## 2019-11-19 ENCOUNTER — OFFICE VISIT (OUTPATIENT)
Dept: FAMILY MEDICINE | Facility: CLINIC | Age: 79
End: 2019-11-19
Payer: MEDICARE

## 2019-11-19 VITALS
HEART RATE: 64 BPM | DIASTOLIC BLOOD PRESSURE: 64 MMHG | TEMPERATURE: 99 F | HEIGHT: 61 IN | BODY MASS INDEX: 26.06 KG/M2 | SYSTOLIC BLOOD PRESSURE: 130 MMHG | OXYGEN SATURATION: 97 % | WEIGHT: 138 LBS

## 2019-11-19 DIAGNOSIS — E03.9 HYPOTHYROIDISM, UNSPECIFIED TYPE: ICD-10-CM

## 2019-11-19 DIAGNOSIS — I10 ESSENTIAL HYPERTENSION: ICD-10-CM

## 2019-11-19 PROCEDURE — 1159F PR MEDICATION LIST DOCUMENTED IN MEDICAL RECORD: ICD-10-PCS | Mod: HCNC,S$GLB,, | Performed by: FAMILY MEDICINE

## 2019-11-19 PROCEDURE — 1126F PR PAIN SEVERITY QUANTIFIED, NO PAIN PRESENT: ICD-10-PCS | Mod: HCNC,S$GLB,, | Performed by: FAMILY MEDICINE

## 2019-11-19 PROCEDURE — 3075F PR MOST RECENT SYSTOLIC BLOOD PRESS GE 130-139MM HG: ICD-10-PCS | Mod: HCNC,CPTII,S$GLB, | Performed by: FAMILY MEDICINE

## 2019-11-19 PROCEDURE — 99214 PR OFFICE/OUTPT VISIT, EST, LEVL IV, 30-39 MIN: ICD-10-PCS | Mod: HCNC,S$GLB,, | Performed by: FAMILY MEDICINE

## 2019-11-19 PROCEDURE — 3078F DIAST BP <80 MM HG: CPT | Mod: HCNC,CPTII,S$GLB, | Performed by: FAMILY MEDICINE

## 2019-11-19 PROCEDURE — 1101F PR PT FALLS ASSESS DOC 0-1 FALLS W/OUT INJ PAST YR: ICD-10-PCS | Mod: HCNC,CPTII,S$GLB, | Performed by: FAMILY MEDICINE

## 2019-11-19 PROCEDURE — 3078F PR MOST RECENT DIASTOLIC BLOOD PRESSURE < 80 MM HG: ICD-10-PCS | Mod: HCNC,CPTII,S$GLB, | Performed by: FAMILY MEDICINE

## 2019-11-19 PROCEDURE — 3075F SYST BP GE 130 - 139MM HG: CPT | Mod: HCNC,CPTII,S$GLB, | Performed by: FAMILY MEDICINE

## 2019-11-19 PROCEDURE — 99999 PR PBB SHADOW E&M-EST. PATIENT-LVL IV: ICD-10-PCS | Mod: PBBFAC,HCNC,, | Performed by: FAMILY MEDICINE

## 2019-11-19 PROCEDURE — 99214 OFFICE O/P EST MOD 30 MIN: CPT | Mod: HCNC,S$GLB,, | Performed by: FAMILY MEDICINE

## 2019-11-19 PROCEDURE — 1126F AMNT PAIN NOTED NONE PRSNT: CPT | Mod: HCNC,S$GLB,, | Performed by: FAMILY MEDICINE

## 2019-11-19 PROCEDURE — 99999 PR PBB SHADOW E&M-EST. PATIENT-LVL IV: CPT | Mod: PBBFAC,HCNC,, | Performed by: FAMILY MEDICINE

## 2019-11-19 PROCEDURE — 1159F MED LIST DOCD IN RCRD: CPT | Mod: HCNC,S$GLB,, | Performed by: FAMILY MEDICINE

## 2019-11-19 PROCEDURE — 1101F PT FALLS ASSESS-DOCD LE1/YR: CPT | Mod: HCNC,CPTII,S$GLB, | Performed by: FAMILY MEDICINE

## 2019-11-19 NOTE — PROGRESS NOTES
(Portions of this note were dictated using voice recognition software and may contain dictation related errors in spelling/grammar/syntax not found on text review)    CC:   Chief Complaint   Patient presents with    Follow-up       HPI: 78 y.o. female here for medical follow-up    Diabetes, on metformin 1000 mg twice a day. mildly high A1c at 7 4.  Compliant with eye exams and dentist. No neuropathy.  , last eye exam:  November 2018   No numbness or tingling in her feet. .  Blood sugars usually 120s to 140 when she checks at home     Hypertension: On losartan 100 mg daily, atenolol 50 mg daily, amlodipine 5 mg daily .       Hyperlipidemia: Intolerant to all statins tried along with Zetia. Not currently on therapy for this at the moment. We discussed tolerating a suboptimal LDL given her intolerance to medication     History of hypothyroidism, Synthroid 50 µg , last TSH was stable      fatty liver. There was one abnormal lesion on the gallbladder with CT demonstrated no gallbladder abnormalities. lfts have been prior slightly elevated, stable overall. occ will get some ruq pain but sporadic and not consistent             Past Medical History:   Diagnosis Date    Aortic atherosclerosis     Diabetes mellitus type II     Fatty liver     High cholesterol     HLD (hyperlipidemia)     HTN (hypertension)     Hypothyroidism     Hypothyroidism     Osteopenia     Statin myopathy        Past Surgical History:   Procedure Laterality Date    BREAST BIOPSY      BREAST SURGERY      biopsy    HYSTERECTOMY      still w/ovaries    ROTATOR CUFF REPAIR Left        Family History   Problem Relation Age of Onset    Diabetes Mother     Hypertension Mother     Diabetes Brother     Coronary artery disease Sister 55        MI    Liver cancer Sister     No Known Problems Father     Diabetes Daughter     No Known Problems Son     No Known Problems Brother        Social History     Socioeconomic History    Marital  status:      Spouse name: Not on file    Number of children: Not on file    Years of education: Not on file    Highest education level: Not on file   Occupational History    Not on file   Social Needs    Financial resource strain: Not on file    Food insecurity:     Worry: Not on file     Inability: Not on file    Transportation needs:     Medical: Not on file     Non-medical: Not on file   Tobacco Use    Smoking status: Never Smoker    Smokeless tobacco: Never Used   Substance and Sexual Activity    Alcohol use: No    Drug use: No    Sexual activity: Yes     Partners: Male   Lifestyle    Physical activity:     Days per week: Not on file     Minutes per session: Not on file    Stress: Not on file   Relationships    Social connections:     Talks on phone: Not on file     Gets together: Not on file     Attends Zoroastrian service: Not on file     Active member of club or organization: Not on file     Attends meetings of clubs or organizations: Not on file     Relationship status: Not on file   Other Topics Concern    Not on file   Social History Narrative    Not on file     Lab Results   Component Value Date    WBC 8.17 05/20/2019    HGB 11.3 (L) 05/20/2019    HCT 35.1 (L) 05/20/2019    MCV 89 05/20/2019     05/20/2019    CHOL 243 (H) 11/06/2018    TRIG 147 11/06/2018    HDL 44 11/06/2018    ALT 32 08/20/2019    AST 31 08/20/2019    BILITOT 0.4 08/20/2019    ALKPHOS 87 08/20/2019     09/03/2019    K 5.0 09/03/2019     09/03/2019    CREATININE 1.0 09/03/2019    CALCIUM 9.9 09/03/2019    ALBUMIN 4.0 08/20/2019    BUN 18 09/03/2019    CO2 26 09/03/2019    TSH 2.676 08/20/2019    HGBA1C 7.4 (H) 08/20/2019    LDLCALC 169.6 (H) 11/06/2018     (H) 09/03/2019         Hemoglobin (g/dL)   Date Value   05/20/2019 11.3 (L)   11/06/2018 11.9 (L)   01/29/2018 11.9 (L)   10/10/2017 11.7 (L)   03/06/2017 12.6     Hemoglobin A1C (%)   Date Value   08/20/2019 7.4 (H)   05/20/2019 7.2 (H)    02/14/2019 7.1 (H)   11/06/2018 7.0 (H)   08/15/2018 7.0 (H)     TSH (uIU/mL)   Date Value   08/20/2019 2.676   11/06/2018 2.834   01/29/2018 3.149   10/10/2017 1.722   07/12/2017 5.603 (H)   03/06/2017 3.604           ROS:  GENERAL:above  SKIN: No rashes, no itching.  HEAD: No headaches.  EYES: No visual changes  EARS: No ear pain or changes in hearing.  NOSE: No congestion or rhinorrhea.  MOUTH & THROAT: No hoarseness, change in voice, or sore throat.  NODES: Denies swollen glands.  CHEST: Denies BARTON, cyanosis, wheezing, cough and sputum production.  CARDIOVASCULAR: Denies chest pain, PND, orthopnea.  ABDOMEN: No nausea, vomiting, or changes in bowel function.  URINARY: No flank pain, dysuria or hematuria.  PERIPHERAL VASCULAR: No claudication or cyanosis.  MUSCULOSKELETAL:  Above  NEUROLOGIC: No weakness or numbness.    Vital signs reviewed  PE:   APPEARANCE: Well nourished, well developed, in no acute distress.    HEAD: Normocephalic, atraumatic.  EYES:  Conjunctivae noninjected.  NECK: Supple with no cervical lymphadenopathy.  No carotid bruits.  No thyromegaly  CHEST: Good inspiratory effort. Lungs clear to auscultation with no wheezes or crackles.  CARDIOVASCULAR: Normal S1, S2. No rubs, murmurs, or gallops.  ABDOMEN: Bowel sounds normal. Not distended. Soft. No tenderness or masses. No organomegaly.  EXTREMITIES: No edema       IMPRESSION  1. Uncontrolled type 2 diabetes mellitus without complication, without long-term current use of insulin    2. Essential hypertension    3. Hypothyroidism, unspecified type            PLAN  Orders Placed This Encounter   Procedures    CBC auto differential    Comprehensive metabolic panel    Hemoglobin A1c     Diabetes:  Continue metformin 2 g daily.  Check labs.  Emphasize trying to get some regular exercise    Hypertension:  Continue current therapy with losartan 100 mg daily, atenolol 50 mg daily, amlodipine 10 mg daily.     Dyslipidemia:  Can't tolerate statins or  Zetia.    Hypothyroidism:  Continue Synthroid.        SCREENINGS      DEXA scan: 2017 normal   Mammogram 12/19/16, declines rpt  Colonoscopy: 2009, Dr. Wright, normal.      immunizations   Tetanus :Check with your pharmacy regarding getting the tetanus (Tdap) vaccine (once every 10 years)  PVX: 2017   Prevnar: 1/2016  Flu: declines  Zoster: utd

## 2019-11-20 ENCOUNTER — LAB VISIT (OUTPATIENT)
Dept: LAB | Facility: HOSPITAL | Age: 79
End: 2019-11-20
Attending: FAMILY MEDICINE
Payer: MEDICARE

## 2019-11-20 DIAGNOSIS — E03.9 HYPOTHYROIDISM, UNSPECIFIED TYPE: ICD-10-CM

## 2019-11-20 DIAGNOSIS — M72.2 FIBROMATOSIS, PLANTAR: ICD-10-CM

## 2019-11-20 DIAGNOSIS — I10 ESSENTIAL HYPERTENSION: ICD-10-CM

## 2019-11-20 DIAGNOSIS — S46.811A TRAPEZIUS STRAIN, RIGHT, INITIAL ENCOUNTER: ICD-10-CM

## 2019-11-20 LAB
ALBUMIN SERPL BCP-MCNC: 4.1 G/DL (ref 3.5–5.2)
ALP SERPL-CCNC: 88 U/L (ref 55–135)
ALT SERPL W/O P-5'-P-CCNC: 32 U/L (ref 10–44)
ANION GAP SERPL CALC-SCNC: 11 MMOL/L (ref 8–16)
AST SERPL-CCNC: 30 U/L (ref 10–40)
BASOPHILS # BLD AUTO: 0.02 K/UL (ref 0–0.2)
BASOPHILS NFR BLD: 0.2 % (ref 0–1.9)
BILIRUB SERPL-MCNC: 0.5 MG/DL (ref 0.1–1)
BUN SERPL-MCNC: 14 MG/DL (ref 8–23)
CALCIUM SERPL-MCNC: 10.2 MG/DL (ref 8.7–10.5)
CHLORIDE SERPL-SCNC: 105 MMOL/L (ref 95–110)
CO2 SERPL-SCNC: 26 MMOL/L (ref 23–29)
CREAT SERPL-MCNC: 1 MG/DL (ref 0.5–1.4)
DIFFERENTIAL METHOD: NORMAL
EOSINOPHIL # BLD AUTO: 0.2 K/UL (ref 0–0.5)
EOSINOPHIL NFR BLD: 2.2 % (ref 0–8)
ERYTHROCYTE [DISTWIDTH] IN BLOOD BY AUTOMATED COUNT: 13.1 % (ref 11.5–14.5)
EST. GFR  (AFRICAN AMERICAN): >60 ML/MIN/1.73 M^2
EST. GFR  (NON AFRICAN AMERICAN): 55 ML/MIN/1.73 M^2
ESTIMATED AVG GLUCOSE: 154 MG/DL (ref 68–131)
GLUCOSE SERPL-MCNC: 146 MG/DL (ref 70–110)
HBA1C MFR BLD HPLC: 7 % (ref 4–5.6)
HCT VFR BLD AUTO: 38.7 % (ref 37–48.5)
HGB BLD-MCNC: 12.7 G/DL (ref 12–16)
LYMPHOCYTES # BLD AUTO: 3 K/UL (ref 1–4.8)
LYMPHOCYTES NFR BLD: 36.7 % (ref 18–48)
MCH RBC QN AUTO: 29.4 PG (ref 27–31)
MCHC RBC AUTO-ENTMCNC: 32.8 G/DL (ref 32–36)
MCV RBC AUTO: 90 FL (ref 82–98)
MONOCYTES # BLD AUTO: 0.6 K/UL (ref 0.3–1)
MONOCYTES NFR BLD: 6.8 % (ref 4–15)
NEUTROPHILS # BLD AUTO: 4.4 K/UL (ref 1.8–7.7)
NEUTROPHILS NFR BLD: 54.1 % (ref 38–73)
PLATELET # BLD AUTO: 287 K/UL (ref 150–350)
PLATELET BLD QL SMEAR: NORMAL
PMV BLD AUTO: 11.9 FL (ref 9.2–12.9)
POTASSIUM SERPL-SCNC: 4.8 MMOL/L (ref 3.5–5.1)
PROT SERPL-MCNC: 8.7 G/DL (ref 6–8.4)
RBC # BLD AUTO: 4.32 M/UL (ref 4–5.4)
SODIUM SERPL-SCNC: 142 MMOL/L (ref 136–145)
TSH SERPL DL<=0.005 MIU/L-ACNC: 3.12 UIU/ML (ref 0.4–4)
WBC # BLD AUTO: 8.13 K/UL (ref 3.9–12.7)

## 2019-11-20 PROCEDURE — 85025 COMPLETE CBC W/AUTO DIFF WBC: CPT | Mod: HCNC

## 2019-11-20 PROCEDURE — 36415 COLL VENOUS BLD VENIPUNCTURE: CPT | Mod: HCNC

## 2019-11-20 PROCEDURE — 80053 COMPREHEN METABOLIC PANEL: CPT | Mod: HCNC

## 2019-11-20 PROCEDURE — 83036 HEMOGLOBIN GLYCOSYLATED A1C: CPT | Mod: HCNC

## 2019-11-20 PROCEDURE — 84443 ASSAY THYROID STIM HORMONE: CPT | Mod: HCNC

## 2019-11-27 ENCOUNTER — PATIENT MESSAGE (OUTPATIENT)
Dept: FAMILY MEDICINE | Facility: CLINIC | Age: 79
End: 2019-11-27

## 2020-01-02 RX ORDER — DEXTROSE 4 G
TABLET,CHEWABLE ORAL
Qty: 1 EACH | Refills: 0 | Status: SHIPPED | OUTPATIENT
Start: 2020-01-02

## 2020-02-05 ENCOUNTER — PATIENT MESSAGE (OUTPATIENT)
Dept: FAMILY MEDICINE | Facility: CLINIC | Age: 80
End: 2020-02-05

## 2020-02-06 ENCOUNTER — TELEPHONE (OUTPATIENT)
Dept: FAMILY MEDICINE | Facility: CLINIC | Age: 80
End: 2020-02-06

## 2020-02-06 DIAGNOSIS — E78.5 HYPERLIPIDEMIA ASSOCIATED WITH TYPE 2 DIABETES MELLITUS: ICD-10-CM

## 2020-02-06 DIAGNOSIS — E11.69 HYPERLIPIDEMIA ASSOCIATED WITH TYPE 2 DIABETES MELLITUS: ICD-10-CM

## 2020-02-06 DIAGNOSIS — I10 ESSENTIAL HYPERTENSION: ICD-10-CM

## 2020-02-06 DIAGNOSIS — T46.6X5A STATIN MYOPATHY: ICD-10-CM

## 2020-02-06 DIAGNOSIS — G72.0 STATIN MYOPATHY: ICD-10-CM

## 2020-02-06 DIAGNOSIS — E03.9 HYPOTHYROIDISM, UNSPECIFIED TYPE: ICD-10-CM

## 2020-02-06 DIAGNOSIS — I70.0 AORTIC ATHEROSCLEROSIS: ICD-10-CM

## 2020-02-06 NOTE — TELEPHONE ENCOUNTER
Orders Placed This Encounter   Procedures    CBC auto differential    Comprehensive metabolic panel    Hemoglobin A1c

## 2020-02-11 ENCOUNTER — OFFICE VISIT (OUTPATIENT)
Dept: FAMILY MEDICINE | Facility: CLINIC | Age: 80
End: 2020-02-11
Payer: MEDICARE

## 2020-02-11 VITALS
DIASTOLIC BLOOD PRESSURE: 60 MMHG | TEMPERATURE: 99 F | HEART RATE: 67 BPM | BODY MASS INDEX: 26.14 KG/M2 | HEIGHT: 61 IN | SYSTOLIC BLOOD PRESSURE: 126 MMHG | OXYGEN SATURATION: 95 % | WEIGHT: 138.44 LBS

## 2020-02-11 DIAGNOSIS — E03.9 HYPOTHYROIDISM, UNSPECIFIED TYPE: ICD-10-CM

## 2020-02-11 DIAGNOSIS — T46.6X5A STATIN MYOPATHY: ICD-10-CM

## 2020-02-11 DIAGNOSIS — I10 ESSENTIAL HYPERTENSION: ICD-10-CM

## 2020-02-11 DIAGNOSIS — I70.0 AORTIC ATHEROSCLEROSIS: ICD-10-CM

## 2020-02-11 DIAGNOSIS — E11.69 HYPERLIPIDEMIA ASSOCIATED WITH TYPE 2 DIABETES MELLITUS: ICD-10-CM

## 2020-02-11 DIAGNOSIS — G72.0 STATIN MYOPATHY: ICD-10-CM

## 2020-02-11 DIAGNOSIS — E78.5 HYPERLIPIDEMIA ASSOCIATED WITH TYPE 2 DIABETES MELLITUS: ICD-10-CM

## 2020-02-11 PROCEDURE — 99499 RISK ADDL DX/OHS AUDIT: ICD-10-PCS | Mod: HCNC,S$GLB,, | Performed by: FAMILY MEDICINE

## 2020-02-11 PROCEDURE — 3074F PR MOST RECENT SYSTOLIC BLOOD PRESSURE < 130 MM HG: ICD-10-PCS | Mod: HCNC,CPTII,S$GLB, | Performed by: FAMILY MEDICINE

## 2020-02-11 PROCEDURE — 1126F PR PAIN SEVERITY QUANTIFIED, NO PAIN PRESENT: ICD-10-PCS | Mod: HCNC,S$GLB,, | Performed by: FAMILY MEDICINE

## 2020-02-11 PROCEDURE — 1101F PR PT FALLS ASSESS DOC 0-1 FALLS W/OUT INJ PAST YR: ICD-10-PCS | Mod: HCNC,CPTII,S$GLB, | Performed by: FAMILY MEDICINE

## 2020-02-11 PROCEDURE — 3078F DIAST BP <80 MM HG: CPT | Mod: HCNC,CPTII,S$GLB, | Performed by: FAMILY MEDICINE

## 2020-02-11 PROCEDURE — 1126F AMNT PAIN NOTED NONE PRSNT: CPT | Mod: HCNC,S$GLB,, | Performed by: FAMILY MEDICINE

## 2020-02-11 PROCEDURE — 3051F PR MOST RECENT HEMOGLOBIN A1C LEVEL 7.0 - < 8.0%: ICD-10-PCS | Mod: HCNC,CPTII,S$GLB, | Performed by: FAMILY MEDICINE

## 2020-02-11 PROCEDURE — 3074F SYST BP LT 130 MM HG: CPT | Mod: HCNC,CPTII,S$GLB, | Performed by: FAMILY MEDICINE

## 2020-02-11 PROCEDURE — 1101F PT FALLS ASSESS-DOCD LE1/YR: CPT | Mod: HCNC,CPTII,S$GLB, | Performed by: FAMILY MEDICINE

## 2020-02-11 PROCEDURE — 99999 PR PBB SHADOW E&M-EST. PATIENT-LVL III: CPT | Mod: PBBFAC,HCNC,, | Performed by: FAMILY MEDICINE

## 2020-02-11 PROCEDURE — 99214 OFFICE O/P EST MOD 30 MIN: CPT | Mod: HCNC,S$GLB,, | Performed by: FAMILY MEDICINE

## 2020-02-11 PROCEDURE — 1159F PR MEDICATION LIST DOCUMENTED IN MEDICAL RECORD: ICD-10-PCS | Mod: HCNC,S$GLB,, | Performed by: FAMILY MEDICINE

## 2020-02-11 PROCEDURE — 99214 PR OFFICE/OUTPT VISIT, EST, LEVL IV, 30-39 MIN: ICD-10-PCS | Mod: HCNC,S$GLB,, | Performed by: FAMILY MEDICINE

## 2020-02-11 PROCEDURE — 99499 UNLISTED E&M SERVICE: CPT | Mod: HCNC,S$GLB,, | Performed by: FAMILY MEDICINE

## 2020-02-11 PROCEDURE — 1159F MED LIST DOCD IN RCRD: CPT | Mod: HCNC,S$GLB,, | Performed by: FAMILY MEDICINE

## 2020-02-11 PROCEDURE — 99999 PR PBB SHADOW E&M-EST. PATIENT-LVL III: ICD-10-PCS | Mod: PBBFAC,HCNC,, | Performed by: FAMILY MEDICINE

## 2020-02-11 PROCEDURE — 3051F HG A1C>EQUAL 7.0%<8.0%: CPT | Mod: HCNC,CPTII,S$GLB, | Performed by: FAMILY MEDICINE

## 2020-02-11 PROCEDURE — 3078F PR MOST RECENT DIASTOLIC BLOOD PRESSURE < 80 MM HG: ICD-10-PCS | Mod: HCNC,CPTII,S$GLB, | Performed by: FAMILY MEDICINE

## 2020-02-11 NOTE — PROGRESS NOTES
"(Portions of this note were dictated using voice recognition software and may contain dictation related errors in spelling/grammar/syntax not found on text review)    CC:   Chief Complaint   Patient presents with    Follow-up       HPI: 79 y.o. female here for medical follow-up.  Last visit November 2019    Diabetes, on metformin 1000 mg twice a day.  Borderline A1c at 7 0.  In November, down from 7.4 prior.  Compliant with eye exams and dentist. No neuropathy.  , last eye exam:  referred to ophthalmology  (Dr. Wang)  No numbness or tingling in her feet.  Home bg: checks before bedtime (usually 130s to 140s) doesn't routinely check in am.  Does admit if sugar is "normal" she may not take the nighttime metformin.      Hypertension: On losartan 100 mg daily, atenolol 50 mg daily, amlodipine 5 mg daily .       Hyperlipidemia: Intolerant to all statins tried along with Zetia. Not currently on therapy for this at the moment. We discussed tolerating a suboptimal LDL given her intolerance to medication     History of hypothyroidism, Synthroid 50 µg , last TSH was stable      fatty liver. There was one abnormal lesion on the gallbladder with CT demonstrated no gallbladder abnormalities. lfts have been prior slightly elevated, stable overall. occ will get some ruq pain but sporadic and not consistent             Past Medical History:   Diagnosis Date    Aortic atherosclerosis     Diabetes mellitus type II     Fatty liver     High cholesterol     HLD (hyperlipidemia)     HTN (hypertension)     Hypothyroidism     Hypothyroidism     Osteopenia     Statin myopathy        Past Surgical History:   Procedure Laterality Date    BREAST BIOPSY      BREAST SURGERY      biopsy    HYSTERECTOMY      still w/ovaries    ROTATOR CUFF REPAIR Left        Family History   Problem Relation Age of Onset    Diabetes Mother     Hypertension Mother     Diabetes Brother     Coronary artery disease Sister 55        MI    Liver " cancer Sister     No Known Problems Father     Diabetes Daughter     No Known Problems Son     No Known Problems Brother        Social History     Socioeconomic History    Marital status:      Spouse name: Not on file    Number of children: Not on file    Years of education: Not on file    Highest education level: Not on file   Occupational History    Not on file   Social Needs    Financial resource strain: Not on file    Food insecurity:     Worry: Not on file     Inability: Not on file    Transportation needs:     Medical: Not on file     Non-medical: Not on file   Tobacco Use    Smoking status: Never Smoker    Smokeless tobacco: Never Used   Substance and Sexual Activity    Alcohol use: No    Drug use: No    Sexual activity: Yes     Partners: Male   Lifestyle    Physical activity:     Days per week: Not on file     Minutes per session: Not on file    Stress: Not on file   Relationships    Social connections:     Talks on phone: Not on file     Gets together: Not on file     Attends Yarsani service: Not on file     Active member of club or organization: Not on file     Attends meetings of clubs or organizations: Not on file     Relationship status: Not on file   Other Topics Concern    Not on file   Social History Narrative    Not on file     Lab Results   Component Value Date    WBC 8.13 11/20/2019    HGB 12.7 11/20/2019    HCT 38.7 11/20/2019    MCV 90 11/20/2019     11/20/2019    CHOL 243 (H) 11/06/2018    TRIG 147 11/06/2018    HDL 44 11/06/2018    ALT 32 11/20/2019    AST 30 11/20/2019    BILITOT 0.5 11/20/2019    ALKPHOS 88 11/20/2019     11/20/2019    K 4.8 11/20/2019     11/20/2019    CREATININE 1.0 11/20/2019    CALCIUM 10.2 11/20/2019    ALBUMIN 4.1 11/20/2019    BUN 14 11/20/2019    CO2 26 11/20/2019    TSH 3.123 11/20/2019    HGBA1C 7.0 (H) 11/20/2019    LDLCALC 169.6 (H) 11/06/2018     (H) 11/20/2019         Hemoglobin (g/dL)   Date Value    11/20/2019 12.7   05/20/2019 11.3 (L)   11/06/2018 11.9 (L)   01/29/2018 11.9 (L)   10/10/2017 11.7 (L)     Hemoglobin A1C (%)   Date Value   11/20/2019 7.0 (H)   08/20/2019 7.4 (H)   05/20/2019 7.2 (H)   02/14/2019 7.1 (H)   11/06/2018 7.0 (H)     TSH (uIU/mL)   Date Value   11/20/2019 3.123   08/20/2019 2.676   11/06/2018 2.834   01/29/2018 3.149   10/10/2017 1.722   07/12/2017 5.603 (H)           ROS:  GENERAL:above  SKIN: No rashes, no itching.  HEAD: No headaches.  EYES: No visual changes  EARS: No ear pain or changes in hearing.  NOSE: No congestion or rhinorrhea.  MOUTH & THROAT: No hoarseness, change in voice, or sore throat.  NODES: Denies swollen glands.  CHEST: Denies BARTON, cyanosis, wheezing, cough and sputum production.  CARDIOVASCULAR: Denies chest pain, PND, orthopnea.  ABDOMEN:  Occasional left upper quadrant pain just last for couple of seconds and goes away, not associated with food.  Does also gets some occasional chronic right upper quadrant pain discussed in the past.  No significant which instead prior evaluations.  URINARY: No flank pain, dysuria or hematuria.  PERIPHERAL VASCULAR: No claudication or cyanosis.  MUSCULOSKELETAL:  No acute issues  NEUROLOGIC: No weakness or numbness.    Vital signs reviewed  PE:   APPEARANCE: Well nourished, well developed, in no acute distress.    HEAD: Normocephalic, atraumatic.  EYES:  Conjunctivae noninjected.  NECK: Supple with no cervical lymphadenopathy.  No carotid bruits.  No thyromegaly  CHEST: Good inspiratory effort. Lungs clear to auscultation with no wheezes or crackles.  CARDIOVASCULAR: Normal S1, S2. No rubs, murmurs, or gallops.  ABDOMEN: Bowel sounds normal. Not distended. Soft. No tenderness or masses. No organomegaly.  EXTREMITIES: No edema.  Normal distal pulses.       IMPRESSION  1. Uncontrolled type 2 diabetes mellitus without complication, without long-term current use of insulin    2. Hypothyroidism, unspecified type    3. Essential  hypertension    4. Aortic atherosclerosis    5. Statin myopathy    6. Hyperlipidemia associated with type 2 diabetes mellitus            PLAN  CBC, CMP, A1c and chart to be scheduled    Diabetes:  Continue metformin 2 g daily.  Check labs.  Emphasize trying to get some regular exercise    Hypertension:  Continue current therapy with losartan 100 mg daily, atenolol 50 mg daily, amlodipine 10 mg daily.     Dyslipidemia:  Can't tolerate statins or Zetia.    Hypothyroidism:  Continue Synthroid.      Referral to Ophthalmology    SCREENINGS      DEXA scan: 2017 normal   Mammogram 12/19/16, declines rpt  Colonoscopy: 2009, Dr. Wright, normal.  Declined repeat     immunizations   Tetanus :Check with your pharmacy regarding getting the tetanus (Tdap) vaccine (once every 10 years)  PVX: 2017   Prevnar: 1/2016  Flu: declines  Zoster: utd

## 2020-02-12 ENCOUNTER — LAB VISIT (OUTPATIENT)
Dept: LAB | Facility: HOSPITAL | Age: 80
End: 2020-02-12
Attending: FAMILY MEDICINE
Payer: MEDICARE

## 2020-02-12 DIAGNOSIS — I10 ESSENTIAL HYPERTENSION: ICD-10-CM

## 2020-02-12 DIAGNOSIS — I70.0 AORTIC ATHEROSCLEROSIS: ICD-10-CM

## 2020-02-12 DIAGNOSIS — E78.5 HYPERLIPIDEMIA ASSOCIATED WITH TYPE 2 DIABETES MELLITUS: ICD-10-CM

## 2020-02-12 DIAGNOSIS — T46.6X5A STATIN MYOPATHY: ICD-10-CM

## 2020-02-12 DIAGNOSIS — G72.0 STATIN MYOPATHY: ICD-10-CM

## 2020-02-12 DIAGNOSIS — E11.69 HYPERLIPIDEMIA ASSOCIATED WITH TYPE 2 DIABETES MELLITUS: ICD-10-CM

## 2020-02-12 DIAGNOSIS — E03.9 HYPOTHYROIDISM, UNSPECIFIED TYPE: ICD-10-CM

## 2020-02-12 LAB
ALBUMIN SERPL BCP-MCNC: 3.9 G/DL (ref 3.5–5.2)
ALP SERPL-CCNC: 87 U/L (ref 55–135)
ALT SERPL W/O P-5'-P-CCNC: 36 U/L (ref 10–44)
ANION GAP SERPL CALC-SCNC: 9 MMOL/L (ref 8–16)
AST SERPL-CCNC: 28 U/L (ref 10–40)
BASOPHILS # BLD AUTO: 0.04 K/UL (ref 0–0.2)
BASOPHILS NFR BLD: 0.4 % (ref 0–1.9)
BILIRUB SERPL-MCNC: 0.5 MG/DL (ref 0.1–1)
BUN SERPL-MCNC: 15 MG/DL (ref 8–23)
CALCIUM SERPL-MCNC: 9.7 MG/DL (ref 8.7–10.5)
CHLORIDE SERPL-SCNC: 105 MMOL/L (ref 95–110)
CO2 SERPL-SCNC: 26 MMOL/L (ref 23–29)
CREAT SERPL-MCNC: 1 MG/DL (ref 0.5–1.4)
DIFFERENTIAL METHOD: ABNORMAL
EOSINOPHIL # BLD AUTO: 0.2 K/UL (ref 0–0.5)
EOSINOPHIL NFR BLD: 2 % (ref 0–8)
ERYTHROCYTE [DISTWIDTH] IN BLOOD BY AUTOMATED COUNT: 13.3 % (ref 11.5–14.5)
EST. GFR  (AFRICAN AMERICAN): >60 ML/MIN/1.73 M^2
EST. GFR  (NON AFRICAN AMERICAN): 54 ML/MIN/1.73 M^2
ESTIMATED AVG GLUCOSE: 163 MG/DL (ref 68–131)
GLUCOSE SERPL-MCNC: 131 MG/DL (ref 70–110)
HBA1C MFR BLD HPLC: 7.3 % (ref 4–5.6)
HCT VFR BLD AUTO: 36 % (ref 37–48.5)
HGB BLD-MCNC: 11.6 G/DL (ref 12–16)
IMM GRANULOCYTES # BLD AUTO: 0.03 K/UL (ref 0–0.04)
IMM GRANULOCYTES NFR BLD AUTO: 0.3 % (ref 0–0.5)
LYMPHOCYTES # BLD AUTO: 3.6 K/UL (ref 1–4.8)
LYMPHOCYTES NFR BLD: 35.9 % (ref 18–48)
MCH RBC QN AUTO: 28.9 PG (ref 27–31)
MCHC RBC AUTO-ENTMCNC: 32.2 G/DL (ref 32–36)
MCV RBC AUTO: 90 FL (ref 82–98)
MONOCYTES # BLD AUTO: 0.7 K/UL (ref 0.3–1)
MONOCYTES NFR BLD: 7 % (ref 4–15)
NEUTROPHILS # BLD AUTO: 5.4 K/UL (ref 1.8–7.7)
NEUTROPHILS NFR BLD: 54.4 % (ref 38–73)
NRBC BLD-RTO: 0 /100 WBC
PLATELET # BLD AUTO: 326 K/UL (ref 150–350)
PMV BLD AUTO: 11 FL (ref 9.2–12.9)
POTASSIUM SERPL-SCNC: 5.7 MMOL/L (ref 3.5–5.1)
PROT SERPL-MCNC: 7.9 G/DL (ref 6–8.4)
RBC # BLD AUTO: 4.02 M/UL (ref 4–5.4)
SODIUM SERPL-SCNC: 140 MMOL/L (ref 136–145)
WBC # BLD AUTO: 9.89 K/UL (ref 3.9–12.7)

## 2020-02-12 PROCEDURE — 85025 COMPLETE CBC W/AUTO DIFF WBC: CPT | Mod: HCNC

## 2020-02-12 PROCEDURE — 36415 COLL VENOUS BLD VENIPUNCTURE: CPT | Mod: HCNC

## 2020-02-12 PROCEDURE — 83036 HEMOGLOBIN GLYCOSYLATED A1C: CPT | Mod: HCNC

## 2020-02-12 PROCEDURE — 80053 COMPREHEN METABOLIC PANEL: CPT | Mod: HCNC

## 2020-02-18 ENCOUNTER — PATIENT MESSAGE (OUTPATIENT)
Dept: FAMILY MEDICINE | Facility: CLINIC | Age: 80
End: 2020-02-18

## 2020-02-18 ENCOUNTER — TELEPHONE (OUTPATIENT)
Dept: FAMILY MEDICINE | Facility: CLINIC | Age: 80
End: 2020-02-18

## 2020-02-18 DIAGNOSIS — E87.5 HYPERKALEMIA: Primary | ICD-10-CM

## 2020-02-18 NOTE — TELEPHONE ENCOUNTER
Please tell patient diabetes test is slightly elevated at 7.3 A1c but overall stable, ideally would like it less than 7 but work on diet, continue current medications.  Potassium level was elevated, not sure if this is the lab collection issue or legitimately high potassium.  Do recommend recheck a potassium level possibly tomorrow or Thursday if possible.  This does not need to be fasting

## 2020-02-21 RX ORDER — IBUPROFEN 800 MG/1
TABLET ORAL
Qty: 90 TABLET | Refills: 0 | Status: SHIPPED | OUTPATIENT
Start: 2020-02-21 | End: 2021-01-11 | Stop reason: SDUPTHER

## 2020-03-03 ENCOUNTER — LAB VISIT (OUTPATIENT)
Dept: LAB | Facility: HOSPITAL | Age: 80
End: 2020-03-03
Attending: FAMILY MEDICINE
Payer: MEDICARE

## 2020-03-03 DIAGNOSIS — E87.5 HYPERKALEMIA: ICD-10-CM

## 2020-03-03 LAB
ANION GAP SERPL CALC-SCNC: 8 MMOL/L (ref 8–16)
BUN SERPL-MCNC: 15 MG/DL (ref 8–23)
CALCIUM SERPL-MCNC: 9.7 MG/DL (ref 8.7–10.5)
CHLORIDE SERPL-SCNC: 105 MMOL/L (ref 95–110)
CO2 SERPL-SCNC: 28 MMOL/L (ref 23–29)
CREAT SERPL-MCNC: 1.1 MG/DL (ref 0.5–1.4)
EST. GFR  (AFRICAN AMERICAN): 56 ML/MIN/1.73 M^2
EST. GFR  (NON AFRICAN AMERICAN): 49 ML/MIN/1.73 M^2
GLUCOSE SERPL-MCNC: 132 MG/DL (ref 70–110)
LEFT EYE DM RETINOPATHY: NEGATIVE
POTASSIUM SERPL-SCNC: 4.9 MMOL/L (ref 3.5–5.1)
RIGHT EYE DM RETINOPATHY: NEGATIVE
SODIUM SERPL-SCNC: 141 MMOL/L (ref 136–145)

## 2020-03-03 PROCEDURE — 80048 BASIC METABOLIC PNL TOTAL CA: CPT | Mod: HCNC

## 2020-03-03 PROCEDURE — 36415 COLL VENOUS BLD VENIPUNCTURE: CPT | Mod: HCNC

## 2020-03-10 RX ORDER — ATENOLOL 50 MG/1
TABLET ORAL
Qty: 90 TABLET | Refills: 3 | Status: SHIPPED | OUTPATIENT
Start: 2020-03-10 | End: 2021-03-17

## 2020-04-20 RX ORDER — AMLODIPINE BESYLATE 10 MG/1
TABLET ORAL
Qty: 90 TABLET | Refills: 3 | Status: SHIPPED | OUTPATIENT
Start: 2020-04-20 | End: 2021-03-17

## 2020-06-12 ENCOUNTER — PATIENT OUTREACH (OUTPATIENT)
Dept: ADMINISTRATIVE | Facility: HOSPITAL | Age: 80
End: 2020-06-12

## 2020-06-12 NOTE — LETTER
AUTHORIZATION FOR RELEASE OF   CONFIDENTIAL INFORMATION    Dear Dr. Wright,    We are seeing Oneyda Shah, date of birth 1940, in the clinic at [facility]. Oneyda Shah has an outstanding lab/procedure at the time we reviewed her chart. In order to help keep her health information updated, she has authorized us to request the following medical record(s):         ( X )  COLONOSCOPY          Please fax records to us at [phone].     Attention: Inna Powell     If you have any questions, please contact [phone].           Patient Name: Oneyda Shah  : 1940  Patient Phone #: [phone]

## 2020-06-25 ENCOUNTER — TELEPHONE (OUTPATIENT)
Dept: FAMILY MEDICINE | Facility: CLINIC | Age: 80
End: 2020-06-25

## 2020-06-25 NOTE — TELEPHONE ENCOUNTER
----- Message from Shasha Shah sent at 6/25/2020 11:45 AM CDT -----  Regarding: call back  Contact: [phone]  Patient is calling to see if she can be seen tomorrow due to having severe dizziness and having trouble sleeping. Please advice

## 2020-08-18 ENCOUNTER — LAB VISIT (OUTPATIENT)
Dept: LAB | Facility: HOSPITAL | Age: 80
End: 2020-08-18
Attending: FAMILY MEDICINE
Payer: MEDICARE

## 2020-08-18 ENCOUNTER — OFFICE VISIT (OUTPATIENT)
Dept: FAMILY MEDICINE | Facility: CLINIC | Age: 80
End: 2020-08-18
Payer: MEDICARE

## 2020-08-18 ENCOUNTER — PATIENT MESSAGE (OUTPATIENT)
Dept: FAMILY MEDICINE | Facility: CLINIC | Age: 80
End: 2020-08-18

## 2020-08-18 VITALS
BODY MASS INDEX: 25.88 KG/M2 | OXYGEN SATURATION: 99 % | TEMPERATURE: 98 F | SYSTOLIC BLOOD PRESSURE: 142 MMHG | HEIGHT: 62 IN | WEIGHT: 140.63 LBS | DIASTOLIC BLOOD PRESSURE: 80 MMHG | HEART RATE: 62 BPM

## 2020-08-18 DIAGNOSIS — R10.11 RUQ ABDOMINAL PAIN: ICD-10-CM

## 2020-08-18 DIAGNOSIS — I10 ESSENTIAL HYPERTENSION: ICD-10-CM

## 2020-08-18 DIAGNOSIS — T46.6X5A STATIN MYOPATHY: ICD-10-CM

## 2020-08-18 DIAGNOSIS — H81.10 BENIGN PAROXYSMAL POSITIONAL VERTIGO, UNSPECIFIED LATERALITY: ICD-10-CM

## 2020-08-18 DIAGNOSIS — N18.30 CKD (CHRONIC KIDNEY DISEASE), STAGE III: ICD-10-CM

## 2020-08-18 DIAGNOSIS — E11.22 TYPE 2 DIABETES MELLITUS WITH STAGE 3 CHRONIC KIDNEY DISEASE, WITHOUT LONG-TERM CURRENT USE OF INSULIN: ICD-10-CM

## 2020-08-18 DIAGNOSIS — G72.0 STATIN MYOPATHY: ICD-10-CM

## 2020-08-18 DIAGNOSIS — E03.9 HYPOTHYROIDISM, UNSPECIFIED TYPE: ICD-10-CM

## 2020-08-18 DIAGNOSIS — J30.9 ALLERGIC RHINITIS, UNSPECIFIED SEASONALITY, UNSPECIFIED TRIGGER: ICD-10-CM

## 2020-08-18 DIAGNOSIS — I70.0 AORTIC ATHEROSCLEROSIS: ICD-10-CM

## 2020-08-18 DIAGNOSIS — N18.30 TYPE 2 DIABETES MELLITUS WITH STAGE 3 CHRONIC KIDNEY DISEASE, WITHOUT LONG-TERM CURRENT USE OF INSULIN: Primary | ICD-10-CM

## 2020-08-18 DIAGNOSIS — K76.0 FATTY LIVER: ICD-10-CM

## 2020-08-18 DIAGNOSIS — N18.30 TYPE 2 DIABETES MELLITUS WITH STAGE 3 CHRONIC KIDNEY DISEASE, WITHOUT LONG-TERM CURRENT USE OF INSULIN: ICD-10-CM

## 2020-08-18 DIAGNOSIS — E11.22 TYPE 2 DIABETES MELLITUS WITH STAGE 3 CHRONIC KIDNEY DISEASE, WITHOUT LONG-TERM CURRENT USE OF INSULIN: Primary | ICD-10-CM

## 2020-08-18 LAB
ALBUMIN SERPL BCP-MCNC: 4 G/DL (ref 3.5–5.2)
ALP SERPL-CCNC: 71 U/L (ref 55–135)
ALT SERPL W/O P-5'-P-CCNC: 37 U/L (ref 10–44)
ANION GAP SERPL CALC-SCNC: 10 MMOL/L (ref 8–16)
AST SERPL-CCNC: 30 U/L (ref 10–40)
BASOPHILS # BLD AUTO: 0.03 K/UL (ref 0–0.2)
BASOPHILS NFR BLD: 0.3 % (ref 0–1.9)
BILIRUB SERPL-MCNC: 0.3 MG/DL (ref 0.1–1)
BUN SERPL-MCNC: 21 MG/DL (ref 8–23)
CALCIUM SERPL-MCNC: 10.5 MG/DL (ref 8.7–10.5)
CHLORIDE SERPL-SCNC: 106 MMOL/L (ref 95–110)
CO2 SERPL-SCNC: 26 MMOL/L (ref 23–29)
CREAT SERPL-MCNC: 1.1 MG/DL (ref 0.5–1.4)
DIFFERENTIAL METHOD: ABNORMAL
EOSINOPHIL # BLD AUTO: 0.1 K/UL (ref 0–0.5)
EOSINOPHIL NFR BLD: 1.6 % (ref 0–8)
ERYTHROCYTE [DISTWIDTH] IN BLOOD BY AUTOMATED COUNT: 13.2 % (ref 11.5–14.5)
EST. GFR  (AFRICAN AMERICAN): 56 ML/MIN/1.73 M^2
EST. GFR  (NON AFRICAN AMERICAN): 49 ML/MIN/1.73 M^2
ESTIMATED AVG GLUCOSE: 154 MG/DL (ref 68–131)
GLUCOSE SERPL-MCNC: 149 MG/DL (ref 70–110)
HBA1C MFR BLD HPLC: 7 % (ref 4–5.6)
HCT VFR BLD AUTO: 35.7 % (ref 37–48.5)
HGB BLD-MCNC: 12.1 G/DL (ref 12–16)
IMM GRANULOCYTES # BLD AUTO: 0.02 K/UL (ref 0–0.04)
IMM GRANULOCYTES NFR BLD AUTO: 0.2 % (ref 0–0.5)
LYMPHOCYTES # BLD AUTO: 2.9 K/UL (ref 1–4.8)
LYMPHOCYTES NFR BLD: 33.5 % (ref 18–48)
MCH RBC QN AUTO: 30.3 PG (ref 27–31)
MCHC RBC AUTO-ENTMCNC: 33.9 G/DL (ref 32–36)
MCV RBC AUTO: 89 FL (ref 82–98)
MONOCYTES # BLD AUTO: 0.7 K/UL (ref 0.3–1)
MONOCYTES NFR BLD: 7.9 % (ref 4–15)
NEUTROPHILS # BLD AUTO: 4.9 K/UL (ref 1.8–7.7)
NEUTROPHILS NFR BLD: 56.5 % (ref 38–73)
NRBC BLD-RTO: 0 /100 WBC
PLATELET # BLD AUTO: 294 K/UL (ref 150–350)
PMV BLD AUTO: 11 FL (ref 9.2–12.9)
POTASSIUM SERPL-SCNC: 4.6 MMOL/L (ref 3.5–5.1)
PROT SERPL-MCNC: 7.9 G/DL (ref 6–8.4)
RBC # BLD AUTO: 4 M/UL (ref 4–5.4)
SODIUM SERPL-SCNC: 142 MMOL/L (ref 136–145)
TSH SERPL DL<=0.005 MIU/L-ACNC: 1.89 UIU/ML (ref 0.4–4)
WBC # BLD AUTO: 8.69 K/UL (ref 3.9–12.7)

## 2020-08-18 PROCEDURE — 3079F PR MOST RECENT DIASTOLIC BLOOD PRESSURE 80-89 MM HG: ICD-10-PCS | Mod: HCNC,CPTII,S$GLB, | Performed by: FAMILY MEDICINE

## 2020-08-18 PROCEDURE — 85025 COMPLETE CBC W/AUTO DIFF WBC: CPT | Mod: HCNC

## 2020-08-18 PROCEDURE — 99215 PR OFFICE/OUTPT VISIT, EST, LEVL V, 40-54 MIN: ICD-10-PCS | Mod: HCNC,S$GLB,, | Performed by: FAMILY MEDICINE

## 2020-08-18 PROCEDURE — 99499 RISK ADDL DX/OHS AUDIT: ICD-10-PCS | Mod: S$GLB,,, | Performed by: FAMILY MEDICINE

## 2020-08-18 PROCEDURE — 36415 COLL VENOUS BLD VENIPUNCTURE: CPT | Mod: HCNC

## 2020-08-18 PROCEDURE — 84443 ASSAY THYROID STIM HORMONE: CPT | Mod: HCNC

## 2020-08-18 PROCEDURE — 99999 PR PBB SHADOW E&M-EST. PATIENT-LVL IV: ICD-10-PCS | Mod: PBBFAC,HCNC,, | Performed by: FAMILY MEDICINE

## 2020-08-18 PROCEDURE — 3079F DIAST BP 80-89 MM HG: CPT | Mod: HCNC,CPTII,S$GLB, | Performed by: FAMILY MEDICINE

## 2020-08-18 PROCEDURE — 1101F PT FALLS ASSESS-DOCD LE1/YR: CPT | Mod: HCNC,CPTII,S$GLB, | Performed by: FAMILY MEDICINE

## 2020-08-18 PROCEDURE — 83036 HEMOGLOBIN GLYCOSYLATED A1C: CPT | Mod: HCNC

## 2020-08-18 PROCEDURE — 99499 UNLISTED E&M SERVICE: CPT | Mod: S$GLB,,, | Performed by: FAMILY MEDICINE

## 2020-08-18 PROCEDURE — 1159F PR MEDICATION LIST DOCUMENTED IN MEDICAL RECORD: ICD-10-PCS | Mod: HCNC,S$GLB,, | Performed by: FAMILY MEDICINE

## 2020-08-18 PROCEDURE — 1101F PR PT FALLS ASSESS DOC 0-1 FALLS W/OUT INJ PAST YR: ICD-10-PCS | Mod: HCNC,CPTII,S$GLB, | Performed by: FAMILY MEDICINE

## 2020-08-18 PROCEDURE — 3077F PR MOST RECENT SYSTOLIC BLOOD PRESSURE >= 140 MM HG: ICD-10-PCS | Mod: HCNC,CPTII,S$GLB, | Performed by: FAMILY MEDICINE

## 2020-08-18 PROCEDURE — 3077F SYST BP >= 140 MM HG: CPT | Mod: HCNC,CPTII,S$GLB, | Performed by: FAMILY MEDICINE

## 2020-08-18 PROCEDURE — 99215 OFFICE O/P EST HI 40 MIN: CPT | Mod: HCNC,S$GLB,, | Performed by: FAMILY MEDICINE

## 2020-08-18 PROCEDURE — 3051F HG A1C>EQUAL 7.0%<8.0%: CPT | Mod: HCNC,CPTII,S$GLB, | Performed by: FAMILY MEDICINE

## 2020-08-18 PROCEDURE — 99999 PR PBB SHADOW E&M-EST. PATIENT-LVL IV: CPT | Mod: PBBFAC,HCNC,, | Performed by: FAMILY MEDICINE

## 2020-08-18 PROCEDURE — 3051F PR MOST RECENT HEMOGLOBIN A1C LEVEL 7.0 - < 8.0%: ICD-10-PCS | Mod: HCNC,CPTII,S$GLB, | Performed by: FAMILY MEDICINE

## 2020-08-18 PROCEDURE — 1159F MED LIST DOCD IN RCRD: CPT | Mod: HCNC,S$GLB,, | Performed by: FAMILY MEDICINE

## 2020-08-18 PROCEDURE — 80053 COMPREHEN METABOLIC PANEL: CPT | Mod: HCNC

## 2020-08-18 RX ORDER — LEVOTHYROXINE SODIUM 50 UG/1
50 TABLET ORAL DAILY
Qty: 90 TABLET | Refills: 3 | Status: SHIPPED | OUTPATIENT
Start: 2020-08-18 | End: 2021-09-17 | Stop reason: SDUPTHER

## 2020-08-18 RX ORDER — METFORMIN HYDROCHLORIDE 1000 MG/1
1000 TABLET ORAL 2 TIMES DAILY
Qty: 180 TABLET | Refills: 11 | Status: SHIPPED | OUTPATIENT
Start: 2020-08-18 | End: 2021-03-16 | Stop reason: SDUPTHER

## 2020-08-18 RX ORDER — LOSARTAN POTASSIUM 100 MG/1
100 TABLET ORAL DAILY
Qty: 90 TABLET | Refills: 3 | Status: SHIPPED | OUTPATIENT
Start: 2020-08-18 | End: 2021-02-05 | Stop reason: SDUPTHER

## 2020-08-18 RX ORDER — MECLIZINE HCL 12.5 MG 12.5 MG/1
12.5 TABLET ORAL 3 TIMES DAILY PRN
Qty: 30 TABLET | Refills: 0 | Status: SHIPPED | OUTPATIENT
Start: 2020-08-18 | End: 2021-07-01 | Stop reason: SDUPTHER

## 2020-08-18 NOTE — PATIENT INSTRUCTIONS
Please check blood pressure in the morning for one week and send a message with your readings to make sure they are ok. Goal blood pressure is less than 140/90.     You can take claritin over the counter (loratadine) for ear stuffiness, stuffy/runny nose.    You can take MECLIZINE prescription if you get more dizzy spells    Ultrasound of abdomen to check liver    Blood tests below.         Orders Placed This Encounter   Procedures    US Abdomen Complete    Comprehensive metabolic panel    Hemoglobin A1C    CBC auto differential    TSH

## 2020-08-18 NOTE — PROGRESS NOTES
"(Portions of this note were dictated using voice recognition software and may contain dictation related errors in spelling/grammar/syntax not found on text review)    CC:   Medical follow-up    HPI: 79 y.o. female here for medical follow-up.  Last visit February 2020    Diabetes with CKD 3, on metformin 1000 mg twice a day.  Mildly suboptimal A1c at 7.3.  Trends as below. .  Compliant with eye exams and dentist. No neuropathy.  , last eye exam:   03/03/2020 (Dr. Wang)  No numbness or tingling in her feet.  Home bg: checks before bedtime (usually 130s to 140s) doesn't routinely check in am.  Does admit if sugar is "normal" she may not take the nighttime metformin.      Hypertension: On losartan 100 mg daily, atenolol 50 mg daily, amlodipine 5 mg daily .  blood pressure usually well controlled although slightly elevated today.  States that at home sometimes she will get low blood pressures.  Checks at home and can send a some records over the next week     Hyperlipidemia: Intolerant to all statins tried along with Zetia. Not currently on therapy for this at the moment. We discussed tolerating a suboptimal LDL given her intolerance to medication     History of hypothyroidism, Synthroid 50 µg , last TSH was stable      fatty liver. There was one abnormal lesion on the gallbladder with CT demonstrated no gallbladder abnormalities. lfts have been prior slightly elevated, stable overall. occ will get some ruq pain but sporadic and not consistent     2 months ago was turning in bed got a lot of vertigo symptoms.  Symptoms have now cleared.  Does have some sense that her right ear is blocked.  Sometimes gets a twinge of pain.  Had seen ENT in the past was given Xyzal and Flonase but she is not taking this.  She was concerned sometimes when she saw the bottle of Xyzal (levocetirizine) that she may confuse it for her Synthroid (levothyroxine).  Does occasionally get some nasal congestion at nighttime.  No fevers chills or " cough.        Past Medical History:   Diagnosis Date    Aortic atherosclerosis     CKD (chronic kidney disease), stage III     Diabetes mellitus type II     Fatty liver     High cholesterol     HLD (hyperlipidemia)     HTN (hypertension)     Hypothyroidism     Hypothyroidism     Osteopenia     Statin myopathy        Past Surgical History:   Procedure Laterality Date    BREAST BIOPSY      BREAST SURGERY      biopsy    HYSTERECTOMY      still w/ovaries    ROTATOR CUFF REPAIR Left        Family History   Problem Relation Age of Onset    Diabetes Mother     Hypertension Mother     Diabetes Brother     Coronary artery disease Sister 55        MI    Liver cancer Sister     No Known Problems Father     Diabetes Daughter     No Known Problems Son     No Known Problems Brother        Social History     Socioeconomic History    Marital status:      Spouse name: Not on file    Number of children: Not on file    Years of education: Not on file    Highest education level: Not on file   Occupational History    Not on file   Social Needs    Financial resource strain: Not on file    Food insecurity     Worry: Not on file     Inability: Not on file    Transportation needs     Medical: Not on file     Non-medical: Not on file   Tobacco Use    Smoking status: Never Smoker    Smokeless tobacco: Never Used   Substance and Sexual Activity    Alcohol use: No    Drug use: No    Sexual activity: Yes     Partners: Male   Lifestyle    Physical activity     Days per week: Not on file     Minutes per session: Not on file    Stress: Not on file   Relationships    Social connections     Talks on phone: Not on file     Gets together: Not on file     Attends Restorationism service: Not on file     Active member of club or organization: Not on file     Attends meetings of clubs or organizations: Not on file     Relationship status: Not on file   Other Topics Concern    Not on file   Social History Narrative     Not on file     Lab Results   Component Value Date    WBC 9.89 02/12/2020    HGB 11.6 (L) 02/12/2020    HCT 36.0 (L) 02/12/2020    MCV 90 02/12/2020     02/12/2020    CHOL 243 (H) 11/06/2018    TRIG 147 11/06/2018    HDL 44 11/06/2018    ALT 36 02/12/2020    AST 28 02/12/2020    BILITOT 0.5 02/12/2020    ALKPHOS 87 02/12/2020     03/03/2020    K 4.9 03/03/2020     03/03/2020    CREATININE 1.1 03/03/2020    ESTGFRAFRICA 56 (A) 03/03/2020    EGFRNONAA 49 (A) 03/03/2020    CALCIUM 9.7 03/03/2020    ALBUMIN 3.9 02/12/2020    BUN 15 03/03/2020    CO2 28 03/03/2020    TSH 3.123 11/20/2019    HGBA1C 7.3 (H) 02/12/2020    MICALBCREAT 32.7 (H) 08/15/2018    LDLCALC 169.6 (H) 11/06/2018     (H) 03/03/2020               Hemoglobin (g/dL)   Date Value   02/12/2020 11.6 (L)   11/20/2019 12.7   05/20/2019 11.3 (L)   11/06/2018 11.9 (L)   01/29/2018 11.9 (L)     Hemoglobin A1C (%)   Date Value   02/12/2020 7.3 (H)   11/20/2019 7.0 (H)   08/20/2019 7.4 (H)   05/20/2019 7.2 (H)   02/14/2019 7.1 (H)     TSH (uIU/mL)   Date Value   11/20/2019 3.123   08/20/2019 2.676   11/06/2018 2.834   01/29/2018 3.149   10/10/2017 1.722   07/12/2017 5.603 (H)     eGFR if non African American (mL/min/1.73 m^2)   Date Value   03/03/2020 49 (A)   02/12/2020 54 (A)   11/20/2019 55 (A)   09/03/2019 55 (A)   08/20/2019 49 (A)           ROS:  GENERAL:above  SKIN: No rashes, no itching.  HEAD: No headaches.  EYES: No visual changes  EARS: No ear pain or changes in hearing.  NOSE:  Above  MOUTH & THROAT: No hoarseness, change in voice, or sore throat.  NODES: Denies swollen glands.  CHEST: Denies BARTON, cyanosis, wheezing, cough and sputum production.  CARDIOVASCULAR: Denies chest pain, PND, orthopnea.  ABDOMEN:  Occasional left upper quadrant pain just last for couple of seconds and goes away, not associated with food.  Does also gets some occasional chronic right upper quadrant pain discussed in the past.  No significant which  instead prior evaluations.  URINARY: No flank pain, dysuria or hematuria.  PERIPHERAL VASCULAR: No claudication or cyanosis.  MUSCULOSKELETAL:  No acute issues  NEUROLOGIC:  Above    Vital signs reviewed  PE:   APPEARANCE: Well nourished, well developed, in no acute distress.    HEAD: Normocephalic, atraumatic.  EYES:  Conjunctivae noninjected.  EARS:  Slight serous effusion noted behind left TM but right TM looks clear.  No impaction of cerumen  NECK: Supple with no cervical lymphadenopathy.  No carotid bruits.  No thyromegaly  CHEST: Good inspiratory effort. Lungs clear to auscultation with no wheezes or crackles.  CARDIOVASCULAR: Normal S1, S2. No rubs, murmurs, or gallops.  ABDOMEN: Bowel sounds normal. Not distended. Soft.  Minimal RUQ pain no rebound or guarding.  EXTREMITIES: No edema.  Normal distal pulses.  DIABETIC FOOT EXAM: Protective Sensation (w/ 10 gram monofilament):  Right: Intact  Left: Intact    Visual Inspection:  Normal -  Bilateral    Pedal Pulses:   Right: Present  Left: Present    Posterior tibialis:   Right:Present  Left: Present            IMPRESSION  1. Type 2 diabetes mellitus with stage 3 chronic kidney disease, without long-term current use of insulin    2. CKD (chronic kidney disease), stage III    3. Hypothyroidism, unspecified type    4. Essential hypertension    5. Aortic atherosclerosis    6. Statin myopathy    7. Fatty liver    8. RUQ abdominal pain    9. Benign paroxysmal positional vertigo, unspecified laterality    10. Allergic rhinitis, unspecified seasonality, unspecified trigger            PLAN  Orders Placed This Encounter   Procedures    Comprehensive metabolic panel    Hemoglobin A1C    CBC auto differential    TSH           Diabetes:  Continue metformin 2 g daily.  Emphasizing importance of b.i.d. dosing even if her blood sugars are normal.  Check labs.  Emphasize trying to get some regular exercise    Hypertension:  Continue current therapy with losartan 100 mg  daily, atenolol 50 mg daily, amlodipine 10 mg daily.  Check blood pressures over the next week at home and send message with results.  Will leave medicines as is as her blood pressure at home usually is better controlled per patient.    Dyslipidemia:  Can't tolerate statins or Zetia.    Hypothyroidism:  Continue Synthroid.      Vertigo episode:  Clear.  Discussed likely BPPV diagnosis.  Will give a prescription of meclizine on hand in case symptoms recur.  Notify for any recurrence, worsening, or persistence of symptoms    Some slight decreased hearing on right ear along with some occasional nasal congestion, ear pain.  Suspect possibly AR. Reviewed ENT documentation from November 2019, also suspicion of eustachian tube dysfunction at that time. She was confused about the Xyzal.  Recommended she can just take over-the-counter Claritin.  Not taking Flonase right now but can add this on if not sufficiently improved with the Claritin alone.       Reviewed ophthalmology documentation from March which has been imported into her chart.  Continue yearly eye exams.    SCREENINGS      DEXA scan: 2017 normal   Mammogram 12/19/16, declines rpt  Colonoscopy: 2009, Dr. Wright, normal.  Declined repeat     immunizations   Tetanus :Check with your pharmacy regarding getting the tetanus (Tdap) vaccine (once every 10 years)  PVX: 2017   Prevnar: 1/2016  Flu: declines  Zoster: utd

## 2020-08-18 NOTE — TELEPHONE ENCOUNTER
Dear Oneyda Shah     Your recent labs were reviewed and released to your account. Your lab results were normal.  Your diabetes test A1c is looking better at 7.0 down from 7.3 last time.  Please continue all of your current medications.  Please let me know if you have any questions.     Zion Villavicencio MD   20

## 2020-08-19 ENCOUNTER — PATIENT MESSAGE (OUTPATIENT)
Dept: FAMILY MEDICINE | Facility: CLINIC | Age: 80
End: 2020-08-19

## 2020-08-21 ENCOUNTER — PATIENT MESSAGE (OUTPATIENT)
Dept: FAMILY MEDICINE | Facility: CLINIC | Age: 80
End: 2020-08-21

## 2020-08-21 ENCOUNTER — VITALS (OUTPATIENT)
Dept: FAMILY MEDICINE | Facility: CLINIC | Age: 80
End: 2020-08-21

## 2020-08-21 VITALS — SYSTOLIC BLOOD PRESSURE: 126 MMHG | DIASTOLIC BLOOD PRESSURE: 64 MMHG | HEART RATE: 65 BPM

## 2020-08-31 ENCOUNTER — HOSPITAL ENCOUNTER (OUTPATIENT)
Dept: RADIOLOGY | Facility: HOSPITAL | Age: 80
Discharge: HOME | End: 2020-08-31
Attending: FAMILY MEDICINE
Payer: MEDICARE

## 2020-08-31 DIAGNOSIS — R10.11 RUQ ABDOMINAL PAIN: ICD-10-CM

## 2020-08-31 DIAGNOSIS — K76.0 FATTY LIVER: ICD-10-CM

## 2020-08-31 PROCEDURE — 76700 US EXAM ABDOM COMPLETE: CPT | Mod: TC,HCNC

## 2020-08-31 PROCEDURE — 76700 US EXAM ABDOM COMPLETE: CPT | Mod: 26,HCNC,, | Performed by: RADIOLOGY

## 2020-08-31 PROCEDURE — 76700 US ABDOMEN COMPLETE: ICD-10-PCS | Mod: 26,HCNC,, | Performed by: RADIOLOGY

## 2020-09-29 ENCOUNTER — PATIENT MESSAGE (OUTPATIENT)
Dept: OTHER | Facility: OTHER | Age: 80
End: 2020-09-29

## 2020-11-17 ENCOUNTER — TELEPHONE (OUTPATIENT)
Dept: FAMILY MEDICINE | Facility: CLINIC | Age: 80
End: 2020-11-17

## 2020-12-11 ENCOUNTER — PATIENT MESSAGE (OUTPATIENT)
Dept: OTHER | Facility: OTHER | Age: 80
End: 2020-12-11

## 2021-01-11 ENCOUNTER — LAB VISIT (OUTPATIENT)
Dept: LAB | Facility: HOSPITAL | Age: 81
End: 2021-01-11
Attending: FAMILY MEDICINE
Payer: MEDICARE

## 2021-01-11 ENCOUNTER — TELEPHONE (OUTPATIENT)
Dept: FAMILY MEDICINE | Facility: CLINIC | Age: 81
End: 2021-01-11

## 2021-01-11 ENCOUNTER — PATIENT MESSAGE (OUTPATIENT)
Dept: FAMILY MEDICINE | Facility: CLINIC | Age: 81
End: 2021-01-11

## 2021-01-11 ENCOUNTER — OFFICE VISIT (OUTPATIENT)
Dept: FAMILY MEDICINE | Facility: CLINIC | Age: 81
End: 2021-01-11
Payer: MEDICARE

## 2021-01-11 VITALS
HEART RATE: 63 BPM | BODY MASS INDEX: 26.17 KG/M2 | HEIGHT: 62 IN | OXYGEN SATURATION: 98 % | SYSTOLIC BLOOD PRESSURE: 134 MMHG | WEIGHT: 142.19 LBS | DIASTOLIC BLOOD PRESSURE: 64 MMHG | TEMPERATURE: 98 F

## 2021-01-11 DIAGNOSIS — I70.0 AORTIC ATHEROSCLEROSIS: ICD-10-CM

## 2021-01-11 DIAGNOSIS — N18.31 STAGE 3A CHRONIC KIDNEY DISEASE: ICD-10-CM

## 2021-01-11 DIAGNOSIS — E03.9 HYPOTHYROIDISM, UNSPECIFIED TYPE: ICD-10-CM

## 2021-01-11 DIAGNOSIS — K76.0 FATTY LIVER: ICD-10-CM

## 2021-01-11 DIAGNOSIS — Z78.0 ASYMPTOMATIC MENOPAUSAL STATE: ICD-10-CM

## 2021-01-11 DIAGNOSIS — I10 ESSENTIAL HYPERTENSION: ICD-10-CM

## 2021-01-11 DIAGNOSIS — G72.0 STATIN MYOPATHY: ICD-10-CM

## 2021-01-11 DIAGNOSIS — T46.6X5A STATIN MYOPATHY: ICD-10-CM

## 2021-01-11 DIAGNOSIS — E11.22 TYPE 2 DIABETES MELLITUS WITH STAGE 3A CHRONIC KIDNEY DISEASE, WITHOUT LONG-TERM CURRENT USE OF INSULIN: ICD-10-CM

## 2021-01-11 DIAGNOSIS — N18.31 TYPE 2 DIABETES MELLITUS WITH STAGE 3A CHRONIC KIDNEY DISEASE, WITHOUT LONG-TERM CURRENT USE OF INSULIN: ICD-10-CM

## 2021-01-11 DIAGNOSIS — E03.9 HYPOTHYROIDISM, UNSPECIFIED TYPE: Primary | ICD-10-CM

## 2021-01-11 LAB
ALBUMIN SERPL BCP-MCNC: 4.1 G/DL (ref 3.5–5.2)
ALP SERPL-CCNC: 62 U/L (ref 55–135)
ALT SERPL W/O P-5'-P-CCNC: 46 U/L (ref 10–44)
ANION GAP SERPL CALC-SCNC: 10 MMOL/L (ref 8–16)
AST SERPL-CCNC: 36 U/L (ref 10–40)
BASOPHILS # BLD AUTO: 0.04 K/UL (ref 0–0.2)
BASOPHILS NFR BLD: 0.5 % (ref 0–1.9)
BILIRUB SERPL-MCNC: 0.4 MG/DL (ref 0.1–1)
BUN SERPL-MCNC: 18 MG/DL (ref 8–23)
CALCIUM SERPL-MCNC: 9.8 MG/DL (ref 8.7–10.5)
CHLORIDE SERPL-SCNC: 104 MMOL/L (ref 95–110)
CHOLEST SERPL-MCNC: 241 MG/DL (ref 120–199)
CHOLEST/HDLC SERPL: 5.1 {RATIO} (ref 2–5)
CO2 SERPL-SCNC: 28 MMOL/L (ref 23–29)
CREAT SERPL-MCNC: 1 MG/DL (ref 0.5–1.4)
DIFFERENTIAL METHOD: ABNORMAL
EOSINOPHIL # BLD AUTO: 0.1 K/UL (ref 0–0.5)
EOSINOPHIL NFR BLD: 1.6 % (ref 0–8)
ERYTHROCYTE [DISTWIDTH] IN BLOOD BY AUTOMATED COUNT: 13.3 % (ref 11.5–14.5)
EST. GFR  (AFRICAN AMERICAN): >60 ML/MIN/1.73 M^2
EST. GFR  (NON AFRICAN AMERICAN): 54 ML/MIN/1.73 M^2
ESTIMATED AVG GLUCOSE: 143 MG/DL (ref 68–131)
GLUCOSE SERPL-MCNC: 136 MG/DL (ref 70–110)
HBA1C MFR BLD HPLC: 6.6 % (ref 4–5.6)
HCT VFR BLD AUTO: 35.7 % (ref 37–48.5)
HDLC SERPL-MCNC: 47 MG/DL (ref 40–75)
HDLC SERPL: 19.5 % (ref 20–50)
HGB BLD-MCNC: 11.7 G/DL (ref 12–16)
IMM GRANULOCYTES # BLD AUTO: 0.02 K/UL (ref 0–0.04)
IMM GRANULOCYTES NFR BLD AUTO: 0.2 % (ref 0–0.5)
LDLC SERPL CALC-MCNC: 167.2 MG/DL (ref 63–159)
LYMPHOCYTES # BLD AUTO: 2.5 K/UL (ref 1–4.8)
LYMPHOCYTES NFR BLD: 30.9 % (ref 18–48)
MCH RBC QN AUTO: 29.6 PG (ref 27–31)
MCHC RBC AUTO-ENTMCNC: 32.8 G/DL (ref 32–36)
MCV RBC AUTO: 90 FL (ref 82–98)
MONOCYTES # BLD AUTO: 0.6 K/UL (ref 0.3–1)
MONOCYTES NFR BLD: 7.8 % (ref 4–15)
NEUTROPHILS # BLD AUTO: 4.8 K/UL (ref 1.8–7.7)
NEUTROPHILS NFR BLD: 59 % (ref 38–73)
NONHDLC SERPL-MCNC: 194 MG/DL
NRBC BLD-RTO: 0 /100 WBC
PLATELET # BLD AUTO: 283 K/UL (ref 150–350)
PMV BLD AUTO: 11.9 FL (ref 9.2–12.9)
POTASSIUM SERPL-SCNC: 4.9 MMOL/L (ref 3.5–5.1)
PROT SERPL-MCNC: 8.1 G/DL (ref 6–8.4)
RBC # BLD AUTO: 3.95 M/UL (ref 4–5.4)
SODIUM SERPL-SCNC: 142 MMOL/L (ref 136–145)
TRIGL SERPL-MCNC: 134 MG/DL (ref 30–150)
TSH SERPL DL<=0.005 MIU/L-ACNC: 1.78 UIU/ML (ref 0.4–4)
WBC # BLD AUTO: 8.1 K/UL (ref 3.9–12.7)

## 2021-01-11 PROCEDURE — 1126F PR PAIN SEVERITY QUANTIFIED, NO PAIN PRESENT: ICD-10-PCS | Mod: S$GLB,,, | Performed by: FAMILY MEDICINE

## 2021-01-11 PROCEDURE — 99999 PR PBB SHADOW E&M-EST. PATIENT-LVL III: CPT | Mod: PBBFAC,,, | Performed by: FAMILY MEDICINE

## 2021-01-11 PROCEDURE — 99999 PR PBB SHADOW E&M-EST. PATIENT-LVL III: ICD-10-PCS | Mod: PBBFAC,,, | Performed by: FAMILY MEDICINE

## 2021-01-11 PROCEDURE — 3051F PR MOST RECENT HEMOGLOBIN A1C LEVEL 7.0 - < 8.0%: ICD-10-PCS | Mod: CPTII,S$GLB,, | Performed by: FAMILY MEDICINE

## 2021-01-11 PROCEDURE — 99499 UNLISTED E&M SERVICE: CPT | Mod: S$GLB,,, | Performed by: FAMILY MEDICINE

## 2021-01-11 PROCEDURE — 1159F PR MEDICATION LIST DOCUMENTED IN MEDICAL RECORD: ICD-10-PCS | Mod: S$GLB,,, | Performed by: FAMILY MEDICINE

## 2021-01-11 PROCEDURE — 80061 LIPID PANEL: CPT

## 2021-01-11 PROCEDURE — 1159F MED LIST DOCD IN RCRD: CPT | Mod: S$GLB,,, | Performed by: FAMILY MEDICINE

## 2021-01-11 PROCEDURE — 1126F AMNT PAIN NOTED NONE PRSNT: CPT | Mod: S$GLB,,, | Performed by: FAMILY MEDICINE

## 2021-01-11 PROCEDURE — 80053 COMPREHEN METABOLIC PANEL: CPT

## 2021-01-11 PROCEDURE — 3288F PR FALLS RISK ASSESSMENT DOCUMENTED: ICD-10-PCS | Mod: CPTII,S$GLB,, | Performed by: FAMILY MEDICINE

## 2021-01-11 PROCEDURE — 3075F SYST BP GE 130 - 139MM HG: CPT | Mod: CPTII,S$GLB,, | Performed by: FAMILY MEDICINE

## 2021-01-11 PROCEDURE — 3288F FALL RISK ASSESSMENT DOCD: CPT | Mod: CPTII,S$GLB,, | Performed by: FAMILY MEDICINE

## 2021-01-11 PROCEDURE — 3051F HG A1C>EQUAL 7.0%<8.0%: CPT | Mod: CPTII,S$GLB,, | Performed by: FAMILY MEDICINE

## 2021-01-11 PROCEDURE — 3078F DIAST BP <80 MM HG: CPT | Mod: CPTII,S$GLB,, | Performed by: FAMILY MEDICINE

## 2021-01-11 PROCEDURE — 85025 COMPLETE CBC W/AUTO DIFF WBC: CPT

## 2021-01-11 PROCEDURE — 84443 ASSAY THYROID STIM HORMONE: CPT

## 2021-01-11 PROCEDURE — 99499 RISK ADDL DX/OHS AUDIT: ICD-10-PCS | Mod: S$GLB,,, | Performed by: FAMILY MEDICINE

## 2021-01-11 PROCEDURE — 1101F PR PT FALLS ASSESS DOC 0-1 FALLS W/OUT INJ PAST YR: ICD-10-PCS | Mod: CPTII,S$GLB,, | Performed by: FAMILY MEDICINE

## 2021-01-11 PROCEDURE — 3078F PR MOST RECENT DIASTOLIC BLOOD PRESSURE < 80 MM HG: ICD-10-PCS | Mod: CPTII,S$GLB,, | Performed by: FAMILY MEDICINE

## 2021-01-11 PROCEDURE — 1101F PT FALLS ASSESS-DOCD LE1/YR: CPT | Mod: CPTII,S$GLB,, | Performed by: FAMILY MEDICINE

## 2021-01-11 PROCEDURE — 3075F PR MOST RECENT SYSTOLIC BLOOD PRESS GE 130-139MM HG: ICD-10-PCS | Mod: CPTII,S$GLB,, | Performed by: FAMILY MEDICINE

## 2021-01-11 PROCEDURE — 99214 OFFICE O/P EST MOD 30 MIN: CPT | Mod: S$GLB,,, | Performed by: FAMILY MEDICINE

## 2021-01-11 PROCEDURE — 83036 HEMOGLOBIN GLYCOSYLATED A1C: CPT

## 2021-01-11 PROCEDURE — 99214 PR OFFICE/OUTPT VISIT, EST, LEVL IV, 30-39 MIN: ICD-10-PCS | Mod: S$GLB,,, | Performed by: FAMILY MEDICINE

## 2021-01-11 PROCEDURE — 36415 COLL VENOUS BLD VENIPUNCTURE: CPT

## 2021-01-11 RX ORDER — IBUPROFEN 800 MG/1
TABLET ORAL
Qty: 90 TABLET | Refills: 0 | Status: SHIPPED | OUTPATIENT
Start: 2021-01-11 | End: 2021-06-11 | Stop reason: SINTOL

## 2021-01-12 ENCOUNTER — TELEPHONE (OUTPATIENT)
Dept: FAMILY MEDICINE | Facility: CLINIC | Age: 81
End: 2021-01-12

## 2021-02-04 ENCOUNTER — PATIENT MESSAGE (OUTPATIENT)
Dept: FAMILY MEDICINE | Facility: CLINIC | Age: 81
End: 2021-02-04

## 2021-02-04 DIAGNOSIS — I10 ESSENTIAL HYPERTENSION: ICD-10-CM

## 2021-02-05 RX ORDER — LOSARTAN POTASSIUM 100 MG/1
100 TABLET ORAL DAILY
Qty: 90 TABLET | Refills: 3 | Status: SHIPPED | OUTPATIENT
Start: 2021-02-05 | End: 2022-09-20 | Stop reason: SDUPTHER

## 2021-02-08 ENCOUNTER — PATIENT MESSAGE (OUTPATIENT)
Dept: FAMILY MEDICINE | Facility: CLINIC | Age: 81
End: 2021-02-08

## 2021-02-09 ENCOUNTER — HOSPITAL ENCOUNTER (OUTPATIENT)
Dept: RADIOLOGY | Facility: HOSPITAL | Age: 81
Discharge: HOME | End: 2021-02-09
Attending: FAMILY MEDICINE
Payer: MEDICARE

## 2021-02-09 ENCOUNTER — PATIENT MESSAGE (OUTPATIENT)
Dept: FAMILY MEDICINE | Facility: CLINIC | Age: 81
End: 2021-02-09

## 2021-02-09 DIAGNOSIS — T46.6X5A STATIN MYOPATHY: ICD-10-CM

## 2021-02-09 DIAGNOSIS — G72.0 STATIN MYOPATHY: ICD-10-CM

## 2021-02-09 DIAGNOSIS — N18.31 TYPE 2 DIABETES MELLITUS WITH STAGE 3A CHRONIC KIDNEY DISEASE, WITHOUT LONG-TERM CURRENT USE OF INSULIN: ICD-10-CM

## 2021-02-09 DIAGNOSIS — Z78.0 ASYMPTOMATIC MENOPAUSAL STATE: ICD-10-CM

## 2021-02-09 DIAGNOSIS — E03.9 HYPOTHYROIDISM, UNSPECIFIED TYPE: ICD-10-CM

## 2021-02-09 DIAGNOSIS — N18.31 STAGE 3A CHRONIC KIDNEY DISEASE: ICD-10-CM

## 2021-02-09 DIAGNOSIS — I70.0 AORTIC ATHEROSCLEROSIS: ICD-10-CM

## 2021-02-09 DIAGNOSIS — K76.0 FATTY LIVER: ICD-10-CM

## 2021-02-09 DIAGNOSIS — I10 ESSENTIAL HYPERTENSION: ICD-10-CM

## 2021-02-09 DIAGNOSIS — E11.22 TYPE 2 DIABETES MELLITUS WITH STAGE 3A CHRONIC KIDNEY DISEASE, WITHOUT LONG-TERM CURRENT USE OF INSULIN: ICD-10-CM

## 2021-02-09 PROCEDURE — 77080 DEXA BONE DENSITY SPINE HIP: ICD-10-PCS | Mod: 26,,, | Performed by: RADIOLOGY

## 2021-02-09 PROCEDURE — 77080 DXA BONE DENSITY AXIAL: CPT | Mod: TC

## 2021-02-09 PROCEDURE — 77080 DXA BONE DENSITY AXIAL: CPT | Mod: 26,,, | Performed by: RADIOLOGY

## 2021-04-15 ENCOUNTER — LAB VISIT (OUTPATIENT)
Dept: LAB | Facility: HOSPITAL | Age: 81
End: 2021-04-15
Attending: FAMILY MEDICINE
Payer: MEDICARE

## 2021-04-15 ENCOUNTER — PATIENT MESSAGE (OUTPATIENT)
Dept: FAMILY MEDICINE | Facility: CLINIC | Age: 81
End: 2021-04-15

## 2021-04-15 ENCOUNTER — OFFICE VISIT (OUTPATIENT)
Dept: FAMILY MEDICINE | Facility: CLINIC | Age: 81
End: 2021-04-15
Payer: MEDICARE

## 2021-04-15 VITALS
BODY MASS INDEX: 25.6 KG/M2 | HEIGHT: 62 IN | WEIGHT: 139.13 LBS | DIASTOLIC BLOOD PRESSURE: 68 MMHG | HEART RATE: 76 BPM | OXYGEN SATURATION: 97 % | SYSTOLIC BLOOD PRESSURE: 124 MMHG | TEMPERATURE: 98 F

## 2021-04-15 DIAGNOSIS — E03.9 HYPOTHYROIDISM, UNSPECIFIED TYPE: ICD-10-CM

## 2021-04-15 DIAGNOSIS — I10 ESSENTIAL HYPERTENSION: ICD-10-CM

## 2021-04-15 DIAGNOSIS — N18.31 TYPE 2 DIABETES MELLITUS WITH STAGE 3A CHRONIC KIDNEY DISEASE, WITHOUT LONG-TERM CURRENT USE OF INSULIN: ICD-10-CM

## 2021-04-15 DIAGNOSIS — N18.31 STAGE 3A CHRONIC KIDNEY DISEASE: ICD-10-CM

## 2021-04-15 DIAGNOSIS — E11.22 TYPE 2 DIABETES MELLITUS WITH STAGE 3A CHRONIC KIDNEY DISEASE, WITHOUT LONG-TERM CURRENT USE OF INSULIN: ICD-10-CM

## 2021-04-15 DIAGNOSIS — N18.31 STAGE 3A CHRONIC KIDNEY DISEASE: Primary | ICD-10-CM

## 2021-04-15 LAB
ALBUMIN SERPL BCP-MCNC: 4.2 G/DL (ref 3.5–5.2)
ALP SERPL-CCNC: 64 U/L (ref 55–135)
ALT SERPL W/O P-5'-P-CCNC: 36 U/L (ref 10–44)
ANION GAP SERPL CALC-SCNC: 10 MMOL/L (ref 8–16)
AST SERPL-CCNC: 30 U/L (ref 10–40)
BASOPHILS # BLD AUTO: 0.05 K/UL (ref 0–0.2)
BASOPHILS NFR BLD: 0.6 % (ref 0–1.9)
BILIRUB SERPL-MCNC: 0.5 MG/DL (ref 0.1–1)
BUN SERPL-MCNC: 19 MG/DL (ref 8–23)
CALCIUM SERPL-MCNC: 9.6 MG/DL (ref 8.7–10.5)
CHLORIDE SERPL-SCNC: 106 MMOL/L (ref 95–110)
CO2 SERPL-SCNC: 26 MMOL/L (ref 23–29)
CREAT SERPL-MCNC: 1 MG/DL (ref 0.5–1.4)
DIFFERENTIAL METHOD: ABNORMAL
EOSINOPHIL # BLD AUTO: 0.2 K/UL (ref 0–0.5)
EOSINOPHIL NFR BLD: 1.9 % (ref 0–8)
ERYTHROCYTE [DISTWIDTH] IN BLOOD BY AUTOMATED COUNT: 13.2 % (ref 11.5–14.5)
EST. GFR  (AFRICAN AMERICAN): >60 ML/MIN/1.73 M^2
EST. GFR  (NON AFRICAN AMERICAN): 54 ML/MIN/1.73 M^2
ESTIMATED AVG GLUCOSE: 140 MG/DL (ref 68–131)
GLUCOSE SERPL-MCNC: 132 MG/DL (ref 70–110)
HBA1C MFR BLD: 6.5 % (ref 4–5.6)
HCT VFR BLD AUTO: 34.7 % (ref 37–48.5)
HGB BLD-MCNC: 11.8 G/DL (ref 12–16)
IMM GRANULOCYTES # BLD AUTO: 0.02 K/UL (ref 0–0.04)
IMM GRANULOCYTES NFR BLD AUTO: 0.2 % (ref 0–0.5)
LYMPHOCYTES # BLD AUTO: 2.7 K/UL (ref 1–4.8)
LYMPHOCYTES NFR BLD: 31.7 % (ref 18–48)
MCH RBC QN AUTO: 30.5 PG (ref 27–31)
MCHC RBC AUTO-ENTMCNC: 34 G/DL (ref 32–36)
MCV RBC AUTO: 90 FL (ref 82–98)
MONOCYTES # BLD AUTO: 0.7 K/UL (ref 0.3–1)
MONOCYTES NFR BLD: 8.2 % (ref 4–15)
NEUTROPHILS # BLD AUTO: 4.9 K/UL (ref 1.8–7.7)
NEUTROPHILS NFR BLD: 57.4 % (ref 38–73)
NRBC BLD-RTO: 0 /100 WBC
PLATELET # BLD AUTO: 305 K/UL (ref 150–450)
PMV BLD AUTO: 11.3 FL (ref 9.2–12.9)
POTASSIUM SERPL-SCNC: 5.2 MMOL/L (ref 3.5–5.1)
PROT SERPL-MCNC: 8.2 G/DL (ref 6–8.4)
RBC # BLD AUTO: 3.87 M/UL (ref 4–5.4)
SODIUM SERPL-SCNC: 142 MMOL/L (ref 136–145)
TSH SERPL DL<=0.005 MIU/L-ACNC: 2.99 UIU/ML (ref 0.4–4)
WBC # BLD AUTO: 8.55 K/UL (ref 3.9–12.7)

## 2021-04-15 PROCEDURE — 99214 OFFICE O/P EST MOD 30 MIN: CPT | Mod: S$GLB,,, | Performed by: FAMILY MEDICINE

## 2021-04-15 PROCEDURE — 1126F AMNT PAIN NOTED NONE PRSNT: CPT | Mod: S$GLB,,, | Performed by: FAMILY MEDICINE

## 2021-04-15 PROCEDURE — 99214 PR OFFICE/OUTPT VISIT, EST, LEVL IV, 30-39 MIN: ICD-10-PCS | Mod: S$GLB,,, | Performed by: FAMILY MEDICINE

## 2021-04-15 PROCEDURE — 1101F PR PT FALLS ASSESS DOC 0-1 FALLS W/OUT INJ PAST YR: ICD-10-PCS | Mod: CPTII,S$GLB,, | Performed by: FAMILY MEDICINE

## 2021-04-15 PROCEDURE — 85025 COMPLETE CBC W/AUTO DIFF WBC: CPT | Performed by: FAMILY MEDICINE

## 2021-04-15 PROCEDURE — 80053 COMPREHEN METABOLIC PANEL: CPT | Performed by: FAMILY MEDICINE

## 2021-04-15 PROCEDURE — 1101F PT FALLS ASSESS-DOCD LE1/YR: CPT | Mod: CPTII,S$GLB,, | Performed by: FAMILY MEDICINE

## 2021-04-15 PROCEDURE — 84443 ASSAY THYROID STIM HORMONE: CPT | Performed by: FAMILY MEDICINE

## 2021-04-15 PROCEDURE — 36415 COLL VENOUS BLD VENIPUNCTURE: CPT | Performed by: FAMILY MEDICINE

## 2021-04-15 PROCEDURE — 1159F MED LIST DOCD IN RCRD: CPT | Mod: S$GLB,,, | Performed by: FAMILY MEDICINE

## 2021-04-15 PROCEDURE — 1126F PR PAIN SEVERITY QUANTIFIED, NO PAIN PRESENT: ICD-10-PCS | Mod: S$GLB,,, | Performed by: FAMILY MEDICINE

## 2021-04-15 PROCEDURE — 99999 PR PBB SHADOW E&M-EST. PATIENT-LVL III: CPT | Mod: PBBFAC,,, | Performed by: FAMILY MEDICINE

## 2021-04-15 PROCEDURE — 3288F PR FALLS RISK ASSESSMENT DOCUMENTED: ICD-10-PCS | Mod: CPTII,S$GLB,, | Performed by: FAMILY MEDICINE

## 2021-04-15 PROCEDURE — 99999 PR PBB SHADOW E&M-EST. PATIENT-LVL III: ICD-10-PCS | Mod: PBBFAC,,, | Performed by: FAMILY MEDICINE

## 2021-04-15 PROCEDURE — 3288F FALL RISK ASSESSMENT DOCD: CPT | Mod: CPTII,S$GLB,, | Performed by: FAMILY MEDICINE

## 2021-04-15 PROCEDURE — 83036 HEMOGLOBIN GLYCOSYLATED A1C: CPT | Performed by: FAMILY MEDICINE

## 2021-04-15 PROCEDURE — 1159F PR MEDICATION LIST DOCUMENTED IN MEDICAL RECORD: ICD-10-PCS | Mod: S$GLB,,, | Performed by: FAMILY MEDICINE

## 2021-04-28 ENCOUNTER — PATIENT OUTREACH (OUTPATIENT)
Dept: ADMINISTRATIVE | Facility: OTHER | Age: 81
End: 2021-04-28

## 2021-04-28 DIAGNOSIS — E11.65 CONTROLLED TYPE 2 DIABETES MELLITUS WITH HYPERGLYCEMIA, WITHOUT LONG-TERM CURRENT USE OF INSULIN: Primary | ICD-10-CM

## 2021-04-29 ENCOUNTER — OFFICE VISIT (OUTPATIENT)
Dept: OTOLARYNGOLOGY | Facility: CLINIC | Age: 81
End: 2021-04-29
Payer: MEDICARE

## 2021-04-29 ENCOUNTER — CLINICAL SUPPORT (OUTPATIENT)
Dept: OTOLARYNGOLOGY | Facility: CLINIC | Age: 81
End: 2021-04-29
Payer: MEDICARE

## 2021-04-29 VITALS
DIASTOLIC BLOOD PRESSURE: 76 MMHG | WEIGHT: 139.13 LBS | BODY MASS INDEX: 25.44 KG/M2 | HEART RATE: 57 BPM | SYSTOLIC BLOOD PRESSURE: 141 MMHG

## 2021-04-29 DIAGNOSIS — H69.93 ETD (EUSTACHIAN TUBE DYSFUNCTION), BILATERAL: Chronic | ICD-10-CM

## 2021-04-29 DIAGNOSIS — J30.9 ALLERGIC RHINITIS, UNSPECIFIED SEASONALITY, UNSPECIFIED TRIGGER: Chronic | ICD-10-CM

## 2021-04-29 DIAGNOSIS — H61.21 IMPACTED CERUMEN OF RIGHT EAR: ICD-10-CM

## 2021-04-29 DIAGNOSIS — H90.3 ASYMMETRIC SNHL (SENSORINEURAL HEARING LOSS): Primary | ICD-10-CM

## 2021-04-29 DIAGNOSIS — J34.3 NASAL TURBINATE HYPERTROPHY: ICD-10-CM

## 2021-04-29 PROCEDURE — 1101F PR PT FALLS ASSESS DOC 0-1 FALLS W/OUT INJ PAST YR: ICD-10-PCS | Mod: CPTII,S$GLB,, | Performed by: OTOLARYNGOLOGY

## 2021-04-29 PROCEDURE — 1126F PR PAIN SEVERITY QUANTIFIED, NO PAIN PRESENT: ICD-10-PCS | Mod: S$GLB,,, | Performed by: OTOLARYNGOLOGY

## 2021-04-29 PROCEDURE — 92567 TYMPANOMETRY: CPT | Mod: S$GLB,,, | Performed by: AUDIOLOGIST-HEARING AID FITTER

## 2021-04-29 PROCEDURE — 1101F PT FALLS ASSESS-DOCD LE1/YR: CPT | Mod: CPTII,S$GLB,, | Performed by: OTOLARYNGOLOGY

## 2021-04-29 PROCEDURE — 69210 EAR CERUMEN REMOVAL: ICD-10-PCS | Mod: S$GLB,,, | Performed by: OTOLARYNGOLOGY

## 2021-04-29 PROCEDURE — 3288F FALL RISK ASSESSMENT DOCD: CPT | Mod: CPTII,S$GLB,, | Performed by: OTOLARYNGOLOGY

## 2021-04-29 PROCEDURE — 3288F PR FALLS RISK ASSESSMENT DOCUMENTED: ICD-10-PCS | Mod: CPTII,S$GLB,, | Performed by: OTOLARYNGOLOGY

## 2021-04-29 PROCEDURE — 1159F PR MEDICATION LIST DOCUMENTED IN MEDICAL RECORD: ICD-10-PCS | Mod: S$GLB,,, | Performed by: OTOLARYNGOLOGY

## 2021-04-29 PROCEDURE — 92553 PR AUDIOMETRY, AIR & BONE: ICD-10-PCS | Mod: S$GLB,,, | Performed by: AUDIOLOGIST-HEARING AID FITTER

## 2021-04-29 PROCEDURE — 99999 PR PBB SHADOW E&M-EST. PATIENT-LVL III: CPT | Mod: PBBFAC,,, | Performed by: OTOLARYNGOLOGY

## 2021-04-29 PROCEDURE — 1159F MED LIST DOCD IN RCRD: CPT | Mod: S$GLB,,, | Performed by: OTOLARYNGOLOGY

## 2021-04-29 PROCEDURE — 99214 OFFICE O/P EST MOD 30 MIN: CPT | Mod: 25,S$GLB,, | Performed by: OTOLARYNGOLOGY

## 2021-04-29 PROCEDURE — 92567 PR TYMPA2METRY: ICD-10-PCS | Mod: S$GLB,,, | Performed by: AUDIOLOGIST-HEARING AID FITTER

## 2021-04-29 PROCEDURE — 92553 AUDIOMETRY AIR & BONE: CPT | Mod: S$GLB,,, | Performed by: AUDIOLOGIST-HEARING AID FITTER

## 2021-04-29 PROCEDURE — 69210 REMOVE IMPACTED EAR WAX UNI: CPT | Mod: S$GLB,,, | Performed by: OTOLARYNGOLOGY

## 2021-04-29 PROCEDURE — 99214 PR OFFICE/OUTPT VISIT, EST, LEVL IV, 30-39 MIN: ICD-10-PCS | Mod: 25,S$GLB,, | Performed by: OTOLARYNGOLOGY

## 2021-04-29 PROCEDURE — 1126F AMNT PAIN NOTED NONE PRSNT: CPT | Mod: S$GLB,,, | Performed by: OTOLARYNGOLOGY

## 2021-04-29 PROCEDURE — 99999 PR PBB SHADOW E&M-EST. PATIENT-LVL III: ICD-10-PCS | Mod: PBBFAC,,, | Performed by: OTOLARYNGOLOGY

## 2021-04-29 RX ORDER — FLUTICASONE PROPIONATE 50 MCG
1 SPRAY, SUSPENSION (ML) NASAL 2 TIMES DAILY
Qty: 11.1 ML | Refills: 11 | Status: SHIPPED | OUTPATIENT
Start: 2021-04-29 | End: 2023-02-27 | Stop reason: SDUPTHER

## 2021-04-29 RX ORDER — AZELASTINE 1 MG/ML
1 SPRAY, METERED NASAL 2 TIMES DAILY
Qty: 30 ML | Refills: 0 | Status: SHIPPED | OUTPATIENT
Start: 2021-04-29 | End: 2022-09-14

## 2021-06-04 ENCOUNTER — PATIENT OUTREACH (OUTPATIENT)
Dept: ADMINISTRATIVE | Facility: OTHER | Age: 81
End: 2021-06-04

## 2021-06-08 ENCOUNTER — HOSPITAL ENCOUNTER (OUTPATIENT)
Dept: RADIOLOGY | Facility: HOSPITAL | Age: 81
Discharge: HOME | End: 2021-06-08
Attending: ORTHOPAEDIC SURGERY
Payer: MEDICARE

## 2021-06-08 ENCOUNTER — OFFICE VISIT (OUTPATIENT)
Dept: ORTHOPEDICS | Facility: CLINIC | Age: 81
End: 2021-06-08
Payer: MEDICARE

## 2021-06-08 VITALS
WEIGHT: 139.13 LBS | HEIGHT: 62 IN | BODY MASS INDEX: 25.6 KG/M2 | DIASTOLIC BLOOD PRESSURE: 76 MMHG | SYSTOLIC BLOOD PRESSURE: 156 MMHG | HEART RATE: 63 BPM

## 2021-06-08 DIAGNOSIS — M25.511 ACUTE PAIN OF RIGHT SHOULDER: Primary | ICD-10-CM

## 2021-06-08 DIAGNOSIS — M25.511 ACUTE PAIN OF RIGHT SHOULDER: ICD-10-CM

## 2021-06-08 DIAGNOSIS — M25.552 HIP PAIN, LEFT: ICD-10-CM

## 2021-06-08 PROCEDURE — 1101F PR PT FALLS ASSESS DOC 0-1 FALLS W/OUT INJ PAST YR: ICD-10-PCS | Mod: CPTII,S$GLB,, | Performed by: ORTHOPAEDIC SURGERY

## 2021-06-08 PROCEDURE — 99203 PR OFFICE/OUTPT VISIT, NEW, LEVL III, 30-44 MIN: ICD-10-PCS | Mod: S$GLB,,, | Performed by: ORTHOPAEDIC SURGERY

## 2021-06-08 PROCEDURE — 3288F PR FALLS RISK ASSESSMENT DOCUMENTED: ICD-10-PCS | Mod: CPTII,S$GLB,, | Performed by: ORTHOPAEDIC SURGERY

## 2021-06-08 PROCEDURE — 73030 X-RAY EXAM OF SHOULDER: CPT | Mod: 26,50,, | Performed by: RADIOLOGY

## 2021-06-08 PROCEDURE — 73030 XR SHOULDER COMPLETE 2 OR MORE VIEWS BILATERAL: ICD-10-PCS | Mod: 26,50,, | Performed by: RADIOLOGY

## 2021-06-08 PROCEDURE — 99203 OFFICE O/P NEW LOW 30 MIN: CPT | Mod: S$GLB,,, | Performed by: ORTHOPAEDIC SURGERY

## 2021-06-08 PROCEDURE — 1125F PR PAIN SEVERITY QUANTIFIED, PAIN PRESENT: ICD-10-PCS | Mod: S$GLB,,, | Performed by: ORTHOPAEDIC SURGERY

## 2021-06-08 PROCEDURE — 73521 X-RAY EXAM HIPS BI 2 VIEWS: CPT | Mod: TC,PN

## 2021-06-08 PROCEDURE — 99499 UNLISTED E&M SERVICE: CPT | Mod: S$GLB,,, | Performed by: ORTHOPAEDIC SURGERY

## 2021-06-08 PROCEDURE — 99999 PR PBB SHADOW E&M-EST. PATIENT-LVL IV: ICD-10-PCS | Mod: PBBFAC,,, | Performed by: ORTHOPAEDIC SURGERY

## 2021-06-08 PROCEDURE — 99499 RISK ADDL DX/OHS AUDIT: ICD-10-PCS | Mod: S$GLB,,, | Performed by: ORTHOPAEDIC SURGERY

## 2021-06-08 PROCEDURE — 1159F PR MEDICATION LIST DOCUMENTED IN MEDICAL RECORD: ICD-10-PCS | Mod: S$GLB,,, | Performed by: ORTHOPAEDIC SURGERY

## 2021-06-08 PROCEDURE — 73521 X-RAY EXAM HIPS BI 2 VIEWS: CPT | Mod: 26,,, | Performed by: RADIOLOGY

## 2021-06-08 PROCEDURE — 1125F AMNT PAIN NOTED PAIN PRSNT: CPT | Mod: S$GLB,,, | Performed by: ORTHOPAEDIC SURGERY

## 2021-06-08 PROCEDURE — 99999 PR PBB SHADOW E&M-EST. PATIENT-LVL IV: CPT | Mod: PBBFAC,,, | Performed by: ORTHOPAEDIC SURGERY

## 2021-06-08 PROCEDURE — 73030 X-RAY EXAM OF SHOULDER: CPT | Mod: TC,50,PN

## 2021-06-08 PROCEDURE — 3288F FALL RISK ASSESSMENT DOCD: CPT | Mod: CPTII,S$GLB,, | Performed by: ORTHOPAEDIC SURGERY

## 2021-06-08 PROCEDURE — 1101F PT FALLS ASSESS-DOCD LE1/YR: CPT | Mod: CPTII,S$GLB,, | Performed by: ORTHOPAEDIC SURGERY

## 2021-06-08 PROCEDURE — 1159F MED LIST DOCD IN RCRD: CPT | Mod: S$GLB,,, | Performed by: ORTHOPAEDIC SURGERY

## 2021-06-08 PROCEDURE — 73521 XR HIPS BILATERAL 2 VIEW INCL AP PELVIS: ICD-10-PCS | Mod: 26,,, | Performed by: RADIOLOGY

## 2021-06-09 ENCOUNTER — IMMUNIZATION (OUTPATIENT)
Dept: PRIMARY CARE CLINIC | Facility: CLINIC | Age: 81
End: 2021-06-09
Payer: MEDICARE

## 2021-06-09 DIAGNOSIS — Z23 NEED FOR VACCINATION: Primary | ICD-10-CM

## 2021-06-09 PROCEDURE — 91300 COVID-19, MRNA, LNP-S, PF, 30 MCG/0.3 ML DOSE VACCINE: CPT | Mod: PBBFAC | Performed by: INTERNAL MEDICINE

## 2021-06-11 ENCOUNTER — OFFICE VISIT (OUTPATIENT)
Dept: FAMILY MEDICINE | Facility: CLINIC | Age: 81
End: 2021-06-11
Payer: MEDICARE

## 2021-06-11 ENCOUNTER — LAB VISIT (OUTPATIENT)
Dept: LAB | Facility: HOSPITAL | Age: 81
End: 2021-06-11
Attending: INTERNAL MEDICINE
Payer: MEDICARE

## 2021-06-11 ENCOUNTER — PATIENT MESSAGE (OUTPATIENT)
Dept: FAMILY MEDICINE | Facility: CLINIC | Age: 81
End: 2021-06-11

## 2021-06-11 VITALS
WEIGHT: 139.31 LBS | BODY MASS INDEX: 25.64 KG/M2 | SYSTOLIC BLOOD PRESSURE: 130 MMHG | TEMPERATURE: 98 F | HEIGHT: 62 IN | OXYGEN SATURATION: 98 % | HEART RATE: 57 BPM | DIASTOLIC BLOOD PRESSURE: 68 MMHG

## 2021-06-11 DIAGNOSIS — I10 ESSENTIAL HYPERTENSION: ICD-10-CM

## 2021-06-11 DIAGNOSIS — E03.9 HYPOTHYROIDISM, UNSPECIFIED TYPE: ICD-10-CM

## 2021-06-11 DIAGNOSIS — N18.30 STAGE 3 CHRONIC KIDNEY DISEASE, UNSPECIFIED WHETHER STAGE 3A OR 3B CKD: ICD-10-CM

## 2021-06-11 DIAGNOSIS — K92.1 MELENA: ICD-10-CM

## 2021-06-11 DIAGNOSIS — E11.69 HYPERLIPIDEMIA ASSOCIATED WITH TYPE 2 DIABETES MELLITUS: ICD-10-CM

## 2021-06-11 DIAGNOSIS — K92.1 MELENA: Primary | ICD-10-CM

## 2021-06-11 DIAGNOSIS — Z12.11 COLON CANCER SCREENING: ICD-10-CM

## 2021-06-11 DIAGNOSIS — E78.5 HYPERLIPIDEMIA ASSOCIATED WITH TYPE 2 DIABETES MELLITUS: ICD-10-CM

## 2021-06-11 LAB
ALBUMIN SERPL BCP-MCNC: 4.4 G/DL (ref 3.5–5.2)
ALP SERPL-CCNC: 63 U/L (ref 55–135)
ALT SERPL W/O P-5'-P-CCNC: 35 U/L (ref 10–44)
ANION GAP SERPL CALC-SCNC: 11 MMOL/L (ref 8–16)
AST SERPL-CCNC: 30 U/L (ref 10–40)
BASOPHILS # BLD AUTO: 0.04 K/UL (ref 0–0.2)
BASOPHILS NFR BLD: 0.4 % (ref 0–1.9)
BILIRUB SERPL-MCNC: 0.5 MG/DL (ref 0.1–1)
BUN SERPL-MCNC: 16 MG/DL (ref 8–23)
CALCIUM SERPL-MCNC: 10 MG/DL (ref 8.7–10.5)
CHLORIDE SERPL-SCNC: 101 MMOL/L (ref 95–110)
CO2 SERPL-SCNC: 27 MMOL/L (ref 23–29)
CREAT SERPL-MCNC: 1.1 MG/DL (ref 0.5–1.4)
DIFFERENTIAL METHOD: ABNORMAL
EOSINOPHIL # BLD AUTO: 0.2 K/UL (ref 0–0.5)
EOSINOPHIL NFR BLD: 1.6 % (ref 0–8)
ERYTHROCYTE [DISTWIDTH] IN BLOOD BY AUTOMATED COUNT: 13.1 % (ref 11.5–14.5)
EST. GFR  (AFRICAN AMERICAN): 56 ML/MIN/1.73 M^2
EST. GFR  (NON AFRICAN AMERICAN): 48 ML/MIN/1.73 M^2
GLUCOSE SERPL-MCNC: 99 MG/DL (ref 70–110)
HCT VFR BLD AUTO: 35.8 % (ref 37–48.5)
HGB BLD-MCNC: 11.8 G/DL (ref 12–16)
IMM GRANULOCYTES # BLD AUTO: 0.03 K/UL (ref 0–0.04)
IMM GRANULOCYTES NFR BLD AUTO: 0.3 % (ref 0–0.5)
INR PPP: 1 (ref 0.8–1.2)
LYMPHOCYTES # BLD AUTO: 2.7 K/UL (ref 1–4.8)
LYMPHOCYTES NFR BLD: 29.6 % (ref 18–48)
MCH RBC QN AUTO: 29.6 PG (ref 27–31)
MCHC RBC AUTO-ENTMCNC: 33 G/DL (ref 32–36)
MCV RBC AUTO: 90 FL (ref 82–98)
MONOCYTES # BLD AUTO: 0.9 K/UL (ref 0.3–1)
MONOCYTES NFR BLD: 9.3 % (ref 4–15)
NEUTROPHILS # BLD AUTO: 5.3 K/UL (ref 1.8–7.7)
NEUTROPHILS NFR BLD: 58.8 % (ref 38–73)
NRBC BLD-RTO: 0 /100 WBC
PLATELET # BLD AUTO: 253 K/UL (ref 150–450)
PMV BLD AUTO: 12.9 FL (ref 9.2–12.9)
POTASSIUM SERPL-SCNC: 4.8 MMOL/L (ref 3.5–5.1)
PROT SERPL-MCNC: 8.6 G/DL (ref 6–8.4)
PROTHROMBIN TIME: 10.3 SEC (ref 9–12.5)
RBC # BLD AUTO: 3.98 M/UL (ref 4–5.4)
SODIUM SERPL-SCNC: 139 MMOL/L (ref 136–145)
WBC # BLD AUTO: 9.1 K/UL (ref 3.9–12.7)

## 2021-06-11 PROCEDURE — 99214 OFFICE O/P EST MOD 30 MIN: CPT | Mod: S$GLB,,, | Performed by: INTERNAL MEDICINE

## 2021-06-11 PROCEDURE — 3288F PR FALLS RISK ASSESSMENT DOCUMENTED: ICD-10-PCS | Mod: CPTII,S$GLB,, | Performed by: INTERNAL MEDICINE

## 2021-06-11 PROCEDURE — 99499 UNLISTED E&M SERVICE: CPT | Mod: S$GLB,,, | Performed by: INTERNAL MEDICINE

## 2021-06-11 PROCEDURE — 99999 PR PBB SHADOW E&M-EST. PATIENT-LVL V: ICD-10-PCS | Mod: PBBFAC,,, | Performed by: INTERNAL MEDICINE

## 2021-06-11 PROCEDURE — 85025 COMPLETE CBC W/AUTO DIFF WBC: CPT | Performed by: INTERNAL MEDICINE

## 2021-06-11 PROCEDURE — 3288F FALL RISK ASSESSMENT DOCD: CPT | Mod: CPTII,S$GLB,, | Performed by: INTERNAL MEDICINE

## 2021-06-11 PROCEDURE — 36415 COLL VENOUS BLD VENIPUNCTURE: CPT | Performed by: INTERNAL MEDICINE

## 2021-06-11 PROCEDURE — 1126F AMNT PAIN NOTED NONE PRSNT: CPT | Mod: S$GLB,,, | Performed by: INTERNAL MEDICINE

## 2021-06-11 PROCEDURE — 1101F PR PT FALLS ASSESS DOC 0-1 FALLS W/OUT INJ PAST YR: ICD-10-PCS | Mod: CPTII,S$GLB,, | Performed by: INTERNAL MEDICINE

## 2021-06-11 PROCEDURE — 1159F MED LIST DOCD IN RCRD: CPT | Mod: S$GLB,,, | Performed by: INTERNAL MEDICINE

## 2021-06-11 PROCEDURE — 99999 PR PBB SHADOW E&M-EST. PATIENT-LVL V: CPT | Mod: PBBFAC,,, | Performed by: INTERNAL MEDICINE

## 2021-06-11 PROCEDURE — 80053 COMPREHEN METABOLIC PANEL: CPT | Performed by: INTERNAL MEDICINE

## 2021-06-11 PROCEDURE — 1126F PR PAIN SEVERITY QUANTIFIED, NO PAIN PRESENT: ICD-10-PCS | Mod: S$GLB,,, | Performed by: INTERNAL MEDICINE

## 2021-06-11 PROCEDURE — 1101F PT FALLS ASSESS-DOCD LE1/YR: CPT | Mod: CPTII,S$GLB,, | Performed by: INTERNAL MEDICINE

## 2021-06-11 PROCEDURE — 1159F PR MEDICATION LIST DOCUMENTED IN MEDICAL RECORD: ICD-10-PCS | Mod: S$GLB,,, | Performed by: INTERNAL MEDICINE

## 2021-06-11 PROCEDURE — 85610 PROTHROMBIN TIME: CPT | Performed by: INTERNAL MEDICINE

## 2021-06-11 PROCEDURE — 99214 PR OFFICE/OUTPT VISIT, EST, LEVL IV, 30-39 MIN: ICD-10-PCS | Mod: S$GLB,,, | Performed by: INTERNAL MEDICINE

## 2021-06-11 PROCEDURE — 99499 RISK ADDL DX/OHS AUDIT: ICD-10-PCS | Mod: S$GLB,,, | Performed by: INTERNAL MEDICINE

## 2021-06-11 RX ORDER — PANTOPRAZOLE SODIUM 40 MG/1
40 TABLET, DELAYED RELEASE ORAL DAILY
Qty: 30 TABLET | Refills: 11 | Status: SHIPPED | OUTPATIENT
Start: 2021-06-11 | End: 2022-09-14

## 2021-06-25 ENCOUNTER — OFFICE VISIT (OUTPATIENT)
Dept: FAMILY MEDICINE | Facility: CLINIC | Age: 81
End: 2021-06-25
Payer: MEDICARE

## 2021-06-25 VITALS
BODY MASS INDEX: 25.6 KG/M2 | TEMPERATURE: 98 F | HEART RATE: 63 BPM | OXYGEN SATURATION: 98 % | WEIGHT: 139.13 LBS | HEIGHT: 62 IN | DIASTOLIC BLOOD PRESSURE: 56 MMHG | SYSTOLIC BLOOD PRESSURE: 128 MMHG

## 2021-06-25 DIAGNOSIS — E03.9 HYPOTHYROIDISM, UNSPECIFIED TYPE: ICD-10-CM

## 2021-06-25 DIAGNOSIS — K92.1 MELENA: Primary | ICD-10-CM

## 2021-06-25 DIAGNOSIS — E78.5 HYPERLIPIDEMIA ASSOCIATED WITH TYPE 2 DIABETES MELLITUS: ICD-10-CM

## 2021-06-25 DIAGNOSIS — E11.69 HYPERLIPIDEMIA ASSOCIATED WITH TYPE 2 DIABETES MELLITUS: ICD-10-CM

## 2021-06-25 DIAGNOSIS — I10 ESSENTIAL HYPERTENSION: ICD-10-CM

## 2021-06-25 DIAGNOSIS — N18.31 TYPE 2 DIABETES MELLITUS WITH STAGE 3A CHRONIC KIDNEY DISEASE, WITHOUT LONG-TERM CURRENT USE OF INSULIN: ICD-10-CM

## 2021-06-25 DIAGNOSIS — E11.22 TYPE 2 DIABETES MELLITUS WITH STAGE 3A CHRONIC KIDNEY DISEASE, WITHOUT LONG-TERM CURRENT USE OF INSULIN: ICD-10-CM

## 2021-06-25 PROCEDURE — 1101F PT FALLS ASSESS-DOCD LE1/YR: CPT | Mod: CPTII,S$GLB,, | Performed by: FAMILY MEDICINE

## 2021-06-25 PROCEDURE — 1159F PR MEDICATION LIST DOCUMENTED IN MEDICAL RECORD: ICD-10-PCS | Mod: S$GLB,,, | Performed by: FAMILY MEDICINE

## 2021-06-25 PROCEDURE — 99214 OFFICE O/P EST MOD 30 MIN: CPT | Mod: S$GLB,,, | Performed by: FAMILY MEDICINE

## 2021-06-25 PROCEDURE — 1126F PR PAIN SEVERITY QUANTIFIED, NO PAIN PRESENT: ICD-10-PCS | Mod: S$GLB,,, | Performed by: FAMILY MEDICINE

## 2021-06-25 PROCEDURE — 1159F MED LIST DOCD IN RCRD: CPT | Mod: S$GLB,,, | Performed by: FAMILY MEDICINE

## 2021-06-25 PROCEDURE — 1101F PR PT FALLS ASSESS DOC 0-1 FALLS W/OUT INJ PAST YR: ICD-10-PCS | Mod: CPTII,S$GLB,, | Performed by: FAMILY MEDICINE

## 2021-06-25 PROCEDURE — 3288F PR FALLS RISK ASSESSMENT DOCUMENTED: ICD-10-PCS | Mod: CPTII,S$GLB,, | Performed by: FAMILY MEDICINE

## 2021-06-25 PROCEDURE — 99999 PR PBB SHADOW E&M-EST. PATIENT-LVL V: ICD-10-PCS | Mod: PBBFAC,,, | Performed by: FAMILY MEDICINE

## 2021-06-25 PROCEDURE — 3288F FALL RISK ASSESSMENT DOCD: CPT | Mod: CPTII,S$GLB,, | Performed by: FAMILY MEDICINE

## 2021-06-25 PROCEDURE — 99214 PR OFFICE/OUTPT VISIT, EST, LEVL IV, 30-39 MIN: ICD-10-PCS | Mod: S$GLB,,, | Performed by: FAMILY MEDICINE

## 2021-06-25 PROCEDURE — 1126F AMNT PAIN NOTED NONE PRSNT: CPT | Mod: S$GLB,,, | Performed by: FAMILY MEDICINE

## 2021-06-25 PROCEDURE — 99999 PR PBB SHADOW E&M-EST. PATIENT-LVL V: CPT | Mod: PBBFAC,,, | Performed by: FAMILY MEDICINE

## 2021-06-29 ENCOUNTER — TELEPHONE (OUTPATIENT)
Dept: FAMILY MEDICINE | Facility: CLINIC | Age: 81
End: 2021-06-29

## 2021-07-09 ENCOUNTER — PATIENT OUTREACH (OUTPATIENT)
Dept: ADMINISTRATIVE | Facility: OTHER | Age: 81
End: 2021-07-09

## 2021-07-12 ENCOUNTER — OFFICE VISIT (OUTPATIENT)
Dept: ORTHOPEDICS | Facility: CLINIC | Age: 81
End: 2021-07-12
Payer: MEDICARE

## 2021-07-12 VITALS
HEIGHT: 62 IN | SYSTOLIC BLOOD PRESSURE: 132 MMHG | DIASTOLIC BLOOD PRESSURE: 69 MMHG | HEART RATE: 63 BPM | BODY MASS INDEX: 25.6 KG/M2 | WEIGHT: 139.13 LBS

## 2021-07-12 DIAGNOSIS — M19.90 ACUTE ARTHRITIS: Primary | ICD-10-CM

## 2021-07-12 DIAGNOSIS — M46.1 SI (SACROILIAC) JOINT INFLAMMATION: ICD-10-CM

## 2021-07-12 PROCEDURE — 3288F FALL RISK ASSESSMENT DOCD: CPT | Mod: CPTII,S$GLB,, | Performed by: ORTHOPAEDIC SURGERY

## 2021-07-12 PROCEDURE — 1126F AMNT PAIN NOTED NONE PRSNT: CPT | Mod: S$GLB,,, | Performed by: ORTHOPAEDIC SURGERY

## 2021-07-12 PROCEDURE — 1100F PR PT FALLS ASSESS DOC 2+ FALLS/FALL W/INJURY/YR: ICD-10-PCS | Mod: CPTII,S$GLB,, | Performed by: ORTHOPAEDIC SURGERY

## 2021-07-12 PROCEDURE — 1100F PTFALLS ASSESS-DOCD GE2>/YR: CPT | Mod: CPTII,S$GLB,, | Performed by: ORTHOPAEDIC SURGERY

## 2021-07-12 PROCEDURE — 99999 PR PBB SHADOW E&M-EST. PATIENT-LVL III: CPT | Mod: PBBFAC,,, | Performed by: ORTHOPAEDIC SURGERY

## 2021-07-12 PROCEDURE — 99213 PR OFFICE/OUTPT VISIT, EST, LEVL III, 20-29 MIN: ICD-10-PCS | Mod: S$GLB,,, | Performed by: ORTHOPAEDIC SURGERY

## 2021-07-12 PROCEDURE — 3288F PR FALLS RISK ASSESSMENT DOCUMENTED: ICD-10-PCS | Mod: CPTII,S$GLB,, | Performed by: ORTHOPAEDIC SURGERY

## 2021-07-12 PROCEDURE — 99999 PR PBB SHADOW E&M-EST. PATIENT-LVL III: ICD-10-PCS | Mod: PBBFAC,,, | Performed by: ORTHOPAEDIC SURGERY

## 2021-07-12 PROCEDURE — 1159F MED LIST DOCD IN RCRD: CPT | Mod: S$GLB,,, | Performed by: ORTHOPAEDIC SURGERY

## 2021-07-12 PROCEDURE — 99213 OFFICE O/P EST LOW 20 MIN: CPT | Mod: S$GLB,,, | Performed by: ORTHOPAEDIC SURGERY

## 2021-07-12 PROCEDURE — 1159F PR MEDICATION LIST DOCUMENTED IN MEDICAL RECORD: ICD-10-PCS | Mod: S$GLB,,, | Performed by: ORTHOPAEDIC SURGERY

## 2021-07-12 PROCEDURE — 1126F PR PAIN SEVERITY QUANTIFIED, NO PAIN PRESENT: ICD-10-PCS | Mod: S$GLB,,, | Performed by: ORTHOPAEDIC SURGERY

## 2021-07-13 ENCOUNTER — TELEPHONE (OUTPATIENT)
Dept: GASTROENTEROLOGY | Facility: CLINIC | Age: 81
End: 2021-07-13

## 2021-07-14 ENCOUNTER — LAB VISIT (OUTPATIENT)
Dept: LAB | Facility: HOSPITAL | Age: 81
End: 2021-07-14
Attending: NURSE PRACTITIONER
Payer: MEDICARE

## 2021-07-14 ENCOUNTER — OFFICE VISIT (OUTPATIENT)
Dept: GASTROENTEROLOGY | Facility: CLINIC | Age: 81
End: 2021-07-14
Payer: MEDICARE

## 2021-07-14 ENCOUNTER — PATIENT MESSAGE (OUTPATIENT)
Dept: GASTROENTEROLOGY | Facility: CLINIC | Age: 81
End: 2021-07-14

## 2021-07-14 VITALS — BODY MASS INDEX: 24.99 KG/M2 | HEIGHT: 62 IN | WEIGHT: 135.81 LBS

## 2021-07-14 DIAGNOSIS — Z12.11 SCREENING FOR COLON CANCER: ICD-10-CM

## 2021-07-14 DIAGNOSIS — K92.1 MELENA: ICD-10-CM

## 2021-07-14 DIAGNOSIS — K92.1 MELENA: Primary | ICD-10-CM

## 2021-07-14 LAB
BASOPHILS # BLD AUTO: 0.05 K/UL (ref 0–0.2)
BASOPHILS NFR BLD: 0.5 % (ref 0–1.9)
DIFFERENTIAL METHOD: ABNORMAL
EOSINOPHIL # BLD AUTO: 0.2 K/UL (ref 0–0.5)
EOSINOPHIL NFR BLD: 2.1 % (ref 0–8)
ERYTHROCYTE [DISTWIDTH] IN BLOOD BY AUTOMATED COUNT: 13.6 % (ref 11.5–14.5)
HCT VFR BLD AUTO: 35.5 % (ref 37–48.5)
HGB BLD-MCNC: 11.8 G/DL (ref 12–16)
IMM GRANULOCYTES # BLD AUTO: 0.03 K/UL (ref 0–0.04)
IMM GRANULOCYTES NFR BLD AUTO: 0.3 % (ref 0–0.5)
LYMPHOCYTES # BLD AUTO: 3.1 K/UL (ref 1–4.8)
LYMPHOCYTES NFR BLD: 29.2 % (ref 18–48)
MCH RBC QN AUTO: 29.6 PG (ref 27–31)
MCHC RBC AUTO-ENTMCNC: 33.2 G/DL (ref 32–36)
MCV RBC AUTO: 89 FL (ref 82–98)
MONOCYTES # BLD AUTO: 0.8 K/UL (ref 0.3–1)
MONOCYTES NFR BLD: 7.4 % (ref 4–15)
NEUTROPHILS # BLD AUTO: 6.4 K/UL (ref 1.8–7.7)
NEUTROPHILS NFR BLD: 60.5 % (ref 38–73)
NRBC BLD-RTO: 0 /100 WBC
PLATELET # BLD AUTO: 251 K/UL (ref 150–450)
PMV BLD AUTO: 12 FL (ref 9.2–12.9)
RBC # BLD AUTO: 3.99 M/UL (ref 4–5.4)
WBC # BLD AUTO: 10.52 K/UL (ref 3.9–12.7)

## 2021-07-14 PROCEDURE — 3288F FALL RISK ASSESSMENT DOCD: CPT | Mod: CPTII,S$GLB,, | Performed by: NURSE PRACTITIONER

## 2021-07-14 PROCEDURE — 99214 OFFICE O/P EST MOD 30 MIN: CPT | Mod: S$GLB,,, | Performed by: NURSE PRACTITIONER

## 2021-07-14 PROCEDURE — 36415 COLL VENOUS BLD VENIPUNCTURE: CPT | Performed by: NURSE PRACTITIONER

## 2021-07-14 PROCEDURE — 1126F AMNT PAIN NOTED NONE PRSNT: CPT | Mod: S$GLB,,, | Performed by: NURSE PRACTITIONER

## 2021-07-14 PROCEDURE — 85025 COMPLETE CBC W/AUTO DIFF WBC: CPT | Performed by: NURSE PRACTITIONER

## 2021-07-14 PROCEDURE — 99999 PR PBB SHADOW E&M-EST. PATIENT-LVL IV: ICD-10-PCS | Mod: PBBFAC,,, | Performed by: NURSE PRACTITIONER

## 2021-07-14 PROCEDURE — 1126F PR PAIN SEVERITY QUANTIFIED, NO PAIN PRESENT: ICD-10-PCS | Mod: S$GLB,,, | Performed by: NURSE PRACTITIONER

## 2021-07-14 PROCEDURE — 1159F PR MEDICATION LIST DOCUMENTED IN MEDICAL RECORD: ICD-10-PCS | Mod: S$GLB,,, | Performed by: NURSE PRACTITIONER

## 2021-07-14 PROCEDURE — 1101F PR PT FALLS ASSESS DOC 0-1 FALLS W/OUT INJ PAST YR: ICD-10-PCS | Mod: CPTII,S$GLB,, | Performed by: NURSE PRACTITIONER

## 2021-07-14 PROCEDURE — 3288F PR FALLS RISK ASSESSMENT DOCUMENTED: ICD-10-PCS | Mod: CPTII,S$GLB,, | Performed by: NURSE PRACTITIONER

## 2021-07-14 PROCEDURE — 1159F MED LIST DOCD IN RCRD: CPT | Mod: S$GLB,,, | Performed by: NURSE PRACTITIONER

## 2021-07-14 PROCEDURE — 99999 PR PBB SHADOW E&M-EST. PATIENT-LVL IV: CPT | Mod: PBBFAC,,, | Performed by: NURSE PRACTITIONER

## 2021-07-14 PROCEDURE — 1101F PT FALLS ASSESS-DOCD LE1/YR: CPT | Mod: CPTII,S$GLB,, | Performed by: NURSE PRACTITIONER

## 2021-07-14 PROCEDURE — 99214 PR OFFICE/OUTPT VISIT, EST, LEVL IV, 30-39 MIN: ICD-10-PCS | Mod: S$GLB,,, | Performed by: NURSE PRACTITIONER

## 2021-07-16 ENCOUNTER — TELEPHONE (OUTPATIENT)
Dept: GASTROENTEROLOGY | Facility: CLINIC | Age: 81
End: 2021-07-16

## 2021-08-02 LAB
LEFT EYE DM RETINOPATHY: NEGATIVE
RIGHT EYE DM RETINOPATHY: NEGATIVE

## 2021-09-22 ENCOUNTER — PATIENT OUTREACH (OUTPATIENT)
Dept: ADMINISTRATIVE | Facility: HOSPITAL | Age: 81
End: 2021-09-22

## 2021-10-22 ENCOUNTER — TELEPHONE (OUTPATIENT)
Dept: FAMILY MEDICINE | Facility: CLINIC | Age: 81
End: 2021-10-22

## 2021-10-22 DIAGNOSIS — E78.5 HYPERLIPIDEMIA ASSOCIATED WITH TYPE 2 DIABETES MELLITUS: Primary | ICD-10-CM

## 2021-10-22 DIAGNOSIS — E03.9 HYPOTHYROIDISM, UNSPECIFIED TYPE: ICD-10-CM

## 2021-10-22 DIAGNOSIS — E11.69 HYPERLIPIDEMIA ASSOCIATED WITH TYPE 2 DIABETES MELLITUS: Primary | ICD-10-CM

## 2021-10-25 ENCOUNTER — TELEPHONE (OUTPATIENT)
Dept: FAMILY MEDICINE | Facility: CLINIC | Age: 81
End: 2021-10-25
Payer: MEDICARE

## 2021-10-26 ENCOUNTER — PATIENT OUTREACH (OUTPATIENT)
Dept: ADMINISTRATIVE | Facility: OTHER | Age: 81
End: 2021-10-26
Payer: MEDICARE

## 2021-10-27 ENCOUNTER — OFFICE VISIT (OUTPATIENT)
Dept: CARDIOLOGY | Facility: CLINIC | Age: 81
End: 2021-10-27
Payer: MEDICARE

## 2021-10-27 VITALS
WEIGHT: 137.63 LBS | SYSTOLIC BLOOD PRESSURE: 160 MMHG | BODY MASS INDEX: 25.33 KG/M2 | HEIGHT: 62 IN | HEART RATE: 67 BPM | OXYGEN SATURATION: 95 % | DIASTOLIC BLOOD PRESSURE: 80 MMHG

## 2021-10-27 DIAGNOSIS — I70.0 AORTIC ATHEROSCLEROSIS: ICD-10-CM

## 2021-10-27 DIAGNOSIS — E11.69 HYPERLIPIDEMIA ASSOCIATED WITH TYPE 2 DIABETES MELLITUS: ICD-10-CM

## 2021-10-27 DIAGNOSIS — N18.31 STAGE 3A CHRONIC KIDNEY DISEASE: ICD-10-CM

## 2021-10-27 DIAGNOSIS — I10 PRIMARY HYPERTENSION: Primary | ICD-10-CM

## 2021-10-27 DIAGNOSIS — T46.6X5A STATIN MYOPATHY: ICD-10-CM

## 2021-10-27 DIAGNOSIS — E78.5 HYPERLIPIDEMIA ASSOCIATED WITH TYPE 2 DIABETES MELLITUS: ICD-10-CM

## 2021-10-27 DIAGNOSIS — E11.65 CONTROLLED TYPE 2 DIABETES MELLITUS WITH HYPERGLYCEMIA, WITHOUT LONG-TERM CURRENT USE OF INSULIN: ICD-10-CM

## 2021-10-27 DIAGNOSIS — G72.0 STATIN MYOPATHY: ICD-10-CM

## 2021-10-27 PROCEDURE — 1126F PR PAIN SEVERITY QUANTIFIED, NO PAIN PRESENT: ICD-10-PCS | Mod: CPTII,S$GLB,, | Performed by: INTERNAL MEDICINE

## 2021-10-27 PROCEDURE — 3288F FALL RISK ASSESSMENT DOCD: CPT | Mod: CPTII,S$GLB,, | Performed by: INTERNAL MEDICINE

## 2021-10-27 PROCEDURE — 1159F PR MEDICATION LIST DOCUMENTED IN MEDICAL RECORD: ICD-10-PCS | Mod: CPTII,S$GLB,, | Performed by: INTERNAL MEDICINE

## 2021-10-27 PROCEDURE — 99214 OFFICE O/P EST MOD 30 MIN: CPT | Mod: S$GLB,,, | Performed by: INTERNAL MEDICINE

## 2021-10-27 PROCEDURE — 3079F DIAST BP 80-89 MM HG: CPT | Mod: CPTII,S$GLB,, | Performed by: INTERNAL MEDICINE

## 2021-10-27 PROCEDURE — 99999 PR PBB SHADOW E&M-EST. PATIENT-LVL IV: CPT | Mod: PBBFAC,,, | Performed by: INTERNAL MEDICINE

## 2021-10-27 PROCEDURE — 99499 UNLISTED E&M SERVICE: CPT | Mod: S$GLB,,, | Performed by: INTERNAL MEDICINE

## 2021-10-27 PROCEDURE — 99999 PR PBB SHADOW E&M-EST. PATIENT-LVL IV: ICD-10-PCS | Mod: PBBFAC,,, | Performed by: INTERNAL MEDICINE

## 2021-10-27 PROCEDURE — 1159F MED LIST DOCD IN RCRD: CPT | Mod: CPTII,S$GLB,, | Performed by: INTERNAL MEDICINE

## 2021-10-27 PROCEDURE — 3077F PR MOST RECENT SYSTOLIC BLOOD PRESSURE >= 140 MM HG: ICD-10-PCS | Mod: CPTII,S$GLB,, | Performed by: INTERNAL MEDICINE

## 2021-10-27 PROCEDURE — 3288F PR FALLS RISK ASSESSMENT DOCUMENTED: ICD-10-PCS | Mod: CPTII,S$GLB,, | Performed by: INTERNAL MEDICINE

## 2021-10-27 PROCEDURE — 99214 PR OFFICE/OUTPT VISIT, EST, LEVL IV, 30-39 MIN: ICD-10-PCS | Mod: S$GLB,,, | Performed by: INTERNAL MEDICINE

## 2021-10-27 PROCEDURE — 1126F AMNT PAIN NOTED NONE PRSNT: CPT | Mod: CPTII,S$GLB,, | Performed by: INTERNAL MEDICINE

## 2021-10-27 PROCEDURE — 3079F PR MOST RECENT DIASTOLIC BLOOD PRESSURE 80-89 MM HG: ICD-10-PCS | Mod: CPTII,S$GLB,, | Performed by: INTERNAL MEDICINE

## 2021-10-27 PROCEDURE — 1101F PR PT FALLS ASSESS DOC 0-1 FALLS W/OUT INJ PAST YR: ICD-10-PCS | Mod: CPTII,S$GLB,, | Performed by: INTERNAL MEDICINE

## 2021-10-27 PROCEDURE — 1101F PT FALLS ASSESS-DOCD LE1/YR: CPT | Mod: CPTII,S$GLB,, | Performed by: INTERNAL MEDICINE

## 2021-10-27 PROCEDURE — 3077F SYST BP >= 140 MM HG: CPT | Mod: CPTII,S$GLB,, | Performed by: INTERNAL MEDICINE

## 2021-10-27 PROCEDURE — 99499 RISK ADDL DX/OHS AUDIT: ICD-10-PCS | Mod: S$GLB,,, | Performed by: INTERNAL MEDICINE

## 2021-10-27 RX ORDER — DOXAZOSIN 2 MG/1
2 TABLET ORAL NIGHTLY
Qty: 60 TABLET | Refills: 4 | Status: SHIPPED | OUTPATIENT
Start: 2021-10-27 | End: 2022-01-28 | Stop reason: SDUPTHER

## 2021-11-26 ENCOUNTER — OFFICE VISIT (OUTPATIENT)
Dept: OBSTETRICS AND GYNECOLOGY | Facility: CLINIC | Age: 81
End: 2021-11-26
Payer: MEDICARE

## 2021-11-26 ENCOUNTER — LAB VISIT (OUTPATIENT)
Dept: LAB | Facility: HOSPITAL | Age: 81
End: 2021-11-26
Attending: INTERNAL MEDICINE
Payer: MEDICARE

## 2021-11-26 VITALS
BODY MASS INDEX: 25.65 KG/M2 | WEIGHT: 140.19 LBS | SYSTOLIC BLOOD PRESSURE: 102 MMHG | DIASTOLIC BLOOD PRESSURE: 50 MMHG

## 2021-11-26 DIAGNOSIS — E78.5 HYPERLIPIDEMIA ASSOCIATED WITH TYPE 2 DIABETES MELLITUS: ICD-10-CM

## 2021-11-26 DIAGNOSIS — R10.2 PELVIC PAIN: Primary | ICD-10-CM

## 2021-11-26 DIAGNOSIS — E03.9 HYPOTHYROIDISM, UNSPECIFIED TYPE: ICD-10-CM

## 2021-11-26 DIAGNOSIS — E11.69 HYPERLIPIDEMIA ASSOCIATED WITH TYPE 2 DIABETES MELLITUS: ICD-10-CM

## 2021-11-26 LAB
ALBUMIN SERPL BCP-MCNC: 4 G/DL (ref 3.5–5.2)
ALP SERPL-CCNC: 58 U/L (ref 55–135)
ALT SERPL W/O P-5'-P-CCNC: 31 U/L (ref 10–44)
ANION GAP SERPL CALC-SCNC: 10 MMOL/L (ref 8–16)
AST SERPL-CCNC: 25 U/L (ref 10–40)
BASOPHILS # BLD AUTO: 0.03 K/UL (ref 0–0.2)
BASOPHILS NFR BLD: 0.4 % (ref 0–1.9)
BILIRUB SERPL-MCNC: 0.5 MG/DL (ref 0.1–1)
BUN SERPL-MCNC: 18 MG/DL (ref 8–23)
CALCIUM SERPL-MCNC: 9.4 MG/DL (ref 8.7–10.5)
CHLORIDE SERPL-SCNC: 108 MMOL/L (ref 95–110)
CHOLEST SERPL-MCNC: 229 MG/DL (ref 120–199)
CHOLEST/HDLC SERPL: 5.3 {RATIO} (ref 2–5)
CO2 SERPL-SCNC: 24 MMOL/L (ref 23–29)
CREAT SERPL-MCNC: 1.1 MG/DL (ref 0.5–1.4)
DIFFERENTIAL METHOD: ABNORMAL
EOSINOPHIL # BLD AUTO: 0.2 K/UL (ref 0–0.5)
EOSINOPHIL NFR BLD: 2.5 % (ref 0–8)
ERYTHROCYTE [DISTWIDTH] IN BLOOD BY AUTOMATED COUNT: 13.2 % (ref 11.5–14.5)
EST. GFR  (AFRICAN AMERICAN): 56 ML/MIN/1.73 M^2
EST. GFR  (NON AFRICAN AMERICAN): 48 ML/MIN/1.73 M^2
ESTIMATED AVG GLUCOSE: 137 MG/DL (ref 68–131)
GLUCOSE SERPL-MCNC: 128 MG/DL (ref 70–110)
HBA1C MFR BLD: 6.4 % (ref 4–5.6)
HCT VFR BLD AUTO: 33.6 % (ref 37–48.5)
HDLC SERPL-MCNC: 43 MG/DL (ref 40–75)
HDLC SERPL: 18.8 % (ref 20–50)
HGB BLD-MCNC: 11.2 G/DL (ref 12–16)
IMM GRANULOCYTES # BLD AUTO: 0.01 K/UL (ref 0–0.04)
IMM GRANULOCYTES NFR BLD AUTO: 0.1 % (ref 0–0.5)
LDLC SERPL CALC-MCNC: 160.2 MG/DL (ref 63–159)
LYMPHOCYTES # BLD AUTO: 2.9 K/UL (ref 1–4.8)
LYMPHOCYTES NFR BLD: 35.2 % (ref 18–48)
MCH RBC QN AUTO: 30.2 PG (ref 27–31)
MCHC RBC AUTO-ENTMCNC: 33.3 G/DL (ref 32–36)
MCV RBC AUTO: 91 FL (ref 82–98)
MONOCYTES # BLD AUTO: 0.7 K/UL (ref 0.3–1)
MONOCYTES NFR BLD: 8.3 % (ref 4–15)
NEUTROPHILS # BLD AUTO: 4.4 K/UL (ref 1.8–7.7)
NEUTROPHILS NFR BLD: 53.5 % (ref 38–73)
NONHDLC SERPL-MCNC: 186 MG/DL
NRBC BLD-RTO: 0 /100 WBC
PLATELET # BLD AUTO: 304 K/UL (ref 150–450)
PMV BLD AUTO: 11.1 FL (ref 9.2–12.9)
POTASSIUM SERPL-SCNC: 5.2 MMOL/L (ref 3.5–5.1)
PROT SERPL-MCNC: 7.4 G/DL (ref 6–8.4)
RBC # BLD AUTO: 3.71 M/UL (ref 4–5.4)
SODIUM SERPL-SCNC: 142 MMOL/L (ref 136–145)
T4 FREE SERPL-MCNC: 1.17 NG/DL (ref 0.71–1.51)
TRIGL SERPL-MCNC: 129 MG/DL (ref 30–150)
TSH SERPL DL<=0.005 MIU/L-ACNC: 1.76 UIU/ML (ref 0.4–4)
WBC # BLD AUTO: 8.3 K/UL (ref 3.9–12.7)

## 2021-11-26 PROCEDURE — 36415 COLL VENOUS BLD VENIPUNCTURE: CPT | Performed by: INTERNAL MEDICINE

## 2021-11-26 PROCEDURE — 80061 LIPID PANEL: CPT | Performed by: INTERNAL MEDICINE

## 2021-11-26 PROCEDURE — 84443 ASSAY THYROID STIM HORMONE: CPT | Performed by: INTERNAL MEDICINE

## 2021-11-26 PROCEDURE — 85025 COMPLETE CBC W/AUTO DIFF WBC: CPT | Performed by: INTERNAL MEDICINE

## 2021-11-26 PROCEDURE — 99999 PR PBB SHADOW E&M-EST. PATIENT-LVL IV: ICD-10-PCS | Mod: PBBFAC,,, | Performed by: OBSTETRICS & GYNECOLOGY

## 2021-11-26 PROCEDURE — 99202 OFFICE O/P NEW SF 15 MIN: CPT | Mod: S$GLB,,, | Performed by: OBSTETRICS & GYNECOLOGY

## 2021-11-26 PROCEDURE — 83036 HEMOGLOBIN GLYCOSYLATED A1C: CPT | Performed by: INTERNAL MEDICINE

## 2021-11-26 PROCEDURE — 99999 PR PBB SHADOW E&M-EST. PATIENT-LVL IV: CPT | Mod: PBBFAC,,, | Performed by: OBSTETRICS & GYNECOLOGY

## 2021-11-26 PROCEDURE — 84439 ASSAY OF FREE THYROXINE: CPT | Performed by: INTERNAL MEDICINE

## 2021-11-26 PROCEDURE — 99202 PR OFFICE/OUTPT VISIT, NEW, LEVL II, 15-29 MIN: ICD-10-PCS | Mod: S$GLB,,, | Performed by: OBSTETRICS & GYNECOLOGY

## 2021-11-26 PROCEDURE — 80053 COMPREHEN METABOLIC PANEL: CPT | Performed by: INTERNAL MEDICINE

## 2021-11-26 PROCEDURE — 87086 URINE CULTURE/COLONY COUNT: CPT | Performed by: OBSTETRICS & GYNECOLOGY

## 2021-11-26 RX ORDER — NEOMYCIN SULFATE, POLYMYXIN B SULFATE, AND DEXAMETHASONE 3.5; 10000; 1 MG/G; [USP'U]/G; MG/G
OINTMENT OPHTHALMIC
Status: ON HOLD | COMMUNITY
Start: 2021-11-18 | End: 2022-01-13 | Stop reason: HOSPADM

## 2021-11-26 RX ORDER — ACETAZOLAMIDE 250 MG/1
TABLET ORAL
COMMUNITY
Start: 2021-11-18 | End: 2021-11-29

## 2021-11-26 RX ORDER — CIPROFLOXACIN HYDROCHLORIDE 3 MG/ML
SOLUTION/ DROPS OPHTHALMIC
COMMUNITY
Start: 2021-11-18 | End: 2023-03-15

## 2021-11-26 RX ORDER — DUREZOL 0.5 MG/ML
EMULSION OPHTHALMIC
Status: ON HOLD | COMMUNITY
Start: 2021-11-22 | End: 2022-01-13 | Stop reason: HOSPADM

## 2021-11-26 RX ORDER — ISOPROPYL ALCOHOL 0.75 G/1
SWAB TOPICAL
COMMUNITY
Start: 2021-10-14 | End: 2022-08-19

## 2021-11-29 ENCOUNTER — OFFICE VISIT (OUTPATIENT)
Dept: FAMILY MEDICINE | Facility: CLINIC | Age: 81
End: 2021-11-29
Payer: MEDICARE

## 2021-11-29 VITALS
SYSTOLIC BLOOD PRESSURE: 130 MMHG | HEART RATE: 66 BPM | BODY MASS INDEX: 26.01 KG/M2 | DIASTOLIC BLOOD PRESSURE: 62 MMHG | TEMPERATURE: 99 F | WEIGHT: 141.31 LBS | OXYGEN SATURATION: 98 % | HEIGHT: 62 IN

## 2021-11-29 DIAGNOSIS — I10 HYPERTENSION, UNSPECIFIED TYPE: Primary | ICD-10-CM

## 2021-11-29 DIAGNOSIS — E11.69 HYPERLIPIDEMIA ASSOCIATED WITH TYPE 2 DIABETES MELLITUS: ICD-10-CM

## 2021-11-29 DIAGNOSIS — N18.31 TYPE 2 DIABETES MELLITUS WITH STAGE 3A CHRONIC KIDNEY DISEASE, WITHOUT LONG-TERM CURRENT USE OF INSULIN: ICD-10-CM

## 2021-11-29 DIAGNOSIS — E87.5 HYPERKALEMIA: ICD-10-CM

## 2021-11-29 DIAGNOSIS — H26.9 CATARACT OF LEFT EYE, UNSPECIFIED CATARACT TYPE: ICD-10-CM

## 2021-11-29 DIAGNOSIS — N18.31 STAGE 3A CHRONIC KIDNEY DISEASE: ICD-10-CM

## 2021-11-29 DIAGNOSIS — E78.5 HYPERLIPIDEMIA ASSOCIATED WITH TYPE 2 DIABETES MELLITUS: ICD-10-CM

## 2021-11-29 DIAGNOSIS — E11.22 TYPE 2 DIABETES MELLITUS WITH STAGE 3A CHRONIC KIDNEY DISEASE, WITHOUT LONG-TERM CURRENT USE OF INSULIN: ICD-10-CM

## 2021-11-29 PROCEDURE — 99214 OFFICE O/P EST MOD 30 MIN: CPT | Mod: S$GLB,,, | Performed by: FAMILY MEDICINE

## 2021-11-29 PROCEDURE — 99214 PR OFFICE/OUTPT VISIT, EST, LEVL IV, 30-39 MIN: ICD-10-PCS | Mod: S$GLB,,, | Performed by: FAMILY MEDICINE

## 2021-11-29 PROCEDURE — 99999 PR PBB SHADOW E&M-EST. PATIENT-LVL IV: CPT | Mod: PBBFAC,,, | Performed by: FAMILY MEDICINE

## 2021-11-29 PROCEDURE — 99999 PR PBB SHADOW E&M-EST. PATIENT-LVL IV: ICD-10-PCS | Mod: PBBFAC,,, | Performed by: FAMILY MEDICINE

## 2021-11-30 ENCOUNTER — TELEPHONE (OUTPATIENT)
Dept: OBSTETRICS AND GYNECOLOGY | Facility: CLINIC | Age: 81
End: 2021-11-30
Payer: MEDICARE

## 2021-11-30 LAB — BACTERIA UR CULT: NO GROWTH

## 2021-12-01 ENCOUNTER — HOSPITAL ENCOUNTER (OUTPATIENT)
Dept: RADIOLOGY | Facility: HOSPITAL | Age: 81
Discharge: HOME | End: 2021-12-01
Attending: OBSTETRICS & GYNECOLOGY
Payer: MEDICARE

## 2021-12-01 DIAGNOSIS — R10.2 PELVIC PAIN: ICD-10-CM

## 2021-12-01 PROCEDURE — 76856 US PELVIS COMP WITH TRANSVAG NON-OB (XPD): ICD-10-PCS | Mod: 26,,, | Performed by: RADIOLOGY

## 2021-12-01 PROCEDURE — 76856 US EXAM PELVIC COMPLETE: CPT | Mod: 26,,, | Performed by: RADIOLOGY

## 2021-12-01 PROCEDURE — 76830 TRANSVAGINAL US NON-OB: CPT | Mod: 26,,, | Performed by: RADIOLOGY

## 2021-12-01 PROCEDURE — 76856 US EXAM PELVIC COMPLETE: CPT | Mod: TC

## 2021-12-01 PROCEDURE — 76830 US PELVIS COMP WITH TRANSVAG NON-OB (XPD): ICD-10-PCS | Mod: 26,,, | Performed by: RADIOLOGY

## 2021-12-02 ENCOUNTER — TELEPHONE (OUTPATIENT)
Dept: OBSTETRICS AND GYNECOLOGY | Facility: CLINIC | Age: 81
End: 2021-12-02
Payer: MEDICARE

## 2021-12-02 NOTE — TELEPHONE ENCOUNTER
----- Message from Shasha Shah sent at 12/2/2021 10:17 AM CST -----  Regarding: call back  Contact: [phone]  Type:  Patient Returning Call    Who Called: PT   Who Left Message for Patient: nurse   Does the patient know what this is regarding?: Test results   Would the patient rather a call back or a response via [facility]? Call back   Best Call Back Number: [phone]  Additional Information: no

## 2021-12-16 ENCOUNTER — TELEPHONE (OUTPATIENT)
Dept: CARDIOLOGY | Facility: CLINIC | Age: 81
End: 2021-12-16
Payer: MEDICARE

## 2021-12-20 ENCOUNTER — LAB VISIT (OUTPATIENT)
Dept: LAB | Facility: HOSPITAL | Age: 81
End: 2021-12-20
Attending: FAMILY MEDICINE
Payer: MEDICARE

## 2021-12-20 DIAGNOSIS — E87.5 HYPERKALEMIA: ICD-10-CM

## 2021-12-20 DIAGNOSIS — N18.31 STAGE 3A CHRONIC KIDNEY DISEASE: ICD-10-CM

## 2021-12-20 LAB
ANION GAP SERPL CALC-SCNC: 10 MMOL/L (ref 8–16)
BUN SERPL-MCNC: 15 MG/DL (ref 8–23)
CALCIUM SERPL-MCNC: 9.9 MG/DL (ref 8.7–10.5)
CHLORIDE SERPL-SCNC: 106 MMOL/L (ref 95–110)
CO2 SERPL-SCNC: 26 MMOL/L (ref 23–29)
CREAT SERPL-MCNC: 1 MG/DL (ref 0.5–1.4)
EST. GFR  (AFRICAN AMERICAN): >60 ML/MIN/1.73 M^2
EST. GFR  (NON AFRICAN AMERICAN): 54 ML/MIN/1.73 M^2
GLUCOSE SERPL-MCNC: 121 MG/DL (ref 70–110)
POTASSIUM SERPL-SCNC: 4.8 MMOL/L (ref 3.5–5.1)
SODIUM SERPL-SCNC: 142 MMOL/L (ref 136–145)

## 2021-12-20 PROCEDURE — 80048 BASIC METABOLIC PNL TOTAL CA: CPT | Performed by: FAMILY MEDICINE

## 2021-12-20 PROCEDURE — 36415 COLL VENOUS BLD VENIPUNCTURE: CPT | Performed by: FAMILY MEDICINE

## 2022-01-04 ENCOUNTER — TELEPHONE (OUTPATIENT)
Dept: FAMILY MEDICINE | Facility: CLINIC | Age: 82
End: 2022-01-04
Payer: MEDICARE

## 2022-01-04 ENCOUNTER — OFFICE VISIT (OUTPATIENT)
Dept: URGENT CARE | Facility: CLINIC | Age: 82
End: 2022-01-04
Payer: MEDICARE

## 2022-01-04 VITALS
SYSTOLIC BLOOD PRESSURE: 155 MMHG | OXYGEN SATURATION: 95 % | RESPIRATION RATE: 14 BRPM | TEMPERATURE: 98 F | WEIGHT: 141 LBS | BODY MASS INDEX: 25.95 KG/M2 | HEIGHT: 62 IN | HEART RATE: 74 BPM | DIASTOLIC BLOOD PRESSURE: 79 MMHG

## 2022-01-04 DIAGNOSIS — U07.1 COVID-19 VIRUS INFECTION: Primary | ICD-10-CM

## 2022-01-04 DIAGNOSIS — U07.1 COVID-19 VIRUS DETECTED: ICD-10-CM

## 2022-01-04 LAB
CTP QC/QA: YES
SARS-COV-2 RDRP RESP QL NAA+PROBE: POSITIVE

## 2022-01-04 PROCEDURE — 3077F PR MOST RECENT SYSTOLIC BLOOD PRESSURE >= 140 MM HG: ICD-10-PCS | Mod: CPTII,S$GLB,, | Performed by: FAMILY MEDICINE

## 2022-01-04 PROCEDURE — 99214 PR OFFICE/OUTPT VISIT, EST, LEVL IV, 30-39 MIN: ICD-10-PCS | Mod: S$GLB,,, | Performed by: FAMILY MEDICINE

## 2022-01-04 PROCEDURE — U0002 COVID-19 LAB TEST NON-CDC: HCPCS | Mod: QW,CR,S$GLB, | Performed by: FAMILY MEDICINE

## 2022-01-04 PROCEDURE — 99214 OFFICE O/P EST MOD 30 MIN: CPT | Mod: S$GLB,,, | Performed by: FAMILY MEDICINE

## 2022-01-04 PROCEDURE — 3078F PR MOST RECENT DIASTOLIC BLOOD PRESSURE < 80 MM HG: ICD-10-PCS | Mod: CPTII,S$GLB,, | Performed by: FAMILY MEDICINE

## 2022-01-04 PROCEDURE — 1159F MED LIST DOCD IN RCRD: CPT | Mod: CPTII,S$GLB,, | Performed by: FAMILY MEDICINE

## 2022-01-04 PROCEDURE — 3077F SYST BP >= 140 MM HG: CPT | Mod: CPTII,S$GLB,, | Performed by: FAMILY MEDICINE

## 2022-01-04 PROCEDURE — 1159F PR MEDICATION LIST DOCUMENTED IN MEDICAL RECORD: ICD-10-PCS | Mod: CPTII,S$GLB,, | Performed by: FAMILY MEDICINE

## 2022-01-04 PROCEDURE — 3078F DIAST BP <80 MM HG: CPT | Mod: CPTII,S$GLB,, | Performed by: FAMILY MEDICINE

## 2022-01-04 PROCEDURE — U0002: ICD-10-PCS | Mod: QW,CR,S$GLB, | Performed by: FAMILY MEDICINE

## 2022-01-04 NOTE — PROGRESS NOTES
"Subjective:       Patient ID: Oneyda Shah is a 81 y.o. female.    Vitals:  height is 5' 2" (1.575 m) and weight is 64 kg (141 lb). Her oral temperature is 97.6 °F (36.4 °C). Her blood pressure is 155/79 (abnormal) and her pulse is 74. Her respiration is 14 and oxygen saturation is 95%.     Chief Complaint: Cough    Cough  This is a new problem. The current episode started in the past 7 days (2 days). The problem has been gradually worsening. The problem occurs every few minutes. The cough is productive of sputum. Associated symptoms include chills, headaches and nasal congestion. Pertinent negatives include no ear congestion, ear pain, fever, postnasal drip, sore throat or shortness of breath. Associated symptoms comments: bodyaches. Nothing aggravates the symptoms. She has tried nothing for the symptoms. The treatment provided no relief. There is no history of asthma, bronchitis or COPD.       Constitution: Positive for chills. Negative for fever.   HENT: Negative for ear pain, postnasal drip and sore throat.    Respiratory: Positive for cough. Negative for shortness of breath.    Neurological: Positive for headaches.       Objective:      Physical Exam   Constitutional: She does not appear ill. No distress. normal  HENT:   Head: Normocephalic and atraumatic.   Nose: Rhinorrhea and congestion present.   Mouth/Throat: Mucous membranes are moist. Posterior oropharyngeal erythema present.   Eyes: Pupils are equal, round, and reactive to light.      extraocular movement intact   Cardiovascular: Normal rate.   Pulmonary/Chest: Effort normal.   Abdominal: Normal appearance. Soft.   Neurological: She is alert.   Nursing note and vitals reviewed.    Results for orders placed or performed in visit on 01/04/22   POCT COVID-19 Rapid Screening   Result Value Ref Range    POC Rapid COVID Positive (A) Negative     Acceptable Yes          Assessment:       1. COVID-19 virus infection          Plan:     "     COVID-19 virus infection  -     POCT COVID-19 Rapid Screening  -     Ambulatory referral/consult to [facility]    discussed symptom monitoring, contact notification and isolation X 5 days and mask wearing. ER precautions reviewed.

## 2022-01-04 NOTE — TELEPHONE ENCOUNTER
----- Message from Patsy Gonzáles sent at 1/4/2022 11:13 AM CST -----  Good Morning,  The patient's daughter is requesting a phone call. The patient tested positive for covid today. She is having body aches and chills. The patient's daughter would like to discuss if she is a candidate for ivermectin or antibody infusion therapy. Patient's daughter Annalee can be reached at [phone].    Thank you!

## 2022-01-04 NOTE — PATIENT INSTRUCTIONS
Patient Education       COVID-19 Discharge Instructions   About this topic   Coronavirus disease 2019 is also known as COVID-19. It is a viral illness that infects the lungs. It is caused by a virus called SARS-associated coronavirus (SARS-CoV-2).  The signs of COVID-19 most often start a few days after you have been infected. In some people, it takes longer to show signs. Others never show signs of the infection. You may have a cough, fever, shaking chills and it may be hard to breathe. You may be very tired, have muscle aches, a headache or sore throat. Some people have an upset stomach or loose stools. Others lose their sense of smell or taste. You may not have these signs all the time and they may come and go while you are sick.  The virus spreads easily through droplets when you talk, sneeze, or cough. You can pass the virus to others when you are talking close together, singing, hugging, sharing food, or shaking hands. Doctors believe the germs also survive on surfaces like tables, door handles, and telephones. However, this is not a common way that COVID-19 spreads. Doctors believe you can also spread the infection even if you dont have any symptoms, but they do not know how that happens. This is why getting vaccinated is one of the best ways to keep you healthy and slow the spread of the virus.  Some people have a mild case of COVID-19 and are able to stay at home and away from others until they feel better. Others may need to be in the hospital if they are very sick. Some people with COVID-19 can have some symptoms for weeks or months. People with COVID-19 must isolate themselves. You can start to be around others when your doctor says it is safe to do so.       What care is needed at home?   · Ask your doctor what you need to do when you go home. Make sure you ask questions if you do not understand what the doctor says.  · Drink lots of water, juice, or broth to replace fluids lost from a fever.  · You  may use cool mist humidifiers to help ease congestion and coughing.  · Use 2 to 3 pillows to prop yourself up when you lie down to make it easier to breathe and sleep.  · Do not smoke and do not drink beer, wine, and mixed drinks (alcohol).  · To lower the chance of passing the infection to others, get a COVID-19 vaccine after your infection has resolved.  · If you have not been fully vaccinated:  ? Wear a mask over your mouth and nose if you are around others who are not sick. Cloth masks work best if they have more than one layer of fabric.  ? Wash your hands often.  ? Stay home in a separate room, if possible, away from others. Only go out to get medical care.  ? Use a separate bathroom if possible.  ? Do not make food for others.  What follow-up care is needed?   · Your doctor may ask you to make visits to the office to check on your progress. Be sure to keep these visits. Make sure you wear a mask at these visits.  · If you can, tell the staff you have COVID-19 ahead of time so they can take extra care to stop the disease from spreading.  · It may take a few weeks before your health returns to normal.  What drugs may be needed?   The doctor may order drugs to:  · Help with breathing  · Help with fever  · Help with swelling in your airways and lungs  · Control coughing  · Ease a sore throat  · Help a runny or stuffy nose  Will physical activity be limited?   You may have to limit your physical activity. Talk to your doctor about the right amount of activity for you. If you have been very sick with COVID-19, it can take some time to get your strength back.  Will there be any other care needed?   Doctors do not know how long you can pass the virus on to others after you are sick. This is why it is important to stay in a separate room, if possible, when you are sick. For now, doctors are giving general guidelines for you to follow after you have been sick. Before you go around other people, you should:  · Be fever  free for 24 hours without taking any drugs to lower the fever  · Have no symptoms of cough or shortness of breath  · Wait at least 10 days after first having symptoms or your first positive test, and you need to be symptom free as above. Some experts suggest waiting 20 days if you have had a more severe infection.  Talk with your doctor about getting a COVID-19 vaccine.  What problems could happen?   · Fluid loss. This is dehydration.  · Short-term or long-term lung damage  · Heart problems  · Death  When do I need to call the doctor?   · You are having so much trouble breathing that you can only say one or two words at a time.  · You need to sit upright at all times to be able to breathe and/or cannot lie down.  · You are very confused or cannot stay awake.  · Your lips or skin start to turn blue or grey.  · You think you might be having a medical emergency. Some examples of medical emergencies are:  ? Severe chest pain.  ? Not able to speak or move normally.  · You have trouble breathing when talking or sitting still.  · You have new shortness of breath.  · You become weak or dizzy.  · You have very dark urine or do not pass urine for more than 8 hours.  · You have new or worsening COVID-19 symptoms like:  ? Fever  ? Cough  ? Feeling very tired  ? Shaking chills  ? Headache  ? Trouble swallowing  ? Throwing up  ? Loose stools  ? Reddish purple spots on your fingers or toes  Teach Back: Helping You Understand   The Teach Back Method helps you understand the information we are giving you. After you talk with the staff, tell them in your own words what you learned. This helps to make sure the staff has described each thing clearly. It also helps to explain things that may have been confusing. Before going home, make sure you can do these:  · I can tell you about my condition.  · I can tell you what may help ease my breathing.  · I can tell you what I can do to help avoid passing the infection to others.  · I can tell  you what I will do if I have trouble breathing; feel sleepy or confused; or my fingertips, fingernails, skin, or lips are blue.  Where can I learn more?   Centers for Disease Control and Prevention  https://www.cdc.gov/coronavirus/2019-ncov/about/index.html   Centers for Disease Control and Prevention  https://www.cdc.gov/coronavirus/2019-ncov/hcp/disposition-in-home-patients.html   World Health Organization  https://www.who.int/news-room/q-a-detail/f-j-wtqaastbgtosq   Last Reviewed Date   2021-10-05  Consumer Information Use and Disclaimer   This information is not specific medical advice and does not replace information you receive from your health care provider. This is only a brief summary of general information. It does NOT include all information about conditions, illnesses, injuries, tests, procedures, treatments, therapies, discharge instructions or life-style choices that may apply to you. You must talk with your health care provider for complete information about your health and treatment options. This information should not be used to decide whether or not to accept your health care providers advice, instructions or recommendations. Only your health care provider has the knowledge and training to provide advice that is right for you.  Copyright   Copyright © 2021 [employer] and its affiliates and/or licensors. All rights reserved.

## 2022-01-05 ENCOUNTER — NURSE TRIAGE (OUTPATIENT)
Dept: ADMINISTRATIVE | Facility: CLINIC | Age: 82
End: 2022-01-05
Payer: MEDICARE

## 2022-01-05 NOTE — TELEPHONE ENCOUNTER
Reason for Disposition   Wrong number reached. Phone number verified.    Additional Information   Negative: Caller has already spoken with the PCP (or office), and has no further questions   Negative: Caller has already spoken with another triager and has no further questions   Negative: Caller has already spoken with another triager or PCP (or office), and has further questions and triager able to answer questions.   Negative: Busy signal.  First attempt to contact caller.  Follow-up call scheduled within 15 minutes.   Negative: No answer.  First attempt to contact caller.  Follow-up call scheduled within 15 minutes.   Negative: Message left on identified voicemail   Negative: Message left on unidentified voice mail. Phone number verified.   Negative: Message left with person in household    Protocols used: NO CONTACT OR DUPLICATE CONTACT CALL-A-OH

## 2022-01-07 ENCOUNTER — NURSE TRIAGE (OUTPATIENT)
Dept: ADMINISTRATIVE | Facility: CLINIC | Age: 82
End: 2022-01-07
Payer: MEDICARE

## 2022-01-07 NOTE — TELEPHONE ENCOUNTER
Pt called and said doing much better than she was yesterday has no concerns for today she's eating and pains better will continue to monitor and call us back if any issues          Reason for Disposition   Caller has already spoken with another triager and has no further questions    Protocols used: NO CONTACT OR DUPLICATE CONTACT CALL-A-OH

## 2022-01-07 NOTE — TELEPHONE ENCOUNTER
Pt called for response to [facility] text and he said that he is doing fine no problems. Pt told to reach out if having and issues or concerns   Reason for Disposition   Information only question and nurse able to answer    Protocols used: INFORMATION ONLY CALL - NO TRIAGE-A-OH

## 2022-01-11 ENCOUNTER — HOSPITAL ENCOUNTER (INPATIENT)
Facility: HOSPITAL | Age: 82
LOS: 4 days | Discharge: HOME HEALTH | DRG: 177 | End: 2022-01-16
Attending: EMERGENCY MEDICINE | Admitting: STUDENT IN AN ORGANIZED HEALTH CARE EDUCATION/TRAINING PROGRAM
Payer: MEDICARE

## 2022-01-11 DIAGNOSIS — R07.9 CHEST PAIN: ICD-10-CM

## 2022-01-11 DIAGNOSIS — J12.82 PNEUMONIA DUE TO COVID-19 VIRUS: ICD-10-CM

## 2022-01-11 DIAGNOSIS — R09.02 HYPOXIA: Primary | ICD-10-CM

## 2022-01-11 DIAGNOSIS — U07.1 COVID-19: ICD-10-CM

## 2022-01-11 DIAGNOSIS — U07.1 PNEUMONIA DUE TO COVID-19 VIRUS: ICD-10-CM

## 2022-01-11 DIAGNOSIS — I10 PRIMARY HYPERTENSION: ICD-10-CM

## 2022-01-11 LAB
ALBUMIN SERPL BCP-MCNC: 3.4 G/DL (ref 3.5–5.2)
ALP SERPL-CCNC: 44 U/L (ref 55–135)
ALT SERPL W/O P-5'-P-CCNC: 37 U/L (ref 10–44)
ANION GAP SERPL CALC-SCNC: 11 MMOL/L (ref 8–16)
AST SERPL-CCNC: 44 U/L (ref 10–40)
BACTERIA #/AREA URNS HPF: NORMAL /HPF
BASOPHILS # BLD AUTO: 0.01 K/UL (ref 0–0.2)
BASOPHILS NFR BLD: 0.1 % (ref 0–1.9)
BILIRUB SERPL-MCNC: 0.4 MG/DL (ref 0.1–1)
BILIRUB UR QL STRIP: NEGATIVE
BUN SERPL-MCNC: 14 MG/DL (ref 8–23)
CALCIUM SERPL-MCNC: 8.8 MG/DL (ref 8.7–10.5)
CHLORIDE SERPL-SCNC: 98 MMOL/L (ref 95–110)
CK SERPL-CCNC: 36 U/L (ref 20–180)
CLARITY UR: CLEAR
CO2 SERPL-SCNC: 25 MMOL/L (ref 23–29)
COLOR UR: YELLOW
CREAT SERPL-MCNC: 1.2 MG/DL (ref 0.5–1.4)
CRP SERPL-MCNC: 84 MG/L (ref 0–8.2)
DIFFERENTIAL METHOD: ABNORMAL
EOSINOPHIL # BLD AUTO: 0 K/UL (ref 0–0.5)
EOSINOPHIL NFR BLD: 0.1 % (ref 0–8)
ERYTHROCYTE [DISTWIDTH] IN BLOOD BY AUTOMATED COUNT: 13.1 % (ref 11.5–14.5)
EST. GFR  (AFRICAN AMERICAN): 50 ML/MIN/1.73 M^2
EST. GFR  (NON AFRICAN AMERICAN): 43 ML/MIN/1.73 M^2
FERRITIN SERPL-MCNC: 175 NG/ML (ref 20–300)
GLUCOSE SERPL-MCNC: 257 MG/DL (ref 70–110)
GLUCOSE UR QL STRIP: NEGATIVE
HCT VFR BLD AUTO: 30.4 % (ref 37–48.5)
HGB BLD-MCNC: 10.4 G/DL (ref 12–16)
HGB UR QL STRIP: NEGATIVE
HYALINE CASTS #/AREA URNS LPF: 1 /LPF
IMM GRANULOCYTES # BLD AUTO: 0.03 K/UL (ref 0–0.04)
IMM GRANULOCYTES NFR BLD AUTO: 0.4 % (ref 0–0.5)
INFLUENZA A, MOLECULAR: NEGATIVE
INFLUENZA B, MOLECULAR: NEGATIVE
KETONES UR QL STRIP: NEGATIVE
LACTATE SERPL-SCNC: 1.4 MMOL/L (ref 0.5–2.2)
LDH SERPL L TO P-CCNC: 313 U/L (ref 110–260)
LEUKOCYTE ESTERASE UR QL STRIP: NEGATIVE
LYMPHOCYTES # BLD AUTO: 1.3 K/UL (ref 1–4.8)
LYMPHOCYTES NFR BLD: 19 % (ref 18–48)
MCH RBC QN AUTO: 29.6 PG (ref 27–31)
MCHC RBC AUTO-ENTMCNC: 34.2 G/DL (ref 32–36)
MCV RBC AUTO: 87 FL (ref 82–98)
MICROSCOPIC COMMENT: NORMAL
MONOCYTES # BLD AUTO: 0.4 K/UL (ref 0.3–1)
MONOCYTES NFR BLD: 5.3 % (ref 4–15)
NEUTROPHILS # BLD AUTO: 5.1 K/UL (ref 1.8–7.7)
NEUTROPHILS NFR BLD: 75.1 % (ref 38–73)
NITRITE UR QL STRIP: NEGATIVE
NRBC BLD-RTO: 0 /100 WBC
PH UR STRIP: 6 [PH] (ref 5–8)
PLATELET # BLD AUTO: 223 K/UL (ref 150–450)
PLATELET BLD QL SMEAR: ABNORMAL
PMV BLD AUTO: 11.2 FL (ref 9.2–12.9)
POCT GLUCOSE: 153 MG/DL (ref 70–110)
POCT GLUCOSE: 245 MG/DL (ref 70–110)
POTASSIUM SERPL-SCNC: 4.6 MMOL/L (ref 3.5–5.1)
PROT SERPL-MCNC: 7.4 G/DL (ref 6–8.4)
PROT UR QL STRIP: ABNORMAL
RBC # BLD AUTO: 3.51 M/UL (ref 4–5.4)
RBC #/AREA URNS HPF: 1 /HPF (ref 0–4)
SODIUM SERPL-SCNC: 134 MMOL/L (ref 136–145)
SP GR UR STRIP: 1.01 (ref 1–1.03)
SPECIMEN SOURCE: NORMAL
TROPONIN I SERPL DL<=0.01 NG/ML-MCNC: 0.01 NG/ML (ref 0–0.03)
URN SPEC COLLECT METH UR: ABNORMAL
UROBILINOGEN UR STRIP-ACNC: NEGATIVE EU/DL
WBC # BLD AUTO: 6.85 K/UL (ref 3.9–12.7)
WBC #/AREA URNS HPF: 0 /HPF (ref 0–5)

## 2022-01-11 PROCEDURE — 86140 C-REACTIVE PROTEIN: CPT | Performed by: NURSE PRACTITIONER

## 2022-01-11 PROCEDURE — G0378 HOSPITAL OBSERVATION PER HR: HCPCS

## 2022-01-11 PROCEDURE — 25000003 PHARM REV CODE 250

## 2022-01-11 PROCEDURE — 82962 GLUCOSE BLOOD TEST: CPT

## 2022-01-11 PROCEDURE — 94640 AIRWAY INHALATION TREATMENT: CPT

## 2022-01-11 PROCEDURE — C9399 UNCLASSIFIED DRUGS OR BIOLOG: HCPCS

## 2022-01-11 PROCEDURE — 87502 INFLUENZA DNA AMP PROBE: CPT

## 2022-01-11 PROCEDURE — 93005 ELECTROCARDIOGRAM TRACING: CPT

## 2022-01-11 PROCEDURE — 96372 THER/PROPH/DIAG INJ SC/IM: CPT

## 2022-01-11 PROCEDURE — 63600175 PHARM REV CODE 636 W HCPCS: Performed by: EMERGENCY MEDICINE

## 2022-01-11 PROCEDURE — 83615 LACTATE (LD) (LDH) ENZYME: CPT | Performed by: NURSE PRACTITIONER

## 2022-01-11 PROCEDURE — 25000003 PHARM REV CODE 250: Performed by: EMERGENCY MEDICINE

## 2022-01-11 PROCEDURE — 85025 COMPLETE CBC W/AUTO DIFF WBC: CPT | Performed by: NURSE PRACTITIONER

## 2022-01-11 PROCEDURE — 99900035 HC TECH TIME PER 15 MIN (STAT)

## 2022-01-11 PROCEDURE — 93010 EKG 12-LEAD: ICD-10-PCS | Mod: ,,, | Performed by: INTERNAL MEDICINE

## 2022-01-11 PROCEDURE — 82728 ASSAY OF FERRITIN: CPT | Performed by: NURSE PRACTITIONER

## 2022-01-11 PROCEDURE — 81000 URINALYSIS NONAUTO W/SCOPE: CPT | Performed by: NURSE PRACTITIONER

## 2022-01-11 PROCEDURE — 96365 THER/PROPH/DIAG IV INF INIT: CPT

## 2022-01-11 PROCEDURE — 63600175 PHARM REV CODE 636 W HCPCS

## 2022-01-11 PROCEDURE — 96368 THER/DIAG CONCURRENT INF: CPT

## 2022-01-11 PROCEDURE — 82550 ASSAY OF CK (CPK): CPT | Performed by: NURSE PRACTITIONER

## 2022-01-11 PROCEDURE — 84484 ASSAY OF TROPONIN QUANT: CPT | Performed by: NURSE PRACTITIONER

## 2022-01-11 PROCEDURE — 87040 BLOOD CULTURE FOR BACTERIA: CPT | Mod: 59 | Performed by: NURSE PRACTITIONER

## 2022-01-11 PROCEDURE — 25000003 PHARM REV CODE 250: Performed by: NURSE PRACTITIONER

## 2022-01-11 PROCEDURE — 93010 ELECTROCARDIOGRAM REPORT: CPT | Mod: ,,, | Performed by: INTERNAL MEDICINE

## 2022-01-11 PROCEDURE — 83605 ASSAY OF LACTIC ACID: CPT | Performed by: NURSE PRACTITIONER

## 2022-01-11 PROCEDURE — 25000242 PHARM REV CODE 250 ALT 637 W/ HCPCS

## 2022-01-11 PROCEDURE — 80053 COMPREHEN METABOLIC PANEL: CPT | Performed by: NURSE PRACTITIONER

## 2022-01-11 PROCEDURE — 94799 UNLISTED PULMONARY SVC/PX: CPT

## 2022-01-11 RX ORDER — GUAIFENESIN/DEXTROMETHORPHAN 100-10MG/5
10 SYRUP ORAL EVERY 4 HOURS PRN
Status: DISCONTINUED | OUTPATIENT
Start: 2022-01-11 | End: 2022-01-16 | Stop reason: HOSPADM

## 2022-01-11 RX ORDER — INSULIN ASPART 100 [IU]/ML
0-5 INJECTION, SOLUTION INTRAVENOUS; SUBCUTANEOUS
Status: DISCONTINUED | OUTPATIENT
Start: 2022-01-11 | End: 2022-01-16 | Stop reason: HOSPADM

## 2022-01-11 RX ORDER — ACETAMINOPHEN 500 MG
1000 TABLET ORAL
Status: COMPLETED | OUTPATIENT
Start: 2022-01-11 | End: 2022-01-11

## 2022-01-11 RX ORDER — ONDANSETRON 2 MG/ML
4 INJECTION INTRAMUSCULAR; INTRAVENOUS EVERY 8 HOURS PRN
Status: DISCONTINUED | OUTPATIENT
Start: 2022-01-11 | End: 2022-01-16 | Stop reason: HOSPADM

## 2022-01-11 RX ORDER — ACETAMINOPHEN 325 MG/1
650 TABLET ORAL EVERY 6 HOURS PRN
Status: DISCONTINUED | OUTPATIENT
Start: 2022-01-11 | End: 2022-01-16 | Stop reason: HOSPADM

## 2022-01-11 RX ORDER — LEVOTHYROXINE SODIUM 50 UG/1
50 TABLET ORAL DAILY
Status: DISCONTINUED | OUTPATIENT
Start: 2022-01-12 | End: 2022-01-16 | Stop reason: HOSPADM

## 2022-01-11 RX ORDER — SIMETHICONE 80 MG
1 TABLET,CHEWABLE ORAL 4 TIMES DAILY PRN
Status: DISCONTINUED | OUTPATIENT
Start: 2022-01-11 | End: 2022-01-16 | Stop reason: HOSPADM

## 2022-01-11 RX ORDER — MAG HYDROX/ALUMINUM HYD/SIMETH 200-200-20
30 SUSPENSION, ORAL (FINAL DOSE FORM) ORAL 4 TIMES DAILY PRN
Status: DISCONTINUED | OUTPATIENT
Start: 2022-01-11 | End: 2022-01-16 | Stop reason: HOSPADM

## 2022-01-11 RX ORDER — LOSARTAN POTASSIUM 50 MG/1
100 TABLET ORAL DAILY
Status: DISCONTINUED | OUTPATIENT
Start: 2022-01-12 | End: 2022-01-16 | Stop reason: HOSPADM

## 2022-01-11 RX ORDER — LOPERAMIDE HYDROCHLORIDE 2 MG/1
2 CAPSULE ORAL 4 TIMES DAILY PRN
Status: DISCONTINUED | OUTPATIENT
Start: 2022-01-11 | End: 2022-01-16 | Stop reason: HOSPADM

## 2022-01-11 RX ORDER — ENOXAPARIN SODIUM 100 MG/ML
1 INJECTION SUBCUTANEOUS 2 TIMES DAILY
Status: DISCONTINUED | OUTPATIENT
Start: 2022-01-11 | End: 2022-01-16 | Stop reason: HOSPADM

## 2022-01-11 RX ORDER — ALBUTEROL SULFATE 90 UG/1
2 AEROSOL, METERED RESPIRATORY (INHALATION) EVERY 6 HOURS
Status: DISCONTINUED | OUTPATIENT
Start: 2022-01-11 | End: 2022-01-16 | Stop reason: HOSPADM

## 2022-01-11 RX ORDER — IBUPROFEN 200 MG
24 TABLET ORAL
Status: DISCONTINUED | OUTPATIENT
Start: 2022-01-11 | End: 2022-01-16 | Stop reason: HOSPADM

## 2022-01-11 RX ORDER — GLUCAGON 1 MG
1 KIT INJECTION
Status: DISCONTINUED | OUTPATIENT
Start: 2022-01-11 | End: 2022-01-16 | Stop reason: HOSPADM

## 2022-01-11 RX ORDER — ATENOLOL 25 MG/1
50 TABLET ORAL DAILY
Status: DISCONTINUED | OUTPATIENT
Start: 2022-01-12 | End: 2022-01-16 | Stop reason: HOSPADM

## 2022-01-11 RX ORDER — TALC
6 POWDER (GRAM) TOPICAL NIGHTLY PRN
Status: DISCONTINUED | OUTPATIENT
Start: 2022-01-11 | End: 2022-01-16 | Stop reason: HOSPADM

## 2022-01-11 RX ORDER — AMOXICILLIN 250 MG
1 CAPSULE ORAL 2 TIMES DAILY
Status: DISCONTINUED | OUTPATIENT
Start: 2022-01-11 | End: 2022-01-16 | Stop reason: HOSPADM

## 2022-01-11 RX ORDER — ASPIRIN 81 MG/1
81 TABLET ORAL DAILY
Status: DISCONTINUED | OUTPATIENT
Start: 2022-01-12 | End: 2022-01-16 | Stop reason: HOSPADM

## 2022-01-11 RX ORDER — IBUPROFEN 200 MG
16 TABLET ORAL
Status: DISCONTINUED | OUTPATIENT
Start: 2022-01-11 | End: 2022-01-16 | Stop reason: HOSPADM

## 2022-01-11 RX ORDER — DOXAZOSIN 2 MG/1
2 TABLET ORAL NIGHTLY
Status: DISCONTINUED | OUTPATIENT
Start: 2022-01-11 | End: 2022-01-16 | Stop reason: HOSPADM

## 2022-01-11 RX ORDER — LEVOFLOXACIN 5 MG/ML
750 INJECTION, SOLUTION INTRAVENOUS
Status: DISCONTINUED | OUTPATIENT
Start: 2022-01-12 | End: 2022-01-16 | Stop reason: HOSPADM

## 2022-01-11 RX ORDER — SODIUM CHLORIDE 0.9 % (FLUSH) 0.9 %
10 SYRINGE (ML) INJECTION
Status: DISCONTINUED | OUTPATIENT
Start: 2022-01-11 | End: 2022-01-16 | Stop reason: HOSPADM

## 2022-01-11 RX ORDER — PANTOPRAZOLE SODIUM 40 MG/1
40 TABLET, DELAYED RELEASE ORAL DAILY
Status: DISCONTINUED | OUTPATIENT
Start: 2022-01-12 | End: 2022-01-16 | Stop reason: HOSPADM

## 2022-01-11 RX ORDER — ASCORBIC ACID 500 MG
500 TABLET ORAL 2 TIMES DAILY
Status: DISCONTINUED | OUTPATIENT
Start: 2022-01-11 | End: 2022-01-16 | Stop reason: HOSPADM

## 2022-01-11 RX ORDER — PROCHLORPERAZINE EDISYLATE 5 MG/ML
5 INJECTION INTRAMUSCULAR; INTRAVENOUS EVERY 6 HOURS PRN
Status: DISCONTINUED | OUTPATIENT
Start: 2022-01-11 | End: 2022-01-16 | Stop reason: HOSPADM

## 2022-01-11 RX ORDER — AMLODIPINE BESYLATE 5 MG/1
10 TABLET ORAL DAILY
Status: DISCONTINUED | OUTPATIENT
Start: 2022-01-12 | End: 2022-01-16 | Stop reason: HOSPADM

## 2022-01-11 RX ADMIN — INSULIN DETEMIR 10 UNITS: 100 INJECTION, SOLUTION SUBCUTANEOUS at 10:01

## 2022-01-11 RX ADMIN — OXYCODONE HYDROCHLORIDE AND ACETAMINOPHEN 500 MG: 500 TABLET ORAL at 10:01

## 2022-01-11 RX ADMIN — CEFTRIAXONE 1 G: 1 INJECTION, SOLUTION INTRAVENOUS at 04:01

## 2022-01-11 RX ADMIN — DEXAMETHASONE 6 MG: 4 TABLET ORAL at 04:01

## 2022-01-11 RX ADMIN — ALBUTEROL SULFATE 2 PUFF: 90 AEROSOL, METERED RESPIRATORY (INHALATION) at 09:01

## 2022-01-11 RX ADMIN — REMDESIVIR 200 MG: 100 INJECTION, POWDER, LYOPHILIZED, FOR SOLUTION INTRAVENOUS at 06:01

## 2022-01-11 RX ADMIN — ENOXAPARIN SODIUM 60 MG: 100 INJECTION SUBCUTANEOUS at 10:01

## 2022-01-11 RX ADMIN — SENNOSIDES AND DOCUSATE SODIUM 1 TABLET: 8.6; 5 TABLET ORAL at 10:01

## 2022-01-11 RX ADMIN — DOXAZOSIN 2 MG: 2 TABLET ORAL at 10:01

## 2022-01-11 RX ADMIN — AZITHROMYCIN MONOHYDRATE 500 MG: 500 INJECTION, POWDER, LYOPHILIZED, FOR SOLUTION INTRAVENOUS at 04:01

## 2022-01-11 RX ADMIN — ACETAMINOPHEN 1000 MG: 500 TABLET ORAL at 01:01

## 2022-01-11 NOTE — FIRST PROVIDER EVALUATION
Emergency Department TeleTriage Encounter Note      CHIEF COMPLAINT    Chief Complaint   Patient presents with    Weakness     Pt presents to ED with c/o weakness, fatigue, and diarrhea for a few days. Pt tested positive for COVID on 1/4/22 and received the antibody infusion 1 week ago. Pt states she was getting better but now she is not eating and feels weak.        VITAL SIGNS   Initial Vitals [01/11/22 1241]   BP Pulse Resp Temp SpO2   137/64 88 (!) 24 (!) 100.6 °F (38.1 °C) (!) 90 %      MAP       --            ALLERGIES    Review of patient's allergies indicates:   Allergen Reactions    Penicillins Other (See Comments)    Ace inhibitors      cough    Statins-hmg-coa reductase inhibitors      Myalgias,       PROVIDER TRIAGE NOTE  This is a teletriage evaluation of a 81 y.o. female presenting to the ED with c/o weakness/fatigue, diarrhea. COVID+ 1/4/22. SPO2 90% on RA, not on home O2, does not appear in distress. Febrile at 100.6 - no meds PTA. Initial orders will be placed and care will be transferred to an alternate provider when patient is roomed for a full evaluation. Any additional orders and the final disposition will be determined by that provider.         ORDERS  Labs Reviewed   CULTURE, BLOOD   CULTURE, BLOOD   CBC W/ AUTO DIFFERENTIAL   COMPREHENSIVE METABOLIC PANEL   C-REACTIVE PROTEIN   FERRITIN   LACTATE DEHYDROGENASE   CK   LACTIC ACID, PLASMA   TROPONIN I   URINALYSIS, REFLEX TO URINE CULTURE       ED Orders (720h ago, onward)    Start Ordered     Status Ordering Provider    01/11/22 1300 01/11/22 1246  acetaminophen tablet 1,000 mg  ED 1 Time         Ordered KINDRA HIDALGO P.    01/11/22 1248 01/11/22 1247  Urinalysis, Reflex to Urine Culture Urine, Clean Catch  STAT         Ordered KINDRA HIDALGO.    01/11/22 1247 01/11/22 1247  Blood Culture #1 **CANNOT BE ORDERED STAT**  Once         Ordered KINDRA HIDALGO P.    01/11/22 1247 01/11/22 1247  Blood Culture #2 **CANNOT BE ORDERED STAT**  Once          Ordered HIDALGO, KINDRA P.    01/11/22 1246 01/11/22 1246  Airborne and Contact and Droplet Isolation Status  Continuous         Ordered HIDALGO, KINDRA P.    01/11/22 1246 01/11/22 1246  CBC auto differential  STAT         Ordered HIDALGO, KINDRA P.    01/11/22 1246 01/11/22 1246  Comprehensive metabolic panel  STAT         Ordered HIDALGO, KINDRA P.    01/11/22 1246 01/11/22 1246  C-Reactive Protein  STAT         Ordered HIDALGO, KINDRA P.    01/11/22 1246 01/11/22 1246  Ferritin  STAT         Ordered HIDALGO, KINDRA P.    01/11/22 1246 01/11/22 1246  Lactate Dehydrogenase  STAT         Ordered HIDALGO, KINDRA P.    01/11/22 1246 01/11/22 1246  CK  STAT         Ordered HIDALGO, KINDRA P.    01/11/22 1246 01/11/22 1246  Lactic Acid, Plasma  STAT         Ordered HIDALGO, KINDRA P.    01/11/22 1246 01/11/22 1246  Troponin I  STAT         Ordered HIDALGO, KINDRA P.    01/11/22 1246 01/11/22 1246  EKG 12-lead  Once         Ordered HIDALGO, KINDRA P.    01/11/22 1246 01/11/22 1246  X-Ray Chest AP Portable  1 time imaging         Ordered HIDALGO, KINDRA P.    01/11/22 1246 01/11/22 1246  Saline lock IV  Once         Ordered HIDALGO, KINDRA P.    01/11/22 1246 01/11/22 1246  Oxygen Continuous  Continuous         Ordered HIDALGO, KINDRA DAVIS            Virtual Visit Note: The provider triage portion of this emergency department evaluation and documentation was performed via [employer], a HIPAA-compliant telemedicine application, in concert with a tele-presenter in the room. A face to face patient evaluation with one of my colleagues will occur once the patient is placed in an emergency department room.      DISCLAIMER: This note was prepared with [employer] voice recognition transcription software. Garbled syntax, mangled pronouns, and other bizarre constructions may be attributed to that software system.

## 2022-01-11 NOTE — ASSESSMENT & PLAN NOTE
Chronic, controlled.  Latest blood pressure and vitals reviewed-   Temp:  [100.6 °F (38.1 °C)]   Pulse:  [75-89]   Resp:  [24-36]   BP: (123-154)/(59-70)   SpO2:  [90 %-99 %] .   Home meds for hypertension were reviewed and noted below.   Hypertension Medications             amLODIPine (NORVASC) 10 MG tablet TAKE 1 TABLET EVERY DAY    atenoloL (TENORMIN) 50 MG tablet TAKE 1 TABLET EVERY DAY    doxazosin (CARDURA) 2 MG tablet Take 1 tablet (2 mg total) by mouth every evening.    losartan (COZAAR) 100 MG tablet Take 1 tablet (100 mg total) by mouth once daily.          While in the hospital, will manage blood pressure as follows; Continue home antihypertensive regimen    Will utilize p.r.n. blood pressure medication only if patient's blood pressure greater than  180/110 and she develops symptoms such as worsening chest pain or shortness of breath.

## 2022-01-11 NOTE — ASSESSMENT & PLAN NOTE
. Last LDL was   Lab Results   Component Value Date    LDLCALC 160.2 (H) 11/26/2021         Patient is not chronically on statin.will not continue for now. Monitor clinically

## 2022-01-11 NOTE — ASSESSMENT & PLAN NOTE
- COVID positive 1/4/2022; patient received monoclonal antibody infusion reported 1 week ago; initiated on protocol  - Isolation: Airborne/Droplet. Surgical mask on patient. Notify Infection Control  - CXR with findings concerning for multifocal PNA from inflammatory or infectious process including atypical bacterial or viral etiology without large consolidation  - Telemetry   - initiated on dexamethasone, will continue for 10d course  - initiated on remdesivir x5d; daily CMP  - initiated on ceftriaxone/azith x5d due to concern for superimposed CAP  - CRP 84; D-dimer pending  -Covid Labs remarkable for   - Order RT consult via Respiratory Communication for COVID Protocols  - Incentive Spirometer Q4h  - Continuous Pulse Oximetry, goal SpO2 92-96%  - supplemental O2, will wean as tolerated  - Albuterol INH Q6h scheduled & PRN  - MVI & ascorbic acid 500mg PO BID  -Anti-tussives for cough  - Acetaminophen Q6hr PRN fever/headache  - Loperamide PRN viral diarrhea  - IVF if indicated, restrictive strategy preferred, no maintenance IV if able  - VTE PPx: enoxaparin   - PT/OT for debility  - If deterioration, may warrant trial of NIPPV in neg pressure room or immediate ICU consult

## 2022-01-11 NOTE — ASSESSMENT & PLAN NOTE
Hemoglobin A1C   Date Value Ref Range Status   11/26/2021 6.4 (H) 4.0 - 5.6 % Final     Comment:     ADA Screening Guidelines:  5.7-6.4%  Consistent with prediabetes  >or=6.5%  Consistent with diabetes    High levels of fetal hemoglobin interfere with the HbA1C  assay. Heterozygous hemoglobin variants (HbS, HgC, etc)do  not significantly interfere with this assay.   However, presence of multiple variants may affect accuracy.     04/15/2021 6.5 (H) 4.0 - 5.6 % Final     Comment:     ADA Screening Guidelines:  5.7-6.4%  Consistent with prediabetes  >or=6.5%  Consistent with diabetes    High levels of fetal hemoglobin interfere with the HbA1C  assay. Heterozygous hemoglobin variants (HbS, HgC, etc)do  not significantly interfere with this assay.   However, presence of multiple variants may affect accuracy.     01/11/2021 6.6 (H) 4.0 - 5.6 % Final     Comment:     ADA Screening Guidelines:  5.7-6.4%  Consistent with prediabetes  >or=6.5%  Consistent with diabetes  High levels of fetal hemoglobin interfere with the HbA1C  assay. Heterozygous hemoglobin variants (HbS, HgC, etc)do  not significantly interfere with this assay.   However, presence of multiple variants may affect accuracy.       Patient's FSGs are controlled on current medication regimen.  - home regimen includes Metformin 1000mg BID  - hold home oral medications  - initiate on detemir 10u daily   - LDSSI with ACCUcheck ACHS  - Diabetic diet  -Hypoglycemia protocol PRN

## 2022-01-11 NOTE — ASSESSMENT & PLAN NOTE
-Creatine stable for now. BMP reviewed- noted Estimated Creatinine Clearance: 31.4 mL/min (based on SCr of 1.2 mg/dL). according to latest data.   -Monitor UOP and serial BMP and adjust therapy as needed.   -Renally dose meds.

## 2022-01-11 NOTE — ED NOTES
Assumed care of patient in ED #2 - patient here for evaluation of weakness x 1 day - was diagnosed with Covid on 1.4.2022 and given monoclonal antibodies by infusion on same day - patient has since lost appetite and had diarrhea x 3 days with last fever on 1.9.2022 - phlegm produced by cough in white in color per patient     Patient in no apparent distress with clear breath sounds - diminished due to poor inhalation by patient      Patient on 4 liters of oxygen per NC to support sats greater than 94%

## 2022-01-11 NOTE — Clinical Note
Is this patient a high probability for COVID-19?: Yes   Diagnosis: Hypoxia [290391]   Future Attending Provider: YAJAIRA CLARKE [[id number]]   Admitting Provider:: YAJAIRA CLARKE [[id number]]

## 2022-01-11 NOTE — ED PROVIDER NOTES
Encounter Date: 1/11/2022       History     Chief Complaint   Patient presents with    Weakness     Pt presents to ED with c/o weakness, fatigue, and diarrhea for a few days. Pt tested positive for COVID on 1/4/22 and received the antibody infusion 1 week ago. Pt states she was getting better but now she is not eating and feels weak.      81f hx  has a past medical history of Aortic atherosclerosis, CKD (chronic kidney disease), stage III, Diabetes mellitus type II, Fatty liver, High cholesterol, HLD (hyperlipidemia), HTN (hypertension), Hypothyroidism, Hypothyroidism, Osteopenia, and Statin myopathy pw decreased appetite, lethargy, fever, diarrhea  Pt is COVID+ and received the infusion 7d ago  Took some tylenol w improvement in fever, has been taking ensure but not hungry  Denies SOB, chest pain, does have residual cough      The history is provided by a relative.     Review of patient's allergies indicates:   Allergen Reactions    Penicillins Other (See Comments)    Ace inhibitors      cough    Statins-hmg-coa reductase inhibitors      Myalgias,     Past Medical History:   Diagnosis Date    Aortic atherosclerosis     CKD (chronic kidney disease), stage III     Diabetes mellitus type II     Fatty liver     High cholesterol     HLD (hyperlipidemia)     HTN (hypertension)     Hypothyroidism     Hypothyroidism     Osteopenia     Statin myopathy      Past Surgical History:   Procedure Laterality Date    BREAST BIOPSY      BREAST SURGERY      biopsy    HYSTERECTOMY      still w/ovaries    ROTATOR CUFF REPAIR Left      Family History   Problem Relation Age of Onset    Diabetes Mother     Hypertension Mother     Diabetes Brother     Coronary artery disease Sister 55        MI    Liver cancer Sister     No Known Problems Father     Diabetes Daughter     No Known Problems Son     No Known Problems Brother      Social History     Tobacco Use    Smoking status: Never Smoker    Smokeless tobacco:  Never Used   Substance Use Topics    Alcohol use: No    Drug use: No     Review of Systems   Constitutional: Positive for appetite change, fatigue and fever. Negative for chills and diaphoresis.   HENT: Negative for congestion, nosebleeds, sore throat, trouble swallowing and voice change.    Eyes: Negative for photophobia, pain, redness and itching.   Respiratory: Positive for cough. Negative for chest tightness and shortness of breath.    Cardiovascular: Negative for chest pain and leg swelling.   Gastrointestinal: Positive for diarrhea. Negative for abdominal pain, constipation, nausea and vomiting.   Genitourinary: Negative for decreased urine volume, difficulty urinating, dysuria and frequency.   Musculoskeletal: Negative for gait problem.   Skin: Negative for color change, rash and wound.   Neurological: Negative for dizziness, facial asymmetry, speech difficulty and headaches.   Psychiatric/Behavioral: Negative for agitation, confusion and suicidal ideas.   All other systems reviewed and are negative.      Physical Exam     Initial Vitals [01/11/22 1241]   BP Pulse Resp Temp SpO2   137/64 88 (!) 24 (!) 100.6 °F (38.1 °C) (!) 90 %      MAP       --         Physical Exam    Nursing note and vitals reviewed.  Constitutional:   EXAM  General: Awake, alert and oriented. Tired appearing.     Head: normocephalic and atraumatic     Eyes: Conjunctivae are clear without exudates or hemorrhage. Sclera is non-icteric. EOM are intact. Eyelids are normal in appearance without swelling or lesions.     Ears: The external ear and ear canal are non-tender and without swelling. The canal is clear without discharge. Hearing intact.     Nose: Nares are patent bilaterally.     Neck: The neck is supple. Trachea is midline. Full ROM.     Cardiac: Regular rate.     Respiratory: No signs of respiratory distress. No audible wheezes. On 4L oxygen, sats 91%.     Abdominal: Non-distended.     Extremities: Upper and lower extremities are  atraumatic in appearance without tenderness or deformity. No swelling or erythema. Full range of motion.     Skin: Appropriate color for ethnicity.     Neurological: The patient is awake, alert and oriented to person, place, and time with normal speech.     Psychiatric: Appropriate mood and affect.     In light of current/ongoing global covid-19 pandemic, all my encounters w pt were with full ppe including but not limited to gown, gloves, n95, eye protection OR from >6 ft away.             ED Course   Procedures  Labs Reviewed   CBC W/ AUTO DIFFERENTIAL - Abnormal; Notable for the following components:       Result Value    RBC 3.51 (*)     Hemoglobin 10.4 (*)     Hematocrit 30.4 (*)     Gran % 75.1 (*)     All other components within normal limits   COMPREHENSIVE METABOLIC PANEL - Abnormal; Notable for the following components:    Sodium 134 (*)     Glucose 257 (*)     Albumin 3.4 (*)     Alkaline Phosphatase 44 (*)     AST 44 (*)     eGFR if  50 (*)     eGFR if non  43 (*)     All other components within normal limits   C-REACTIVE PROTEIN - Abnormal; Notable for the following components:    CRP 84.0 (*)     All other components within normal limits   LACTATE DEHYDROGENASE - Abnormal; Notable for the following components:     (*)     All other components within normal limits   URINALYSIS, REFLEX TO URINE CULTURE - Abnormal; Notable for the following components:    Protein, UA 1+ (*)     All other components within normal limits    Narrative:     Specimen Source->Urine   CBC W/ AUTO DIFFERENTIAL - Abnormal; Notable for the following components:    WBC 3.21 (*)     RBC 3.98 (*)     Hemoglobin 11.7 (*)     Hematocrit 36.1 (*)     Immature Granulocytes 0.6 (*)     Lymph # 0.7 (*)     Mono # 0.1 (*)     Gran % 73.3 (*)     Mono % 3.1 (*)     All other components within normal limits   APTT - Abnormal; Notable for the following components:    aPTT 39.3 (*)     All other components  within normal limits   D DIMER, QUANTITATIVE - Abnormal; Notable for the following components:    D-Dimer 1.91 (*)     All other components within normal limits   SEDIMENTATION RATE - Abnormal; Notable for the following components:    Sed Rate 120 (*)     All other components within normal limits   POCT GLUCOSE - Abnormal; Notable for the following components:    POCT Glucose 153 (*)     All other components within normal limits   POCT GLUCOSE - Abnormal; Notable for the following components:    POCT Glucose 245 (*)     All other components within normal limits   POCT GLUCOSE - Abnormal; Notable for the following components:    POCT Glucose 244 (*)     All other components within normal limits   POCT GLUCOSE - Abnormal; Notable for the following components:    POCT Glucose 344 (*)     All other components within normal limits   INFLUENZA A & B BY MOLECULAR   FERRITIN   CK   LACTIC ACID, PLASMA   TROPONIN I   URINALYSIS MICROSCOPIC    Narrative:     Specimen Source->Urine   B-TYPE NATRIURETIC PEPTIDE   PROTIME-INR   PROCALCITONIN   POCT GLUCOSE, HAND-HELD DEVICE   POCT GLUCOSE, HAND-HELD DEVICE   POCT GLUCOSE, HAND-HELD DEVICE   POCT GLUCOSE, HAND-HELD DEVICE   POCT GLUCOSE, HAND-HELD DEVICE   POCT GLUCOSE, HAND-HELD DEVICE     EKG Readings: (Independently Interpreted)   Normal sinus rhythm, rate 87, T-wave flattening lateral leads, no ST elevations, normal intervals     ECG Results          EKG 12-lead (Final result)  Result time 01/11/22 16:59:00    Final result by Interface, Lab In [facility] (01/11/22 16:59:00)                 Narrative:    Test Reason : U07.1,    Vent. Rate : 087 BPM     Atrial Rate : 087 BPM     P-R Int : 124 ms          QRS Dur : 086 ms      QT Int : 336 ms       P-R-T Axes : 062 008 086 degrees     QTc Int : 404 ms    Normal sinus rhythm  Minimal voltage criteria for LVH, may be normal variant  Nonspecific T wave abnormality  Abnormal ECG  When compared with ECG of 26-MAR-2019 09:59,  Criteria  for Septal infarct are no longer Present  Confirmed by Regis Gómez MD ([id number]) on 1/11/2022 4:58:48 PM    Referred By: [email or web address]   SELF           Confirmed By:Regis Gómez MD                            Imaging Results          X-Ray Chest AP Portable (Final result)  Result time 01/11/22 15:15:13    Final result by Fabián Raymundo MD (01/11/22 15:15:13)                 Impression:      Findings concerning for multifocal pneumonia from inflammatory or infectious process including atypical bacterial or viral etiology, without large consolidation.  Differential to include mild pulmonary edema or chronic interstitial lung changes with patchy subsegmental atelectasis.      Electronically signed by: Fabián Raymundo MD  Date:    01/11/2022  Time:    15:15             Narrative:    EXAMINATION:  XR CHEST AP PORTABLE    CLINICAL HISTORY:  COVID-19;    TECHNIQUE:  Single frontal view of the chest was performed.    COMPARISON:  CT abdomen and pelvis 12/06/2013, bilateral shoulder series 06/08/2021    FINDINGS:  Monitoring leads overlie the chest.  Patient is slightly rotated.  Resolution is somewhat limited by body habitus with underpenetration.    Cardiac silhouette is upper limits of normal in size.  There is calcification and tortuosity of the aorta.  Pulmonary vasculature and hilar contours are within normal limits.  Chronic mild nonspecific elevation of the right hemidiaphragm.    The lungs are well expanded.  There is bilateral mild diffuse nonspecific interstitial coarsening with patchy subsegmental opacities with a peripheral mid to lower lung zone distribution slightly more so on the right.  No lobar consolidation, pleural effusion or pneumothorax seen.  No acute osseous process seen.                                 Medications   sodium chloride 0.9% flush 10 mL (has no administration in time range)   remdesivir 100 mg in sodium chloride 0.9% 250 mL infusion (0 mg Intravenous Stopped 1/13/22 1024)   dexAMETHasone  tablet 6 mg (6 mg Oral Given 1/13/22 0913)   glucose chewable tablet 16 g (has no administration in time range)   glucose chewable tablet 24 g (has no administration in time range)   dextrose 50% injection 12.5 g (has no administration in time range)   dextrose 50% injection 25 g (has no administration in time range)   glucagon (human recombinant) injection 1 mg (has no administration in time range)   ascorbic acid (vitamin C) tablet 500 mg (500 mg Oral Given 1/13/22 2106)   multivitamin tablet (1 tablet Oral Given 1/13/22 0913)   enoxaparin injection 60 mg (60 mg Subcutaneous Given 1/13/22 2105)   levoFLOXacin 750 mg/150 mL IVPB 750 mg (0 mg Intravenous Stopped 1/12/22 1115)   insulin aspart U-100 pen 0-5 Units (2 Units Subcutaneous Given 1/13/22 2107)   dextromethorphan-guaiFENesin  mg/5 ml liquid 10 mL (has no administration in time range)   acetaminophen tablet 650 mg (has no administration in time range)   senna-docusate 8.6-50 mg per tablet 1 tablet (1 tablet Oral Not Given 1/13/22 2100)   ondansetron injection 4 mg (has no administration in time range)   prochlorperazine injection Soln 5 mg (has no administration in time range)   melatonin tablet 6 mg (has no administration in time range)   simethicone chewable tablet 80 mg (has no administration in time range)   aluminum-magnesium hydroxide-simethicone 200-200-20 mg/5 mL suspension 30 mL (has no administration in time range)   albuterol inhaler 2 puff (2 puffs Inhalation Given 1/13/22 2025)   loperamide capsule 2 mg (has no administration in time range)   amLODIPine tablet 10 mg (10 mg Oral Given 1/13/22 0913)   aspirin EC tablet 81 mg (81 mg Oral Given 1/13/22 0913)   atenoloL tablet 50 mg (50 mg Oral Given 1/13/22 0913)   doxazosin tablet 2 mg (2 mg Oral Given 1/13/22 2106)   levothyroxine tablet 50 mcg (50 mcg Oral Given 1/13/22 0913)   losartan tablet 100 mg (100 mg Oral Given 1/13/22 0914)   pantoprazole EC tablet 40 mg (40 mg Oral Given 1/13/22  0913)   insulin detemir U-100 pen 10 Units (10 Units Subcutaneous Given 1/13/22 2106)   acetaminophen tablet 1,000 mg (1,000 mg Oral Given 1/11/22 1356)   azithromycin 500 mg in dextrose 5 % 250 mL IVPB (ready to mix system) (0 mg Intravenous Stopped 1/11/22 1722)   cefTRIAXone (ROCEPHIN) 1 g/50 mL D5W IVPB (0 g Intravenous Stopped 1/11/22 1722)   remdesivir 200 mg in sodium chloride 0.9% 250 mL infusion (0 mg Intravenous Stopped 1/11/22 1912)     Medical Decision Making:   ED Management:  Patient is tired appearing, febrile, hypoxic on 4 L. tested positive for COVID received antibody infusion last week.  Patient is not clinically improving, and now requiring oxygen.  Will need admission.  Labs ordered.  X-ray ordered.                      Clinical Impression:   Final diagnoses:  [U07.1] COVID-19  [R09.02] Hypoxia (Primary)  [U07.1, J12.82] Pneumonia due to COVID-19 virus          ED Disposition Condition    Admit               Kaykay Farfan MD  01/13/22 7149

## 2022-01-12 ENCOUNTER — TELEPHONE (OUTPATIENT)
Dept: FAMILY MEDICINE | Facility: CLINIC | Age: 82
End: 2022-01-12
Payer: MEDICARE

## 2022-01-12 DIAGNOSIS — N18.9 CHRONIC KIDNEY DISEASE, UNSPECIFIED CKD STAGE: ICD-10-CM

## 2022-01-12 LAB
APTT BLDCRRT: 39.3 SEC (ref 21–32)
BASOPHILS # BLD AUTO: 0.01 K/UL (ref 0–0.2)
BASOPHILS NFR BLD: 0.3 % (ref 0–1.9)
BNP SERPL-MCNC: 17 PG/ML (ref 0–99)
D DIMER PPP IA.FEU-MCNC: 1.91 MG/L FEU
DIFFERENTIAL METHOD: ABNORMAL
EOSINOPHIL # BLD AUTO: 0 K/UL (ref 0–0.5)
EOSINOPHIL NFR BLD: 0 % (ref 0–8)
ERYTHROCYTE [DISTWIDTH] IN BLOOD BY AUTOMATED COUNT: 13 % (ref 11.5–14.5)
ERYTHROCYTE [SEDIMENTATION RATE] IN BLOOD BY WESTERGREN METHOD: 120 MM/HR (ref 0–20)
HCT VFR BLD AUTO: 36.1 % (ref 37–48.5)
HGB BLD-MCNC: 11.7 G/DL (ref 12–16)
IMM GRANULOCYTES # BLD AUTO: 0.02 K/UL (ref 0–0.04)
IMM GRANULOCYTES NFR BLD AUTO: 0.6 % (ref 0–0.5)
INR PPP: 1 (ref 0.8–1.2)
LYMPHOCYTES # BLD AUTO: 0.7 K/UL (ref 1–4.8)
LYMPHOCYTES NFR BLD: 22.7 % (ref 18–48)
MCH RBC QN AUTO: 29.4 PG (ref 27–31)
MCHC RBC AUTO-ENTMCNC: 32.4 G/DL (ref 32–36)
MCV RBC AUTO: 91 FL (ref 82–98)
MONOCYTES # BLD AUTO: 0.1 K/UL (ref 0.3–1)
MONOCYTES NFR BLD: 3.1 % (ref 4–15)
NEUTROPHILS # BLD AUTO: 2.4 K/UL (ref 1.8–7.7)
NEUTROPHILS NFR BLD: 73.3 % (ref 38–73)
NRBC BLD-RTO: 0 /100 WBC
PLATELET # BLD AUTO: 172 K/UL (ref 150–450)
PLATELET BLD QL SMEAR: ABNORMAL
PMV BLD AUTO: 12.1 FL (ref 9.2–12.9)
POCT GLUCOSE: 244 MG/DL (ref 70–110)
POCT GLUCOSE: 344 MG/DL (ref 70–110)
PROCALCITONIN SERPL IA-MCNC: 0.14 NG/ML
PROTHROMBIN TIME: 10.4 SEC (ref 9–12.5)
RBC # BLD AUTO: 3.98 M/UL (ref 4–5.4)
WBC # BLD AUTO: 3.21 K/UL (ref 3.9–12.7)

## 2022-01-12 PROCEDURE — 27000207 HC ISOLATION

## 2022-01-12 PROCEDURE — 85730 THROMBOPLASTIN TIME PARTIAL: CPT

## 2022-01-12 PROCEDURE — 85025 COMPLETE CBC W/AUTO DIFF WBC: CPT

## 2022-01-12 PROCEDURE — 63600175 PHARM REV CODE 636 W HCPCS

## 2022-01-12 PROCEDURE — 25000003 PHARM REV CODE 250

## 2022-01-12 PROCEDURE — 94640 AIRWAY INHALATION TREATMENT: CPT

## 2022-01-12 PROCEDURE — 94761 N-INVAS EAR/PLS OXIMETRY MLT: CPT

## 2022-01-12 PROCEDURE — 25000242 PHARM REV CODE 250 ALT 637 W/ HCPCS

## 2022-01-12 PROCEDURE — 97161 PT EVAL LOW COMPLEX 20 MIN: CPT

## 2022-01-12 PROCEDURE — 94799 UNLISTED PULMONARY SVC/PX: CPT

## 2022-01-12 PROCEDURE — 27000221 HC OXYGEN, UP TO 24 HOURS

## 2022-01-12 PROCEDURE — 99285 EMERGENCY DEPT VISIT HI MDM: CPT | Mod: 25

## 2022-01-12 PROCEDURE — 96372 THER/PROPH/DIAG INJ SC/IM: CPT

## 2022-01-12 PROCEDURE — 85610 PROTHROMBIN TIME: CPT

## 2022-01-12 PROCEDURE — 97165 OT EVAL LOW COMPLEX 30 MIN: CPT

## 2022-01-12 PROCEDURE — 85379 FIBRIN DEGRADATION QUANT: CPT

## 2022-01-12 PROCEDURE — 85652 RBC SED RATE AUTOMATED: CPT | Performed by: HOSPITALIST

## 2022-01-12 PROCEDURE — 11000001 HC ACUTE MED/SURG PRIVATE ROOM

## 2022-01-12 PROCEDURE — 84145 PROCALCITONIN (PCT): CPT

## 2022-01-12 PROCEDURE — 27100098 HC SPACER

## 2022-01-12 PROCEDURE — 83880 ASSAY OF NATRIURETIC PEPTIDE: CPT

## 2022-01-12 PROCEDURE — 97530 THERAPEUTIC ACTIVITIES: CPT

## 2022-01-12 PROCEDURE — 99900035 HC TECH TIME PER 15 MIN (STAT)

## 2022-01-12 PROCEDURE — 82962 GLUCOSE BLOOD TEST: CPT

## 2022-01-12 PROCEDURE — 96367 TX/PROPH/DG ADDL SEQ IV INF: CPT

## 2022-01-12 RX ADMIN — ATENOLOL 50 MG: 25 TABLET ORAL at 09:01

## 2022-01-12 RX ADMIN — ENOXAPARIN SODIUM 60 MG: 100 INJECTION SUBCUTANEOUS at 09:01

## 2022-01-12 RX ADMIN — ALBUTEROL SULFATE 2 PUFF: 90 AEROSOL, METERED RESPIRATORY (INHALATION) at 07:01

## 2022-01-12 RX ADMIN — OXYCODONE HYDROCHLORIDE AND ACETAMINOPHEN 500 MG: 500 TABLET ORAL at 09:01

## 2022-01-12 RX ADMIN — SENNOSIDES AND DOCUSATE SODIUM 1 TABLET: 8.6; 5 TABLET ORAL at 09:01

## 2022-01-12 RX ADMIN — AMLODIPINE BESYLATE 10 MG: 5 TABLET ORAL at 09:01

## 2022-01-12 RX ADMIN — PANTOPRAZOLE SODIUM 40 MG: 40 TABLET, DELAYED RELEASE ORAL at 09:01

## 2022-01-12 RX ADMIN — CEFTRIAXONE SODIUM 1 G: 1 INJECTION, POWDER, FOR SOLUTION INTRAMUSCULAR; INTRAVENOUS at 05:01

## 2022-01-12 RX ADMIN — LEVOTHYROXINE SODIUM 50 MCG: 50 TABLET ORAL at 09:01

## 2022-01-12 RX ADMIN — ASPIRIN 81 MG: 81 TABLET, COATED ORAL at 09:01

## 2022-01-12 RX ADMIN — LOSARTAN POTASSIUM 100 MG: 50 TABLET, FILM COATED ORAL at 09:01

## 2022-01-12 RX ADMIN — LEVOFLOXACIN 750 MG: 750 INJECTION, SOLUTION INTRAVENOUS at 09:01

## 2022-01-12 RX ADMIN — ALBUTEROL SULFATE 2 PUFF: 90 AEROSOL, METERED RESPIRATORY (INHALATION) at 01:01

## 2022-01-12 RX ADMIN — DOXAZOSIN 2 MG: 2 TABLET ORAL at 09:01

## 2022-01-12 RX ADMIN — REMDESIVIR 100 MG: 100 INJECTION, POWDER, LYOPHILIZED, FOR SOLUTION INTRAVENOUS at 02:01

## 2022-01-12 RX ADMIN — DEXAMETHASONE 6 MG: 4 TABLET ORAL at 09:01

## 2022-01-12 RX ADMIN — ALBUTEROL SULFATE 2 PUFF: 90 AEROSOL, METERED RESPIRATORY (INHALATION) at 12:01

## 2022-01-12 RX ADMIN — INSULIN DETEMIR 10 UNITS: 100 INJECTION, SOLUTION SUBCUTANEOUS at 10:01

## 2022-01-12 NOTE — SUBJECTIVE & OBJECTIVE
Past Medical History:   Diagnosis Date    Aortic atherosclerosis     CKD (chronic kidney disease), stage III     Diabetes mellitus type II     Fatty liver     High cholesterol     HLD (hyperlipidemia)     HTN (hypertension)     Hypothyroidism     Hypothyroidism     Osteopenia     Statin myopathy        Past Surgical History:   Procedure Laterality Date    BREAST BIOPSY      BREAST SURGERY      biopsy    HYSTERECTOMY      still w/ovaries    ROTATOR CUFF REPAIR Left        Review of patient's allergies indicates:   Allergen Reactions    Penicillins Other (See Comments)    Ace inhibitors      cough    Statins-hmg-coa reductase inhibitors      Myalgias,       Current Facility-Administered Medications on File Prior to Encounter   Medication    acetaminophen tablet 650 mg    albuterol inhaler 2 puff    diphenhydrAMINE injection 25 mg    EPINEPHrine (EPIPEN) 0.3 mg/0.3 mL pen injection 0.3 mg    methylPREDNISolone sod suc(PF) injection 40 mg    ondansetron disintegrating tablet 4 mg    sodium chloride 0.9% 500 mL flush bag    sodium chloride 0.9% flush 10 mL     Current Outpatient Medications on File Prior to Encounter   Medication Sig    ACCU-CHEK GERTRUDE PLUS TEST STRP Strp TEST ONE TIME DAILY    amLODIPine (NORVASC) 10 MG tablet TAKE 1 TABLET EVERY DAY    aspirin (ECOTRIN) 81 MG EC tablet Take 81 mg by mouth once daily.    atenoloL (TENORMIN) 50 MG tablet TAKE 1 TABLET EVERY DAY    azelastine (ASTELIN) 137 mcg (0.1 %) nasal spray 1 spray (137 mcg total) by Nasal route 2 (two) times daily.    BD ALCOHOL SWABS PadM     blood-glucose meter Misc Check daily sugars, dispense insurance covered blood sugar meter    cholecalciferol, vitamin D3, (VITAMIN D3) 25 mcg (1,000 unit) capsule Take 1,000 Units by mouth once daily.    ciprofloxacin HCl (CILOXAN) 0.3 % ophthalmic solution     doxazosin (CARDURA) 2 MG tablet Take 1 tablet (2 mg total) by mouth every evening.    DUREZOL 0.05 % Drop  ophthalmic solution     fluticasone propionate (FLONASE) 50 mcg/actuation nasal spray 1 spray (50 mcg total) by Each Nostril route 2 (two) times a day.    lancets (ACCU-CHEK SOFTCLIX LANCETS) Misc Check sugar twice daily.    levothyroxine (SYNTHROID) 50 MCG tablet TAKE 1 TABLET (50 MCG TOTAL) BY MOUTH ONCE DAILY.    losartan (COZAAR) 100 MG tablet Take 1 tablet (100 mg total) by mouth once daily.    meclizine (ANTIVERT) 12.5 mg tablet TAKE 1 TABLET THREE TIMES DAILY AS NEEDED FOR  DIZZINESS    metFORMIN (GLUCOPHAGE) 1000 MG tablet Take 1 tablet (1,000 mg total) by mouth 2 (two) times daily.    neomycin-polymyxin-dexamethasone (DEXACINE) 3.5 mg/g-10,000 unit/g-0.1 % Oint     pantoprazole (PROTONIX) 40 MG tablet Take 1 tablet (40 mg total) by mouth once daily.    [DISCONTINUED] ZETIA 10 mg tablet      Family History     Problem Relation (Age of Onset)    Coronary artery disease Sister (55)    Diabetes Mother, Brother, Daughter    Hypertension Mother    Liver cancer Sister    No Known Problems Father, Son, Brother        Tobacco Use    Smoking status: Never Smoker    Smokeless tobacco: Never Used   Substance and Sexual Activity    Alcohol use: No    Drug use: No    Sexual activity: Yes     Partners: Male     Review of Systems   Constitutional: Positive for appetite change, fatigue and fever. Negative for chills and diaphoresis.   HENT: Negative for hearing loss and sore throat.    Eyes: Negative for visual disturbance.   Respiratory: Positive for cough. Negative for shortness of breath and wheezing.    Cardiovascular: Negative for chest pain and leg swelling.   Gastrointestinal: Positive for diarrhea. Negative for abdominal pain, nausea and vomiting.   Genitourinary: Negative for difficulty urinating and flank pain.   Musculoskeletal: Negative for back pain and neck pain.   Skin: Negative for rash and wound.   Neurological: Positive for weakness. Negative for dizziness and headaches.    Psychiatric/Behavioral: Negative for agitation, confusion and hallucinations.     Objective:     Vital Signs (Most Recent):  Temp: 98 °F (36.7 °C) (01/11/22 1926)  Pulse: 75 (01/11/22 1514)  Resp: (!) 36 (01/11/22 1321)  BP: (!) 123/59 (01/11/22 1527)  SpO2: 99 % (01/11/22 1528) Vital Signs (24h Range):  Temp:  [98 °F (36.7 °C)-100.6 °F (38.1 °C)] 98 °F (36.7 °C)  Pulse:  [75-89] 75  Resp:  [24-36] 36  SpO2:  [90 %-99 %] 99 %  BP: (123-154)/(59-70) 123/59     Weight: 59 kg (130 lb)  Body mass index is 24.56 kg/m².    Physical Exam  Vitals and nursing note reviewed.   Constitutional:       General: She is not in acute distress.     Appearance: Normal appearance. She is ill-appearing. She is not toxic-appearing.      Interventions: Nasal cannula in place.      Comments: 2L   HENT:      Head: Normocephalic and atraumatic.      Mouth/Throat:      Mouth: Mucous membranes are moist.   Eyes:      Extraocular Movements: Extraocular movements intact.      Pupils: Pupils are equal, round, and reactive to light.   Cardiovascular:      Rate and Rhythm: Normal rate and regular rhythm.      Pulses: Normal pulses.      Heart sounds: Normal heart sounds. No murmur heard.  No friction rub. No gallop.    Pulmonary:      Effort: Tachypnea present. No respiratory distress.      Breath sounds: Examination of the right-lower field reveals decreased breath sounds. Examination of the left-lower field reveals decreased breath sounds. Decreased breath sounds present. No wheezing or rhonchi.   Abdominal:      General: Bowel sounds are normal. There is no distension.      Palpations: Abdomen is soft.      Tenderness: There is no abdominal tenderness. There is no guarding.   Musculoskeletal:         General: No swelling.      Cervical back: Normal range of motion and neck supple.      Right lower leg: No edema.      Left lower leg: No edema.   Skin:     General: Skin is warm and dry.      Capillary Refill: Capillary refill takes less than 2  seconds.      Findings: No bruising, erythema or rash.   Neurological:      General: No focal deficit present.      Mental Status: She is oriented to person, place, and time.   Psychiatric:         Mood and Affect: Mood normal.         Behavior: Behavior normal. Behavior is cooperative.           CRANIAL NERVES     CN III, IV, VI   Pupils are equal, round, and reactive to light.       Significant Labs:   All pertinent labs within the past 24 hours have been reviewed.  A1C:   Recent Labs   Lab 11/26/21  1140   HGBA1C 6.4*     Bilirubin:   Recent Labs   Lab 01/11/22  1330   BILITOT 0.4     BMP:   Recent Labs   Lab 01/11/22  1330   *   *   K 4.6   CL 98   CO2 25   BUN 14   CREATININE 1.2   CALCIUM 8.8     CBC:   Recent Labs   Lab 01/11/22  1330   WBC 6.85   HGB 10.4*   HCT 30.4*        CMP:   Recent Labs   Lab 01/11/22  1330   *   K 4.6   CL 98   CO2 25   *   BUN 14   CREATININE 1.2   CALCIUM 8.8   PROT 7.4   ALBUMIN 3.4*   BILITOT 0.4   ALKPHOS 44*   AST 44*   ALT 37   ANIONGAP 11   EGFRNONAA 43*      Lactic Acid:   Recent Labs   Lab 01/11/22  1330   LACTATE 1.4       POCT Glucose:   Recent Labs   Lab 01/11/22  1736   POCTGLUCOSE 153*     Troponin:   Recent Labs   Lab 01/11/22  1330   TROPONINI 0.012     TSH:   Recent Labs   Lab 11/26/21  1140   TSH 1.763     Urine Studies:   Recent Labs   Lab 01/11/22  1925   COLORU Yellow   APPEARANCEUA Clear   PHUR 6.0   SPECGRAV 1.010   PROTEINUA 1+*   GLUCUA Negative   KETONESU Negative   BILIRUBINUA Negative   OCCULTUA Negative   NITRITE Negative   UROBILINOGEN Negative   LEUKOCYTESUR Negative   RBCUA 1   WBCUA 0   BACTERIA None   HYALINECASTS 1       Significant Imaging: I have reviewed all pertinent imaging results/findings within the past 24 hours.

## 2022-01-12 NOTE — ED NOTES
Relieving primary RN for lunch. Pt pending Remdesivir administration once pharmacy delivers medication to ED. Pt notified and verbalized understanding.

## 2022-01-12 NOTE — HPI
Ms. Oneyda Shah is a 82 y/o female with PMHx CKD, DMII, HTN, HLD, and hypothyroidism presents to [facility] ED with complaints of weakness, fatigue, and diarrhea x few days. Patient was recently tested positive for COVID-19 on 1/4/22 which she received the monoclonal antibody infusion approximately reported one week ago. Patient reports she was feeling better but recently she began to lose her appetite, had fever (tmax 100.1), diarrhea, and generalized weakness. Patient reports taking Tylenol and Motrin with fever relief, but her other symptoms have not improved. Patient reports non-productive cough. Patient denies shortness of breath, chest pain, palpitations, BARTON, or leg swelling. Patient denies nausea or vomiting. In ED: CXR revealed findings concerning for multifocal PNA from inflammatory or infectious process including atypical bacterial or viral etiology without large consolidation. Denies recent sick contacts. Denies recent travel.

## 2022-01-12 NOTE — TELEPHONE ENCOUNTER
----- Message from Sonya Marquez sent at 1/10/2022 11:26 AM CST -----  Contact: Annalee (daughter) [phone]  Type: Requesting Call Back    Who called: Annalee   Best Call Back Number:  [phone]  Additional Information:  pt's daughter is requesting a call back regarding pt's current medication and directions. Pt also tested positive for COVID and received an EUA infusion last week

## 2022-01-12 NOTE — ASSESSMENT & PLAN NOTE
Hemoglobin A1C   Date Value Ref Range Status   11/26/2021 6.4 (H) 4.0 - 5.6 % Final     Comment:     ADA Screening Guidelines:  5.7-6.4%  Consistent with prediabetes  >or=6.5%  Consistent with diabetes    High levels of fetal hemoglobin interfere with the HbA1C  assay. Heterozygous hemoglobin variants (HbS, HgC, etc)do  not significantly interfere with this assay.   However, presence of multiple variants may affect accuracy.     04/15/2021 6.5 (H) 4.0 - 5.6 % Final     Comment:     ADA Screening Guidelines:  5.7-6.4%  Consistent with prediabetes  >or=6.5%  Consistent with diabetes    High levels of fetal hemoglobin interfere with the HbA1C  assay. Heterozygous hemoglobin variants (HbS, HgC, etc)do  not significantly interfere with this assay.   However, presence of multiple variants may affect accuracy.     01/11/2021 6.6 (H) 4.0 - 5.6 % Final     Comment:     ADA Screening Guidelines:  5.7-6.4%  Consistent with prediabetes  >or=6.5%  Consistent with diabetes  High levels of fetal hemoglobin interfere with the HbA1C  assay. Heterozygous hemoglobin variants (HbS, HgC, etc)do  not significantly interfere with this assay.   However, presence of multiple variants may affect accuracy.       Patient's FSGs are controlled on current medication regimen.  - home regimen includes Metformin 1000mg BID  - hold home oral medications  - initiate on detemir 10u daily   - LDSSI with ACCUcheck ACHS  - add long acting as dexamethasone likely increasing  - Diabetic diet  -Hypoglycemia protocol PRN

## 2022-01-12 NOTE — ED NOTES
"patient states she has " a little bit of chest pain". Points to right side of mid sternum. No distress. ekg NSR 60s. 144/70 95% on 2l 97.6F  Notified MD.  Further questioning, patient states that she has had this for a month intermittently .  Patient states she has seen cardiology and had test. States doctor said she was okay.  Will continue to monitor closely.    "

## 2022-01-12 NOTE — ED NOTES
Pt repositioned for comfort. Provided pt w/ extra blankets. Denies pain and other needs at this time. CB within reach.

## 2022-01-12 NOTE — PLAN OF CARE
OT carlito performed, report to follow    Anticipate home, may need HH--ongoing assessment    Problem: Occupational Therapy Goal  Goal: Occupational Therapy Goal  Description: Goals to be met by: 2/12     Patient will increase functional independence with ADLs by performing:    UE Dressing with Modified [place].  LE Dressing with Modified [place].  Grooming while standing at sink with Modified [place].  Toileting from toilet with Modified [place] for hygiene and clothing management.   Toilet transfer to toilet with Modified [place].  Upper extremity exercise program x10 reps per handout, with independence.  1/12/2022 1513 by Travis Bonilla OT  Outcome: Ongoing, Progressing

## 2022-01-12 NOTE — ED NOTES
In and out cath performed. Pt tolerated well. Pt reports abdominal discomfort resolved following catheterization. Pts family at bedside. Denies needs at this time. CB within reach.

## 2022-01-12 NOTE — PT/OT/SLP EVAL
Occupational Therapy   Evaluation    Name: Oneyda Shah  MRN: [id number]  Admitting Diagnosis:  Pneumonia due to COVID-19 virus  Recent Surgery: * No surgery found *      Recommendations:     Discharge Recommendations: home health PT,home health OT  Discharge Equipment Recommendations:     Barriers to discharge:  None    Assessment:     Oneyda Shah is a 81 y.o. female with a medical diagnosis of Pneumonia due to COVID-19 virus.  She presents with performance deficits affecting function: weakness,impaired functional mobilty,impaired self care skills,impaired cardiopulmonary response to activity,impaired endurance.      Rehab Prognosis: Good; patient would benefit from acute skilled OT services to address these deficits and reach maximum level of function.       Plan:     Patient to be seen 2 x/week to address the above listed problems via self-care/home management,therapeutic activities,therapeutic exercises  · Plan of Care Expires: 02/12/22  · Plan of Care Reviewed with: patient    Subjective     Chief Complaint: weakness  Patient/Family Comments/goals: returnto PLOF    Occupational Profile:  Living Environment: Lives w/spouse, son, grddtr in [facility], no NICA, W[place]  Previous level of function: indep  Roles and Routines: retired  Equipment Used at Home:  none  Assistance upon Discharge: family    Pain/Comfort:  · Pain Rating 1: 0/10  · Pain Rating Post-Intervention 1: 0/10    Patients cultural, spiritual, Lutheran conflicts given the current situation: no    Objective:     Communicated with: nurse prior to session.  Patient found HOB elevated with blood pressure cuff,oxygen,pulse ox (continuous),telemetry,peripheral IV upon OT entry to room.    General Precautions: Standard, droplet,airborne,fall,contact,respiratory   Orthopedic Precautions:    Braces:    Respiratory Status: Nasal cannula, flow 2 L/min    Occupational Performance:    Bed Mobility:    · Patient completed Rolling/Turning to Left with   supervision  · Patient completed Scooting/Bridging with supervision  · Patient completed Supine to Sit with supervision  · Patient completed Sit to Supine with supervision    Functional Mobility/Transfers:  · Patient completed Sit <> Stand Transfer with stand by assistance and contact guard assistance  with  no assistive device   · Functional Mobility: side stepped and few steps forward/back SBA-CGA HHA    Activities of Daily Living:  · Lower Body Dressing: stand by assistance donnig/doffing socks    Cognitive/Visual Perceptual:  AO4    Physical Exam:  BUE AROM/strength WFL  Good sitting, fair+ standing balance    AMPAC 6 Click ADL:  AMPAC Total Score: 19    Treatment & Education:  Pt educated on role of oT/POC  Education:    Patient left HOB elevated with all lines intact, call button in reach and nurse notified    GOALS:   Multidisciplinary Problems     Occupational Therapy Goals        Problem: Occupational Therapy Goal    Goal Priority Disciplines Outcome Interventions   Occupational Therapy Goal     OT, PT/OT Ongoing, Progressing    Description: Goals to be met by: 2/12     Patient will increase functional independence with ADLs by performing:    UE Dressing with Modified [place].  LE Dressing with Modified [place].  Grooming while standing at sink with Modified [place].  Toileting from toilet with Modified [place] for hygiene and clothing management.   Toilet transfer to toilet with Modified [place].  Upper extremity exercise program x10 reps per handout, with independence.                     History:     Past Medical History:   Diagnosis Date    Aortic atherosclerosis     CKD (chronic kidney disease), stage III     Diabetes mellitus type II     Fatty liver     High cholesterol     HLD (hyperlipidemia)     HTN (hypertension)     Hypothyroidism     Hypothyroidism     Osteopenia     Statin myopathy        Past Surgical History:   Procedure Laterality Date    BREAST BIOPSY       BREAST SURGERY      biopsy    HYSTERECTOMY      still w/ovaries    ROTATOR CUFF REPAIR Left        Time Tracking:     OT Date of Treatment: 01/12/22  OT Start Time: 1049  OT Stop Time: 1112  OT Total Time (min): 23 min w/PT    Billable Minutes:Evaluation 15    1/12/2022

## 2022-01-12 NOTE — H&P
[facility] Medicine  History & Physical    Patient Name: Oneyda Shah  MRN: [id number]  Patient Class: OP- Observation  Admission Date: 1/11/2022  Attending Physician: Jh Garcia MD  Primary Care Provider: Zion Villavicencio MD         Patient information was obtained from patient, relative(s), past medical records and ER records.     Subjective:     Principal Problem:Pneumonia due to COVID-19 virus    Chief Complaint:   Chief Complaint   Patient presents with    Weakness     Pt presents to ED with c/o weakness, fatigue, and diarrhea for a few days. Pt tested positive for COVID on 1/4/22 and received the antibody infusion 1 week ago. Pt states she was getting better but now she is not eating and feels weak.         HPI: Ms. Oneyda Shah is a 82 y/o female with PMHx CKD, DMII, HTN, HLD, and hypothyroidism presents to [facility] ED with complaints of weakness, fatigue, and diarrhea x few days. Patient was recently tested positive for COVID-19 on 1/4/22 which she received the monoclonal antibody infusion approximately reported one week ago. Patient reports she was feeling better but recently she began to lose her appetite, had fever (tmax 100.1), diarrhea, and generalized weakness. Patient reports taking Tylenol and Motrin with fever relief, but her other symptoms have not improved. Patient reports non-productive cough. Patient denies shortness of breath, chest pain, palpitations, BARTON, or leg swelling. Patient denies nausea or vomiting. In ED: CXR revealed findings concerning for multifocal PNA from inflammatory or infectious process including atypical bacterial or viral etiology without large consolidation. Denies recent sick contacts. Denies recent travel.       No new subjective & objective note has been filed under this hospital service since the last note was generated.    Assessment/Plan:     * Pneumonia due to COVID-19 virus  - COVID positive 1/4/2022; patient received monoclonal  antibody infusion reported 1 week ago; initiated on protocol  - Isolation: Airborne/Droplet. Surgical mask on patient. Notify Infection Control  - CXR with findings concerning for multifocal PNA from inflammatory or infectious process including atypical bacterial or viral etiology without large consolidation  - Telemetry   - initiated on dexamethasone, will continue for 10d course  - initiated on remdesivir x5d; daily CMP  - initiated on ceftriaxone/azith x5d due to concern for superimposed CAP  - CRP 84; D-dimer pending  -Covid Labs remarkable for   - Order RT consult via Respiratory Communication for COVID Protocols  - Incentive Spirometer Q4h  - Continuous Pulse Oximetry, goal SpO2 92-96%  - supplemental O2, will wean as tolerated  - Albuterol INH Q6h scheduled & PRN  - MVI & ascorbic acid 500mg PO BID  -Anti-tussives for cough  - Acetaminophen Q6hr PRN fever/headache  - Loperamide PRN viral diarrhea  - VTE PPx: enoxaparin   - PT/OT for debility  - If deterioration, may warrant trial of NIPPV in neg pressure room or immediate ICU consult        CKD (chronic kidney disease), stage III  -Creatine stable for now. BMP reviewed- noted Estimated Creatinine Clearance: 31.4 mL/min (based on SCr of 1.2 mg/dL). according to latest data.   -Monitor UOP and serial BMP and adjust therapy as needed.   -Renally dose meds.            Controlled type 2 diabetes mellitus with hyperglycemia, without long-term current use of insulin  Hemoglobin A1C   Date Value Ref Range Status   11/26/2021 6.4 (H) 4.0 - 5.6 % Final     Comment:     ADA Screening Guidelines:  5.7-6.4%  Consistent with prediabetes  >or=6.5%  Consistent with diabetes    High levels of fetal hemoglobin interfere with the HbA1C  assay. Heterozygous hemoglobin variants (HbS, HgC, etc)do  not significantly interfere with this assay.   However, presence of multiple variants may affect accuracy.     04/15/2021 6.5 (H) 4.0 - 5.6 % Final     Comment:     ADA Screening  Guidelines:  5.7-6.4%  Consistent with prediabetes  >or=6.5%  Consistent with diabetes    High levels of fetal hemoglobin interfere with the HbA1C  assay. Heterozygous hemoglobin variants (HbS, HgC, etc)do  not significantly interfere with this assay.   However, presence of multiple variants may affect accuracy.     01/11/2021 6.6 (H) 4.0 - 5.6 % Final     Comment:     ADA Screening Guidelines:  5.7-6.4%  Consistent with prediabetes  >or=6.5%  Consistent with diabetes  High levels of fetal hemoglobin interfere with the HbA1C  assay. Heterozygous hemoglobin variants (HbS, HgC, etc)do  not significantly interfere with this assay.   However, presence of multiple variants may affect accuracy.       Patient's FSGs are controlled on current medication regimen.  - home regimen includes Metformin 1000mg BID  - hold home oral medications  - initiate on detemir 10u daily   - LDSSI with ACCUcheck ACHS  - Diabetic diet  -Hypoglycemia protocol PRN      Aortic atherosclerosis  -chronic, stable;not on statin  -Continue home ASA        Hypothyroidism  -continue home synthroid      HLD (hyperlipidemia)  . Last LDL was   Lab Results   Component Value Date    LDLCALC 160.2 (H) 11/26/2021         Patient is not chronically on statin.will not continue for now. Monitor clinically    HTN (hypertension)  Chronic, controlled.  Latest blood pressure and vitals reviewed-   Temp:  [100.6 °F (38.1 °C)]   Pulse:  [75-89]   Resp:  [24-36]   BP: (123-154)/(59-70)   SpO2:  [90 %-99 %] .   Home meds for hypertension were reviewed and noted below.   Hypertension Medications             amLODIPine (NORVASC) 10 MG tablet TAKE 1 TABLET EVERY DAY    atenoloL (TENORMIN) 50 MG tablet TAKE 1 TABLET EVERY DAY    doxazosin (CARDURA) 2 MG tablet Take 1 tablet (2 mg total) by mouth every evening.    losartan (COZAAR) 100 MG tablet Take 1 tablet (100 mg total) by mouth once daily.          While in the hospital, will manage blood pressure as follows; Continue  home antihypertensive regimen    Will utilize p.r.n. blood pressure medication only if patient's blood pressure greater than  180/110 and she develops symptoms such as worsening chest pain or shortness of breath.        VTE Risk Mitigation (From admission, onward)         Ordered     enoxaparin injection 60 mg  2 times daily         01/11/22 1547     IP VTE HIGH RISK PATIENT  Once         01/11/22 1547     Place sequential compression device  Until discontinued         01/11/22 1547                     Elizabeth Boston DNP, ACNPC-AG, CCRN  Hospitalist  Department of [facility] Medicine  [facility]   [phone]

## 2022-01-12 NOTE — PT/OT/SLP EVAL
Physical Therapy Evaluation    Patient Name:  Oneyda Shah   MRN:  [id number]    Recommendations:     Discharge Recommendations:  home health PT,home health OT   Discharge Equipment Recommendations:  (TBD)   Barriers to discharge: None    Assessment:     Oneyda Shah is a 81 y.o. female admitted with a medical diagnosis of Pneumonia due to COVID-19 virus.  She presents with the following impairments/functional limitations:  weakness,impaired functional mobilty,impaired self care skills,impaired cardiopulmonary response to activity,impaired balance,impaired endurance,decreased coordination .Pt able to take ~3-4 steps forward/backward/laterally with CGA-SBA and HHA - ambulation limited by multiple lines and O2 tubing. Recommending HH PT at this time. DME needs TBD pending further assessment of functional mobility.     Rehab Prognosis: Good; patient would benefit from acute skilled PT services to address these deficits and reach maximum level of function.    Recent Surgery: * No surgery found *      Plan:     During this hospitalization, patient to be seen 3 x/week to address the identified rehab impairments via gait training,therapeutic activities,therapeutic exercises,neuromuscular re-education and progress toward the following goals:    · Plan of Care Expires:  02/12/22    Subjective     Chief Complaint: Weakness  Patient/Family Comments/goals: Return to PLOF  Pain/Comfort:  · Pain Rating 1: 0/10  · Pain Rating Post-Intervention 1: 0/10    Patients cultural, spiritual, Temple conflicts given the current situation: no    Living Environment:  Lives w/spouse, son, grddtr in [facility], no NICA, WIS  Prior to admission, patients level of function was independent. Unknown if pt uses or has any equipment 2/2 language barrier. Pt is retired but does drive. Equipment used at home: none.  DME owned (not currently used): none.  Upon discharge, patient will have assistance from family.    Objective:     Communicated with  nurse prior to session.  Patient found HOB elevated with blood pressure cuff,oxygen,pulse ox (continuous),telemetry,peripheral IV  upon PT entry to room.    General Precautions: Standard, airborne,contact,droplet,respiratory,fall   Orthopedic Precautions:N/A   Braces: N/A  Respiratory Status: Nasal cannula, flow 2 L/min    Exams:  · Cognitive Exam:  Patient is oriented to Person, Place, Time and Situation  · Sensation:    · -       Intact  · Skin Integrity/Edema:      · -       Skin integrity: Visible skin intact  · -       Edema: None noted BLE  · RLE ROM: WFL  · RLE Strength: WFL  · LLE ROM: WFL  · LLE Strength: WFL    Bed Mobility:    · Patient completed Rolling/Turning to Left with  supervision  · Patient completed Scooting/Bridging with supervision  · Patient completed Supine to Sit with supervision  · Patient completed Sit to Supine with supervision     Functional Mobility/Transfers:  · Patient completed Sit <> Stand Transfer with stand by assistance and contact guard assistance  with  no assistive device   · Gait: Pt able to take ~3-4 steps forward/backward/laterally with CGA-SBA and HHA - ambulation limited by multiple lines and O2 tubing.    Therapeutic Activities and Exercises:   Pt refused use of , however, language barrier noted. Pt follows commands but answers questions inappropriately at times.   Pt educated on role of PT/POC and pt agreeable to participate in therapy session.  Pt able to march in place with CGA, mild LOB noted needing CGA-[place] to correct.   See above for mobility.  Pt remained on 2 L O2 throughout session.      AM-PAC 6 CLICK MOBILITY  Total Score:18     Patient left HOB elevated with all lines intact, call button in reach and nsg notified.    GOALS:   Multidisciplinary Problems     Physical Therapy Goals        Problem: Physical Therapy Goal    Goal Priority Disciplines Outcome Goal Variances Interventions   Physical Therapy Goal     PT, PT/OT Ongoing, Progressing      Description: Goals to be met by: 22     Patient will increase functional independence with mobility by performin. Supine to sit with Modified [place]  2. Sit to stand transfer with Modified [place]  3. Bed to chair transfer with Modified [place] with or without an AD.  4. Gait  x 100 feet with Supervision with or without an AD.                     History:     Past Medical History:   Diagnosis Date    Aortic atherosclerosis     CKD (chronic kidney disease), stage III     Diabetes mellitus type II     Fatty liver     High cholesterol     HLD (hyperlipidemia)     HTN (hypertension)     Hypothyroidism     Hypothyroidism     Osteopenia     Statin myopathy        Past Surgical History:   Procedure Laterality Date    BREAST BIOPSY      BREAST SURGERY      biopsy    HYSTERECTOMY      still w/ovaries    ROTATOR CUFF REPAIR Left        Time Tracking:     PT Received On: 22  PT Start Time: 1049     PT Stop Time: 1112  PT Total Time (min): 23 min     Billable Minutes: Evaluation 13 and Therapeutic Activity 10 (co-tx with OT)      2022

## 2022-01-12 NOTE — SUBJECTIVE & OBJECTIVE
Interval History: no acute issues, says her breathing is okay. On 3 liters NC. Family at bedside and updated.    Review of Systems   Constitutional: Positive for appetite change, fatigue and fever. Negative for chills and diaphoresis.   HENT: Negative for hearing loss and sore throat.    Eyes: Negative for visual disturbance.   Respiratory: Positive for cough. Negative for shortness of breath and wheezing.    Cardiovascular: Negative for chest pain and leg swelling.   Gastrointestinal: Positive for diarrhea. Negative for abdominal pain, nausea and vomiting.   Genitourinary: Negative for difficulty urinating and flank pain.   Musculoskeletal: Negative for back pain and neck pain.   Skin: Negative for rash and wound.   Neurological: Positive for weakness. Negative for dizziness and headaches.   Psychiatric/Behavioral: Negative for agitation, confusion and hallucinations.     Objective:     Vital Signs (Most Recent):  Temp: 97.4 °F (36.3 °C) (01/12/22 0933)  Pulse: 69 (01/12/22 0933)  Resp: (!) 28 (01/12/22 0933)  BP: 128/74 (01/12/22 0933)  SpO2: (!) 94 % (01/12/22 0933) Vital Signs (24h Range):  Temp:  [97.4 °F (36.3 °C)-100.6 °F (38.1 °C)] 97.4 °F (36.3 °C)  Pulse:  [67-89] 69  Resp:  [23-36] 28  SpO2:  [90 %-99 %] 94 %  BP: (123-154)/(59-81) 128/74     Weight: 59 kg (130 lb)  Body mass index is 24.56 kg/m².    Intake/Output Summary (Last 24 hours) at 1/12/2022 1017  Last data filed at 1/12/2022 0646  Gross per 24 hour   Intake 550 ml   Output 870 ml   Net -320 ml      Physical Exam  Vitals and nursing note reviewed.   Constitutional:       General: She is not in acute distress.     Appearance: Normal appearance. She is ill-appearing. She is not toxic-appearing.      Interventions: Nasal cannula in place.      Comments: 2L   HENT:      Head: Normocephalic and atraumatic.      Mouth/Throat:      Mouth: Mucous membranes are moist.   Eyes:      Extraocular Movements: Extraocular movements intact.      Pupils: Pupils  are equal, round, and reactive to light.   Cardiovascular:      Rate and Rhythm: Normal rate and regular rhythm.      Pulses: Normal pulses.      Heart sounds: Normal heart sounds. No murmur heard.  No friction rub. No gallop.    Pulmonary:      Effort: No tachypnea or respiratory distress.      Breath sounds: No wheezing.      Comments: Crackles at bilateral bases  Abdominal:      General: Bowel sounds are normal. There is no distension.      Palpations: Abdomen is soft.      Tenderness: There is no abdominal tenderness. There is no guarding.   Musculoskeletal:         General: No swelling.      Cervical back: Normal range of motion and neck supple.      Right lower leg: No edema.      Left lower leg: No edema.   Skin:     General: Skin is warm and dry.      Capillary Refill: Capillary refill takes less than 2 seconds.      Findings: No bruising, erythema or rash.   Neurological:      General: No focal deficit present.      Mental Status: She is oriented to person, place, and time.   Psychiatric:         Mood and Affect: Mood normal.         Behavior: Behavior normal. Behavior is cooperative.         Significant Labs: All pertinent labs within the past 24 hours have been reviewed.    Significant Imaging: I have reviewed all pertinent imaging results/findings within the past 24 hours.

## 2022-01-12 NOTE — PLAN OF CARE
Patient on oxygen with documented flow.  Will attempt to wean per O2 order protocol. The proper method of use, as well as anticipated side effects, of this metered-dose inhaler are discussed and demonstrated to the patient. Will continue to monitor.

## 2022-01-12 NOTE — ASSESSMENT & PLAN NOTE
Chronic, controlled.  Latest blood pressure and vitals reviewed-   Temp:  [97.4 °F (36.3 °C)-100.6 °F (38.1 °C)]   Pulse:  [67-89]   Resp:  [23-36]   BP: (123-154)/(59-81)   SpO2:  [90 %-99 %] .   Home meds for hypertension were reviewed and noted below.   Hypertension Medications             amLODIPine (NORVASC) 10 MG tablet TAKE 1 TABLET EVERY DAY    atenoloL (TENORMIN) 50 MG tablet TAKE 1 TABLET EVERY DAY    doxazosin (CARDURA) 2 MG tablet Take 1 tablet (2 mg total) by mouth every evening.    losartan (COZAAR) 100 MG tablet Take 1 tablet (100 mg total) by mouth once daily.          While in the hospital, will manage blood pressure as follows; Continue home antihypertensive regimen    Will utilize p.r.n. blood pressure medication only if patient's blood pressure greater than  180/110 and she develops symptoms such as worsening chest pain or shortness of breath.

## 2022-01-12 NOTE — PLAN OF CARE
Problem: Physical Therapy Goal  Goal: Physical Therapy Goal  Description: Goals to be met by: 22     Patient will increase functional independence with mobility by performin. Supine to sit with Modified [place]  2. Sit to stand transfer with Modified [place]  3. Bed to chair transfer with Modified [place] with or without an AD.  4. Gait  x 100 feet with Supervision with or without an AD.    Outcome: Ongoing, Progressing     PT Eval completed, note to follow. Pt able to take ~3-4 steps forward/backward/laterally with CGA-SBA and HHA - ambulation limited by multiple lines and O2 tubing. Recommending HH PT at this time. DME needs TBD pending further assessment of functional mobility.

## 2022-01-12 NOTE — ASSESSMENT & PLAN NOTE
- COVID positive 1/4/2022; patient received monoclonal antibody infusion reported 1 week ago; initiated on protocol  - Isolation: Airborne/Droplet. Surgical mask on patient. Notify Infection Control  - CXR with findings concerning for multifocal PNA from inflammatory or infectious process including atypical bacterial or viral etiology without large consolidation  - Telemetry   - initiated on dexamethasone, will continue for 10d course  - initiated on remdesivir x5d; daily CMP  - initiated on ceftriaxone/azith x5d due to concern for superimposed CAP  - CRP 84; D-dimer pending  -Covid Labs remarkable for   - Order RT consult via Respiratory Communication for COVID Protocols  - Incentive Spirometer Q4h  - Continuous Pulse Oximetry, goal SpO2 92-96%  - supplemental O2, will wean as tolerated  - Albuterol INH Q6h scheduled & PRN  - MVI & ascorbic acid 500mg PO BID  -Anti-tussives for cough  - Acetaminophen Q6hr PRN fever/headache  - Loperamide PRN viral diarrhea  - VTE PPx: enoxaparin   - PT/OT for debility  - If deterioration, may warrant trial of NIPPV in neg pressure room or immediate ICU consult

## 2022-01-12 NOTE — ED NOTES
Upon entering room. Noted that o2 sat was 88%.  Noted that Oxygen was discontinued. Reconnected tubing to o2 and 02sat are beging to increase to 94% on 2l nc

## 2022-01-12 NOTE — PROGRESS NOTES
[facility] Emergency Dept  [facility] Medicine  Progress Note    Patient Name: Oneyda Shah  MRN: [id number]  Patient Class: OP- Observation   Admission Date: 1/11/2022  Length of Stay: 0 days  Attending Physician: Oliver Moore MD  Primary Care Provider: Zion Villavicencio MD        Subjective:     Principal Problem:Pneumonia due to COVID-19 virus        HPI:  Ms. Oneyda Shah is a 82 y/o female with PMHx CKD, DMII, HTN, HLD, and hypothyroidism presents to [facility] ED with complaints of weakness, fatigue, and diarrhea x few days. Patient was recently tested positive for COVID-19 on 1/4/22 which she received the monoclonal antibody infusion approximately reported one week ago. Patient reports she was feeling better but recently she began to lose her appetite, had fever (tmax 100.1), diarrhea, and generalized weakness. Patient reports taking Tylenol and Motrin with fever relief, but her other symptoms have not improved. Patient reports non-productive cough. Patient denies shortness of breath, chest pain, palpitations, BARTON, or leg swelling. Patient denies nausea or vomiting. In ED: CXR revealed findings concerning for multifocal PNA from inflammatory or infectious process including atypical bacterial or viral etiology without large consolidation. Denies recent sick contacts. Denies recent travel.       Overview/Hospital Course:  Admitted with COVID pneumonia and hypoxia. Started on dexamethasone and remdesivir.      Interval History: no acute issues, says her breathing is okay. On 3 liters NC. Family at bedside and updated.    Review of Systems   Constitutional: Positive for appetite change, fatigue and fever. Negative for chills and diaphoresis.   HENT: Negative for hearing loss and sore throat.    Eyes: Negative for visual disturbance.   Respiratory: Positive for cough. Negative for shortness of breath and wheezing.    Cardiovascular: Negative for chest pain and leg swelling.   Gastrointestinal: Positive for diarrhea.  Negative for abdominal pain, nausea and vomiting.   Genitourinary: Negative for difficulty urinating and flank pain.   Musculoskeletal: Negative for back pain and neck pain.   Skin: Negative for rash and wound.   Neurological: Positive for weakness. Negative for dizziness and headaches.   Psychiatric/Behavioral: Negative for agitation, confusion and hallucinations.     Objective:     Vital Signs (Most Recent):  Temp: 97.4 °F (36.3 °C) (01/12/22 0933)  Pulse: 69 (01/12/22 0933)  Resp: (!) 28 (01/12/22 0933)  BP: 128/74 (01/12/22 0933)  SpO2: (!) 94 % (01/12/22 0933) Vital Signs (24h Range):  Temp:  [97.4 °F (36.3 °C)-100.6 °F (38.1 °C)] 97.4 °F (36.3 °C)  Pulse:  [67-89] 69  Resp:  [23-36] 28  SpO2:  [90 %-99 %] 94 %  BP: (123-154)/(59-81) 128/74     Weight: 59 kg (130 lb)  Body mass index is 24.56 kg/m².    Intake/Output Summary (Last 24 hours) at 1/12/2022 1017  Last data filed at 1/12/2022 0646  Gross per 24 hour   Intake 550 ml   Output 870 ml   Net -320 ml      Physical Exam  Vitals and nursing note reviewed.   Constitutional:       General: She is not in acute distress.     Appearance: Normal appearance. She is ill-appearing. She is not toxic-appearing.      Interventions: Nasal cannula in place.      Comments: 2L   HENT:      Head: Normocephalic and atraumatic.      Mouth/Throat:      Mouth: Mucous membranes are moist.   Eyes:      Extraocular Movements: Extraocular movements intact.      Pupils: Pupils are equal, round, and reactive to light.   Cardiovascular:      Rate and Rhythm: Normal rate and regular rhythm.      Pulses: Normal pulses.      Heart sounds: Normal heart sounds. No murmur heard.  No friction rub. No gallop.    Pulmonary:      Effort: No tachypnea or respiratory distress.      Breath sounds: No wheezing.      Comments: Crackles at bilateral bases  Abdominal:      General: Bowel sounds are normal. There is no distension.      Palpations: Abdomen is soft.      Tenderness: There is no abdominal  tenderness. There is no guarding.   Musculoskeletal:         General: No swelling.      Cervical back: Normal range of motion and neck supple.      Right lower leg: No edema.      Left lower leg: No edema.   Skin:     General: Skin is warm and dry.      Capillary Refill: Capillary refill takes less than 2 seconds.      Findings: No bruising, erythema or rash.   Neurological:      General: No focal deficit present.      Mental Status: She is oriented to person, place, and time.   Psychiatric:         Mood and Affect: Mood normal.         Behavior: Behavior normal. Behavior is cooperative.         Significant Labs: All pertinent labs within the past 24 hours have been reviewed.    Significant Imaging: I have reviewed all pertinent imaging results/findings within the past 24 hours.      Assessment/Plan:      * Pneumonia due to COVID-19 virus  - COVID positive 1/4/2022; patient received monoclonal antibody infusion reported 1 week ago; initiated on protocol  - Isolation: Airborne/Droplet. Surgical mask on patient. Notify Infection Control  - CXR with findings concerning for multifocal PNA from inflammatory or infectious process including atypical bacterial or viral etiology without large consolidation  - Telemetry   - initiated on dexamethasone, will continue for 10d course  - initiated on remdesivir x5d; daily CMP  - initiated on ceftriaxone/azith x5d due to concern for superimposed CAP  - CRP 84; D-dimer pending  -Covid Labs remarkable for   - Order RT consult via Respiratory Communication for COVID Protocols  - Incentive Spirometer Q4h  - Continuous Pulse Oximetry, goal SpO2 92-96%  - supplemental O2, will wean as tolerated  - Albuterol INH Q6h scheduled & PRN  - MVI & ascorbic acid 500mg PO BID  -Anti-tussives for cough  - Acetaminophen Q6hr PRN fever/headache  - Loperamide PRN viral diarrhea  - VTE PPx: enoxaparin   - PT/OT for debility  - If deterioration, may warrant trial of NIPPV in neg pressure room or  immediate ICU consult        CKD (chronic kidney disease), stage III  -Creatine stable for now. BMP reviewed- noted Estimated Creatinine Clearance: 31.4 mL/min (based on SCr of 1.2 mg/dL). according to latest data.   -Monitor UOP and serial BMP and adjust therapy as needed.   -Renally dose meds.            Controlled type 2 diabetes mellitus with hyperglycemia, without long-term current use of insulin  Hemoglobin A1C   Date Value Ref Range Status   11/26/2021 6.4 (H) 4.0 - 5.6 % Final     Comment:     ADA Screening Guidelines:  5.7-6.4%  Consistent with prediabetes  >or=6.5%  Consistent with diabetes    High levels of fetal hemoglobin interfere with the HbA1C  assay. Heterozygous hemoglobin variants (HbS, HgC, etc)do  not significantly interfere with this assay.   However, presence of multiple variants may affect accuracy.     04/15/2021 6.5 (H) 4.0 - 5.6 % Final     Comment:     ADA Screening Guidelines:  5.7-6.4%  Consistent with prediabetes  >or=6.5%  Consistent with diabetes    High levels of fetal hemoglobin interfere with the HbA1C  assay. Heterozygous hemoglobin variants (HbS, HgC, etc)do  not significantly interfere with this assay.   However, presence of multiple variants may affect accuracy.     01/11/2021 6.6 (H) 4.0 - 5.6 % Final     Comment:     ADA Screening Guidelines:  5.7-6.4%  Consistent with prediabetes  >or=6.5%  Consistent with diabetes  High levels of fetal hemoglobin interfere with the HbA1C  assay. Heterozygous hemoglobin variants (HbS, HgC, etc)do  not significantly interfere with this assay.   However, presence of multiple variants may affect accuracy.       Patient's FSGs are controlled on current medication regimen.  - home regimen includes Metformin 1000mg BID  - hold home oral medications  - initiate on detemir 10u daily   - LDSSI with ACCUcheck ACHS  - add long acting as dexamethasone likely increasing  - Diabetic diet  -Hypoglycemia protocol PRN      Aortic atherosclerosis  -chronic,  stable;not on statin  -Continue home ASA        Hypothyroidism  -continue home synthroid      HLD (hyperlipidemia)  . Last LDL was   Lab Results   Component Value Date    LDLCALC 160.2 (H) 11/26/2021         Patient is not chronically on statin.will not continue for now. Monitor clinically    HTN (hypertension)  Chronic, controlled.  Latest blood pressure and vitals reviewed-   Temp:  [97.4 °F (36.3 °C)-100.6 °F (38.1 °C)]   Pulse:  [67-89]   Resp:  [23-36]   BP: (123-154)/(59-81)   SpO2:  [90 %-99 %] .   Home meds for hypertension were reviewed and noted below.   Hypertension Medications             amLODIPine (NORVASC) 10 MG tablet TAKE 1 TABLET EVERY DAY    atenoloL (TENORMIN) 50 MG tablet TAKE 1 TABLET EVERY DAY    doxazosin (CARDURA) 2 MG tablet Take 1 tablet (2 mg total) by mouth every evening.    losartan (COZAAR) 100 MG tablet Take 1 tablet (100 mg total) by mouth once daily.          While in the hospital, will manage blood pressure as follows; Continue home antihypertensive regimen    Will utilize p.r.n. blood pressure medication only if patient's blood pressure greater than  180/110 and she develops symptoms such as worsening chest pain or shortness of breath.          VTE Risk Mitigation (From admission, onward)         Ordered     enoxaparin injection 60 mg  2 times daily         01/11/22 1547     IP VTE HIGH RISK PATIENT  Once         01/11/22 1547     Place sequential compression device  Until discontinued         01/11/22 1547                Discharge Planning   LELA:      Code Status: Prior   Is the patient medically ready for discharge?:     Reason for patient still in hospital (select all that apply): Treatment                     Oliver Moore MD  Department of Hospital Medicine   [place] - Emergency Dept

## 2022-01-13 ENCOUNTER — PATIENT OUTREACH (OUTPATIENT)
Dept: ADMINISTRATIVE | Facility: OTHER | Age: 82
End: 2022-01-13
Payer: MEDICARE

## 2022-01-13 LAB
POCT GLUCOSE: 230 MG/DL (ref 70–110)
POCT GLUCOSE: 278 MG/DL (ref 70–110)
POCT GLUCOSE: 347 MG/DL (ref 70–110)

## 2022-01-13 PROCEDURE — 11000001 HC ACUTE MED/SURG PRIVATE ROOM

## 2022-01-13 PROCEDURE — 27000221 HC OXYGEN, UP TO 24 HOURS

## 2022-01-13 PROCEDURE — 97530 THERAPEUTIC ACTIVITIES: CPT | Mod: CQ

## 2022-01-13 PROCEDURE — 97116 GAIT TRAINING THERAPY: CPT | Mod: CQ

## 2022-01-13 PROCEDURE — 25000003 PHARM REV CODE 250: Performed by: STUDENT IN AN ORGANIZED HEALTH CARE EDUCATION/TRAINING PROGRAM

## 2022-01-13 PROCEDURE — 94640 AIRWAY INHALATION TREATMENT: CPT

## 2022-01-13 PROCEDURE — C9399 UNCLASSIFIED DRUGS OR BIOLOG: HCPCS | Performed by: STUDENT IN AN ORGANIZED HEALTH CARE EDUCATION/TRAINING PROGRAM

## 2022-01-13 PROCEDURE — 97535 SELF CARE MNGMENT TRAINING: CPT

## 2022-01-13 PROCEDURE — 27000207 HC ISOLATION

## 2022-01-13 PROCEDURE — 25000003 PHARM REV CODE 250

## 2022-01-13 PROCEDURE — 94761 N-INVAS EAR/PLS OXIMETRY MLT: CPT

## 2022-01-13 PROCEDURE — 94799 UNLISTED PULMONARY SVC/PX: CPT

## 2022-01-13 PROCEDURE — 97530 THERAPEUTIC ACTIVITIES: CPT

## 2022-01-13 PROCEDURE — 25000242 PHARM REV CODE 250 ALT 637 W/ HCPCS

## 2022-01-13 PROCEDURE — 63600175 PHARM REV CODE 636 W HCPCS

## 2022-01-13 RX ADMIN — ALBUTEROL SULFATE 2 PUFF: 90 AEROSOL, METERED RESPIRATORY (INHALATION) at 08:01

## 2022-01-13 RX ADMIN — ATENOLOL 50 MG: 25 TABLET ORAL at 09:01

## 2022-01-13 RX ADMIN — INSULIN DETEMIR 10 UNITS: 100 INJECTION, SOLUTION SUBCUTANEOUS at 09:01

## 2022-01-13 RX ADMIN — INSULIN ASPART 3 UNITS: 100 INJECTION, SOLUTION INTRAVENOUS; SUBCUTANEOUS at 06:01

## 2022-01-13 RX ADMIN — INSULIN ASPART 2 UNITS: 100 INJECTION, SOLUTION INTRAVENOUS; SUBCUTANEOUS at 09:01

## 2022-01-13 RX ADMIN — LEVOTHYROXINE SODIUM 50 MCG: 50 TABLET ORAL at 09:01

## 2022-01-13 RX ADMIN — AMLODIPINE BESYLATE 10 MG: 5 TABLET ORAL at 09:01

## 2022-01-13 RX ADMIN — LOSARTAN POTASSIUM 100 MG: 50 TABLET, FILM COATED ORAL at 09:01

## 2022-01-13 RX ADMIN — ENOXAPARIN SODIUM 60 MG: 100 INJECTION SUBCUTANEOUS at 09:01

## 2022-01-13 RX ADMIN — ASPIRIN 81 MG: 81 TABLET, COATED ORAL at 09:01

## 2022-01-13 RX ADMIN — PANTOPRAZOLE SODIUM 40 MG: 40 TABLET, DELAYED RELEASE ORAL at 09:01

## 2022-01-13 RX ADMIN — ALBUTEROL SULFATE 2 PUFF: 90 AEROSOL, METERED RESPIRATORY (INHALATION) at 01:01

## 2022-01-13 RX ADMIN — DEXAMETHASONE 6 MG: 4 TABLET ORAL at 09:01

## 2022-01-13 RX ADMIN — OXYCODONE HYDROCHLORIDE AND ACETAMINOPHEN 500 MG: 500 TABLET ORAL at 09:01

## 2022-01-13 RX ADMIN — DOXAZOSIN 2 MG: 2 TABLET ORAL at 09:01

## 2022-01-13 RX ADMIN — INSULIN ASPART 2 UNITS: 100 INJECTION, SOLUTION INTRAVENOUS; SUBCUTANEOUS at 12:01

## 2022-01-13 RX ADMIN — SENNOSIDES AND DOCUSATE SODIUM 1 TABLET: 8.6; 5 TABLET ORAL at 09:01

## 2022-01-13 RX ADMIN — ALBUTEROL SULFATE 2 PUFF: 90 AEROSOL, METERED RESPIRATORY (INHALATION) at 07:01

## 2022-01-13 RX ADMIN — REMDESIVIR 100 MG: 100 INJECTION, POWDER, LYOPHILIZED, FOR SOLUTION INTRAVENOUS at 09:01

## 2022-01-13 RX ADMIN — THERA TABS 1 TABLET: TAB at 09:01

## 2022-01-13 NOTE — PT/OT/SLP PROGRESS
Occupational Therapy   Treatment    Name: Oneyda Shah  MRN: [id number]  Admitting Diagnosis:  Pneumonia due to COVID-19 virus       Recommendations:     Discharge Recommendations: home health OT,home health PT  Discharge Equipment Recommendations:  walker, rolling,shower chair  Barriers to discharge:  None    Assessment:     Oneyda Shah is a 81 y.o. female with a medical diagnosis of Pneumonia due to COVID-19 virus.  She presents with performance deficits affecting function are impaired endurance,impaired self care skills,impaired functional mobilty,decreased lower extremity function,decreased upper extremity function,impaired cardiopulmonary response to activity.     Rehab Prognosis:  Good; patient would benefit from acute skilled OT services to address these deficits and reach maximum level of function.       Plan:     Patient to be seen 2 x/week to address the above listed problems via self-care/home management,therapeutic activities,therapeutic exercises  · Plan of Care Expires: 02/12/22  · Plan of Care Reviewed with: patient    Subjective     Pain/Comfort:  · Pain Rating 1: 0/10  · Pain Rating Post-Intervention 1: 0/10    Objective:     Communicated with: nurse prior to session.  Patient found HOB elevated with oxygen,peripheral IV,PureWick,pulse ox (continuous),telemetry upon OT entry to room.    General Precautions: Standard, fall,airborne,contact,droplet   Orthopedic Precautions:    Braces:    Respiratory Status: Nasal cannula, flow 2 L/min     Occupational Performance:     Bed Mobility:    · Patient completed Rolling/Turning to Left with  modified independence  · Patient completed Scooting/Bridging with modified independence  · Patient completed Supine to Sit with modified independence  · Patient completed Sit to Supine with modified independence     Functional Mobility/Transfers:  · Patient completed Sit <> Stand Transfer with stand by assistance  with  no assistive device   · Patient completed Bed  <> Chair Transfer using Step Transfer technique with stand by assistance with no assistive device  · Patient completed Toilet Transfer Step Transfer technique with stand by assistance with  no AD  · Functional Mobility: SBA in room no AD    Activities of Daily Living:  · Lower Body Dressing: stand by assistance    · Toileting: stand by assistance        [facility] 6 Click ADL: 19    Treatment & Education:  Pt up to EOB then to toilet SBA without O2 and sat 93=94% throughout treatment session. Performed ADLs and functional mobility in room on RA.  Reviewed AROM ex.  O2 reapplied prior to leaving pt.    Patient left up in chair with all lines intact, call button in reach and nurse notifiedEducation:      GOALS:   Multidisciplinary Problems     Occupational Therapy Goals        Problem: Occupational Therapy Goal    Goal Priority Disciplines Outcome Interventions   Occupational Therapy Goal     OT, PT/OT Ongoing, Progressing    Description: Goals to be met by: 2/12     Patient will increase functional independence with ADLs by performing:    UE Dressing with Modified [place].  LE Dressing with Modified [place].  Grooming while standing at sink with Modified [place].  Toileting from toilet with Modified [place] for hygiene and clothing management.   Toilet transfer to toilet with Modified [place].  Upper extremity exercise program x10 reps per handout, with independence.                     Time Tracking:     OT Date of Treatment: 01/13/22  OT Start Time: 0944  OT Stop Time: 1022  OT Total Time (min): 38 min    Billable Minutes:Self Care/Home Management 23  Therapeutic Activity 15               1/13/2022

## 2022-01-13 NOTE — ED NOTES
Patient identifiers verified and correct for patient  C/C: sob, covid  APPEARANCE: awake and alert in NAD. Wearing O2 at @l NC  SKIN: warm, dry and intact. No breakdown or bruising.  MUSCULOSKELETAL: Patient moving all extremities spontaneously, no obvious swelling or deformities noted. Ambulates independently.Generalized weakness and requires side assist to ambulate   RESPIRATORY: reports shortness of breath w/ excertion.Respirations unlabored at rest. Coarse crackles to bl lower lobes upon auscultation.  O2 at 2l NC   CARDIAC: Denies CP, + distal pulses; mild peripheral edema blle  ABDOMEN: denies abdominal pain and n/v/d . Diarrhea PTA  : voids via periwick, denies difficulty  Neurologic: AAO x 4; follows commands equal strength in all extremities; denies numbness/tingling. Denies dizziness

## 2022-01-13 NOTE — PLAN OF CARE
SW attempted to go into pt room via Loop App and was unsuccessful.     SW called pt room and there was no answer. SW will follow up with pt.    SW will continue to follow pt throughout her transitions of care and assist with any dc needs.       --SW requested PT/OT recommendations of  Home Health, RW, and Transfer Tub bench orders to placed from doctor.     Future Appointments   Date Time Provider Department Center   2/2/2022  2:40 PM Arsh Benavides MD [facility]

## 2022-01-13 NOTE — ASSESSMENT & PLAN NOTE
Chronic, controlled.  Latest blood pressure and vitals reviewed-   Temp:  [96.6 °F (35.9 °C)-98 °F (36.7 °C)]   Pulse:  [50-75]   Resp:  [16-47]   BP: (131-175)/(63-82)   SpO2:  [92 %-98 %] .   Home meds for hypertension were reviewed and noted below.   Hypertension Medications             amLODIPine (NORVASC) 10 MG tablet TAKE 1 TABLET EVERY DAY    atenoloL (TENORMIN) 50 MG tablet TAKE 1 TABLET EVERY DAY    doxazosin (CARDURA) 2 MG tablet Take 1 tablet (2 mg total) by mouth every evening.    losartan (COZAAR) 100 MG tablet Take 1 tablet (100 mg total) by mouth once daily.          While in the hospital, will manage blood pressure as follows; Continue home antihypertensive regimen    Will utilize p.r.n. blood pressure medication only if patient's blood pressure greater than  180/110 and she develops symptoms such as worsening chest pain or shortness of breath.

## 2022-01-13 NOTE — PROGRESS NOTES
Reached out to pt via room phone and mobile number @ [phone] to notify upcoming discharge and explain d/c rights no answer will f/u.

## 2022-01-13 NOTE — PT/OT/SLP PROGRESS
Physical Therapy Treatment    Patient Name:  Oneyda Shah   MRN:  [id number]    Recommendations:     Discharge Recommendations:  home health PT   Discharge Equipment Recommendations: shower chair,walker, rolling   Barriers to discharge: decreased mobility,strength and endurance    Assessment:     Oneyda Shah is a 81 y.o. female admitted with a medical diagnosis of Pneumonia due to COVID-19 virus.  She presents with the following impairments/functional limitations:  weakness,impaired endurance,impaired functional mobilty,impaired balance,decreased lower extremity function,decreased upper extremity function,decreased ROM,impaired coordination,impaired cardiopulmonary response to activity,pt with improving status and will benefit from HH services upon discharge to address above functional limitations.    Rehab Prognosis: Good; patient would benefit from acute skilled PT services to address these deficits and reach maximum level of function.    Recent Surgery: * No surgery found *      Plan:     During this hospitalization, patient to be seen 3 x/week to address the identified rehab impairments via gait training,therapeutic activities,therapeutic exercises,neuromuscular re-education and progress toward the following goals:    · Plan of Care Expires:  02/12/22    Subjective     Chief Complaint: n/a  Patient/Family Comments/goals: pt is feeling better.  Pain/Comfort:  · Pain Rating 1: 0/10      Objective:     Communicated with nsg prior to session.  Patient found supine with oxygen,peripheral IV,pulse ox (continuous),telemetry upon PT entry to room.     General Precautions: Standard, fall,airborne,contact,droplet   Orthopedic Precautions:N/A   Braces: N/A  Respiratory Status: Nasal cannula, flow 4 L/min     Functional Mobility:  · Bed Mobility:     · Supine to Sit: modified independence  · Transfers:     · Sit to Stand:  supervision with no AD  · Bed to Chair: supervision with  no AD  using  ambulation  · Gait: amb  ~90' w/o AD and S with O2/4L and IV in tow  · Balance: fair standing balance      AM-PAC 6 CLICK MOBILITY  Turning over in bed (including adjusting bedclothes, sheets and blankets)?: 4  Sitting down on and standing up from a chair with arms (e.g., wheelchair, bedside commode, etc.): 3  Moving from lying on back to sitting on the side of the bed?: 4  Moving to and from a bed to a chair (including a wheelchair)?: 3  Need to walk in hospital room?: 3  Climbing 3-5 steps with a railing?: 2  Basic Mobility Total Score: 19       Therapeutic Activities and Exercises:        Patient left up in chair with all lines intact, call button in reach and nsg notified..    GOALS: see general POC  Multidisciplinary Problems     Physical Therapy Goals        Problem: Physical Therapy Goal    Goal Priority Disciplines Outcome Goal Variances Interventions   Physical Therapy Goal     PT, PT/OT Ongoing, Progressing     Description: Goals to be met by: 22     Patient will increase functional independence with mobility by performin. Supine to sit with Modified [place]  2. Sit to stand transfer with Modified [place]  3. Bed to chair transfer with Modified [place] with or without an AD.  4. Gait  x 100 feet with Supervision with or without an AD.                     Time Tracking:     PT Received On: 22  PT Start Time: 1343     PT Stop Time: 1407  PT Total Time (min): 24 min     Billable Minutes: Gait Training 15 and Therapeutic Activity 9    Treatment Type: Treatment  PT/PTA: PTA     PTA Visit Number: 1     2022

## 2022-01-13 NOTE — PROGRESS NOTES
[facility] Medicine  Progress Note    Patient Name: Oneyda Shah  MRN: [id number]  Patient Class: IP- Inpatient   Admission Date: 1/11/2022  Length of Stay: 1 days  Attending Physician: Oliver Moore MD  Primary Care Provider: Zion Villavicencio MD        Subjective:     Principal Problem:Pneumonia due to COVID-19 virus        HPI:  Ms. Oneyda Shah is a 80 y/o female with PMHx CKD, DMII, HTN, HLD, and hypothyroidism presents to [facility] ED with complaints of weakness, fatigue, and diarrhea x few days. Patient was recently tested positive for COVID-19 on 1/4/22 which she received the monoclonal antibody infusion approximately reported one week ago. Patient reports she was feeling better but recently she began to lose her appetite, had fever (tmax 100.1), diarrhea, and generalized weakness. Patient reports taking Tylenol and Motrin with fever relief, but her other symptoms have not improved. Patient reports non-productive cough. Patient denies shortness of breath, chest pain, palpitations, BARTON, or leg swelling. Patient denies nausea or vomiting. In ED: CXR revealed findings concerning for multifocal PNA from inflammatory or infectious process including atypical bacterial or viral etiology without large consolidation. Denies recent sick contacts. Denies recent travel.       Overview/Hospital Course:  Admitted with COVID pneumonia and hypoxia. Started on dexamethasone and remdesivir.      Interval History: Doing okay today, asking when she can go home. Eating more.    Review of Systems   Constitutional: Positive for appetite change, fatigue and fever. Negative for chills and diaphoresis.   HENT: Negative for hearing loss and sore throat.    Eyes: Negative for visual disturbance.   Respiratory: Positive for cough. Negative for shortness of breath and wheezing.    Cardiovascular: Negative for chest pain and leg swelling.   Gastrointestinal: Positive for diarrhea. Negative for abdominal pain, nausea and  vomiting.   Genitourinary: Negative for difficulty urinating and flank pain.   Musculoskeletal: Negative for back pain and neck pain.   Skin: Negative for rash and wound.   Neurological: Positive for weakness. Negative for dizziness and headaches.   Psychiatric/Behavioral: Negative for agitation, confusion and hallucinations.     Objective:     Vital Signs (Most Recent):  Temp: 97.6 °F (36.4 °C) (01/13/22 1115)  Pulse: 62 (01/13/22 1359)  Resp: 18 (01/13/22 1359)  BP: 131/65 (01/13/22 1115)  SpO2: 96 % (01/13/22 1359) Vital Signs (24h Range):  Temp:  [96.6 °F (35.9 °C)-98 °F (36.7 °C)] 97.6 °F (36.4 °C)  Pulse:  [50-75] 62  Resp:  [16-47] 18  SpO2:  [92 %-98 %] 96 %  BP: (131-175)/(63-82) 131/65     Weight: 62.4 kg (137 lb 9.1 oz)  Body mass index is 25.99 kg/m².  No intake or output data in the 24 hours ending 01/13/22 1531   Physical Exam  Vitals and nursing note reviewed.   Constitutional:       General: She is not in acute distress.     Appearance: Normal appearance. She is ill-appearing. She is not toxic-appearing.      Interventions: Nasal cannula in place.      Comments: 2L   HENT:      Head: Normocephalic and atraumatic.      Mouth/Throat:      Mouth: Mucous membranes are moist.   Eyes:      Extraocular Movements: Extraocular movements intact.      Pupils: Pupils are equal, round, and reactive to light.   Cardiovascular:      Rate and Rhythm: Normal rate and regular rhythm.      Pulses: Normal pulses.      Heart sounds: Normal heart sounds. No murmur heard.  No friction rub. No gallop.    Pulmonary:      Effort: No tachypnea or respiratory distress.      Breath sounds: No wheezing.      Comments: Mild Crackles at bilateral bases but improved  Abdominal:      General: Bowel sounds are normal. There is no distension.      Palpations: Abdomen is soft.      Tenderness: There is no abdominal tenderness. There is no guarding.   Musculoskeletal:         General: No swelling.      Cervical back: Normal range of  motion and neck supple.      Right lower leg: No edema.      Left lower leg: No edema.   Skin:     General: Skin is warm and dry.      Capillary Refill: Capillary refill takes less than 2 seconds.      Findings: No bruising, erythema or rash.   Neurological:      General: No focal deficit present.      Mental Status: She is oriented to person, place, and time.   Psychiatric:         Mood and Affect: Mood normal.         Behavior: Behavior normal. Behavior is cooperative.         Significant Labs: All pertinent labs within the past 24 hours have been reviewed.    Significant Imaging: I have reviewed all pertinent imaging results/findings within the past 24 hours.      Assessment/Plan:      * Pneumonia due to COVID-19 virus  - COVID positive 1/4/2022; patient received monoclonal antibody infusion reported 1 week ago; initiated on protocol  - Isolation: Airborne/Droplet. Surgical mask on patient. Notify Infection Control  - CXR with findings concerning for multifocal PNA from inflammatory or infectious process including atypical bacterial or viral etiology without large consolidation  - Telemetry   - initiated on dexamethasone, will continue for 10d course  - initiated on remdesivir x5d; daily CMP  - initiated on ceftriaxone/azith x5d due to concern for superimposed CAP  - CRP 84; D-dimer pending  -Covid Labs remarkable for   - Order RT consult via Respiratory Communication for COVID Protocols  - Incentive Spirometer Q4h  - Continuous Pulse Oximetry, goal SpO2 92-96%  - supplemental O2, will wean as tolerated  - Albuterol INH Q6h scheduled & PRN  - MVI & ascorbic acid 500mg PO BID  -Anti-tussives for cough  - Acetaminophen Q6hr PRN fever/headache  - Loperamide PRN viral diarrhea  - VTE PPx: enoxaparin   - PT/OT for debility  - If deterioration, may warrant trial of NIPPV in neg pressure room or immediate ICU consult    - wean O2 as tolerated        CKD (chronic kidney disease), stage III  -Creatine stable for  now. BMP reviewed- noted Estimated Creatinine Clearance: 31.4 mL/min (based on SCr of 1.2 mg/dL). according to latest data.   -Monitor UOP and serial BMP and adjust therapy as needed.   -Renally dose meds.            Controlled type 2 diabetes mellitus with hyperglycemia, without long-term current use of insulin  Hemoglobin A1C   Date Value Ref Range Status   11/26/2021 6.4 (H) 4.0 - 5.6 % Final     Comment:     ADA Screening Guidelines:  5.7-6.4%  Consistent with prediabetes  >or=6.5%  Consistent with diabetes    High levels of fetal hemoglobin interfere with the HbA1C  assay. Heterozygous hemoglobin variants (HbS, HgC, etc)do  not significantly interfere with this assay.   However, presence of multiple variants may affect accuracy.     04/15/2021 6.5 (H) 4.0 - 5.6 % Final     Comment:     ADA Screening Guidelines:  5.7-6.4%  Consistent with prediabetes  >or=6.5%  Consistent with diabetes    High levels of fetal hemoglobin interfere with the HbA1C  assay. Heterozygous hemoglobin variants (HbS, HgC, etc)do  not significantly interfere with this assay.   However, presence of multiple variants may affect accuracy.     01/11/2021 6.6 (H) 4.0 - 5.6 % Final     Comment:     ADA Screening Guidelines:  5.7-6.4%  Consistent with prediabetes  >or=6.5%  Consistent with diabetes  High levels of fetal hemoglobin interfere with the HbA1C  assay. Heterozygous hemoglobin variants (HbS, HgC, etc)do  not significantly interfere with this assay.   However, presence of multiple variants may affect accuracy.       Patient's FSGs are controlled on current medication regimen.  - home regimen includes Metformin 1000mg BID  - hold home oral medications  - initiate on detemir 10u daily   - LDSSI with ACCUcheck ACHS  - add long acting as dexamethasone likely increasing  - Diabetic diet  -Hypoglycemia protocol PRN      Aortic atherosclerosis  -chronic, stable;not on statin  -Continue home ASA        Hypothyroidism  -continue home  synthroid      HLD (hyperlipidemia)  . Last LDL was   Lab Results   Component Value Date    LDLCALC 160.2 (H) 11/26/2021         Patient is not chronically on statin.will not continue for now. Monitor clinically    HTN (hypertension)  Chronic, controlled.  Latest blood pressure and vitals reviewed-   Temp:  [96.6 °F (35.9 °C)-98 °F (36.7 °C)]   Pulse:  [50-75]   Resp:  [16-47]   BP: (131-175)/(63-82)   SpO2:  [92 %-98 %] .   Home meds for hypertension were reviewed and noted below.   Hypertension Medications             amLODIPine (NORVASC) 10 MG tablet TAKE 1 TABLET EVERY DAY    atenoloL (TENORMIN) 50 MG tablet TAKE 1 TABLET EVERY DAY    doxazosin (CARDURA) 2 MG tablet Take 1 tablet (2 mg total) by mouth every evening.    losartan (COZAAR) 100 MG tablet Take 1 tablet (100 mg total) by mouth once daily.          While in the hospital, will manage blood pressure as follows; Continue home antihypertensive regimen    Will utilize p.r.n. blood pressure medication only if patient's blood pressure greater than  180/110 and she develops symptoms such as worsening chest pain or shortness of breath.          VTE Risk Mitigation (From admission, onward)         Ordered     enoxaparin injection 60 mg  2 times daily         01/11/22 1547     IP VTE HIGH RISK PATIENT  Once         01/11/22 1547     Place sequential compression device  Until discontinued         01/11/22 1547                Discharge Planning   LELA:      Code Status: Prior   Is the patient medically ready for discharge?:     Reason for patient still in hospital (select all that apply): Treatment                     Oliver Moore MD  Department of [facility] Medicine   [facility]

## 2022-01-13 NOTE — ED NOTES
Report received from ASHISH Reyna RN  Assumed care of pt.   Pt is resting comfortably c respirations even, unlabored. NADN. Denies further needs at this time

## 2022-01-13 NOTE — PLAN OF CARE
Progressing toward goals.  SBA for basic ADL tasks in room    Cont POC    Problem: Occupational Therapy Goal  Goal: Occupational Therapy Goal  Description: Goals to be met by: 2/12     Patient will increase functional independence with ADLs by performing:    UE Dressing with Modified [place].  LE Dressing with Modified [place].  Grooming while standing at sink with Modified [place].  Toileting from toilet with Modified [place] for hygiene and clothing management.   Toilet transfer to toilet with Modified [place].  Upper extremity exercise program x10 reps per handout, with independence.    Outcome: Ongoing, Progressing

## 2022-01-13 NOTE — PROGRESS NOTES
IP Liaison - Initial Visit Note    Patient: Oneyda Shah  MRN:  [id number]  Date of Service:  1/13/2022  Completed by:  EDWARDO Turcios    Reason for Visit   Patient presents with    IP Liaison Initial Visit       RSW contacted pt via pt cell phone in order to complete SDOH questionnaire and liaison assessment. Pt drowsy during conversation, pt unable to complete full initial assessment. RSW asked pt if pt has any social barriers to care of in relation to food, housing, or financial resource strain. Pt has identified no immediate social barriers to care. Per pt, pt is not in need of resources at this time.    The following were addressed during this visit:  - Complete initial visit with patient        Patient Summary     IP Liaison Patient Assessment    General  Level of Caregiver support: Member independent and does not need caregiver assistance  Have you had to make a decision between paying for any of the following in the last 2 months?: None  Assessments  Was the PHQ Depression Screening completed this visit?: No  Was the MCKENNA-7 Screening completed this visit?: No         EDWARDO Turcios

## 2022-01-13 NOTE — PLAN OF CARE
Pt on nasal cannula flow as documented in flowsheets. Metered dose inhaler given. Patient instructed on proper technique of device, along with benefits of therapy.   Pt in no apparent respiratory distress.  Will continue to monitor.

## 2022-01-13 NOTE — ASSESSMENT & PLAN NOTE
- COVID positive 1/4/2022; patient received monoclonal antibody infusion reported 1 week ago; initiated on protocol  - Isolation: Airborne/Droplet. Surgical mask on patient. Notify Infection Control  - CXR with findings concerning for multifocal PNA from inflammatory or infectious process including atypical bacterial or viral etiology without large consolidation  - Telemetry   - initiated on dexamethasone, will continue for 10d course  - initiated on remdesivir x5d; daily CMP  - initiated on ceftriaxone/azith x5d due to concern for superimposed CAP  - CRP 84; D-dimer pending  -Covid Labs remarkable for   - Order RT consult via Respiratory Communication for COVID Protocols  - Incentive Spirometer Q4h  - Continuous Pulse Oximetry, goal SpO2 92-96%  - supplemental O2, will wean as tolerated  - Albuterol INH Q6h scheduled & PRN  - MVI & ascorbic acid 500mg PO BID  -Anti-tussives for cough  - Acetaminophen Q6hr PRN fever/headache  - Loperamide PRN viral diarrhea  - VTE PPx: enoxaparin   - PT/OT for debility  - If deterioration, may warrant trial of NIPPV in neg pressure room or immediate ICU consult    - wean O2 as tolerated

## 2022-01-13 NOTE — PLAN OF CARE
[place] - Telemetry      HOME HEALTH ORDERS  FACE TO FACE ENCOUNTER    Patient Name: Oneyda Shah  YOB: 1940    PCP: Zion Villavicencio MD   PCP Address: [address] / [place] [address]  PCP Phone Number: [phone]  PCP Fax: [phone]    Encounter Date: 1/11/22    Admit to Home Health    Diagnoses:  Active Hospital Problems    Diagnosis  POA    *Pneumonia due to COVID-19 virus [U07.1, J12.82]  Yes    CKD (chronic kidney disease), stage III [N18.30]  Yes    Controlled type 2 diabetes mellitus with hyperglycemia, without long-term current use of insulin [E11.65]  Yes    Aortic atherosclerosis [I70.0]  Yes     Seen on CT of the abdomen dated 12/06/13      HTN (hypertension) [I10]  Yes    HLD (hyperlipidemia) [E78.5]  Yes    Hypothyroidism [E03.9]  Yes      Resolved Hospital Problems   No resolved problems to display.       Follow Up Appointments:  Future Appointments   Date Time Provider Department Center   2/2/2022  2:40 PM Arsh Benavides MD [facility]       Allergies:  Review of patient's allergies indicates:   Allergen Reactions    Penicillins Other (See Comments)    Ace inhibitors      cough    Statins-hmg-coa reductase inhibitors      Myalgias,       Medications: Review discharge medications with patient and family and provide education.    Current Facility-Administered Medications   Medication Dose Route Frequency Provider Last Rate Last Admin    acetaminophen tablet 650 mg  650 mg Oral Q6H PRN Elizabeth Boston NP        albuterol inhaler 2 puff  2 puff Inhalation Q6H Elizabethdamian Boston NP   2 puff at 01/13/22 1359    aluminum-magnesium hydroxide-simethicone 200-200-20 mg/5 mL suspension 30 mL  30 mL Oral QID PRN Elizabethdamian Boston NP        amLODIPine tablet 10 mg  10 mg Oral Daily Elizabeth Boston NP   10 mg at 01/13/22 0913    ascorbic acid (vitamin C) tablet 500 mg  500 mg Oral BID Elizabeth Boston NP   500 mg at  01/13/22 0914    aspirin EC tablet 81 mg  81 mg Oral Daily Elizabeth TMihai Card-Dalton, NP   81 mg at 01/13/22 0913    atenoloL tablet 50 mg  50 mg Oral Daily Elizabeth T. Card-Dalton, NP   50 mg at 01/13/22 0913    dexAMETHasone tablet 6 mg  6 mg Oral Daily Elizabeth T. Card-Dalton, NP   6 mg at 01/13/22 0913    dextromethorphan-guaiFENesin  mg/5 ml liquid 10 mL  10 mL Oral Q4H PRN Elizabeth T. Ludy, NP        dextrose 50% injection 12.5 g  12.5 g Intravenous PRN Elizabeth T. Card-Dalton, NP        dextrose 50% injection 25 g  25 g Intravenous PRN Elizabeth T. Card-Dalton, NP        doxazosin tablet 2 mg  2 mg Oral QHS Elizabeth T. Card-Dalton, NP   2 mg at 01/12/22 2156    enoxaparin injection 60 mg  1 mg/kg Subcutaneous BID Elizabeth DOTMihai Boston, NP   60 mg at 01/13/22 0914    glucagon (human recombinant) injection 1 mg  1 mg Intramuscular PRN Elizabeth T. Ludy, NP        glucose chewable tablet 16 g  16 g Oral PRN Elizabeth T. Cadr-Dalton, NP        glucose chewable tablet 24 g  24 g Oral PRN Elizabeth T. Ludy, NP        insulin aspart U-100 pen 0-5 Units  0-5 Units Subcutaneous QID (AC + HS) PRN Elizabeth TMihai Boston, NP   2 Units at 01/13/22 1209    insulin detemir U-100 pen 10 Units  10 Units Subcutaneous BID Oliver Moore MD   10 Units at 01/13/22 0914    levoFLOXacin 750 mg/150 mL IVPB 750 mg  750 mg Intravenous Q48H Elizabeth JAQUELIN Boston, NP   Stopped at 01/12/22 1115    levothyroxine tablet 50 mcg  50 mcg Oral Daily Elizabeth TMihai Card-Dalton, NP   50 mcg at 01/13/22 0913    loperamide capsule 2 mg  2 mg Oral QID PRN Elizabeth TMihai Boston, NP        losartan tablet 100 mg  100 mg Oral Daily Elizabeth TMihai Boston, NP   100 mg at 01/13/22 0914    melatonin tablet 6 mg  6 mg Oral Nightly PRN Elizabeth Boston NP        multivitamin tablet  1 tablet Oral Daily Elizabeth Boston NP   1 tablet at 01/13/22 0913    ondansetron injection 4 mg  4 mg Intravenous Q8H PRN Elizabeth Boston NP         pantoprazole EC tablet 40 mg  40 mg Oral Daily Elizabeth Boston NP   40 mg at 01/13/22 0913    prochlorperazine injection Soln 5 mg  5 mg Intravenous Q6H PRN Elizabeth Boston NP        remdesivir 100 mg in sodium chloride 0.9% 250 mL infusion  100 mg Intravenous Daily Elizabeth Boston NP   Stopped at 01/13/22 1024    senna-docusate 8.6-50 mg per tablet 1 tablet  1 tablet Oral BID Elizabeth Boston NP   1 tablet at 01/13/22 0913    simethicone chewable tablet 80 mg  1 tablet Oral QID PRN Elizabeth Botson NP        sodium chloride 0.9% flush 10 mL  10 mL Intravenous PRN Elizabeth Boston NP         Current Discharge Medication List      CONTINUE these medications which have NOT CHANGED    Details   ACCU-CHEK GERTRUDE PLUS TEST STRP Strp TEST ONE TIME DAILY  Qty: 100 strip, Refills: 2      amLODIPine (NORVASC) 10 MG tablet TAKE 1 TABLET EVERY DAY  Qty: 90 tablet, Refills: 3      aspirin (ECOTRIN) 81 MG EC tablet Take 81 mg by mouth once daily.      atenoloL (TENORMIN) 50 MG tablet TAKE 1 TABLET EVERY DAY  Qty: 90 tablet, Refills: 3      azelastine (ASTELIN) 137 mcg (0.1 %) nasal spray 1 spray (137 mcg total) by Nasal route 2 (two) times daily.  Qty: 30 mL, Refills: 0      BD ALCOHOL SWABS PadM       blood-glucose meter Misc Check daily sugars, dispense insurance covered blood sugar meter  Qty: 1 each, Refills: 0      cholecalciferol, vitamin D3, (VITAMIN D3) 25 mcg (1,000 unit) capsule Take 1,000 Units by mouth once daily.      ciprofloxacin HCl (CILOXAN) 0.3 % ophthalmic solution       doxazosin (CARDURA) 2 MG tablet Take 1 tablet (2 mg total) by mouth every evening.  Qty: 60 tablet, Refills: 4    Comments: .  Associated Diagnoses: Primary hypertension      fluticasone propionate (FLONASE) 50 mcg/actuation nasal spray 1 spray (50 mcg total) by Each Nostril route 2 (two) times a day.  Qty: 11.1 mL, Refills: 11      lancets (ACCU-CHEK SOFTCLIX LANCETS) Misc Check sugar twice daily.  Qty:  100 each, Refills: 11    Comments: NEW RX REQUEST FOR PATIENT DUE TO USING NEW MAIL ORDER PHARMACY-[facility]      levothyroxine (SYNTHROID) 50 MCG tablet TAKE 1 TABLET (50 MCG TOTAL) BY MOUTH ONCE DAILY.  Qty: 90 tablet, Refills: 3      losartan (COZAAR) 100 MG tablet Take 1 tablet (100 mg total) by mouth once daily.  Qty: 90 tablet, Refills: 3    Comments: .  Associated Diagnoses: Essential hypertension      metFORMIN (GLUCOPHAGE) 1000 MG tablet Take 1 tablet (1,000 mg total) by mouth 2 (two) times daily.  Qty: 180 tablet, Refills: 11      pantoprazole (PROTONIX) 40 MG tablet Take 1 tablet (40 mg total) by mouth once daily.  Qty: 30 tablet, Refills: 11         STOP taking these medications       DUREZOL 0.05 % Drop ophthalmic solution Comments:   Reason for Stopping:         meclizine (ANTIVERT) 12.5 mg tablet Comments:   Reason for Stopping:         neomycin-polymyxin-dexamethasone (DEXACINE) 3.5 mg/g-10,000 unit/g-0.1 % Oint Comments:   Reason for Stopping:         ZETIA 10 mg tablet Comments:   Reason for Stopping:                 I have seen and examined this patient within the last 30 days. My clinical findings that support the need for the home health skilled services and home bound status are the following:no   Weakness/numbness causing balance and gait disturbance due to Infection and Weakness/Debility making it taxing to leave home.     Diet:   diabetic diet 2000 calorie    Labs:  n/a    Referrals/ Consults  Physical Therapy to evaluate and treat. Evaluate for home safety and equipment needs; Establish/upgrade home exercise program. Perform / instruct on therapeutic exercises, gait training, transfer training, and Range of Motion.  Occupational Therapy to evaluate and treat. Evaluate home environment for safety and equipment needs. Perform/Instruct on transfers, ADL training, ROM, and therapeutic exercises.  Aide to provide assistance with personal care, ADLs, and vital signs.    Activities:    activity as tolerated    Nursing:   Agency to admit patient within 24 hours of hospital discharge unless specified on physician order or at patient request    SN to complete comprehensive assessment including routine vital signs. Instruct on disease process and s/s of complications to report to MD. Review/verify medication list sent home with the patient at time of discharge  and instruct patient/caregiver as needed. Frequency may be adjusted depending on start of care date.     Skilled nurse to perform up to 3 visits PRN for symptoms related to diagnosis    Notify MD if SBP > 160 or < 90; DBP > 90 or < 50; HR > 120 or < 50; Temp > 101; O2 < 88%; Other:       Ok to schedule additional visits based on staff availability and patient request on consecutive days within the home health episode.    When multiple disciplines ordered:    Start of Care occurs on Sunday - Wednesday schedule remaining discipline evaluations as ordered on separate consecutive days following the start of care.    Thursday SOC -schedule subsequent evaluations Friday and Monday the following week.     Friday - Saturday SOC - schedule subsequent discipline evaluations on consecutive days starting Monday of the following week.    For all post-discharge communication and subsequent orders please contact patient's primary care physician.     Miscellaneous   N/A    Home Health Aide:  Nursing , Physical Therapy  and Occupational Therapy     Wound Care Orders  n/a    I certify that this patient is confined to her home and needs intermittent skilled nursing care, physical therapy and occupational therapy.

## 2022-01-13 NOTE — PLAN OF CARE
Patient on oxygen @ documented setting. Attempt to wean FiO2 per [facility] oxygen protocol. Patient instructed on benefits of therapy.    Patient instructed on proper technique, use, and benefits of incentive spirometer therapy.   Patient achieved volumes: 500mls  Patient encouraged to utilize q1-2 hours while awake.

## 2022-01-13 NOTE — PLAN OF CARE
Problem: Physical Therapy Goal  Goal: Physical Therapy Goal  Description: Goals to be met by: 22     Patient will increase functional independence with mobility by performin. Supine to sit with Modified [place]  2. Sit to stand transfer with Modified [place]  3. Bed to chair transfer with Modified [place] with or without an AD.  4. Gait  x 100 feet with Supervision with or without an AD.    Outcome: Ongoing, Progressing

## 2022-01-13 NOTE — SUBJECTIVE & OBJECTIVE
Interval History: Doing okay today, asking when she can go home. Eating more.    Review of Systems   Constitutional: Positive for appetite change, fatigue and fever. Negative for chills and diaphoresis.   HENT: Negative for hearing loss and sore throat.    Eyes: Negative for visual disturbance.   Respiratory: Positive for cough. Negative for shortness of breath and wheezing.    Cardiovascular: Negative for chest pain and leg swelling.   Gastrointestinal: Positive for diarrhea. Negative for abdominal pain, nausea and vomiting.   Genitourinary: Negative for difficulty urinating and flank pain.   Musculoskeletal: Negative for back pain and neck pain.   Skin: Negative for rash and wound.   Neurological: Positive for weakness. Negative for dizziness and headaches.   Psychiatric/Behavioral: Negative for agitation, confusion and hallucinations.     Objective:     Vital Signs (Most Recent):  Temp: 97.6 °F (36.4 °C) (01/13/22 1115)  Pulse: 62 (01/13/22 1359)  Resp: 18 (01/13/22 1359)  BP: 131/65 (01/13/22 1115)  SpO2: 96 % (01/13/22 1359) Vital Signs (24h Range):  Temp:  [96.6 °F (35.9 °C)-98 °F (36.7 °C)] 97.6 °F (36.4 °C)  Pulse:  [50-75] 62  Resp:  [16-47] 18  SpO2:  [92 %-98 %] 96 %  BP: (131-175)/(63-82) 131/65     Weight: 62.4 kg (137 lb 9.1 oz)  Body mass index is 25.99 kg/m².  No intake or output data in the 24 hours ending 01/13/22 1531   Physical Exam  Vitals and nursing note reviewed.   Constitutional:       General: She is not in acute distress.     Appearance: Normal appearance. She is ill-appearing. She is not toxic-appearing.      Interventions: Nasal cannula in place.      Comments: 2L   HENT:      Head: Normocephalic and atraumatic.      Mouth/Throat:      Mouth: Mucous membranes are moist.   Eyes:      Extraocular Movements: Extraocular movements intact.      Pupils: Pupils are equal, round, and reactive to light.   Cardiovascular:      Rate and Rhythm: Normal rate and regular rhythm.      Pulses: Normal  pulses.      Heart sounds: Normal heart sounds. No murmur heard.  No friction rub. No gallop.    Pulmonary:      Effort: No tachypnea or respiratory distress.      Breath sounds: No wheezing.      Comments: Mild Crackles at bilateral bases but improved  Abdominal:      General: Bowel sounds are normal. There is no distension.      Palpations: Abdomen is soft.      Tenderness: There is no abdominal tenderness. There is no guarding.   Musculoskeletal:         General: No swelling.      Cervical back: Normal range of motion and neck supple.      Right lower leg: No edema.      Left lower leg: No edema.   Skin:     General: Skin is warm and dry.      Capillary Refill: Capillary refill takes less than 2 seconds.      Findings: No bruising, erythema or rash.   Neurological:      General: No focal deficit present.      Mental Status: She is oriented to person, place, and time.   Psychiatric:         Mood and Affect: Mood normal.         Behavior: Behavior normal. Behavior is cooperative.         Significant Labs: All pertinent labs within the past 24 hours have been reviewed.    Significant Imaging: I have reviewed all pertinent imaging results/findings within the past 24 hours.

## 2022-01-14 ENCOUNTER — PATIENT OUTREACH (OUTPATIENT)
Dept: ADMINISTRATIVE | Facility: OTHER | Age: 82
End: 2022-01-14
Payer: MEDICARE

## 2022-01-14 LAB
POCT GLUCOSE: 159 MG/DL (ref 70–110)
POCT GLUCOSE: 210 MG/DL (ref 70–110)
POCT GLUCOSE: 362 MG/DL (ref 70–110)
POCT GLUCOSE: 378 MG/DL (ref 70–110)

## 2022-01-14 PROCEDURE — 97110 THERAPEUTIC EXERCISES: CPT | Mod: CQ

## 2022-01-14 PROCEDURE — 27000207 HC ISOLATION

## 2022-01-14 PROCEDURE — 25000003 PHARM REV CODE 250: Performed by: STUDENT IN AN ORGANIZED HEALTH CARE EDUCATION/TRAINING PROGRAM

## 2022-01-14 PROCEDURE — 94799 UNLISTED PULMONARY SVC/PX: CPT

## 2022-01-14 PROCEDURE — 94640 AIRWAY INHALATION TREATMENT: CPT

## 2022-01-14 PROCEDURE — 99900035 HC TECH TIME PER 15 MIN (STAT)

## 2022-01-14 PROCEDURE — 11000001 HC ACUTE MED/SURG PRIVATE ROOM

## 2022-01-14 PROCEDURE — 25000003 PHARM REV CODE 250

## 2022-01-14 PROCEDURE — 63600175 PHARM REV CODE 636 W HCPCS

## 2022-01-14 PROCEDURE — 97116 GAIT TRAINING THERAPY: CPT | Mod: CQ

## 2022-01-14 PROCEDURE — 94761 N-INVAS EAR/PLS OXIMETRY MLT: CPT

## 2022-01-14 PROCEDURE — 27000221 HC OXYGEN, UP TO 24 HOURS

## 2022-01-14 PROCEDURE — C9399 UNCLASSIFIED DRUGS OR BIOLOG: HCPCS | Performed by: STUDENT IN AN ORGANIZED HEALTH CARE EDUCATION/TRAINING PROGRAM

## 2022-01-14 RX ADMIN — ALBUTEROL SULFATE 2 PUFF: 90 AEROSOL, METERED RESPIRATORY (INHALATION) at 01:01

## 2022-01-14 RX ADMIN — SENNOSIDES AND DOCUSATE SODIUM 1 TABLET: 8.6; 5 TABLET ORAL at 09:01

## 2022-01-14 RX ADMIN — INSULIN ASPART 3 UNITS: 100 INJECTION, SOLUTION INTRAVENOUS; SUBCUTANEOUS at 08:01

## 2022-01-14 RX ADMIN — ALBUTEROL SULFATE 2 PUFF: 90 AEROSOL, METERED RESPIRATORY (INHALATION) at 08:01

## 2022-01-14 RX ADMIN — INSULIN DETEMIR 10 UNITS: 100 INJECTION, SOLUTION SUBCUTANEOUS at 09:01

## 2022-01-14 RX ADMIN — ALBUTEROL SULFATE 2 PUFF: 90 AEROSOL, METERED RESPIRATORY (INHALATION) at 07:01

## 2022-01-14 RX ADMIN — INSULIN ASPART 5 UNITS: 100 INJECTION, SOLUTION INTRAVENOUS; SUBCUTANEOUS at 05:01

## 2022-01-14 RX ADMIN — ENOXAPARIN SODIUM 60 MG: 100 INJECTION SUBCUTANEOUS at 09:01

## 2022-01-14 RX ADMIN — DEXAMETHASONE 6 MG: 4 TABLET ORAL at 09:01

## 2022-01-14 RX ADMIN — LEVOFLOXACIN 750 MG: 750 INJECTION, SOLUTION INTRAVENOUS at 11:01

## 2022-01-14 RX ADMIN — GUAIFENESIN AND DEXTROMETHORPHAN 10 ML: 100; 10 SYRUP ORAL at 08:01

## 2022-01-14 RX ADMIN — INSULIN ASPART 2 UNITS: 100 INJECTION, SOLUTION INTRAVENOUS; SUBCUTANEOUS at 09:01

## 2022-01-14 RX ADMIN — OXYCODONE HYDROCHLORIDE AND ACETAMINOPHEN 500 MG: 500 TABLET ORAL at 08:01

## 2022-01-14 RX ADMIN — ALBUTEROL SULFATE 2 PUFF: 90 AEROSOL, METERED RESPIRATORY (INHALATION) at 12:01

## 2022-01-14 RX ADMIN — REMDESIVIR 100 MG: 100 INJECTION, POWDER, LYOPHILIZED, FOR SOLUTION INTRAVENOUS at 09:01

## 2022-01-14 RX ADMIN — ACETAMINOPHEN 650 MG: 325 TABLET ORAL at 11:01

## 2022-01-14 RX ADMIN — LEVOTHYROXINE SODIUM 50 MCG: 50 TABLET ORAL at 09:01

## 2022-01-14 RX ADMIN — ENOXAPARIN SODIUM 60 MG: 100 INJECTION SUBCUTANEOUS at 08:01

## 2022-01-14 RX ADMIN — ASPIRIN 81 MG: 81 TABLET, COATED ORAL at 09:01

## 2022-01-14 RX ADMIN — OXYCODONE HYDROCHLORIDE AND ACETAMINOPHEN 500 MG: 500 TABLET ORAL at 09:01

## 2022-01-14 RX ADMIN — PANTOPRAZOLE SODIUM 40 MG: 40 TABLET, DELAYED RELEASE ORAL at 09:01

## 2022-01-14 RX ADMIN — INSULIN DETEMIR 10 UNITS: 100 INJECTION, SOLUTION SUBCUTANEOUS at 08:01

## 2022-01-14 RX ADMIN — THERA TABS 1 TABLET: TAB at 09:01

## 2022-01-14 RX ADMIN — LOSARTAN POTASSIUM 100 MG: 50 TABLET, FILM COATED ORAL at 09:01

## 2022-01-14 RX ADMIN — SENNOSIDES AND DOCUSATE SODIUM 1 TABLET: 8.6; 5 TABLET ORAL at 08:01

## 2022-01-14 RX ADMIN — DOXAZOSIN 2 MG: 2 TABLET ORAL at 08:01

## 2022-01-14 RX ADMIN — AMLODIPINE BESYLATE 10 MG: 5 TABLET ORAL at 09:01

## 2022-01-14 NOTE — PLAN OF CARE
Pt was approved for 1 E tank  and 1 additional tank and RW by Jeanne SNEED.     SW will continue to follow pt throughout her transitions of care and assist with any dc needs.     Future Appointments   Date Time Provider Department Center   2/2/2022  2:40 PM Arsh Benavides MD [facility]

## 2022-01-14 NOTE — PLAN OF CARE
Problem: Adult Inpatient Plan of Care  Goal: Plan of Care Review  Outcome: Ongoing, Progressing   Patient is alert, oriented X4. Care plan explained to patient, she verbalized understanding.     On 2L of nasal cannula, O2 saturation maintain 95%, no respiratory distress noted. On cardiac monitor, running sinus bradycardia.     Deny nausea/vomiting/diarrhea. No pain complaint. Due medications given.     Maintain contact/airborne/droplet precaution. Maintain fall risk precaution. Bed in lowest position, bed alarm on. Purewick in place. Call light/personal items in reach. Instructed patient call for help as needed. Will continue to monitor.

## 2022-01-14 NOTE — PLAN OF CARE
SW received call from Meche with [facility]  expressing that the earliest they will be able to see pt is on Wednesday.     Pt will be contacted for first appointment time/date by [facility]  services.    JOANN will continue to follow pt throughout her transitions of care and assist with any dc needs.     Future Appointments   Date Time Provider Department Center   2/2/2022  2:40 PM Arsh Benavides MD [facility]

## 2022-01-14 NOTE — NURSING
Home Oxygen Evaluation    Date Performed: 1/14/2022    1) Patient's Home O2 Sat on room air, while at rest: 87%              If O2 sats on room air at rest are 88% or below, patient qualifies. No additional testing needed. Document N/A in steps 2 and 3. If 89% or above, complete steps 2.      2) Patient's O2 Sat on room air while exercising: N/A        If O2 sats on room air while exercising remain 89% or above patient does not qualify, no further testing needed Document N/A in step 3. If O2 sats on room air while exercising are 88% or below, continue to step 3.      3) Patient's O2 Sat while exercising on O2: N/A         (Must show improvement from #2 for patients to qualify)    If O2 sats improve on oxygen, patient qualifies for portable oxygen. If not, the patient does not qualify.

## 2022-01-14 NOTE — PLAN OF CARE
Pt on oxygen in no apparent distress.  MDI and IS tx. Given with ok pt. Effort.  Will cont. To monitor.

## 2022-01-14 NOTE — PLAN OF CARE
Problem: Physical Therapy Goal  Goal: Physical Therapy Goal  Description: Goals to be met by: 22     Patient will increase functional independence with mobility by performin. Supine to sit with Modified [place]  2. Sit to stand transfer with Modified [place]  3. Bed to chair transfer with Modified [place] with or without an AD.  4. Gait  x 100 feet with Supervision with or without an AD.    Outcome: Ongoing, Progressing

## 2022-01-14 NOTE — PLAN OF CARE
SW requested home O2 from attending doctor.     SW will continue to follow pt throughout her transitions of care and assist with any dc needs.     Future Appointments   Date Time Provider Department Center   2/2/2022  2:40 PM Arsh Benavides MD [facility]

## 2022-01-14 NOTE — PLAN OF CARE
SW contacted pt daughter to discuss dc planning.    Pts daughterAnnalee confirmed all information on chart. Pts daughter expressed [facility]   [phone] me with , Bora (Son) [phone], and daughter in Law. Pt is independent in ADLs. Pt has no DME at home. Pt has no HH services. Pt drives herself to and from doctor appointments and if needed family will take her. Pt doctor expressed family member will  pt upon  dc time.   SW provided pts daughter with contact information for any questions or concerns. SW discussed PT/OT recommendations to pts daughter. Recommendations are HH, shower chair, and RW. SW expressed to Annalee that shower chair will not be covered under insurance. SW expressed that she will review to see if RW is covered under pt insurance.     Pts daughter expressed she is agreeable to PW for pt. Pts daughter expressed she is unsure if she wants shower chiar at this time.     SW expressed that she has sent out HH referrals. Pts daughter expressed okay.      SW will continue to follow pt throughout her transitions of care and assist with any dc needs.     Annalee Flynn (Daughter)   [phone]   Bora (Son) [phone],    Pending HH services.       Requested RW and shower chair orders to attending doctor.       Future Appointments   Date Time Provider Department Center   2/2/2022  2:40 PM Arsh Benavides MD [facility]        01/14/22 1340   Discharge Assessment   Confirmed/corrected address, phone number and insurance Yes   Confirmed Demographics Correct on Facesheet   Source of Information family   Communicated LELA with patient/caregiver Yes   Lives With child(rai), adult;spouse   Do you expect to return to your current living situation? Yes   Do you have help at home or someone to help you manage your care at home? Yes   Who are your caregiver(s) and their phone number(s)? , Bora (Son) [phone], and  daughter in Law.   Prior to hospitilization cognitive status: Unable to Assess   Current cognitive status: Unable to Assess   Walking or Climbing Stairs Difficulty none   Dressing/Bathing Difficulty none   Home Layout Able to live on 1st floor   Equipment Currently Used at Home none   Readmission within 30 days? No   Patient currently being followed by outpatient case management? No   Do you currently have service(s) that help you manage your care at home? No   Do you take prescription medications? Yes   Do you have prescription coverage? Yes   Coverage Medicare   Do you have any problems affording any of your prescribed medications? No   Is the patient taking medications as prescribed? yes   Who is going to help you get home at discharge? GeeTrishdesmond (Daughter)   [phone] or Bora (Son) [phone]   How do you get to doctors appointments? car, drives self;family or friend will provide   Are you on dialysis? No   Do you take coumadin? No   Discharge Plan A Home Health   Discharge Plan B Home with family   DME Needed Upon Discharge    (TBD)   Discharge Plan discussed with: Adult children   Discharge Barriers Identified None   Relationship/Environment   Name(s) of Who Lives With Patient , Bora (Son) [phone], and daughter in Law.

## 2022-01-14 NOTE — PROGRESS NOTES
RSW contacted pt via room phone to discuss discharge and additional social barriers to care. Pt daughter Annalee answered phone and talked to RSW. Annalee did not identify any additional social barriers to care at this time. Per Annalee, pt is not in need of resources at this time.    The following were addressed during this visit:  - Complete follow-up visit with patient    EDWARDO Turcios

## 2022-01-14 NOTE — PLAN OF CARE
SW sent HH referral to [facility] and [facility].    SW is awaiting acceptance for HH services.    SW will continue to follow pt throughout her transitions of care and assist with any dc needs.     Future Appointments   Date Time Provider Department Center   2/2/2022  2:40 PM Arsh Benavides MD [facility]     .

## 2022-01-14 NOTE — PLAN OF CARE
SW delivered 1 Etank and additional O2 tank. SW also delivered RW. Pt's nurse, Daniel RN, signed and acknowledged.       01/14/22 0074   Discharge Assessment   Assessment Type Discharge Planning Brief Assessment   DME Needed Upon Discharge  walker, rolling

## 2022-01-14 NOTE — PT/OT/SLP PROGRESS
Physical Therapy Treatment    Patient Name:  Oneyda Shah   MRN:  [id number]    Recommendations:     Discharge Recommendations:  home health PT   Discharge Equipment Recommendations: shower chair,walker, rolling   Barriers to discharge: decreased mobility,strength and endurance    Assessment:     Oneyda Shah is a 81 y.o. female admitted with a medical diagnosis of Pneumonia due to COVID-19 virus.  She presents with the following impairments/functional limitations:  weakness,impaired endurance,impaired functional mobilty,gait instability,impaired balance,decreased lower extremity function,decreased ROM,impaired coordination,impaired cardiopulmonary response to activity,pt with improving status and will benefit from HH services upon discharge to address above functional limitations.    Rehab Prognosis: Good; patient would benefit from acute skilled PT services to address these deficits and reach maximum level of function.    Recent Surgery: * No surgery found *      Plan:     During this hospitalization, patient to be seen 3 x/week to address the identified rehab impairments via gait training,therapeutic activities,therapeutic exercises,neuromuscular re-education and progress toward the following goals:    · Plan of Care Expires:  02/12/22    Subjective     Chief Complaint: n/a  Patient/Family Comments/goals: pt is ready to go home.  Pain/Comfort:  · Pain Rating 1: 0/10      Objective:     Communicated with nsg prior to session.  Patient found supine with oxygen,peripheral IV,pulse ox (continuous),telemetry upon PT entry to room.     General Precautions: Standard, airborne,contact,droplet,fall   Orthopedic Precautions:N/A   Braces: N/A  Respiratory Status: Nasal cannula, flow 1 L/min     Functional Mobility:  · Bed Mobility:     · Supine to Sit: modified independence  · Transfers:     · Sit to Stand:  modified independence and supervision with no AD  · Gait: amb ~45' X 2 w/o AD and S/SBA X 1 seated  rest  · Balance: fair standing balance      AM-PAC 6 CLICK MOBILITY  Turning over in bed (including adjusting bedclothes, sheets and blankets)?: 4  Sitting down on and standing up from a chair with arms (e.g., wheelchair, bedside commode, etc.): 3  Moving from lying on back to sitting on the side of the bed?: 4  Moving to and from a bed to a chair (including a wheelchair)?: 3  Need to walk in hospital room?: 3  Climbing 3-5 steps with a railing?: 2  Basic Mobility Total Score: 19       Therapeutic Activities and Exercises: le seated ex's x 10-12 reps inc: ap,laq,hip flex,O2 sats on rm air at rest 93% and post gait 91-92%,nsg notified,O2 donned post gait.       Patient left EOB with all lines intact, call button in reach, nsg notified and daughter present..    GOALS: see general POC  Multidisciplinary Problems     Physical Therapy Goals        Problem: Physical Therapy Goal    Goal Priority Disciplines Outcome Goal Variances Interventions   Physical Therapy Goal     PT, PT/OT Ongoing, Progressing     Description: Goals to be met by: 22     Patient will increase functional independence with mobility by performin. Supine to sit with Modified [place]  2. Sit to stand transfer with Modified [place]  3. Bed to chair transfer with Modified [place] with or without an AD.  4. Gait  x 100 feet with Supervision with or without an AD.                     Time Tracking:     PT Received On: 22  PT Start Time: 1404     PT Stop Time: 1431  PT Total Time (min): 27 min     Billable Minutes: Gait Training 17 and Therapeutic Exercise 10    Treatment Type: Treatment  PT/PTA: PTA     PTA Visit Number: 2     2022

## 2022-01-14 NOTE — PLAN OF CARE
JOANN requested  RW be reviewed by Jeanne SNEED to see if insurance will cover DME.     SW will continue to follow pt throughout her transitions of care and assist with any dc needs.     Future Appointments   Date Time Provider Department Center   2/2/2022  2:40 PM Arsh Benavides MD [facility]

## 2022-01-15 LAB
POCT GLUCOSE: 218 MG/DL (ref 70–110)
POCT GLUCOSE: 251 MG/DL (ref 70–110)
POCT GLUCOSE: 353 MG/DL (ref 70–110)
POCT GLUCOSE: 383 MG/DL (ref 70–110)

## 2022-01-15 PROCEDURE — 11000001 HC ACUTE MED/SURG PRIVATE ROOM

## 2022-01-15 PROCEDURE — 63600175 PHARM REV CODE 636 W HCPCS

## 2022-01-15 PROCEDURE — 94640 AIRWAY INHALATION TREATMENT: CPT

## 2022-01-15 PROCEDURE — C9399 UNCLASSIFIED DRUGS OR BIOLOG: HCPCS | Performed by: STUDENT IN AN ORGANIZED HEALTH CARE EDUCATION/TRAINING PROGRAM

## 2022-01-15 PROCEDURE — 99900035 HC TECH TIME PER 15 MIN (STAT)

## 2022-01-15 PROCEDURE — 25000003 PHARM REV CODE 250

## 2022-01-15 PROCEDURE — 27000207 HC ISOLATION

## 2022-01-15 PROCEDURE — 27000221 HC OXYGEN, UP TO 24 HOURS

## 2022-01-15 PROCEDURE — 94799 UNLISTED PULMONARY SVC/PX: CPT

## 2022-01-15 PROCEDURE — 94761 N-INVAS EAR/PLS OXIMETRY MLT: CPT

## 2022-01-15 PROCEDURE — 25000003 PHARM REV CODE 250: Performed by: STUDENT IN AN ORGANIZED HEALTH CARE EDUCATION/TRAINING PROGRAM

## 2022-01-15 RX ADMIN — PANTOPRAZOLE SODIUM 40 MG: 40 TABLET, DELAYED RELEASE ORAL at 08:01

## 2022-01-15 RX ADMIN — OXYCODONE HYDROCHLORIDE AND ACETAMINOPHEN 500 MG: 500 TABLET ORAL at 08:01

## 2022-01-15 RX ADMIN — INSULIN ASPART 3 UNITS: 100 INJECTION, SOLUTION INTRAVENOUS; SUBCUTANEOUS at 08:01

## 2022-01-15 RX ADMIN — INSULIN ASPART 3 UNITS: 100 INJECTION, SOLUTION INTRAVENOUS; SUBCUTANEOUS at 12:01

## 2022-01-15 RX ADMIN — INSULIN ASPART 5 UNITS: 100 INJECTION, SOLUTION INTRAVENOUS; SUBCUTANEOUS at 05:01

## 2022-01-15 RX ADMIN — LOSARTAN POTASSIUM 100 MG: 50 TABLET, FILM COATED ORAL at 08:01

## 2022-01-15 RX ADMIN — REMDESIVIR 100 MG: 100 INJECTION, POWDER, LYOPHILIZED, FOR SOLUTION INTRAVENOUS at 10:01

## 2022-01-15 RX ADMIN — INSULIN DETEMIR 10 UNITS: 100 INJECTION, SOLUTION SUBCUTANEOUS at 08:01

## 2022-01-15 RX ADMIN — DEXAMETHASONE 6 MG: 4 TABLET ORAL at 08:01

## 2022-01-15 RX ADMIN — ACETAMINOPHEN 650 MG: 325 TABLET ORAL at 10:01

## 2022-01-15 RX ADMIN — INSULIN ASPART 2 UNITS: 100 INJECTION, SOLUTION INTRAVENOUS; SUBCUTANEOUS at 08:01

## 2022-01-15 RX ADMIN — ALBUTEROL SULFATE 2 PUFF: 90 AEROSOL, METERED RESPIRATORY (INHALATION) at 12:01

## 2022-01-15 RX ADMIN — ALBUTEROL SULFATE 2 PUFF: 90 AEROSOL, METERED RESPIRATORY (INHALATION) at 01:01

## 2022-01-15 RX ADMIN — DOXAZOSIN 2 MG: 2 TABLET ORAL at 08:01

## 2022-01-15 RX ADMIN — ENOXAPARIN SODIUM 60 MG: 100 INJECTION SUBCUTANEOUS at 08:01

## 2022-01-15 RX ADMIN — AMLODIPINE BESYLATE 10 MG: 5 TABLET ORAL at 08:01

## 2022-01-15 RX ADMIN — ALBUTEROL SULFATE 2 PUFF: 90 AEROSOL, METERED RESPIRATORY (INHALATION) at 08:01

## 2022-01-15 RX ADMIN — ASPIRIN 81 MG: 81 TABLET, COATED ORAL at 08:01

## 2022-01-15 RX ADMIN — LEVOTHYROXINE SODIUM 50 MCG: 50 TABLET ORAL at 08:01

## 2022-01-15 RX ADMIN — GUAIFENESIN AND DEXTROMETHORPHAN 10 ML: 100; 10 SYRUP ORAL at 08:01

## 2022-01-15 RX ADMIN — ALBUTEROL SULFATE 2 PUFF: 90 AEROSOL, METERED RESPIRATORY (INHALATION) at 07:01

## 2022-01-15 RX ADMIN — THERA TABS 1 TABLET: TAB at 08:01

## 2022-01-15 NOTE — PLAN OF CARE
Problem: Adult Inpatient Plan of Care  Goal: Plan of Care Review  Outcome: Ongoing, Progressing   Updated care plan.  Chart review complete.

## 2022-01-15 NOTE — CONSULTS
Thank you for your consult to [facility]. We have reviewed the patient chart. This patient does meet criteria for virtual \[facility] [place] [facility]\"" medicine service at this time HOWEVER she is in a room without a monitor so cannot perform a virtual visit in this room. Will hand back to In-house service.    Maria Elena Harvey MD  [facility]

## 2022-01-15 NOTE — ASSESSMENT & PLAN NOTE
- COVID positive 1/4/2022; patient received monoclonal antibody infusion reported 1 week ago; initiated on protocol  - Isolation: Airborne/Droplet. Surgical mask on patient. Notify Infection Control  - CXR with findings concerning for multifocal PNA from inflammatory or infectious process including atypical bacterial or viral etiology without large consolidation  - Telemetry   - initiated on dexamethasone, will continue for 10d course  - initiated on remdesivir x5d; daily CMP  - initiated on ceftriaxone/azith x5d due to concern for superimposed CAP  - CRP 84; D-dimer pending  -Covid Labs remarkable for   - Order RT consult via Respiratory Communication for COVID Protocols  - Incentive Spirometer Q4h  - Continuous Pulse Oximetry, goal SpO2 92-96%  - supplemental O2, will wean as tolerated  - Albuterol INH Q6h scheduled & PRN  - MVI & ascorbic acid 500mg PO BID  -Anti-tussives for cough  - Acetaminophen Q6hr PRN fever/headache  - Loperamide PRN viral diarrhea  - VTE PPx: enoxaparin   - PT/OT for debility - recommending home health  - If deterioration, may warrant trial of NIPPV in neg pressure room or immediate ICU consult    - wean O2 as tolerated

## 2022-01-15 NOTE — SUBJECTIVE & OBJECTIVE
Interval History: No acute events, still hypoxic to 87% just on room air.     Review of Systems   Constitutional: Positive for fatigue and fever. Negative for chills and diaphoresis.   HENT: Negative for hearing loss and sore throat.    Eyes: Negative for visual disturbance.   Respiratory: Positive for cough. Negative for shortness of breath and wheezing.    Cardiovascular: Negative for chest pain and leg swelling.   Gastrointestinal: Positive for diarrhea. Negative for abdominal pain, nausea and vomiting.   Genitourinary: Negative for difficulty urinating and flank pain.   Musculoskeletal: Negative for back pain and neck pain.   Skin: Negative for rash and wound.   Neurological: Positive for weakness. Negative for dizziness and headaches.   Psychiatric/Behavioral: Negative for agitation, confusion and hallucinations.     Objective:     Vital Signs (Most Recent):  Temp: 97.1 °F (36.2 °C) (01/14/22 1202)  Pulse: (!) 52 (01/14/22 1202)  Resp: 20 (01/14/22 1202)  BP: (!) 163/80 (01/14/22 1202)  SpO2: 97 % (01/14/22 1202) Vital Signs (24h Range):  Temp:  [96.9 °F (36.1 °C)-98 °F (36.7 °C)] 97.1 °F (36.2 °C)  Pulse:  [46-63] 52  Resp:  [18-20] 20  SpO2:  [94 %-98 %] 97 %  BP: (130-163)/(63-80) 163/80     Weight: 62.4 kg (137 lb 9.1 oz)  Body mass index is 25.99 kg/m².    Intake/Output Summary (Last 24 hours) at 1/14/2022 1811  Last data filed at 1/14/2022 0500  Gross per 24 hour   Intake --   Output 1100 ml   Net -1100 ml      Physical Exam  Vitals and nursing note reviewed.   Constitutional:       General: She is not in acute distress.     Appearance: Normal appearance. She is ill-appearing. She is not toxic-appearing.      Interventions: Nasal cannula in place.      Comments: 2L   HENT:      Head: Normocephalic and atraumatic.      Mouth/Throat:      Mouth: Mucous membranes are moist.   Eyes:      Extraocular Movements: Extraocular movements intact.      Pupils: Pupils are equal, round, and reactive to light.    Cardiovascular:      Rate and Rhythm: Normal rate and regular rhythm.      Pulses: Normal pulses.      Heart sounds: Normal heart sounds. No murmur heard.  No friction rub. No gallop.    Pulmonary:      Effort: No tachypnea or respiratory distress.      Breath sounds: No wheezing.      Comments: Mild Crackles at bilateral bases but improved  Abdominal:      General: Bowel sounds are normal. There is no distension.      Palpations: Abdomen is soft.      Tenderness: There is no abdominal tenderness. There is no guarding.   Musculoskeletal:         General: No swelling.      Cervical back: Normal range of motion and neck supple.      Right lower leg: No edema.      Left lower leg: No edema.   Skin:     General: Skin is warm and dry.      Capillary Refill: Capillary refill takes less than 2 seconds.      Findings: No bruising, erythema or rash.   Neurological:      General: No focal deficit present.      Mental Status: She is oriented to person, place, and time.   Psychiatric:         Mood and Affect: Mood normal.         Behavior: Behavior normal. Behavior is cooperative.         Significant Labs: All pertinent labs within the past 24 hours have been reviewed.    Significant Imaging: I have reviewed all pertinent imaging results/findings within the past 24 hours.

## 2022-01-15 NOTE — PLAN OF CARE
Problem: Adult Inpatient Plan of Care  Goal: Plan of Care Review  Outcome: Ongoing, Progressing     Patient is alert, oriented X4. Care plan explained to patient, she verbalized understanding.      On 1L of nasal cannula, O2 saturation maintain 96%, no respiratory distress noted. On cardiac monitor, running sinus bradycardia.      Deny nausea/vomiting/diarrhea. No pain complaint. Due medications given.      Maintain contact/airborne/droplet precaution. Maintain fall risk precaution. Bed in lowest position, bed alarm on. Purewick in place. Call light/personal items in reach. Instructed patient call for help as needed. Will continue to monitor.

## 2022-01-15 NOTE — PROGRESS NOTES
[facility] Medicine  Progress Note    Patient Name: Oneyda Shah  MRN: [id number]  Patient Class: IP- Inpatient   Admission Date: 1/11/2022  Length of Stay: 2 days  Attending Physician: Oliver Moore MD  Primary Care Provider: Zion Villavicencio MD        Subjective:     Principal Problem:Pneumonia due to COVID-19 virus        HPI:  Ms. Oneyda Shah is a 82 y/o female with PMHx CKD, DMII, HTN, HLD, and hypothyroidism presents to [facility] ED with complaints of weakness, fatigue, and diarrhea x few days. Patient was recently tested positive for COVID-19 on 1/4/22 which she received the monoclonal antibody infusion approximately reported one week ago. Patient reports she was feeling better but recently she began to lose her appetite, had fever (tmax 100.1), diarrhea, and generalized weakness. Patient reports taking Tylenol and Motrin with fever relief, but her other symptoms have not improved. Patient reports non-productive cough. Patient denies shortness of breath, chest pain, palpitations, BARTON, or leg swelling. Patient denies nausea or vomiting. In ED: CXR revealed findings concerning for multifocal PNA from inflammatory or infectious process including atypical bacterial or viral etiology without large consolidation. Denies recent sick contacts. Denies recent travel.       Overview/Hospital Course:  Admitted with COVID pneumonia and hypoxia. Started on dexamethasone and remdesivir.      Interval History: No acute events, still hypoxic to 87% just on room air.     Review of Systems   Constitutional: Positive for fatigue and fever. Negative for chills and diaphoresis.   HENT: Negative for hearing loss and sore throat.    Eyes: Negative for visual disturbance.   Respiratory: Positive for cough. Negative for shortness of breath and wheezing.    Cardiovascular: Negative for chest pain and leg swelling.   Gastrointestinal: Positive for diarrhea. Negative for abdominal pain, nausea and vomiting.    Genitourinary: Negative for difficulty urinating and flank pain.   Musculoskeletal: Negative for back pain and neck pain.   Skin: Negative for rash and wound.   Neurological: Positive for weakness. Negative for dizziness and headaches.   Psychiatric/Behavioral: Negative for agitation, confusion and hallucinations.     Objective:     Vital Signs (Most Recent):  Temp: 97.1 °F (36.2 °C) (01/14/22 1202)  Pulse: (!) 52 (01/14/22 1202)  Resp: 20 (01/14/22 1202)  BP: (!) 163/80 (01/14/22 1202)  SpO2: 97 % (01/14/22 1202) Vital Signs (24h Range):  Temp:  [96.9 °F (36.1 °C)-98 °F (36.7 °C)] 97.1 °F (36.2 °C)  Pulse:  [46-63] 52  Resp:  [18-20] 20  SpO2:  [94 %-98 %] 97 %  BP: (130-163)/(63-80) 163/80     Weight: 62.4 kg (137 lb 9.1 oz)  Body mass index is 25.99 kg/m².    Intake/Output Summary (Last 24 hours) at 1/14/2022 1811  Last data filed at 1/14/2022 0500  Gross per 24 hour   Intake --   Output 1100 ml   Net -1100 ml      Physical Exam  Vitals and nursing note reviewed.   Constitutional:       General: She is not in acute distress.     Appearance: Normal appearance. She is ill-appearing. She is not toxic-appearing.      Interventions: Nasal cannula in place.      Comments: 2L   HENT:      Head: Normocephalic and atraumatic.      Mouth/Throat:      Mouth: Mucous membranes are moist.   Eyes:      Extraocular Movements: Extraocular movements intact.      Pupils: Pupils are equal, round, and reactive to light.   Cardiovascular:      Rate and Rhythm: Normal rate and regular rhythm.      Pulses: Normal pulses.      Heart sounds: Normal heart sounds. No murmur heard.  No friction rub. No gallop.    Pulmonary:      Effort: No tachypnea or respiratory distress.      Breath sounds: No wheezing.      Comments: Mild Crackles at bilateral bases but improved  Abdominal:      General: Bowel sounds are normal. There is no distension.      Palpations: Abdomen is soft.      Tenderness: There is no abdominal tenderness. There is no  guarding.   Musculoskeletal:         General: No swelling.      Cervical back: Normal range of motion and neck supple.      Right lower leg: No edema.      Left lower leg: No edema.   Skin:     General: Skin is warm and dry.      Capillary Refill: Capillary refill takes less than 2 seconds.      Findings: No bruising, erythema or rash.   Neurological:      General: No focal deficit present.      Mental Status: She is oriented to person, place, and time.   Psychiatric:         Mood and Affect: Mood normal.         Behavior: Behavior normal. Behavior is cooperative.         Significant Labs: All pertinent labs within the past 24 hours have been reviewed.    Significant Imaging: I have reviewed all pertinent imaging results/findings within the past 24 hours.      Assessment/Plan:      * Pneumonia due to COVID-19 virus  - COVID positive 1/4/2022; patient received monoclonal antibody infusion reported 1 week ago; initiated on protocol  - Isolation: Airborne/Droplet. Surgical mask on patient. Notify Infection Control  - CXR with findings concerning for multifocal PNA from inflammatory or infectious process including atypical bacterial or viral etiology without large consolidation  - Telemetry   - initiated on dexamethasone, will continue for 10d course  - initiated on remdesivir x5d; daily CMP  - initiated on ceftriaxone/azith x5d due to concern for superimposed CAP  - CRP 84; D-dimer pending  -Covid Labs remarkable for   - Order RT consult via Respiratory Communication for COVID Protocols  - Incentive Spirometer Q4h  - Continuous Pulse Oximetry, goal SpO2 92-96%  - supplemental O2, will wean as tolerated  - Albuterol INH Q6h scheduled & PRN  - MVI & ascorbic acid 500mg PO BID  -Anti-tussives for cough  - Acetaminophen Q6hr PRN fever/headache  - Loperamide PRN viral diarrhea  - VTE PPx: enoxaparin   - PT/OT for debility - recommending home health  - If deterioration, may warrant trial of NIPPV in neg pressure room  or immediate ICU consult    - wean O2 as tolerated        CKD (chronic kidney disease), stage III  -Creatine stable for now. BMP reviewed- noted Estimated Creatinine Clearance: 31.4 mL/min (based on SCr of 1.2 mg/dL). according to latest data.   -Monitor UOP and serial BMP and adjust therapy as needed.   -Renally dose meds.            Controlled type 2 diabetes mellitus with hyperglycemia, without long-term current use of insulin  Hemoglobin A1C   Date Value Ref Range Status   11/26/2021 6.4 (H) 4.0 - 5.6 % Final     Comment:     ADA Screening Guidelines:  5.7-6.4%  Consistent with prediabetes  >or=6.5%  Consistent with diabetes    High levels of fetal hemoglobin interfere with the HbA1C  assay. Heterozygous hemoglobin variants (HbS, HgC, etc)do  not significantly interfere with this assay.   However, presence of multiple variants may affect accuracy.     04/15/2021 6.5 (H) 4.0 - 5.6 % Final     Comment:     ADA Screening Guidelines:  5.7-6.4%  Consistent with prediabetes  >or=6.5%  Consistent with diabetes    High levels of fetal hemoglobin interfere with the HbA1C  assay. Heterozygous hemoglobin variants (HbS, HgC, etc)do  not significantly interfere with this assay.   However, presence of multiple variants may affect accuracy.     01/11/2021 6.6 (H) 4.0 - 5.6 % Final     Comment:     ADA Screening Guidelines:  5.7-6.4%  Consistent with prediabetes  >or=6.5%  Consistent with diabetes  High levels of fetal hemoglobin interfere with the HbA1C  assay. Heterozygous hemoglobin variants (HbS, HgC, etc)do  not significantly interfere with this assay.   However, presence of multiple variants may affect accuracy.       Patient's FSGs are controlled on current medication regimen.  - home regimen includes Metformin 1000mg BID  - hold home oral medications  - initiate on detemir 10u daily   - LDSSI with ACCUcheck ACHS  - add long acting as dexamethasone likely increasing  - Diabetic diet  -Hypoglycemia protocol  PRN      Aortic atherosclerosis  -chronic, stable;not on statin  -Continue home ASA        Hypothyroidism  -continue home synthroid      HLD (hyperlipidemia)  . Last LDL was   Lab Results   Component Value Date    LDLCALC 160.2 (H) 11/26/2021         Patient is not chronically on statin.will not continue for now. Monitor clinically    HTN (hypertension)  Chronic, controlled.  Latest blood pressure and vitals reviewed-   Temp:  [96.9 °F (36.1 °C)-98 °F (36.7 °C)]   Pulse:  [46-63]   Resp:  [18-20]   BP: (130-163)/(63-80)   SpO2:  [94 %-98 %] .   Home meds for hypertension were reviewed and noted below.   Hypertension Medications             amLODIPine (NORVASC) 10 MG tablet TAKE 1 TABLET EVERY DAY    atenoloL (TENORMIN) 50 MG tablet TAKE 1 TABLET EVERY DAY    doxazosin (CARDURA) 2 MG tablet Take 1 tablet (2 mg total) by mouth every evening.    losartan (COZAAR) 100 MG tablet Take 1 tablet (100 mg total) by mouth once daily.          While in the hospital, will manage blood pressure as follows; Continue home antihypertensive regimen    Will utilize p.r.n. blood pressure medication only if patient's blood pressure greater than  180/110 and she develops symptoms such as worsening chest pain or shortness of breath.          VTE Risk Mitigation (From admission, onward)         Ordered     enoxaparin injection 60 mg  2 times daily         01/11/22 1547     IP VTE HIGH RISK PATIENT  Once         01/11/22 1547     Place sequential compression device  Until discontinued         01/11/22 1547                Discharge Planning   LELA:      Code Status: Prior   Is the patient medically ready for discharge?:     Reason for patient still in hospital (select all that apply): Treatment  Discharge Plan A: Home Health                  Oliver Moore MD  Department of Hospital Medicine   [facility]

## 2022-01-15 NOTE — PROGRESS NOTES
[facility] Medicine  Progress Note    Patient Name: Oneyda Shah  MRN: [id number]  Patient Class: IP- Inpatient   Admission Date: 1/11/2022  Length of Stay: 3 days  Attending Physician: Daquan Armstrong MD  Primary Care Provider: Zion Villavicencio MD        Subjective:     Principal Problem:Pneumonia due to COVID-19 virus       HPI:  Ms. Oneyda Shah is a 82 y/o female with PMHx CKD, DMII, HTN, HLD, and hypothyroidism presents to [facility] ED with complaints of weakness, fatigue, and diarrhea x few days. Patient was recently tested positive for COVID-19 on 1/4/22 which she received the monoclonal antibody infusion approximately reported one week ago. Patient reports she was feeling better but recently she began to lose her appetite, had fever (tmax 100.1), diarrhea, and generalized weakness. Patient reports taking Tylenol and Motrin with fever relief, but her other symptoms have not improved. Patient reports non-productive cough. Patient denies shortness of breath, chest pain, palpitations, BARTON, or leg swelling. Patient denies nausea or vomiting. In ED: CXR revealed findings concerning for multifocal PNA from inflammatory or infectious process including atypical bacterial or viral etiology without large consolidation. Denies recent sick contacts. Denies recent travel    Interval History: no acute event overnight. Improving sats with weaning O2 - currently 0.5 LPM. Her daughter Annalee works in hospital.    Review of Systems     Constitutional: Positive for fatigue and fever. Negative for chills and diaphoresis.   HENT: Negative for hearing loss and sore throat.    Eyes: Negative for visual disturbance.   Respiratory: Positive for cough. Negative for shortness of breath and wheezing.    Cardiovascular: Negative for chest pain and leg swelling.   Gastrointestinal: Positive for diarrhea. Negative for abdominal pain, nausea and vomiting.   Genitourinary: Negative for difficulty urinating and flank  pain.   Musculoskeletal: Negative for back pain and neck pain.   Skin: Negative for rash and wound.   Neurological: Positive for weakness. Negative for dizziness and headaches.   Psychiatric/Behavioral: Negative for agitation, confusion and hallucinations.       Objective:     Vital Signs (Most Recent):  Temp: 98.6 °F (37 °C) (01/15/22 0850)  Pulse: (!) 56 (01/15/22 0850)  Resp: 20 (01/15/22 0850)  BP: (!) 154/71 (01/15/22 0850)  SpO2: 96 % (01/15/22 0850) Vital Signs (24h Range):  Temp:  [96.1 °F (35.6 °C)-98.6 °F (37 °C)] 98.6 °F (37 °C)  Pulse:  [48-62] 56  Resp:  [16-20] 20  SpO2:  [95 %-97 %] 96 %  BP: (143-169)/(67-80) 154/71     Weight: 62.4 kg (137 lb 9.1 oz)  Body mass index is 25.99 kg/m².    Intake/Output Summary (Last 24 hours) at 1/15/2022 0942  Last data filed at 1/14/2022 2000  Gross per 24 hour   Intake --   Output 650 ml   Net -650 ml      Physical Exam    Vitals and nursing note reviewed.   Constitutional:       General: She is not in acute distress.     Appearance: Normal appearance. She is ill-appearing. She is not toxic-appearing.      Interventions: Nasal cannula in place.      Comments: 2L   HENT:      Head: Normocephalic and atraumatic.      Mouth/Throat:      Mouth: Mucous membranes are moist.   Eyes:      Extraocular Movements: Extraocular movements intact.      Pupils: Pupils are equal, round, and reactive to light.   Cardiovascular:      Rate and Rhythm: Normal rate and regular rhythm.      Pulses: Normal pulses.      Heart sounds: Normal heart sounds. No murmur heard.  No friction rub. No gallop.    Pulmonary:      Effort: No tachypnea or respiratory distress.      Breath sounds: No wheezing.      Comments: Mild Crackles at bilateral bases but improved  Abdominal:      General: Bowel sounds are normal. There is no distension.      Palpations: Abdomen is soft.      Tenderness: There is no abdominal tenderness. There is no guarding.   Musculoskeletal:         General: No swelling.       Cervical back: Normal range of motion and neck supple.      Right lower leg: No edema.      Left lower leg: No edema.   Skin:     General: Skin is warm and dry.      Capillary Refill: Capillary refill takes less than 2 seconds.      Findings: No bruising, erythema or rash.   Neurological:      General: No focal deficit present.      Mental Status: She is oriented to person, place, and time.   Psychiatric:         Mood and Affect: Mood normal.         Behavior: Behavior normal. Behavior is cooperative.     Significant Labs:   Lab Results   Component Value Date    WBC 3.21 (L) 01/12/2022    HGB 11.7 (L) 01/12/2022    HCT 36.1 (L) 01/12/2022    MCV 91 01/12/2022     01/12/2022       CMP  Sodium   Date Value Ref Range Status   01/11/2022 134 (L) 136 - 145 mmol/L Final     Potassium   Date Value Ref Range Status   01/11/2022 4.6 3.5 - 5.1 mmol/L Final     Chloride   Date Value Ref Range Status   01/11/2022 98 95 - 110 mmol/L Final     CO2   Date Value Ref Range Status   01/11/2022 25 23 - 29 mmol/L Final     Glucose   Date Value Ref Range Status   01/11/2022 257 (H) 70 - 110 mg/dL Final     BUN   Date Value Ref Range Status   01/11/2022 14 8 - 23 mg/dL Final     Creatinine   Date Value Ref Range Status   01/11/2022 1.2 0.5 - 1.4 mg/dL Final     Calcium   Date Value Ref Range Status   01/11/2022 8.8 8.7 - 10.5 mg/dL Final     Total Protein   Date Value Ref Range Status   01/11/2022 7.4 6.0 - 8.4 g/dL Final     Albumin   Date Value Ref Range Status   01/11/2022 3.4 (L) 3.5 - 5.2 g/dL Final     Total Bilirubin   Date Value Ref Range Status   01/11/2022 0.4 0.1 - 1.0 mg/dL Final     Comment:     For infants and newborns, interpretation of results should be based  on gestational age, weight and in agreement with clinical  observations.    Premature Infant recommended reference ranges:  Up to 24 hours.............<8.0 mg/dL  Up to 48 hours............<12.0 mg/dL  3-5 days..................<15.0 mg/dL  6-29  days.................<15.0 mg/dL       Alkaline Phosphatase   Date Value Ref Range Status   01/11/2022 44 (L) 55 - 135 U/L Final     AST   Date Value Ref Range Status   01/11/2022 44 (H) 10 - 40 U/L Final     ALT   Date Value Ref Range Status   01/11/2022 37 10 - 44 U/L Final     Anion Gap   Date Value Ref Range Status   01/11/2022 11 8 - 16 mmol/L Final     eGFR if    Date Value Ref Range Status   01/11/2022 50 (A) >60 mL/min/1.73 m^2 Final     eGFR if non    Date Value Ref Range Status   01/11/2022 43 (A) >60 mL/min/1.73 m^2 Final     Comment:     Calculation used to obtain the estimated glomerular filtration  rate (eGFR) is the CKD-EPI equation.            Significant Imaging:     Assessment/Plan:      Active Diagnoses:    Diagnosis Date Noted POA    PRINCIPAL PROBLEM:  Pneumonia due to COVID-19 virus [U07.1, J12.82] 01/11/2022 Yes    CKD (chronic kidney disease), stage III [N18.30]  Yes    Controlled type 2 diabetes mellitus with hyperglycemia, without long-term current use of insulin [E11.65] 02/24/2017 Yes    Aortic atherosclerosis [I70.0] 12/06/2013 Yes    HTN (hypertension) [I10]  Yes    HLD (hyperlipidemia) [E78.5]  Yes    Hypothyroidism [E03.9]  Yes      Problems Resolved During this Admission:     VTE Risk Mitigation (From admission, onward)         Ordered     enoxaparin injection 60 mg  2 times daily         01/11/22 1547     IP VTE HIGH RISK PATIENT  Once         01/11/22 1547     Place sequential compression device  Until discontinued         01/11/22 1547               * Pneumonia due to COVID-19 virus  - COVID positive 1/4/2022; patient received monoclonal antibody infusion reported 1 week ago; initiated on protocol  - Isolation: Airborne/Droplet. Surgical mask on patient. Notify Infection Control  - CXR with findings concerning for multifocal PNA from inflammatory or infectious process including atypical bacterial or viral etiology without large  consolidation  - Telemetry   - initiated on dexamethasone, will continue for 10d course  - initiated on remdesivir x5d; daily CMP  - initiated on ceftriaxone/azith x5d due to concern for superimposed CAP  - CRP 84; D-dimer pending  -Covid Labs remarkable for   - Order RT consult via Respiratory Communication for COVID Protocols  - Incentive Spirometer Q4h  - Continuous Pulse Oximetry, goal SpO2 92-96%  - supplemental O2, will wean as tolerated  - Albuterol INH Q6h scheduled & PRN  - MVI & ascorbic acid 500mg PO BID  -Anti-tussives for cough  - Acetaminophen Q6hr PRN fever/headache  - Loperamide PRN viral diarrhea  - VTE PPx: enoxaparin   - PT/OT for debility - recommending home health     - wean O2 as tolerated           CKD (chronic kidney disease), stage III  -Creatine stable for now. BMP reviewed- noted Estimated Creatinine Clearance: 31.4 mL/min (based on SCr of 1.2 mg/dL). according to latest data.   -Monitor UOP and serial BMP and adjust therapy as needed.   -Renally dose meds.                 Controlled type 2 diabetes mellitus with hyperglycemia, without long-term current use of insulin          Hemoglobin A1C   Date Value Ref Range Status   11/26/2021 6.4 (H) 4.0 - 5.6 % Final       Comment:       ADA Screening Guidelines:  5.7-6.4%  Consistent with prediabetes  >or=6.5%  Consistent with diabetes     High levels of fetal hemoglobin interfere with the HbA1C  assay. Heterozygous hemoglobin variants (HbS, HgC, etc)do  not significantly interfere with this assay.   However, presence of multiple variants may affect accuracy.      04/15/2021 6.5 (H) 4.0 - 5.6 % Final       Comment:       ADA Screening Guidelines:  5.7-6.4%  Consistent with prediabetes  >or=6.5%  Consistent with diabetes     High levels of fetal hemoglobin interfere with the HbA1C  assay. Heterozygous hemoglobin variants (HbS, HgC, etc)do  not significantly interfere with this assay.   However, presence of multiple variants may affect  accuracy.      01/11/2021 6.6 (H) 4.0 - 5.6 % Final       Comment:       ADA Screening Guidelines:  5.7-6.4%  Consistent with prediabetes  >or=6.5%  Consistent with diabetes  High levels of fetal hemoglobin interfere with the HbA1C  assay. Heterozygous hemoglobin variants (HbS, HgC, etc)do  not significantly interfere with this assay.   However, presence of multiple variants may affect accuracy.         Patient's FSGs are controlled on current medication regimen.  - home regimen includes Metformin 1000mg BID  - hold home oral medications  - initiate on detemir 10u daily   - LDSSI with ACCUcheck ACHS  - add long acting as dexamethasone likely increasing  - Diabetic diet  -Hypoglycemia protocol PRN        Aortic atherosclerosis  -chronic, stable;not on statin  -Continue home ASA           Hypothyroidism  -continue home synthroid        HLD (hyperlipidemia)  . Last LDL was         Lab Results   Component Value Date     LDLCALC 160.2 (H) 11/26/2021          Patient is not chronically on statin.will not continue for now. Monitor clinically     HTN (hypertension)  Chronic, controlled.  Latest blood pressure and vitals reviewed-   Temp:  [96.9 °F (36.1 °C)-98 °F (36.7 °C)]   Pulse:  [46-63]   Resp:  [18-20]   BP: (130-163)/(63-80)   SpO2:  [94 %-98 %] .   Home meds for hypertension were reviewed and noted below.         Hypertension Medications                 amLODIPine (NORVASC) 10 MG tablet TAKE 1 TABLET EVERY DAY     atenoloL (TENORMIN) 50 MG tablet TAKE 1 TABLET EVERY DAY     doxazosin (CARDURA) 2 MG tablet Take 1 tablet (2 mg total) by mouth every evening.     losartan (COZAAR) 100 MG tablet Take 1 tablet (100 mg total) by mouth once daily.             While in the hospital, will manage blood pressure as follows; Continue home antihypertensive regimen     Will utilize p.r.n. blood pressure medication only if patient's blood pressure greater than  180/110 and she develops symptoms such as worsening chest pain or  shortness of breath.                 Daquan Armstrong MD  Department of Hospital Medicine   [facility]

## 2022-01-15 NOTE — ASSESSMENT & PLAN NOTE
Chronic, controlled.  Latest blood pressure and vitals reviewed-   Temp:  [96.9 °F (36.1 °C)-98 °F (36.7 °C)]   Pulse:  [46-63]   Resp:  [18-20]   BP: (130-163)/(63-80)   SpO2:  [94 %-98 %] .   Home meds for hypertension were reviewed and noted below.   Hypertension Medications             amLODIPine (NORVASC) 10 MG tablet TAKE 1 TABLET EVERY DAY    atenoloL (TENORMIN) 50 MG tablet TAKE 1 TABLET EVERY DAY    doxazosin (CARDURA) 2 MG tablet Take 1 tablet (2 mg total) by mouth every evening.    losartan (COZAAR) 100 MG tablet Take 1 tablet (100 mg total) by mouth once daily.          While in the hospital, will manage blood pressure as follows; Continue home antihypertensive regimen    Will utilize p.r.n. blood pressure medication only if patient's blood pressure greater than  180/110 and she develops symptoms such as worsening chest pain or shortness of breath.

## 2022-01-16 VITALS
TEMPERATURE: 97 F | BODY MASS INDEX: 25.97 KG/M2 | HEIGHT: 61 IN | DIASTOLIC BLOOD PRESSURE: 76 MMHG | WEIGHT: 137.56 LBS | HEART RATE: 65 BPM | OXYGEN SATURATION: 97 % | RESPIRATION RATE: 20 BRPM | SYSTOLIC BLOOD PRESSURE: 158 MMHG

## 2022-01-16 PROBLEM — U07.1 PNEUMONIA DUE TO COVID-19 VIRUS: Status: RESOLVED | Noted: 2022-01-11 | Resolved: 2022-01-16

## 2022-01-16 PROBLEM — J12.82 PNEUMONIA DUE TO COVID-19 VIRUS: Status: RESOLVED | Noted: 2022-01-11 | Resolved: 2022-01-16

## 2022-01-16 LAB
BACTERIA BLD CULT: NORMAL
BACTERIA BLD CULT: NORMAL
POCT GLUCOSE: 293 MG/DL (ref 70–110)

## 2022-01-16 PROCEDURE — 94640 AIRWAY INHALATION TREATMENT: CPT

## 2022-01-16 PROCEDURE — 25000003 PHARM REV CODE 250

## 2022-01-16 PROCEDURE — 94761 N-INVAS EAR/PLS OXIMETRY MLT: CPT

## 2022-01-16 PROCEDURE — 94799 UNLISTED PULMONARY SVC/PX: CPT

## 2022-01-16 PROCEDURE — 63600175 PHARM REV CODE 636 W HCPCS

## 2022-01-16 PROCEDURE — 27000221 HC OXYGEN, UP TO 24 HOURS

## 2022-01-16 RX ADMIN — DEXAMETHASONE 6 MG: 4 TABLET ORAL at 08:01

## 2022-01-16 RX ADMIN — ALBUTEROL SULFATE 2 PUFF: 90 AEROSOL, METERED RESPIRATORY (INHALATION) at 12:01

## 2022-01-16 RX ADMIN — LOSARTAN POTASSIUM 100 MG: 50 TABLET, FILM COATED ORAL at 08:01

## 2022-01-16 RX ADMIN — OXYCODONE HYDROCHLORIDE AND ACETAMINOPHEN 500 MG: 500 TABLET ORAL at 08:01

## 2022-01-16 RX ADMIN — LEVOTHYROXINE SODIUM 50 MCG: 50 TABLET ORAL at 08:01

## 2022-01-16 RX ADMIN — PANTOPRAZOLE SODIUM 40 MG: 40 TABLET, DELAYED RELEASE ORAL at 08:01

## 2022-01-16 RX ADMIN — INSULIN DETEMIR 10 UNITS: 100 INJECTION, SOLUTION SUBCUTANEOUS at 08:01

## 2022-01-16 RX ADMIN — ALBUTEROL SULFATE 2 PUFF: 90 AEROSOL, METERED RESPIRATORY (INHALATION) at 07:01

## 2022-01-16 RX ADMIN — ASPIRIN 81 MG: 81 TABLET, COATED ORAL at 08:01

## 2022-01-16 RX ADMIN — AMLODIPINE BESYLATE 10 MG: 5 TABLET ORAL at 08:01

## 2022-01-16 RX ADMIN — THERA TABS 1 TABLET: TAB at 08:01

## 2022-01-16 RX ADMIN — ENOXAPARIN SODIUM 60 MG: 100 INJECTION SUBCUTANEOUS at 08:01

## 2022-01-16 RX ADMIN — LEVOFLOXACIN 750 MG: 750 INJECTION, SOLUTION INTRAVENOUS at 08:01

## 2022-01-16 RX ADMIN — INSULIN ASPART 3 UNITS: 100 INJECTION, SOLUTION INTRAVENOUS; SUBCUTANEOUS at 08:01

## 2022-01-16 NOTE — NURSING
Patient safety maintained. Medications administered per order. Bed in low position, wheels locked, call bell at reach. Patient up to the bathroom and chair with standby assistance. Discharge paperwork provided and education completed with patient and family. Walker and 2 oxygen tanks plus wheels provided as discharge equipment. IV and telemetry discontinued, patient tolerated well.

## 2022-01-16 NOTE — PLAN OF CARE
Discharge orders noted     orders sent/accepted via [employer] fax system by [facility] [place]- Start of care Wednesday 1/19/22    DME: Home O2 ordered, delivered bedside to pt on Friday 2/14/22    Future Appointments   Date Time Provider Department Center   2/2/2022  2:40 PM Arsh Benavides MD [facility]       Pt's nurse will go over medications/signs and symptoms prior to discharge         01/16/22 1243   Final Note   Assessment Type Final Discharge Note   Anticipated Discharge Disposition Home-Health  ([facility])   What phone number can be called within the next 1-3 days to see how you are doing after discharge? [phone]   Hospital Resources/Appts/Education Provided Appointments scheduled and added to AVS  (Pt toschedule PCP f/u)   Post-Acute Status   Post-Acute Authorization Home Health;HME   HME Status Set-up Complete/Auth obtained  ([facility]- Home O[facility])   Home Health Status Set-up Complete/Auth obtained  ([facility])   Discharge Delays None known at this time     Elizabeth Barraza, RN Transitional Navigator  [phone]

## 2022-01-16 NOTE — PLAN OF CARE
[place] - Telemetry      HOME HEALTH ORDERS  FACE TO FACE ENCOUNTER    Patient Name: Oneyda Shah  YOB: 1940    PCP: Zion Villavicencio MD   PCP Address: [address] / [place] [address]  PCP Phone Number: [phone]  PCP Fax: [phone]    Encounter Date: 1/11/22    Admit to Home Health    Diagnoses:  Active Hospital Problems    Diagnosis  POA    CKD (chronic kidney disease), stage III [N18.30]  Yes    Controlled type 2 diabetes mellitus with hyperglycemia, without long-term current use of insulin [E11.65]  Yes    Aortic atherosclerosis [I70.0]  Yes     Seen on CT of the abdomen dated 12/06/13      HTN (hypertension) [I10]  Yes    HLD (hyperlipidemia) [E78.5]  Yes    Hypothyroidism [E03.9]  Yes      Resolved Hospital Problems    Diagnosis Date Resolved POA    *Pneumonia due to COVID-19 virus [U07.1, J12.82] 01/16/2022 Yes       Follow Up Appointments:  Future Appointments   Date Time Provider Department Center   2/2/2022  2:40 PM Arsh Benavides MD [facility]       Allergies:  Review of patient's allergies indicates:   Allergen Reactions    Penicillins Other (See Comments)    Ace inhibitors      cough    Statins-hmg-coa reductase inhibitors      Myalgias,       Medications: Review discharge medications with patient and family and provide education.    Current Facility-Administered Medications   Medication Dose Route Frequency Provider Last Rate Last Admin    acetaminophen tablet 650 mg  650 mg Oral Q6H PRN Elizabeth T. Card-Dalton, NP   650 mg at 01/15/22 1004    albuterol inhaler 2 puff  2 puff Inhalation Q6H Elizabeth T. Card-Dalton, NP   2 puff at 01/16/22 0744    aluminum-magnesium hydroxide-simethicone 200-200-20 mg/5 mL suspension 30 mL  30 mL Oral QID PRN Elizabeth T. Card-Dalton, NP        amLODIPine tablet 10 mg  10 mg Oral Daily Elizabeth T. Card-Dalton, NP   10 mg at 01/16/22 0837    ascorbic acid (vitamin C) tablet 500 mg  500 mg Oral BID Elizabeth T.  Ludy, NP   500 mg at 01/16/22 0835    aspirin EC tablet 81 mg  81 mg Oral Daily Elizabeth TMihai Boston, NP   81 mg at 01/16/22 0835    atenoloL tablet 50 mg  50 mg Oral Daily Elizabeth TMihai Boston, NP   50 mg at 01/13/22 0913    dexAMETHasone tablet 6 mg  6 mg Oral Daily Elizabeth TMihai Boston, NP   6 mg at 01/16/22 0836    dextromethorphan-guaiFENesin  mg/5 ml liquid 10 mL  10 mL Oral Q4H PRN Elizabeth TMihai Boston, NP   10 mL at 01/15/22 2037    dextrose 50% injection 12.5 g  12.5 g Intravenous PRN Elizabeth TMihai Bosotn, NP        dextrose 50% injection 25 g  25 g Intravenous PRN Elizabeth TMihai Boston, NP        doxazosin tablet 2 mg  2 mg Oral QHS Elizabeth TMihai Boston, NP   2 mg at 01/15/22 2037    enoxaparin injection 60 mg  1 mg/kg Subcutaneous BID Elizabeth JAQUELIN Boston, NP   60 mg at 01/16/22 0837    glucagon (human recombinant) injection 1 mg  1 mg Intramuscular PRN Elizabeth JAQUELIN Boston, NP        glucose chewable tablet 16 g  16 g Oral PRN Elizabeth TMihai Boston, NP        glucose chewable tablet 24 g  24 g Oral PRN Elizabeth TMihai Boston, NP        insulin aspart U-100 pen 0-5 Units  0-5 Units Subcutaneous QID (AC + HS) PRN Elizabeth JAQUELIN Boston, NP   3 Units at 01/16/22 0837    insulin detemir U-100 pen 10 Units  10 Units Subcutaneous BID Oliver Moore MD   10 Units at 01/16/22 0837    levoFLOXacin 750 mg/150 mL IVPB 750 mg  750 mg Intravenous Q48H Elizabeth JAQUELIN Boston, NP   Stopped at 01/16/22 1008    levothyroxine tablet 50 mcg  50 mcg Oral Daily Elizabeth JAQUELIN Boston, NP   50 mcg at 01/16/22 0835    loperamide capsule 2 mg  2 mg Oral QID PRN Elizabeth JAQUELIN Boston, SEAMUS        losartan tablet 100 mg  100 mg Oral Daily Elizabeth JAQUELIN Boston, NP   100 mg at 01/16/22 0836    melatonin tablet 6 mg  6 mg Oral Nightly PRN Elizabeth Boston NP        multivitamin tablet  1 tablet Oral Daily Elizabeth Boston NP   1 tablet at 01/16/22 0835    ondansetron injection 4 mg  4 mg Intravenous  Q8H PRN Elizabethdamian Boston NP        pantoprazole EC tablet 40 mg  40 mg Oral Daily Elizabeth Boston NP   40 mg at 01/16/22 0837    prochlorperazine injection Soln 5 mg  5 mg Intravenous Q6H PRN Elizabeth Boston NP        senna-docusate 8.6-50 mg per tablet 1 tablet  1 tablet Oral BID Elizabeth Boston NP   1 tablet at 01/14/22 2024    simethicone chewable tablet 80 mg  1 tablet Oral QID PRN Elizabeth Boston NP        sodium chloride 0.9% flush 10 mL  10 mL Intravenous PRN Elizabeth Boston NP         Current Discharge Medication List      CONTINUE these medications which have NOT CHANGED    Details   ACCU-CHEK GERTRUDE PLUS TEST STRP Strp TEST ONE TIME DAILY  Qty: 100 strip, Refills: 2      amLODIPine (NORVASC) 10 MG tablet TAKE 1 TABLET EVERY DAY  Qty: 90 tablet, Refills: 3      aspirin (ECOTRIN) 81 MG EC tablet Take 81 mg by mouth once daily.      atenoloL (TENORMIN) 50 MG tablet TAKE 1 TABLET EVERY DAY  Qty: 90 tablet, Refills: 3      azelastine (ASTELIN) 137 mcg (0.1 %) nasal spray 1 spray (137 mcg total) by Nasal route 2 (two) times daily.  Qty: 30 mL, Refills: 0      BD ALCOHOL SWABS PadM       blood-glucose meter Misc Check daily sugars, dispense insurance covered blood sugar meter  Qty: 1 each, Refills: 0      cholecalciferol, vitamin D3, (VITAMIN D3) 25 mcg (1,000 unit) capsule Take 1,000 Units by mouth once daily.      ciprofloxacin HCl (CILOXAN) 0.3 % ophthalmic solution       doxazosin (CARDURA) 2 MG tablet Take 1 tablet (2 mg total) by mouth every evening.  Qty: 60 tablet, Refills: 4    Comments: .  Associated Diagnoses: Primary hypertension      fluticasone propionate (FLONASE) 50 mcg/actuation nasal spray 1 spray (50 mcg total) by Each Nostril route 2 (two) times a day.  Qty: 11.1 mL, Refills: 11      lancets (ACCU-CHEK SOFTCLIX LANCETS) Misc Check sugar twice daily.  Qty: 100 each, Refills: 11    Comments: NEW RX REQUEST FOR PATIENT DUE TO USING NEW MAIL ORDER  PHARMACY-[facility]      levothyroxine (SYNTHROID) 50 MCG tablet TAKE 1 TABLET (50 MCG TOTAL) BY MOUTH ONCE DAILY.  Qty: 90 tablet, Refills: 3      losartan (COZAAR) 100 MG tablet Take 1 tablet (100 mg total) by mouth once daily.  Qty: 90 tablet, Refills: 3    Comments: .  Associated Diagnoses: Essential hypertension      metFORMIN (GLUCOPHAGE) 1000 MG tablet Take 1 tablet (1,000 mg total) by mouth 2 (two) times daily.  Qty: 180 tablet, Refills: 11      pantoprazole (PROTONIX) 40 MG tablet Take 1 tablet (40 mg total) by mouth once daily.  Qty: 30 tablet, Refills: 11         STOP taking these medications       DUREZOL 0.05 % Drop ophthalmic solution Comments:   Reason for Stopping:         meclizine (ANTIVERT) 12.5 mg tablet Comments:   Reason for Stopping:         neomycin-polymyxin-dexamethasone (DEXACINE) 3.5 mg/g-10,000 unit/g-0.1 % Oint Comments:   Reason for Stopping:         ZETIA 10 mg tablet Comments:   Reason for Stopping:                 I have seen and examined this patient within the last 30 days. My clinical findings that support the need for the home health skilled services and home bound status are the following:no   Weakness/numbness causing balance and gait disturbance due to Weakness/Debility making it taxing to leave home.     Diet:   diabetic diet 1800 calorie    Labs:  Report Lab results to PCP.    Referrals/ Consults  Physical Therapy to evaluate and treat. Evaluate for home safety and equipment needs; Establish/upgrade home exercise program. Perform / instruct on therapeutic exercises, gait training, transfer training, and Range of Motion.    Activities:   activity as tolerated    Nursing:   Agency to admit patient within 24 hours of hospital discharge unless specified on physician order or at patient request    SN to complete comprehensive assessment including routine vital signs. Instruct on disease process and s/s of complications to report to MD. Review/verify medication list sent  home with the patient at time of discharge  and instruct patient/caregiver as needed. Frequency may be adjusted depending on start of care date.     Skilled nurse to perform up to 3 visits PRN for symptoms related to diagnosis    Notify MD if SBP > 160 or < 90; DBP > 90 or < 50; HR > 120 or < 50; Temp > 101; O2 < 88%; Other:       Ok to schedule additional visits based on staff availability and patient request on consecutive days within the home health episode.    When multiple disciplines ordered:    Start of Care occurs on Sunday - Wednesday schedule remaining discipline evaluations as ordered on separate consecutive days following the start of care.    Thursday SOC -schedule subsequent evaluations Friday and Monday the following week.     Friday - Saturday SOC - schedule subsequent discipline evaluations on consecutive days starting Monday of the following week.    For all post-discharge communication and subsequent orders please contact patient's primary care physician. If unable to reach primary care physician or do not receive response within 30 minutes, please contact [facility] for clinical staff order clarification    Miscellaneous   N/A    Home Health Aide:  Physical Therapy Daily    Wound Care Orders  n/a    I certify that this patient is confined to her home and needs physical therapy.

## 2022-01-16 NOTE — PLAN OF CARE
Problem: Adult Inpatient Plan of Care  Goal: Plan of Care Review  Outcome: Ongoing, Progressing     Patient is alert, oriented X4. Care plan explained to patient, she verbalized understanding.      On 0.5L of nasal cannula, O2 saturation maintain 95%, sometimes dropped to 87% during the night.  no respiratory distress noted. On cardiac monitor, running sinus bradycardia.      Deny nausea/vomiting/diarrhea. No pain complaint. Due medications given.      Maintain contact/airborne/droplet precaution. Maintain fall risk precaution. Bed in lowest position, bed alarm on. Purewick in place. Call light/personal items in reach. Instructed patient call for help as needed. Will continue to monitor.

## 2022-01-16 NOTE — DISCHARGE SUMMARY
[facility] Medicine  Discharge Summary      Patient Name: Oneyda Shah  MRN: [id number]  Admission Date: 1/11/2022  Hospital Length of Stay: 4 days  Discharge Date and Time:  01/16/2022 11:02 AM  Attending Physician: Daquan Armstrong MD   Discharging Provider: Daquan Armstrong MD  Primary Care Provider: Zion Villavicencio MD        HPI: HPI:  Ms. Oneyda Shah is a 80 y/o female with PMHx CKD, DMII, HTN, HLD, and hypothyroidism presents to [facility] ED with complaints of weakness, fatigue, and diarrhea x few days. Patient was recently tested positive for COVID-19 on 1/4/22 which she received the monoclonal antibody infusion approximately reported one week ago. Patient reports she was feeling better but recently she began to lose her appetite, had fever (tmax 100.1), diarrhea, and generalized weakness. Patient reports taking Tylenol and Motrin with fever relief, but her other symptoms have not improved. Patient reports non-productive cough. Patient denies shortness of breath, chest pain, palpitations, BARTON, or leg swelling. Patient denies nausea or vomiting. In ED: CXR revealed findings concerning for multifocal PNA from inflammatory or infectious process including atypical bacterial or viral etiology without large consolidation. Denies recent sick contacts. Denies recent travel       * No surgery found *      Hospital Course: patient admitted for covid pneumonia. Successfully completed treatment with remdesevir, dexamethasone and antibiotics. Much improved. Still need low flow oxygen which she has tank at home. PT/OT done and HH is ordered as well. She is stable to go home.    Consults:   Consults (From admission, onward)        Status Ordering Provider     Inpatient virtual consult to Hospital Medicine  Once        Provider:  (Not yet assigned)    Completed TORY MCMANUS     Inpatient consult to Social Work/Case Management  Once        Provider:  (Not yet assigned)    Acknowledged TORY MCMANUS     "      Final Active Diagnoses:    Diagnosis Date Noted POA    CKD (chronic kidney disease), stage III [N18.30]  Yes    Controlled type 2 diabetes mellitus with hyperglycemia, without long-term current use of insulin [E11.65] 02/24/2017 Yes    Aortic atherosclerosis [I70.0] 12/06/2013 Yes    HTN (hypertension) [I10]  Yes    HLD (hyperlipidemia) [E78.5]  Yes    Hypothyroidism [E03.9]  Yes      Problems Resolved During this Admission:    Diagnosis Date Noted Date Resolved POA    PRINCIPAL PROBLEM:  Pneumonia due to COVID-19 virus [U07.1, J12.82] 01/11/2022 01/16/2022 Yes      Discharged Condition: fair    Disposition: Home or Self Care    Follow Up:   Follow-up Information     Please follow up.    Why: Patient will be contacted by [facility] to schedule first appointment time/date.            Zion Villavicencio MD In 1 week.    Specialty: Family Medicine  Contact information:  [address]  [address]  [facility] [address] [phone]                       Patient Instructions:      WALKER FOR HOME USE     Order Specific Question Answer Comments   Type of Walker: Adult (5'4"-6'6")    With wheels? Yes    Height: 5' 1" (1.549 m)    Weight: 62.4 kg (137 lb 9.1 oz)    Length of need (1-99 months): 99    Does patient have medical equipment at home? none    Please check all that apply: Patient's condition impairs ambulation.      OXYGEN FOR HOME USE     Order Specific Question Answer Comments   Liter Flow 2    Duration Continuous    Qualifying Test Performed at: Rest    Oxygen saturation: 87    Portable mode: continuous    Route nasal cannula    Device: home concentrator with portable tanks    Length of need (in months): 3 mos    Patient condition with qualifying saturation Other - List qualifying diagnosis and code    Select a diagnosis & list the code in the comments COVID-19 [[id number]]    Height: 5' 1" (1.549 m)    Weight: 62.4 kg (137 lb 9.1 oz)    Alternative treatment measures have been " tried or considered and deemed clinically ineffective. Yes      Medications:  Reconciled Home Medications:      Medication List      CONTINUE taking these medications    ACCU-CHEK GERTRUDE PLUS TEST STRP Strp  Generic drug: blood sugar diagnostic  TEST ONE TIME DAILY     amLODIPine 10 MG tablet  Commonly known as: NORVASC  TAKE 1 TABLET EVERY DAY     aspirin 81 MG EC tablet  Commonly known as: ECOTRIN  Take 81 mg by mouth once daily.     atenoloL 50 MG tablet  Commonly known as: TENORMIN  TAKE 1 TABLET EVERY DAY     azelastine 137 mcg (0.1 %) nasal spray  Commonly known as: ASTELIN  1 spray (137 mcg total) by Nasal route 2 (two) times daily.     BD ALCOHOL SWABS Padm  Generic drug: alcohol swabs     blood-glucose meter Misc  Check daily sugars, dispense insurance covered blood sugar meter     cholecalciferol (vitamin D3) 25 mcg (1,000 unit) capsule  Commonly known as: VITAMIN D3  Take 1,000 Units by mouth once daily.     ciprofloxacin HCl 0.3 % ophthalmic solution  Commonly known as: CILOXAN     doxazosin 2 MG tablet  Commonly known as: CARDURA  Take 1 tablet (2 mg total) by mouth every evening.     fluticasone propionate 50 mcg/actuation nasal spray  Commonly known as: FLONASE  1 spray (50 mcg total) by Each Nostril route 2 (two) times a day.     lancets Misc  Commonly known as: ACCU-CHEK SOFTCLIX LANCETS  Check sugar twice daily.     levothyroxine 50 MCG tablet  Commonly known as: SYNTHROID  TAKE 1 TABLET (50 MCG TOTAL) BY MOUTH ONCE DAILY.     losartan 100 MG tablet  Commonly known as: COZAAR  Take 1 tablet (100 mg total) by mouth once daily.     metFORMIN 1000 MG tablet  Commonly known as: GLUCOPHAGE  Take 1 tablet (1,000 mg total) by mouth 2 (two) times daily.     pantoprazole 40 MG tablet  Commonly known as: PROTONIX  Take 1 tablet (40 mg total) by mouth once daily.        STOP taking these medications    DurezoL 0.05 % Drop ophthalmic solution  Generic drug: difluprednate     meclizine 12.5 mg tablet  Commonly  known as: ANTIVERT     neomycin-polymyxin-dexamethasone 3.5 mg/g-10,000 unit/g-0.1 % Oint  Commonly known as: DEXACINE     ZETIA 10 mg tablet  Generic drug: ezetimibe            Significant Diagnostic Studies: Labs: BMP: No results for input(s): GLU, NA, K, CL, CO2, BUN, CREATININE, CALCIUM, MG in the last 48 hours., CMP No results for input(s): NA, K, CL, CO2, GLU, BUN, CREATININE, CALCIUM, PROT, ALBUMIN, BILITOT, ALKPHOS, AST, ALT, ANIONGAP, ESTGFRAFRICA, EGFRNONAA in the last 48 hours. and CBC No results for input(s): WBC, HGB, HCT, PLT in the last 48 hours.    Pending Diagnostic Studies:     None        Indwelling Lines/Drains at time of discharge:   Lines/Drains/Airways     None                 Time spent on the discharge of patient: 30 minutes         Daquan Armstrong MD  Department of [facility] Medicine  [facility]

## 2022-01-16 NOTE — NURSING
Patient safety maintained. Medications administered per order. Purewick removed during day shift. Bed in low position, wheels locked, call bell at reach.  Ambulates to the bathroom with stand by assistance, instructed to call as needed.

## 2022-01-17 ENCOUNTER — NURSE TRIAGE (OUTPATIENT)
Dept: ADMINISTRATIVE | Facility: CLINIC | Age: 82
End: 2022-01-17
Payer: MEDICARE

## 2022-01-17 NOTE — TELEPHONE ENCOUNTER
[facility] pt called x2 with no contact made. VM left    Reason for Disposition   Message left on unidentified voice mail. Phone number verified.    Protocols used: NO CONTACT OR DUPLICATE CONTACT CALL-A-OH

## 2022-01-18 ENCOUNTER — PATIENT OUTREACH (OUTPATIENT)
Dept: ADMINISTRATIVE | Facility: OTHER | Age: 82
End: 2022-01-18
Payer: MEDICARE

## 2022-01-18 PROCEDURE — G0180 MD CERTIFICATION HHA PATIENT: HCPCS | Mod: ,,, | Performed by: FAMILY MEDICINE

## 2022-01-18 PROCEDURE — G0180 PR HOME HEALTH MD CERTIFICATION: ICD-10-PCS | Mod: ,,, | Performed by: FAMILY MEDICINE

## 2022-01-18 NOTE — PROGRESS NOTES
IP Liaison - Final Visit Note    Patient: Oneyda Shah  MRN:  [id number]  Date of Service:  1/18/2022  Completed by:  EDWARDO Turcios    Reason for Visit   Patient presents with    IP Liaison Chart Review        Patient Summary     Discharge Date: 1/16/2021  Discharge telephone number/address: [phone] / [address] [place] [address]  Follow up provider: Arsh Benavides MD  Follow up appointments: 2/2/2022 @ 2:40pm  Home Health agency & telephone number: [facility]  DME ordered &  name: Home O2 delivered to pt bedside  Assigned OPCM RN/SW: n/a  Report sent to follow up team (PCP/OPCM) via in basket message: n/a  Community Resources arranged including agency name & contact info: n/a        EDWARDO Turcios

## 2022-01-19 NOTE — TELEPHONE ENCOUNTER
Spoke with patient daughter. Problem was resolved. Patient was in hospital with pneumonia. The doctors took care of her medication. Patient is schedule to doctor.

## 2022-01-25 ENCOUNTER — OFFICE VISIT (OUTPATIENT)
Dept: FAMILY MEDICINE | Facility: CLINIC | Age: 82
End: 2022-01-25
Payer: MEDICARE

## 2022-01-25 ENCOUNTER — TELEPHONE (OUTPATIENT)
Dept: FAMILY MEDICINE | Facility: CLINIC | Age: 82
End: 2022-01-25

## 2022-01-25 VITALS
HEART RATE: 62 BPM | OXYGEN SATURATION: 96 % | WEIGHT: 135.38 LBS | SYSTOLIC BLOOD PRESSURE: 120 MMHG | DIASTOLIC BLOOD PRESSURE: 62 MMHG | TEMPERATURE: 98 F | BODY MASS INDEX: 25.56 KG/M2 | HEIGHT: 61 IN

## 2022-01-25 DIAGNOSIS — E11.22 TYPE 2 DIABETES MELLITUS WITH STAGE 3A CHRONIC KIDNEY DISEASE, WITHOUT LONG-TERM CURRENT USE OF INSULIN: ICD-10-CM

## 2022-01-25 DIAGNOSIS — Z86.16 HISTORY OF COVID-19: Primary | ICD-10-CM

## 2022-01-25 DIAGNOSIS — M46.1 SI (SACROILIAC) JOINT INFLAMMATION: ICD-10-CM

## 2022-01-25 DIAGNOSIS — J12.82 PNEUMONIA DUE TO COVID-19 VIRUS: ICD-10-CM

## 2022-01-25 DIAGNOSIS — U07.1 PNEUMONIA DUE TO COVID-19 VIRUS: ICD-10-CM

## 2022-01-25 DIAGNOSIS — N18.31 TYPE 2 DIABETES MELLITUS WITH STAGE 3A CHRONIC KIDNEY DISEASE, WITHOUT LONG-TERM CURRENT USE OF INSULIN: ICD-10-CM

## 2022-01-25 DIAGNOSIS — R53.1 WEAKNESS: ICD-10-CM

## 2022-01-25 DIAGNOSIS — I10 HYPERTENSION, UNSPECIFIED TYPE: ICD-10-CM

## 2022-01-25 DIAGNOSIS — R09.02 HYPOXIA: ICD-10-CM

## 2022-01-25 PROCEDURE — 3078F DIAST BP <80 MM HG: CPT | Mod: CPTII,S$GLB,, | Performed by: FAMILY MEDICINE

## 2022-01-25 PROCEDURE — 1101F PR PT FALLS ASSESS DOC 0-1 FALLS W/OUT INJ PAST YR: ICD-10-PCS | Mod: CPTII,S$GLB,, | Performed by: FAMILY MEDICINE

## 2022-01-25 PROCEDURE — 3288F PR FALLS RISK ASSESSMENT DOCUMENTED: ICD-10-PCS | Mod: CPTII,S$GLB,, | Performed by: FAMILY MEDICINE

## 2022-01-25 PROCEDURE — 99215 OFFICE O/P EST HI 40 MIN: CPT | Mod: S$GLB,,, | Performed by: FAMILY MEDICINE

## 2022-01-25 PROCEDURE — 1125F PR PAIN SEVERITY QUANTIFIED, PAIN PRESENT: ICD-10-PCS | Mod: CPTII,S$GLB,, | Performed by: FAMILY MEDICINE

## 2022-01-25 PROCEDURE — 3288F FALL RISK ASSESSMENT DOCD: CPT | Mod: CPTII,S$GLB,, | Performed by: FAMILY MEDICINE

## 2022-01-25 PROCEDURE — 99215 PR OFFICE/OUTPT VISIT, EST, LEVL V, 40-54 MIN: ICD-10-PCS | Mod: S$GLB,,, | Performed by: FAMILY MEDICINE

## 2022-01-25 PROCEDURE — 1111F DSCHRG MED/CURRENT MED MERGE: CPT | Mod: CPTII,S$GLB,, | Performed by: FAMILY MEDICINE

## 2022-01-25 PROCEDURE — 3074F SYST BP LT 130 MM HG: CPT | Mod: CPTII,S$GLB,, | Performed by: FAMILY MEDICINE

## 2022-01-25 PROCEDURE — 1101F PT FALLS ASSESS-DOCD LE1/YR: CPT | Mod: CPTII,S$GLB,, | Performed by: FAMILY MEDICINE

## 2022-01-25 PROCEDURE — 99999 PR PBB SHADOW E&M-EST. PATIENT-LVL IV: ICD-10-PCS | Mod: PBBFAC,,, | Performed by: FAMILY MEDICINE

## 2022-01-25 PROCEDURE — 3078F PR MOST RECENT DIASTOLIC BLOOD PRESSURE < 80 MM HG: ICD-10-PCS | Mod: CPTII,S$GLB,, | Performed by: FAMILY MEDICINE

## 2022-01-25 PROCEDURE — 1159F MED LIST DOCD IN RCRD: CPT | Mod: CPTII,S$GLB,, | Performed by: FAMILY MEDICINE

## 2022-01-25 PROCEDURE — 1111F PR DISCHARGE MEDS RECONCILED W/ CURRENT OUTPATIENT MED LIST: ICD-10-PCS | Mod: CPTII,S$GLB,, | Performed by: FAMILY MEDICINE

## 2022-01-25 PROCEDURE — 1159F PR MEDICATION LIST DOCUMENTED IN MEDICAL RECORD: ICD-10-PCS | Mod: CPTII,S$GLB,, | Performed by: FAMILY MEDICINE

## 2022-01-25 PROCEDURE — 3074F PR MOST RECENT SYSTOLIC BLOOD PRESSURE < 130 MM HG: ICD-10-PCS | Mod: CPTII,S$GLB,, | Performed by: FAMILY MEDICINE

## 2022-01-25 PROCEDURE — 99999 PR PBB SHADOW E&M-EST. PATIENT-LVL IV: CPT | Mod: PBBFAC,,, | Performed by: FAMILY MEDICINE

## 2022-01-25 PROCEDURE — 99499 RISK ADDL DX/OHS AUDIT: ICD-10-PCS | Mod: S$GLB,,, | Performed by: FAMILY MEDICINE

## 2022-01-25 PROCEDURE — 99499 UNLISTED E&M SERVICE: CPT | Mod: S$GLB,,, | Performed by: FAMILY MEDICINE

## 2022-01-25 PROCEDURE — 1125F AMNT PAIN NOTED PAIN PRSNT: CPT | Mod: CPTII,S$GLB,, | Performed by: FAMILY MEDICINE

## 2022-01-25 NOTE — PROGRESS NOTES
(Portions of this note were dictated using voice recognition software and may contain dictation related errors in spelling/grammar/syntax not found on text review)    CC: No chief complaint on file.      HPI: 81 y.o. female hospital followup for covid pna with hypoxia, was put on 2L O2 . Given remdesivir/dex    CXR:Impression:   Findings concerning for multifocal pneumonia from inflammatory or infectious process including atypical bacterial or viral etiology, without large consolidation.  Differential to include mild pulmonary edema or chronic interstitial lung changes with patchy subsegmental atelectasis.    Chronic health history including  Diabetes with CKD 3 on metformin 1000 mg b.i.d.  Hypertension on losartan 100 mg daily, atenolol 50 mg daily, amlodipine 10 mg daily  Hyperlipidemia intolerant to all statins along with Zetia  Hypothyroidism on Synthroid 50 mcg daily  Fatty liver disease      Here with her daughter today, currently doing better, off of oxygen.  Still feels weak walking around.  Working with home physical therapy which has been helping.  Only 1 more session left, was wondering if this can be extended.  Did have significant right low back and leg pain in the hospital but this has improved.  No swelling of the leg.  No chest pain.  No fevers    Past Medical History:   Diagnosis Date    Aortic atherosclerosis     CKD (chronic kidney disease), stage III     Diabetes mellitus type II     Fatty liver     High cholesterol     HLD (hyperlipidemia)     HTN (hypertension)     Hypothyroidism     Hypothyroidism     Osteopenia     Statin myopathy        Past Surgical History:   Procedure Laterality Date    BREAST BIOPSY      BREAST SURGERY      biopsy    HYSTERECTOMY      still w/ovaries    ROTATOR CUFF REPAIR Left        Family History   Problem Relation Age of Onset    Diabetes Mother     Hypertension Mother     Diabetes Brother     Coronary artery disease Sister 55        MI    Liver  cancer Sister     No Known Problems Father     Diabetes Daughter     No Known Problems Son     No Known Problems Brother        Social History     Tobacco Use    Smoking status: Never Smoker    Smokeless tobacco: Never Used   Substance Use Topics    Alcohol use: No    Drug use: No       Lab Results   Component Value Date    WBC 3.21 (L) 01/12/2022    HGB 11.7 (L) 01/12/2022    HCT 36.1 (L) 01/12/2022    MCV 91 01/12/2022     01/12/2022    CHOL 229 (H) 11/26/2021    TRIG 129 11/26/2021    HDL 43 11/26/2021    ALT 37 01/11/2022    AST 44 (H) 01/11/2022    BILITOT 0.4 01/11/2022    ALKPHOS 44 (L) 01/11/2022     (L) 01/11/2022    K 4.6 01/11/2022    CL 98 01/11/2022    CREATININE 1.2 01/11/2022    ESTGFRAFRICA 50 (A) 01/11/2022    EGFRNONAA 43 (A) 01/11/2022    CALCIUM 8.8 01/11/2022    ALBUMIN 3.4 (L) 01/11/2022    BUN 14 01/11/2022    CO2 25 01/11/2022    TSH 1.763 11/26/2021    INR 1.0 01/12/2022    HGBA1C 6.4 (H) 11/26/2021    MICALBCREAT 46.1 (H) 11/26/2021    LDLCALC 160.2 (H) 11/26/2021     (H) 01/11/2022    [id number] 35 10/10/2017             Vital signs reviewed  PE:   APPEARANCE: Well nourished, well developed, in no acute distress.    HEAD: Normocephalic, atraumatic.  EYES: PERRL. EOMI.   Conjunctivae noninjected.  EARS: TM's intact. Light reflex normal. No retraction or perforation  NOSE: Mucosa pink. Airway clear.  MOUTH & THROAT: No tonsillar enlargement. No pharyngeal erythema or exudate.   NECK: Supple with no cervical lymphadenopathy.    CHEST: Good inspiratory effort. Lungs clear to auscultation with no wheezes.  Subtle bibasal crackles.  CARDIOVASCULAR: Normal S1, S2. No rubs, murmurs, or gallops.  ABDOMEN: Bowel sounds normal. Not distended. Soft. No tenderness or masses. No organomegaly.  EXTREMITIES: No edema,       IMPRESSION  1. History of COVID-19    2. Pneumonia due to COVID-19 virus    3. Type 2 diabetes mellitus with stage 3a chronic kidney disease, without  long-term current use of insulin    4. Hypertension, unspecified type    5. Weakness    6. Hypoxia    7. SI (sacroiliac) joint inflammation            PLAN  Reviewed hospitalization summary, reviewed x-ray with patient her daughter.  Reviewed her recent labs as above.    COVID-19 pneumonia with resulting hypoxia.  This has improved.  She is off of oxygen, pulse ox in the office was 96% on room air.  No signs of respiratory distress.  Aside from some basilar atelectatic changes noted on auscultation, no other major lung findings on exam.  Would advise continuing incentive spirometry    BP stable continue current therapy    Diabetes:  Typically controlled.  Did have hyperglycemia in the   hospital likely resulting from infection and steroids.  Blood sugars much better controlled now at home.  Continue current therapy    Weakness, improving but will reach out to home health physical therapist to see if therapy sessions could being extended for another 2 weeks just allow for better strengthening        did have some pain at the right sacroiliac region in the hospital.  This has improved.  Notify for any sudden worsening of leg pain or swelling or sudden development of chest pain or worsening shortness of breath    Follow-up 3 months for routine medical follow-up or sooner if needed.    SCREENINGS      DEXA scan:  02/09/2021, normal  Mammogram 12/19/16, declines rpt  Colonoscopy: 2009, Dr. Wright, normal.  Declined repeat     immunizations   Tetanus :Check with your pharmacy regarding getting the tetanus (Tdap) vaccine (once every 10 years)  PVX: 2017   Prevnar: 1/2016  Flu: declines  Zoster: utd  COVID (05/19/2021, 06/19/2021 (Pfizer), has not had booster yet but do encourage getting booster shot when able

## 2022-01-25 NOTE — PHYSICIAN QUERY
PT Name: Oneyda Shah  MR #: [id number]     DOCUMENTATION CLARIFICATION     CDS: Matthew Avelar RN CCDS               Contact information: [email or web address]   This form is a permanent document in the medical record.     Query Date: January 25, 2022    By submitting this query, we are merely seeking further clarification of documentation.  Please utilize your independent clinical judgment when addressing the question(s) below.  The Medical Record contains the following   Indicators   Supporting Clinical Findings Location in Medical Record     x SOB, BARTON, Wheezing, Productive Cough, Use of Accessory Muscles, etc. Lethargy  reports shortness of breath w/ exertion 1/11/2022 ED provider notes  1/12/2022 ED nursing notes     x RR         ABGs         O2 sat O2 sat 90% on RA, RR 24-36  Noted that o2 sat was 88%.  Noted that Oxygen was discontinued.  O2 sat 87% on RA, RR 18-42 1/11/2022 Vitals  1/12/2022 ED nursing notes  1/12/2022 Vitals     x Hypoxia/Hypercapnia Hypoxic 1/11/2022 ED provider notes    BiPAP/Intubation/Mechanical Ventilation       x Supplemental O2 Supplemental O2 @ 4L  Supplemental O2 ranging from 2-3L 1/11/2022 Vitals  1/12/2022 Vitals    Home O2, Oxygen Dependence        Respiratory distress or failure       x Radiology findings Findings concerning for multifocal pneumonia from inflammatory or infectious process including atypical bacterial or viral etiology, without large consolidation.  Differential to include mild pulmonary edema or chronic interstitial lung changes with patchy subsegmental atelectasis. 1/11/2022 Chest x-ray     x Acute/Chronic Illness Pneumonia due to COVID-19 virus 1/11/2022 H&P     x Treatment remdesivir 200 mg IV x 1  remdesivir 100 mg IV daily 1/11/2022 Medication  1/12-1/15/2022 Medication    Other         The noted clinical guidelines are only system guidelines and do not replace the providers clinical judgment.    Provider, please specify the diagnosis or diagnoses  associated with above clinical findings.     [    ] Acute Respiratory Failure with Hypoxia - ABG pO2 < 60 mmHg or O2 sat of <91% on room air and respiratory symptoms documented   [   x ] Hypoxia Only   [    ] Other Respiratory Diagnosis (please specify): _________________   [   ] Clinically Undetermined         Please document in your progress notes daily for the duration of treatment until resolved and include in your discharge summary.       Form No. [id number]

## 2022-01-25 NOTE — TELEPHONE ENCOUNTER
Called Irlanda, pt's home health physical therapist. Gave verbal order per Dr Villavicencio to extend pt's PT by two weeks. Irlanda verified that the extension will begin the week of 1/30 and be twice a week for two weeks.

## 2022-01-28 DIAGNOSIS — I10 PRIMARY HYPERTENSION: ICD-10-CM

## 2022-01-28 NOTE — TELEPHONE ENCOUNTER
----- Message from Mitzi Mcclellan sent at 1/28/2022  3:39 PM CST -----  Type: Requesting to speak with nurse         Who Called: PT  Regarding: Pt needing a refill for doxazosin (CARDURA) 2 MG tablet  Would the patient rather a call back or a response via [facility]? Call back  Best Call Back Number: [phone]  Additional Information: [facility] - [place], [place] - [address]

## 2022-01-31 RX ORDER — DOXAZOSIN 2 MG/1
2 TABLET ORAL NIGHTLY
Qty: 60 TABLET | Refills: 4 | Status: SHIPPED | OUTPATIENT
Start: 2022-01-31 | End: 2022-03-07 | Stop reason: SDUPTHER

## 2022-02-01 ENCOUNTER — EXTERNAL HOME HEALTH (OUTPATIENT)
Dept: HOME HEALTH SERVICES | Facility: HOSPITAL | Age: 82
End: 2022-02-01
Payer: MEDICARE

## 2022-02-01 ENCOUNTER — PATIENT OUTREACH (OUTPATIENT)
Dept: ADMINISTRATIVE | Facility: OTHER | Age: 82
End: 2022-02-01
Payer: MEDICARE

## 2022-02-01 NOTE — PROGRESS NOTES
Health Maintenance Due   Topic Date Due    TETANUS VACCINE  Never done    Shingles Vaccine (1 of 2) Never done    COVID-19 Vaccine (3 - Booster for Pfizer series) 12/09/2021     Updates were requested from care everywhere.  Chart was reviewed for overdue [facility]active [facility] Encounters (DANIELA) topics (CRS, Breast Cancer Screening, Eye exam)  Health Maintenance has been updated.  LINKS immunization registry triggered.  Immunizations were reconciled.

## 2022-02-02 ENCOUNTER — OFFICE VISIT (OUTPATIENT)
Dept: CARDIOLOGY | Facility: CLINIC | Age: 82
End: 2022-02-02
Payer: MEDICARE

## 2022-02-02 VITALS
HEART RATE: 64 BPM | OXYGEN SATURATION: 97 % | SYSTOLIC BLOOD PRESSURE: 121 MMHG | WEIGHT: 136.13 LBS | HEIGHT: 61 IN | DIASTOLIC BLOOD PRESSURE: 66 MMHG | BODY MASS INDEX: 25.7 KG/M2

## 2022-02-02 DIAGNOSIS — U07.1 PNEUMONIA DUE TO COVID-19 VIRUS: ICD-10-CM

## 2022-02-02 DIAGNOSIS — E78.00 PURE HYPERCHOLESTEROLEMIA: ICD-10-CM

## 2022-02-02 DIAGNOSIS — E11.65 CONTROLLED TYPE 2 DIABETES MELLITUS WITH HYPERGLYCEMIA, WITHOUT LONG-TERM CURRENT USE OF INSULIN: ICD-10-CM

## 2022-02-02 DIAGNOSIS — I70.0 AORTIC ATHEROSCLEROSIS: ICD-10-CM

## 2022-02-02 DIAGNOSIS — I10 PRIMARY HYPERTENSION: ICD-10-CM

## 2022-02-02 DIAGNOSIS — N18.31 STAGE 3A CHRONIC KIDNEY DISEASE: ICD-10-CM

## 2022-02-02 DIAGNOSIS — J12.82 PNEUMONIA DUE TO COVID-19 VIRUS: ICD-10-CM

## 2022-02-02 PROCEDURE — 1101F PR PT FALLS ASSESS DOC 0-1 FALLS W/OUT INJ PAST YR: ICD-10-PCS | Mod: CPTII,S$GLB,, | Performed by: INTERNAL MEDICINE

## 2022-02-02 PROCEDURE — 3078F DIAST BP <80 MM HG: CPT | Mod: CPTII,S$GLB,, | Performed by: INTERNAL MEDICINE

## 2022-02-02 PROCEDURE — 1160F RVW MEDS BY RX/DR IN RCRD: CPT | Mod: CPTII,S$GLB,, | Performed by: INTERNAL MEDICINE

## 2022-02-02 PROCEDURE — 99499 UNLISTED E&M SERVICE: CPT | Mod: S$GLB,,, | Performed by: INTERNAL MEDICINE

## 2022-02-02 PROCEDURE — 1160F PR REVIEW ALL MEDS BY PRESCRIBER/CLIN PHARMACIST DOCUMENTED: ICD-10-PCS | Mod: CPTII,S$GLB,, | Performed by: INTERNAL MEDICINE

## 2022-02-02 PROCEDURE — 3288F FALL RISK ASSESSMENT DOCD: CPT | Mod: CPTII,S$GLB,, | Performed by: INTERNAL MEDICINE

## 2022-02-02 PROCEDURE — 99999 PR PBB SHADOW E&M-EST. PATIENT-LVL IV: CPT | Mod: PBBFAC,,, | Performed by: INTERNAL MEDICINE

## 2022-02-02 PROCEDURE — 3288F PR FALLS RISK ASSESSMENT DOCUMENTED: ICD-10-PCS | Mod: CPTII,S$GLB,, | Performed by: INTERNAL MEDICINE

## 2022-02-02 PROCEDURE — 1159F MED LIST DOCD IN RCRD: CPT | Mod: CPTII,S$GLB,, | Performed by: INTERNAL MEDICINE

## 2022-02-02 PROCEDURE — 1126F AMNT PAIN NOTED NONE PRSNT: CPT | Mod: CPTII,S$GLB,, | Performed by: INTERNAL MEDICINE

## 2022-02-02 PROCEDURE — 99214 OFFICE O/P EST MOD 30 MIN: CPT | Mod: S$GLB,,, | Performed by: INTERNAL MEDICINE

## 2022-02-02 PROCEDURE — 99214 PR OFFICE/OUTPT VISIT, EST, LEVL IV, 30-39 MIN: ICD-10-PCS | Mod: S$GLB,,, | Performed by: INTERNAL MEDICINE

## 2022-02-02 PROCEDURE — 99499 RISK ADDL DX/OHS AUDIT: ICD-10-PCS | Mod: S$GLB,,, | Performed by: INTERNAL MEDICINE

## 2022-02-02 PROCEDURE — 1111F DSCHRG MED/CURRENT MED MERGE: CPT | Mod: CPTII,S$GLB,, | Performed by: INTERNAL MEDICINE

## 2022-02-02 PROCEDURE — 3074F PR MOST RECENT SYSTOLIC BLOOD PRESSURE < 130 MM HG: ICD-10-PCS | Mod: CPTII,S$GLB,, | Performed by: INTERNAL MEDICINE

## 2022-02-02 PROCEDURE — 1111F PR DISCHARGE MEDS RECONCILED W/ CURRENT OUTPATIENT MED LIST: ICD-10-PCS | Mod: CPTII,S$GLB,, | Performed by: INTERNAL MEDICINE

## 2022-02-02 PROCEDURE — 3078F PR MOST RECENT DIASTOLIC BLOOD PRESSURE < 80 MM HG: ICD-10-PCS | Mod: CPTII,S$GLB,, | Performed by: INTERNAL MEDICINE

## 2022-02-02 PROCEDURE — 1126F PR PAIN SEVERITY QUANTIFIED, NO PAIN PRESENT: ICD-10-PCS | Mod: CPTII,S$GLB,, | Performed by: INTERNAL MEDICINE

## 2022-02-02 PROCEDURE — 99999 PR PBB SHADOW E&M-EST. PATIENT-LVL IV: ICD-10-PCS | Mod: PBBFAC,,, | Performed by: INTERNAL MEDICINE

## 2022-02-02 PROCEDURE — 1159F PR MEDICATION LIST DOCUMENTED IN MEDICAL RECORD: ICD-10-PCS | Mod: CPTII,S$GLB,, | Performed by: INTERNAL MEDICINE

## 2022-02-02 PROCEDURE — 1101F PT FALLS ASSESS-DOCD LE1/YR: CPT | Mod: CPTII,S$GLB,, | Performed by: INTERNAL MEDICINE

## 2022-02-02 PROCEDURE — 3074F SYST BP LT 130 MM HG: CPT | Mod: CPTII,S$GLB,, | Performed by: INTERNAL MEDICINE

## 2022-02-02 NOTE — ASSESSMENT & PLAN NOTE
Condition stable.  She has not required diuretics.  No signs of volume overload today on exam.  Her GFR is in the 50 range.

## 2022-02-02 NOTE — PROGRESS NOTES
[facility] Cardiology     Subjective:    Patient ID:  Oneyda Shah is a 81 y.o. female who presents for follow-up of Hypertension, Hyperlipidemia, and Shortness of Breath    Review of patient's allergies indicates:   Allergen Reactions    Penicillins Other (See Comments)    Ace inhibitors      cough    Statins-hmg-coa reductase inhibitors      Myalgias,      The patient was hospitalized for COVID pneumonia January 2022. She recovered well.  She has been at home.  She is getting physical therapy.  Her chest x-ray was reviewed and showed diffuse infiltrates.  She remains on 4 antihypertensive medications.  Adding doxazosin on previous evaluation helped her a lot.  She has not had pounding heart or any high blood pressure readings.  She is with her daughter today.  She is diabetic.  She has renal insufficiency.  She is still feeling weak from her COVID infection.  Her 2018 echo showed normal ejection fraction.  She has hyperlipidemia but is statin intolerant.        Review of Systems   Constitutional: Positive for malaise/fatigue. Negative for chills, decreased appetite, diaphoresis, fever, night sweats, weight gain and weight loss.   HENT: Negative for congestion, ear discharge, ear pain, hearing loss, hoarse voice, nosebleeds, odynophagia, sore throat, stridor and tinnitus.    Eyes: Negative for blurred vision, discharge, double vision, pain, photophobia, redness, vision loss in left eye, vision loss in right eye, visual disturbance and visual halos.   Cardiovascular: Positive for dyspnea on exertion. Negative for chest pain, claudication, cyanosis, irregular heartbeat, leg swelling, near-syncope, orthopnea, palpitations, paroxysmal nocturnal dyspnea and syncope.   Respiratory: Positive for shortness of breath. Negative for cough, hemoptysis, sleep disturbances due to breathing, snoring, sputum production and wheezing.    Endocrine: Negative  "for cold intolerance, heat intolerance, polydipsia, polyphagia and polyuria.   Hematologic/Lymphatic: Negative for adenopathy and bleeding problem. Does not bruise/bleed easily.   Skin: Negative for color change, dry skin, flushing, itching, nail changes, poor wound healing, rash, skin cancer, suspicious lesions and unusual hair distribution.   Musculoskeletal: Negative for arthritis, back pain, falls, gout, joint pain, joint swelling, muscle cramps, muscle weakness, myalgias, neck pain and stiffness.   Gastrointestinal: Negative for bloating, abdominal pain, anorexia, change in bowel habit, bowel incontinence, constipation, diarrhea, dysphagia, excessive appetite, flatus, heartburn, hematemesis, hematochezia, hemorrhoids, jaundice, melena, nausea and vomiting.   Genitourinary: Negative for bladder incontinence, decreased libido, dysuria, flank pain, frequency, genital sores, hematuria, hesitancy, incomplete emptying, nocturia and urgency.   Neurological: Negative for aphonia, brief paralysis, difficulty with concentration, disturbances in coordination, excessive daytime sleepiness, dizziness, focal weakness, headaches, light-headedness, loss of balance, numbness, paresthesias, seizures, sensory change, tremors, vertigo and weakness.   Psychiatric/Behavioral: Negative for altered mental status, depression, hallucinations, memory loss, substance abuse, suicidal ideas and thoughts of violence. The patient does not have insomnia and is not nervous/anxious.    Allergic/Immunologic: Negative for hives and persistent infections.        Objective:       Vitals:    02/02/22 1500   BP: 121/66   Pulse: 64   SpO2: 97%   Weight: 61.7 kg (136 lb 2.1 oz)   Height: 5' 1" (1.549 m)    Physical Exam  Constitutional:       General: She is not in acute distress.     Appearance: She is well-developed. She is not diaphoretic.   HENT:      Head: Normocephalic and atraumatic.      Nose: Nose normal.   Eyes:      General: No scleral " icterus.        Right eye: No discharge.      Conjunctiva/sclera: Conjunctivae normal.      Pupils: Pupils are equal, round, and reactive to light.   Neck:      Thyroid: No thyromegaly.      Vascular: No JVD.      Trachea: No tracheal deviation.   Cardiovascular:      Rate and Rhythm: Normal rate and regular rhythm.      Pulses:           Carotid pulses are 2+ on the right side and 2+ on the left side.       Radial pulses are 2+ on the right side and 2+ on the left side.        Dorsalis pedis pulses are 2+ on the right side and 2+ on the left side.        Posterior tibial pulses are 2+ on the right side and 2+ on the left side.      Heart sounds: Normal heart sounds. No murmur heard.  No friction rub. No gallop.    Pulmonary:      Effort: Pulmonary effort is normal. No respiratory distress.      Breath sounds: No stridor. Examination of the right-middle field reveals rhonchi. Examination of the left-middle field reveals rhonchi. Rhonchi present. No wheezing or rales.   Chest:      Chest wall: No tenderness.   Abdominal:      General: Bowel sounds are normal. There is no distension.      Palpations: Abdomen is soft. There is no mass.      Tenderness: There is no abdominal tenderness. There is no guarding or rebound.   Musculoskeletal:         General: No tenderness. Normal range of motion.      Cervical back: Normal range of motion and neck supple.   Lymphadenopathy:      Cervical: No cervical adenopathy.   Skin:     General: Skin is warm and dry.      Coloration: Skin is not pale.      Findings: No erythema or rash.   Neurological:      Mental Status: She is alert and oriented to person, place, and time.      Cranial Nerves: No cranial nerve deficit.      Coordination: Coordination normal.   Psychiatric:         Behavior: Behavior normal.         Thought Content: Thought content normal.         Judgment: Judgment normal.           Assessment:       1. Primary hypertension    2. Stage 3a chronic kidney disease    3.  Aortic atherosclerosis    4. Pure hypercholesterolemia    5. Controlled type 2 diabetes mellitus with hyperglycemia, without long-term current use of insulin    6. Pneumonia due to COVID-19 virus      Results for orders placed or performed during the hospital encounter of 01/11/22   Blood Culture #1 **CANNOT BE ORDERED STAT**    Specimen: Peripheral, Lower Arm, Right; Blood   Result Value Ref Range    Blood Culture, Routine No growth after 5 days.    Blood Culture #2 **CANNOT BE ORDERED STAT**    Specimen: Peripheral, Antecubital, Left; Blood   Result Value Ref Range    Blood Culture, Routine No growth after 5 days.    Influenza A & B by Molecular    Specimen: Nasopharyngeal Swab   Result Value Ref Range    Influenza A, Molecular Negative Negative    Influenza B, Molecular Negative Negative    Flu A & B Source Nasal swab    CBC auto differential   Result Value Ref Range    WBC 6.85 3.90 - 12.70 K/uL    RBC 3.51 (L) 4.00 - 5.40 M/uL    Hemoglobin 10.4 (L) 12.0 - 16.0 g/dL    Hematocrit 30.4 (L) 37.0 - 48.5 %    MCV 87 82 - 98 fL    MCH 29.6 27.0 - 31.0 pg    MCHC 34.2 32.0 - 36.0 g/dL    RDW 13.1 11.5 - 14.5 %    Platelets 223 150 - 450 K/uL    MPV 11.2 9.2 - 12.9 fL    Immature Granulocytes 0.4 0.0 - 0.5 %    Gran # (ANC) 5.1 1.8 - 7.7 K/uL    Immature Grans (Abs) 0.03 0.00 - 0.04 K/uL    Lymph # 1.3 1.0 - 4.8 K/uL    Mono # 0.4 0.3 - 1.0 K/uL    Eos # 0.0 0.0 - 0.5 K/uL    Baso # 0.01 0.00 - 0.20 K/uL    nRBC 0 0 /100 WBC    Gran % 75.1 (H) 38.0 - 73.0 %    Lymph % 19.0 18.0 - 48.0 %    Mono % 5.3 4.0 - 15.0 %    Eosinophil % 0.1 0.0 - 8.0 %    Basophil % 0.1 0.0 - 1.9 %    Platelet Estimate Appears normal     Differential Method Automated    Comprehensive metabolic panel   Result Value Ref Range    Sodium 134 (L) 136 - 145 mmol/L    Potassium 4.6 3.5 - 5.1 mmol/L    Chloride 98 95 - 110 mmol/L    CO2 25 23 - 29 mmol/L    Glucose 257 (H) 70 - 110 mg/dL    BUN 14 8 - 23 mg/dL    Creatinine 1.2 0.5 - 1.4 mg/dL     Calcium 8.8 8.7 - 10.5 mg/dL    Total Protein 7.4 6.0 - 8.4 g/dL    Albumin 3.4 (L) 3.5 - 5.2 g/dL    Total Bilirubin 0.4 0.1 - 1.0 mg/dL    Alkaline Phosphatase 44 (L) 55 - 135 U/L    AST 44 (H) 10 - 40 U/L    ALT 37 10 - 44 U/L    Anion Gap 11 8 - 16 mmol/L    eGFR if African American 50 (A) >60 mL/min/1.73 m^2    eGFR if non African American 43 (A) >60 mL/min/1.73 m^2   C-Reactive Protein   Result Value Ref Range    CRP 84.0 (H) 0.0 - 8.2 mg/L   Ferritin   Result Value Ref Range    Ferritin 175 20.0 - 300.0 ng/mL   Lactate Dehydrogenase   Result Value Ref Range     (H) 110 - 260 U/L   CK   Result Value Ref Range    CPK 36 20 - 180 U/L   Lactic Acid, Plasma   Result Value Ref Range    Lactate (Lactic Acid) 1.4 0.5 - 2.2 mmol/L   Troponin I   Result Value Ref Range    Troponin I 0.012 0.000 - 0.026 ng/mL   Urinalysis, Reflex to Urine Culture Urine, Clean Catch    Specimen: Urine   Result Value Ref Range    Specimen UA Urine, Catheterized     Color, UA Yellow Yellow, Straw, Kylah    Appearance, UA Clear Clear    pH, UA 6.0 5.0 - 8.0    Specific Gravity, UA 1.010 1.005 - 1.030    Protein, UA 1+ (A) Negative    Glucose, UA Negative Negative    Ketones, UA Negative Negative    Bilirubin (UA) Negative Negative    Occult Blood UA Negative Negative    Nitrite, UA Negative Negative    Urobilinogen, UA Negative <2.0 EU/dL    Leukocytes, UA Negative Negative   Urinalysis Microscopic   Result Value Ref Range    RBC, UA 1 0 - 4 /hpf    WBC, UA 0 0 - 5 /hpf    Bacteria None None-Occ /hpf    Hyaline Casts, UA 1 0-1/lpf /lpf    Microscopic Comment SEE COMMENT    Brain natriuretic peptide   Result Value Ref Range    BNP 17 0 - 99 pg/mL   CBC with Automated Differential   Result Value Ref Range    WBC 3.21 (L) 3.90 - 12.70 K/uL    RBC 3.98 (L) 4.00 - 5.40 M/uL    Hemoglobin 11.7 (L) 12.0 - 16.0 g/dL    Hematocrit 36.1 (L) 37.0 - 48.5 %    MCV 91 82 - 98 fL    MCH 29.4 27.0 - 31.0 pg    MCHC 32.4 32.0 - 36.0 g/dL    RDW 13.0  11.5 - 14.5 %    Platelets 172 150 - 450 K/uL    MPV 12.1 9.2 - 12.9 fL    Immature Granulocytes 0.6 (H) 0.0 - 0.5 %    Gran # (ANC) 2.4 1.8 - 7.7 K/uL    Immature Grans (Abs) 0.02 0.00 - 0.04 K/uL    Lymph # 0.7 (L) 1.0 - 4.8 K/uL    Mono # 0.1 (L) 0.3 - 1.0 K/uL    Eos # 0.0 0.0 - 0.5 K/uL    Baso # 0.01 0.00 - 0.20 K/uL    nRBC 0 0 /100 WBC    Gran % 73.3 (H) 38.0 - 73.0 %    Lymph % 22.7 18.0 - 48.0 %    Mono % 3.1 (L) 4.0 - 15.0 %    Eosinophil % 0.0 0.0 - 8.0 %    Basophil % 0.3 0.0 - 1.9 %    Platelet Estimate Appears normal     Differential Method Automated    PT/INR   Result Value Ref Range    Prothrombin Time 10.4 9.0 - 12.5 sec    INR 1.0 0.8 - 1.2   PTT   Result Value Ref Range    aPTT 39.3 (H) 21.0 - 32.0 sec   D-Dimer, Quantitative   Result Value Ref Range    D-Dimer 1.91 (H) <0.50 mg/L FEU   Procalcitonin   Result Value Ref Range    Procalcitonin 0.14 <0.25 ng/mL   Sedimentation rate   Result Value Ref Range    Sed Rate 120 (H) 0 - 20 mm/Hr   POCT glucose   Result Value Ref Range    POCT Glucose 153 (H) 70 - 110 mg/dL   POCT glucose   Result Value Ref Range    POCT Glucose 245 (H) 70 - 110 mg/dL   POCT glucose   Result Value Ref Range    POCT Glucose 244 (H) 70 - 110 mg/dL   POCT glucose   Result Value Ref Range    POCT Glucose 344 (H) 70 - 110 mg/dL   POCT glucose   Result Value Ref Range    POCT Glucose 230 (H) 70 - 110 mg/dL   POCT glucose   Result Value Ref Range    POCT Glucose 278 (H) 70 - 110 mg/dL   POCT glucose   Result Value Ref Range    POCT Glucose 347 (H) 70 - 110 mg/dL   POCT glucose   Result Value Ref Range    POCT Glucose 210 (H) 70 - 110 mg/dL   POCT glucose   Result Value Ref Range    POCT Glucose 159 (H) 70 - 110 mg/dL   POCT glucose   Result Value Ref Range    POCT Glucose 378 (H) 70 - 110 mg/dL   POCT glucose   Result Value Ref Range    POCT Glucose 362 (H) 70 - 110 mg/dL   POCT glucose   Result Value Ref Range    POCT Glucose 218 (H) 70 - 110 mg/dL   POCT glucose   Result Value  Ref Range    POCT Glucose 251 (H) 70 - 110 mg/dL   POCT glucose   Result Value Ref Range    POCT Glucose 353 (H) 70 - 110 mg/dL   POCT glucose   Result Value Ref Range    POCT Glucose 383 (H) 70 - 110 mg/dL   POCT glucose   Result Value Ref Range    POCT Glucose 293 (H) 70 - 110 mg/dL         Current Outpatient Medications:     ACCU-CHEK GERTRUDE PLUS TEST STRP Strp, TEST ONE TIME DAILY, Disp: 100 strip, Rfl: 2    amLODIPine (NORVASC) 10 MG tablet, TAKE 1 TABLET EVERY DAY, Disp: 90 tablet, Rfl: 3    aspirin (ECOTRIN) 81 MG EC tablet, Take 81 mg by mouth once daily., Disp: , Rfl:     atenoloL (TENORMIN) 50 MG tablet, TAKE 1 TABLET EVERY DAY, Disp: 90 tablet, Rfl: 3    azelastine (ASTELIN) 137 mcg (0.1 %) nasal spray, 1 spray (137 mcg total) by Nasal route 2 (two) times daily., Disp: 30 mL, Rfl: 0    BD ALCOHOL SWABS PadM, , Disp: , Rfl:     blood-glucose meter Misc, Check daily sugars, dispense insurance covered blood sugar meter, Disp: 1 each, Rfl: 0    cholecalciferol, vitamin D3, (VITAMIN D3) 25 mcg (1,000 unit) capsule, Take 1,000 Units by mouth once daily., Disp: , Rfl:     ciprofloxacin HCl (CILOXAN) 0.3 % ophthalmic solution, , Disp: , Rfl:     doxazosin (CARDURA) 2 MG tablet, Take 1 tablet (2 mg total) by mouth every evening., Disp: 60 tablet, Rfl: 4    fluticasone propionate (FLONASE) 50 mcg/actuation nasal spray, 1 spray (50 mcg total) by Each Nostril route 2 (two) times a day., Disp: 11.1 mL, Rfl: 11    lancets (ACCU-CHEK SOFTCLIX LANCETS) Misc, Check sugar twice daily., Disp: 100 each, Rfl: 11    levothyroxine (SYNTHROID) 50 MCG tablet, TAKE 1 TABLET (50 MCG TOTAL) BY MOUTH ONCE DAILY., Disp: 90 tablet, Rfl: 3    losartan (COZAAR) 100 MG tablet, Take 1 tablet (100 mg total) by mouth once daily., Disp: 90 tablet, Rfl: 3    metFORMIN (GLUCOPHAGE) 1000 MG tablet, Take 1 tablet (1,000 mg total) by mouth 2 (two) times daily., Disp: 180 tablet, Rfl: 11    pantoprazole (PROTONIX) 40 MG tablet, Take  1 tablet (40 mg total) by mouth once daily., Disp: 30 tablet, Rfl: 11    Current Facility-Administered Medications:     acetaminophen tablet 650 mg, 650 mg, Oral, Once PRN, Darrell Long MD    albuterol inhaler 2 puff, 2 puff, Inhalation, Q20 Min PRN, Darrell Long MD     Lab Results   Component Value Date    WBC 3.21 (L) 01/12/2022    RBC 3.98 (L) 01/12/2022    HGB 11.7 (L) 01/12/2022    HCT 36.1 (L) 01/12/2022    MCV 91 01/12/2022    MCH 29.4 01/12/2022    MCHC 32.4 01/12/2022    RDW 13.0 01/12/2022     01/12/2022    MPV 12.1 01/12/2022    GRAN 2.4 01/12/2022    GRAN 73.3 (H) 01/12/2022    LYMPH 0.7 (L) 01/12/2022    LYMPH 22.7 01/12/2022    MONO 0.1 (L) 01/12/2022    MONO 3.1 (L) 01/12/2022    EOS 0.0 01/12/2022    BASO 0.01 01/12/2022    EOSINOPHIL 0.0 01/12/2022    BASOPHIL 0.3 01/12/2022        CMP  Lab Results   Component Value Date     (L) 01/11/2022    K 4.6 01/11/2022    CL 98 01/11/2022    CO2 25 01/11/2022     (H) 01/11/2022    BUN 14 01/11/2022    CREATININE 1.2 01/11/2022    CALCIUM 8.8 01/11/2022    PROT 7.4 01/11/2022    ALBUMIN 3.4 (L) 01/11/2022    BILITOT 0.4 01/11/2022    ALKPHOS 44 (L) 01/11/2022    AST 44 (H) 01/11/2022    ALT 37 01/11/2022    ANIONGAP 11 01/11/2022    ESTGFRAFRICA 50 (A) 01/11/2022    EGFRNONAA 43 (A) 01/11/2022        Lab Results   Component Value Date    LABBLOO No growth after 5 days. 01/11/2022    LABURIN No growth 11/26/2021            Results for orders placed or performed during the hospital encounter of 01/11/22   EKG 12-lead    Collection Time: 01/11/22  1:21 PM    Narrative    Test Reason : U07.1,    Vent. Rate : 087 BPM     Atrial Rate : 087 BPM     P-R Int : 124 ms          QRS Dur : 086 ms      QT Int : 336 ms       P-R-T Axes : 062 008 086 degrees     QTc Int : 404 ms    Normal sinus rhythm  Minimal voltage criteria for LVH, may be normal variant  Nonspecific T wave abnormality  Abnormal ECG  When compared with ECG of 26-MAR-2019  09:59,  Criteria for Septal infarct are no longer Present  Confirmed by Dalia MALDONADO, Regis TORREZ ([id number]) on 1/11/2022 4:58:48 PM    Referred By: MARV   SELF           Confirmed By:Regis Gómez MD                  Plan:       Problem List Items Addressed This Visit        Cardiac/Vascular    HTN (hypertension)     Compliance encouraged.  She has been hesitant to take her blood pressure medications unless it is high.  I support continuing for medications.  She states that adding doxazosin helped her readings a lot.         HLD (hyperlipidemia)     Unchanged status.  Statin intolerant.  I do not advise PS canine injection therapy at this time given her stable cardiac status and absence of any anginal symptoms.  She does not have extensive atherosclerosis.         Aortic atherosclerosis     Condition stable.  She is on aspirin.  She is statin intolerant.            Renal/    CKD (chronic kidney disease), stage III     Condition stable.  She has not required diuretics.  No signs of volume overload today on exam.  Her GFR is in the 50 range.            Endocrine    Controlled type 2 diabetes mellitus with hyperglycemia, without long-term current use of insulin     Condition unchanged.  She takes metformin.            Other    Pneumonia due to COVID-19 virus     She is recovering well.  She has not required home oxygen.  Condition improving.                      A 6 month follow-up was advised.  Her blood pressures have stabilized.         Arsh Benavides MD  02/02/2022   3:19 PM

## 2022-02-02 NOTE — ASSESSMENT & PLAN NOTE
Unchanged status.  Statin intolerant.  I do not advise PS canine injection therapy at this time given her stable cardiac status and absence of any anginal symptoms.  She does not have extensive atherosclerosis.

## 2022-02-02 NOTE — ASSESSMENT & PLAN NOTE
Compliance encouraged.  She has been hesitant to take her blood pressure medications unless it is high.  I support continuing for medications.  She states that adding doxazosin helped her readings a lot.

## 2022-02-08 ENCOUNTER — DOCUMENT SCAN (OUTPATIENT)
Dept: HOME HEALTH SERVICES | Facility: HOSPITAL | Age: 82
End: 2022-02-08
Payer: MEDICARE

## 2022-02-15 ENCOUNTER — TELEPHONE (OUTPATIENT)
Dept: CARDIOLOGY | Facility: CLINIC | Age: 82
End: 2022-02-15
Payer: MEDICARE

## 2022-02-15 DIAGNOSIS — I10 PRIMARY HYPERTENSION: ICD-10-CM

## 2022-02-15 NOTE — TELEPHONE ENCOUNTER
David Garza with [facility]     Spoke with a representative with [facility] pharmacy  Stated they received a refill request transfer from [facility] in regards to this message    Will refill medication for pt    No further questions    ND

## 2022-02-15 NOTE — TELEPHONE ENCOUNTER
----- Message from Mitzi Mcclellan sent at 2/14/2022  2:42 PM CST -----  Type: Requesting to speak with nurse         Who Called: Kayla blanchard/ [facility]   Regarding: doxazosin (CARDURA) 2 MG tablet needing to be mail order please advise   Would the patient rather a call back or a response via [facility]? Call back  Best Call Back Number: [phone]  Additional Information: [facility] - [facility] [address]

## 2022-03-03 ENCOUNTER — OUTPATIENT CASE MANAGEMENT (OUTPATIENT)
Dept: FAMILY MEDICINE | Facility: CLINIC | Age: 82
End: 2022-03-03
Payer: MEDICARE

## 2022-03-07 ENCOUNTER — PATIENT MESSAGE (OUTPATIENT)
Dept: CARDIOLOGY | Facility: CLINIC | Age: 82
End: 2022-03-07
Payer: MEDICARE

## 2022-03-07 DIAGNOSIS — I10 PRIMARY HYPERTENSION: ICD-10-CM

## 2022-03-08 RX ORDER — DOXAZOSIN 2 MG/1
2 TABLET ORAL NIGHTLY
Qty: 60 TABLET | Refills: 4 | Status: SHIPPED | OUTPATIENT
Start: 2022-03-08 | End: 2022-08-12 | Stop reason: SDUPTHER

## 2022-03-26 NOTE — TELEPHONE ENCOUNTER
This Rx Request does not qualify for assessment with the [facility]   Please review protocol details and the Care Due Message for extra clinical information    Reasons Rx Request may be deferred:  Labs/Vitals abnormal    Note composed:11:14 AM 03/26/2022

## 2022-03-26 NOTE — TELEPHONE ENCOUNTER
Care Due:                  Date            Visit Type   Department     Provider  --------------------------------------------------------------------------------                                [facility] FAMILY  Last Visit: 01-      FOLLOW UP    MEDICINE       Zion Villavicencio                               -                              [facility]  Next Visit: 04-      CARE ([facility])   MEDICINE       Zion Villavicencio                                                            Last  Test          Frequency    Reason                     Performed    Due Date  --------------------------------------------------------------------------------    HBA1C.......  6 months...  metFORMIN................  11- 05-    Powered by [employer]. Reference number: [id number].   3/26/2022 3:59:21 AM CDT

## 2022-03-27 RX ORDER — METFORMIN HYDROCHLORIDE 1000 MG/1
TABLET ORAL
Qty: 180 TABLET | Refills: 11 | Status: SHIPPED | OUTPATIENT
Start: 2022-03-27 | End: 2023-06-07

## 2022-04-06 ENCOUNTER — TELEPHONE (OUTPATIENT)
Dept: NEPHROLOGY | Facility: CLINIC | Age: 82
End: 2022-04-06
Payer: MEDICARE

## 2022-04-08 ENCOUNTER — TELEPHONE (OUTPATIENT)
Dept: NEPHROLOGY | Facility: CLINIC | Age: 82
End: 2022-04-08
Payer: MEDICARE

## 2022-04-26 ENCOUNTER — OFFICE VISIT (OUTPATIENT)
Dept: FAMILY MEDICINE | Facility: CLINIC | Age: 82
End: 2022-04-26
Payer: MEDICARE

## 2022-04-26 VITALS
WEIGHT: 135.81 LBS | HEART RATE: 57 BPM | BODY MASS INDEX: 25.64 KG/M2 | DIASTOLIC BLOOD PRESSURE: 60 MMHG | TEMPERATURE: 98 F | HEIGHT: 61 IN | OXYGEN SATURATION: 98 % | SYSTOLIC BLOOD PRESSURE: 122 MMHG

## 2022-04-26 DIAGNOSIS — I10 HYPERTENSION, UNSPECIFIED TYPE: ICD-10-CM

## 2022-04-26 DIAGNOSIS — R53.83 FATIGUE, UNSPECIFIED TYPE: ICD-10-CM

## 2022-04-26 DIAGNOSIS — N18.31 TYPE 2 DIABETES MELLITUS WITH STAGE 3A CHRONIC KIDNEY DISEASE, WITHOUT LONG-TERM CURRENT USE OF INSULIN: ICD-10-CM

## 2022-04-26 DIAGNOSIS — E11.22 TYPE 2 DIABETES MELLITUS WITH STAGE 3A CHRONIC KIDNEY DISEASE, WITHOUT LONG-TERM CURRENT USE OF INSULIN: ICD-10-CM

## 2022-04-26 DIAGNOSIS — L65.9 HAIR LOSS: ICD-10-CM

## 2022-04-26 DIAGNOSIS — Z00.00 ROUTINE GENERAL MEDICAL EXAMINATION AT A HEALTH CARE FACILITY: Primary | ICD-10-CM

## 2022-04-26 PROCEDURE — 99999 PR PBB SHADOW E&M-EST. PATIENT-LVL IV: CPT | Mod: PBBFAC,,, | Performed by: FAMILY MEDICINE

## 2022-04-26 PROCEDURE — 3078F PR MOST RECENT DIASTOLIC BLOOD PRESSURE < 80 MM HG: ICD-10-PCS | Mod: CPTII,S$GLB,, | Performed by: FAMILY MEDICINE

## 2022-04-26 PROCEDURE — 1101F PR PT FALLS ASSESS DOC 0-1 FALLS W/OUT INJ PAST YR: ICD-10-PCS | Mod: CPTII,S$GLB,, | Performed by: FAMILY MEDICINE

## 2022-04-26 PROCEDURE — 1159F MED LIST DOCD IN RCRD: CPT | Mod: CPTII,S$GLB,, | Performed by: FAMILY MEDICINE

## 2022-04-26 PROCEDURE — 3288F PR FALLS RISK ASSESSMENT DOCUMENTED: ICD-10-PCS | Mod: CPTII,S$GLB,, | Performed by: FAMILY MEDICINE

## 2022-04-26 PROCEDURE — 1101F PT FALLS ASSESS-DOCD LE1/YR: CPT | Mod: CPTII,S$GLB,, | Performed by: FAMILY MEDICINE

## 2022-04-26 PROCEDURE — 99999 PR PBB SHADOW E&M-EST. PATIENT-LVL IV: ICD-10-PCS | Mod: PBBFAC,,, | Performed by: FAMILY MEDICINE

## 2022-04-26 PROCEDURE — 3288F FALL RISK ASSESSMENT DOCD: CPT | Mod: CPTII,S$GLB,, | Performed by: FAMILY MEDICINE

## 2022-04-26 PROCEDURE — 1126F AMNT PAIN NOTED NONE PRSNT: CPT | Mod: CPTII,S$GLB,, | Performed by: FAMILY MEDICINE

## 2022-04-26 PROCEDURE — 1159F PR MEDICATION LIST DOCUMENTED IN MEDICAL RECORD: ICD-10-PCS | Mod: CPTII,S$GLB,, | Performed by: FAMILY MEDICINE

## 2022-04-26 PROCEDURE — 99397 PR PREVENTIVE VISIT,EST,65 & OVER: ICD-10-PCS | Mod: S$GLB,,, | Performed by: FAMILY MEDICINE

## 2022-04-26 PROCEDURE — 3074F SYST BP LT 130 MM HG: CPT | Mod: CPTII,S$GLB,, | Performed by: FAMILY MEDICINE

## 2022-04-26 PROCEDURE — 1126F PR PAIN SEVERITY QUANTIFIED, NO PAIN PRESENT: ICD-10-PCS | Mod: CPTII,S$GLB,, | Performed by: FAMILY MEDICINE

## 2022-04-26 PROCEDURE — 99397 PER PM REEVAL EST PAT 65+ YR: CPT | Mod: S$GLB,,, | Performed by: FAMILY MEDICINE

## 2022-04-26 PROCEDURE — 3078F DIAST BP <80 MM HG: CPT | Mod: CPTII,S$GLB,, | Performed by: FAMILY MEDICINE

## 2022-04-26 PROCEDURE — 3074F PR MOST RECENT SYSTOLIC BLOOD PRESSURE < 130 MM HG: ICD-10-PCS | Mod: CPTII,S$GLB,, | Performed by: FAMILY MEDICINE

## 2022-04-26 RX ORDER — ATENOLOL 25 MG/1
25 TABLET ORAL DAILY
Qty: 90 TABLET | Refills: 3 | Status: SHIPPED | OUTPATIENT
Start: 2022-04-26 | End: 2022-05-03 | Stop reason: SDUPTHER

## 2022-04-26 NOTE — MEDICAL/APP STUDENT
(Portions of this note were dictated using voice recognition software and may contain dictation related errors in spelling/grammar/syntax not found on text review)    CC:   Chief Complaint   Patient presents with    Annual Exam       HPI: 81 y.o. female last seen Jan 2022 for hospital followup for covid pna with hypoxia, was put on 2L O2 . Given remdesivir/dex. CXR below; had improved clinically at time of last visit.      CXR:Impression:   Findings concerning for multifocal pneumonia from inflammatory or infectious process including atypical bacterial or viral etiology, without large consolidation.  Differential to include mild pulmonary edema or chronic interstitial lung changes with patchy subsegmental atelectasis.    Today, patient reports she is doing well. Reports no SOB post Covid infection and is back to normal daily routine. She does report increased hair loss since infection. Patient reports global hair loss with no fever or itching. She brought a 1/2 Ziploc bag of hair that she lost over last 2 days for reference.    Patient also reports overall fatigue. She gets 9 hours of sleep each day; however, she is limited in her activity due to feeling tired and does not feel like herself. No recent changes in diet or activity. She was recently started on doxazosin for blood pressure control and has been reporting multiple pressures from home ~ <120/80.      Chronic health history including:     Diabetes with CKD 3 on metformin 1000 mg b.i.d. Reports post prandial readings of 170-180. Does not check blood glucose levels on a daily basis. She has been taking her medications daily.     Hypertension on losartan 100 mg daily, atenolol 50 mg daily, amlodipine 10 mg daily, doxazosin 2 mg daily. Patient's at home blood pressure readings have been ~ <120/80s.     Hyperlipidemia intolerant to all statins along with Zetia     Hypothyroidism on Synthroid 50 mcg daily     Fatty liver disease    Past Medical History:    Diagnosis Date    Aortic atherosclerosis     CKD (chronic kidney disease), stage III     Diabetes mellitus type II     Fatty liver     High cholesterol     HLD (hyperlipidemia)     HTN (hypertension)     Hypothyroidism     Hypothyroidism     Osteopenia     Statin myopathy        Past Surgical History:   Procedure Laterality Date    BREAST BIOPSY      BREAST SURGERY      biopsy    HYSTERECTOMY      still w/ovaries    ROTATOR CUFF REPAIR Left        Family History   Problem Relation Age of Onset    Diabetes Mother     Hypertension Mother     Diabetes Brother     Coronary artery disease Sister 55        MI    Liver cancer Sister     No Known Problems Father     Diabetes Daughter     No Known Problems Son     No Known Problems Brother        Social History     Tobacco Use    Smoking status: Never Smoker    Smokeless tobacco: Never Used   Substance Use Topics    Alcohol use: No    Drug use: No       Lab Results   Component Value Date    WBC 3.21 (L) 01/12/2022    HGB 11.7 (L) 01/12/2022    HCT 36.1 (L) 01/12/2022    MCV 91 01/12/2022     01/12/2022    CHOL 229 (H) 11/26/2021    TRIG 129 11/26/2021    HDL 43 11/26/2021    ALT 37 01/11/2022    AST 44 (H) 01/11/2022    BILITOT 0.4 01/11/2022    ALKPHOS 44 (L) 01/11/2022     (L) 01/11/2022    K 4.6 01/11/2022    CL 98 01/11/2022    CREATININE 1.2 01/11/2022    ESTGFRAFRICA 50 (A) 01/11/2022    EGFRNONAA 43 (A) 01/11/2022    CALCIUM 8.8 01/11/2022    ALBUMIN 3.4 (L) 01/11/2022    BUN 14 01/11/2022    CO2 25 01/11/2022    TSH 1.763 11/26/2021    INR 1.0 01/12/2022    HGBA1C 6.4 (H) 11/26/2021    MICALBCREAT 46.1 (H) 11/26/2021    LDLCALC 160.2 (H) 11/26/2021     (H) 01/11/2022    [id number] 35 10/10/2017       Review of Systems   Constitutional: Positive for malaise/fatigue. Negative for chills and fever.   HENT: Negative.    Eyes: Negative for blurred vision and double vision.   Respiratory: Negative for cough and shortness  "of breath.    Cardiovascular: Negative for chest pain, palpitations and leg swelling.   Gastrointestinal: Negative for abdominal pain, constipation, diarrhea, nausea and vomiting.   Genitourinary: Negative for dysuria and frequency.   Musculoskeletal: Negative.    Skin: Negative for itching and rash.        Reports hair loss   Neurological: Negative for weakness and headaches.   Psychiatric/Behavioral: Negative.          Vital signs reviewed  Vitals:    04/26/22 1407   BP: 122/60   BP Location: Right arm   Patient Position: Sitting   BP Method: Medium (Manual)   Pulse: (!) 57   Temp: 97.9 °F (36.6 °C)   TempSrc: Oral   SpO2: 98%   Weight: 61.6 kg (135 lb 12.9 oz)   Height: 5' 1" (1.549 m)       Wt Readings from Last 4 Encounters:   04/26/22 61.6 kg (135 lb 12.9 oz)   02/02/22 61.7 kg (136 lb 2.1 oz)   01/25/22 61.4 kg (135 lb 5.8 oz)   01/12/22 62.4 kg (137 lb 9.1 oz)       PE:   APPEARANCE: Well nourished, well developed, in no acute distress.    HEAD: Normocephalic, atraumatic.  EYES: PERRL. EOMI.   Conjunctivae noninjected.  EARS: TM's intact. Light reflex normal. No retraction or perforation  NOSE: Mucosa pink. Airway clear.  MOUTH & THROAT: No tonsillar enlargement. No pharyngeal erythema or exudate.   NECK: Supple with no cervical lymphadenopathy.    CHEST: Good inspiratory effort. Lungs clear to auscultation with no wheezes or crackles.  CARDIOVASCULAR: Normal S1, S2. No rubs, murmurs, or gallops.  ABDOMEN: Bowel sounds normal. Not distended. Soft. No tenderness or masses. No organomegaly.  EXTREMITIES: No edema, cyanosis, or clubbing.      IMPRESSION  1. Type 2 diabetes mellitus with stage 3a chronic kidney disease, without long-term current use of insulin    2. Hypertension, unspecified type        PLAN  Orders Placed This Encounter   Procedures    CBC Auto Differential    Comprehensive Metabolic Panel    Hemoglobin A1C    TSH     Medications Ordered This Encounter   Medications    atenoloL (TENORMIN) " 25 MG tablet     Sig: Take 1 tablet (25 mg total) by mouth once daily.     Dispense:  90 tablet     Refill:  3     .        Type 2 Diabetes:  Patient has been reporting post prandial blood glucose readings of 170-180.  No changes in diet or exercise.  Continue to monitor and follow-up with HbA1c in 3 months.    Hypertension:  Patient currently on losartan 100 mg daily, atenolol 50 mg daily, amlodipine 10 mg daily, doxazosin 2 mg daily.   Home blood pressure readings have been ~ <120/80s.    Patient advised to take Atenolol 25mg (down from 50mg) daily due to low blood pressure readings and possible cause of fatigue. She was advised to monitor readings at home, and if low, then restart on Atenolol 50mg daily.    Fatigue:  Reduced blood pressure medication due to possible link to fatigue  Will assess labs for other causes and follow-up in 3 months    Hair Loss:  Could potentially be due to stress reaction post Covid infection  Patient advised that this can be temporary and to use Rogaine (minoxidil) daily if hair loss is not resolved  Continue to monitor and follow-up in 3 months    Follow-up in 3 months with labs or sooner if labs abnormal.    Wilma Morrell  MS4 UQ-OCS

## 2022-04-26 NOTE — PROGRESS NOTES
(Portions of this note were dictated using voice recognition software and may contain dictation related errors in spelling/grammar/syntax not found on text review)     CC:       Chief Complaint   Patient presents with    Annual Exam         HPI: 81 y.o. female last seen Jan 2022 for hospital followup for covid pna with hypoxia, was put on 2L O2 . Given remdesivir/dex. CXR below; had improved clinically at time of last visit.      CXR:Impression:   Findings concerning for multifocal pneumonia from inflammatory or infectious process including atypical bacterial or viral etiology, without large consolidation.  Differential to include mild pulmonary edema or chronic interstitial lung changes with patchy subsegmental atelectasis.     Today, patient reports she is doing well. Reports no SOB post Covid infection and is back to normal daily routine. She does report increased hair loss since infection. Patient reports global hair loss with no fever or itching. She brought a 1/2 Ziploc bag of hair that she lost over last 2 days for reference.     Patient also reports overall fatigue. She gets 9 hours of sleep each day; however, she is limited in her activity due to feeling tired and does not feel like herself. No recent changes in diet or activity. She was recently started on doxazosin for blood pressure control and has been reporting multiple pressures from home ~ <120/80.      Chronic health history including:     Diabetes with CKD 3 on metformin 1000 mg b.i.d. Reports post prandial readings of 170-180. Does not check blood glucose levels on a daily basis. She has been taking her medications daily.     Hypertension on losartan 100 mg daily, atenolol 50 mg daily, amlodipine 10 mg daily, doxazosin 2 mg daily. Patient's at home blood pressure readings have been ~ <120/80s.     Hyperlipidemia intolerant to all statins along with Zetia     Hypothyroidism on Synthroid 50 mcg daily     Fatty liver disease          Past Medical  History:   Diagnosis Date    Aortic atherosclerosis      CKD (chronic kidney disease), stage III      Diabetes mellitus type II      Fatty liver      High cholesterol      HLD (hyperlipidemia)      HTN (hypertension)      Hypothyroidism      Hypothyroidism      Osteopenia      Statin myopathy                 Past Surgical History:   Procedure Laterality Date    BREAST BIOPSY        BREAST SURGERY         biopsy    HYSTERECTOMY         still w/ovaries    ROTATOR CUFF REPAIR Left                 Family History   Problem Relation Age of Onset    Diabetes Mother      Hypertension Mother      Diabetes Brother      Coronary artery disease Sister 55         MI    Liver cancer Sister      No Known Problems Father      Diabetes Daughter      No Known Problems Son      No Known Problems Brother           Social History           Tobacco Use    Smoking status: Never Smoker    Smokeless tobacco: Never Used   Substance Use Topics    Alcohol use: No    Drug use: No               Lab Results   Component Value Date     WBC 3.21 (L) 01/12/2022     HGB 11.7 (L) 01/12/2022     HCT 36.1 (L) 01/12/2022     MCV 91 01/12/2022      01/12/2022     CHOL 229 (H) 11/26/2021     TRIG 129 11/26/2021     HDL 43 11/26/2021     ALT 37 01/11/2022     AST 44 (H) 01/11/2022     BILITOT 0.4 01/11/2022     ALKPHOS 44 (L) 01/11/2022      (L) 01/11/2022     K 4.6 01/11/2022     CL 98 01/11/2022     CREATININE 1.2 01/11/2022     ESTGFRAFRICA 50 (A) 01/11/2022     EGFRNONAA 43 (A) 01/11/2022     CALCIUM 8.8 01/11/2022     ALBUMIN 3.4 (L) 01/11/2022     BUN 14 01/11/2022     CO2 25 01/11/2022     TSH 1.763 11/26/2021     INR 1.0 01/12/2022     HGBA1C 6.4 (H) 11/26/2021     MICALBCREAT 46.1 (H) 11/26/2021     LDLCALC 160.2 (H) 11/26/2021      (H) 01/11/2022     [id number] 35 10/10/2017       Review of Systems   Constitutional: Positive for malaise/fatigue. Negative for chills and fever.   HENT: Negative.   "  Eyes: Negative for blurred vision and double vision.   Respiratory: Negative for cough and shortness of breath.    Cardiovascular: Negative for chest pain, palpitations and leg swelling.   Gastrointestinal: Negative for abdominal pain, constipation, diarrhea, nausea and vomiting.   Genitourinary: Negative for dysuria and frequency.   Musculoskeletal: Negative.    Skin: Negative for itching and rash.        Reports hair loss   Neurological: Negative for weakness and headaches.   Psychiatric/Behavioral: Negative.             Vital signs reviewed  Vitals       Vitals:     04/26/22 1407   BP: 122/60   BP Location: Right arm   Patient Position: Sitting   BP Method: Medium (Manual)   Pulse: (!) 57   Temp: 97.9 °F (36.6 °C)   TempSrc: Oral   SpO2: 98%   Weight: 61.6 kg (135 lb 12.9 oz)   Height: 5' 1" (1.549 m)                Wt Readings from Last 4 Encounters:   04/26/22 61.6 kg (135 lb 12.9 oz)   02/02/22 61.7 kg (136 lb 2.1 oz)   01/25/22 61.4 kg (135 lb 5.8 oz)   01/12/22 62.4 kg (137 lb 9.1 oz)         PE:   APPEARANCE: Well nourished, well developed, in no acute distress.    HEAD: Normocephalic, atraumatic.  EYES:   Conjunctivae noninjected.  NECK: Supple with no cervical lymphadenopathy.  No carotid bruits, no thyromegaly  CHEST: Good inspiratory effort. Lungs clear to auscultation with no wheezes or crackles.  CARDIOVASCULAR: Normal S1, S2. No rubs, murmurs, or gallops.  ABDOMEN: Bowel sounds normal. Not distended. Soft. No tenderness or masses. No organomegaly.  EXTREMITIES: No edema  DIABETIC FOOT EXAM: Protective Sensation (w/ 10 gram monofilament):  Right: Intact  Left: Intact    Visual Inspection:  Normal -  Bilateral    Pedal Pulses:   Right: Present  Left: Present    Posterior tibialis:   Right:Present  Left: Present                IMPRESSION  1. Routine general medical examination at a health care facility    2. Type 2 diabetes mellitus with stage 3a chronic kidney disease, without long-term current use " of insulin    3. Hypertension, unspecified type    4. Fatigue, unspecified type    5. Hair loss        PLAN           Medications Ordered This Encounter   Medications    atenoloL (TENORMIN) 25 MG tablet       Sig: Take 1 tablet (25 mg total) by mouth once daily.       Dispense:  90 tablet       Refill:  3       .         Type 2 Diabetes:  Patient has been reporting post prandial blood glucose readings of 170-180.  No changes in diet or exercise.  Continue to monitor and follow-up with HbA1c    Continue metformin    Hypertension:  Patient currently on losartan 100 mg daily, atenolol 50 mg daily, amlodipine 10 mg daily, doxazosin 2 mg daily.   Home blood pressure readings have been ~ <120/80s.     Patient advised to take Atenolol 25mg (down from 50mg) daily due to   fatigue in case this will help especially given the addition doxazosin to her current blood pressure therapy.  She was advised to monitor readings at home, and if reaching or exceeding 140/90 then restart on Atenolol 50mg daily.     Fatigue:  Check labs below  Reduced blood pressure medication due to possible link to fatigue     Hair Loss:  Could potentially be due to stress reaction post Covid infection  Patient advised that this can be temporary and to use Rogaine (minoxidil) daily if hair loss is not resolved  Continue to monitor and follow-up in 3 months        Orders Placed This Encounter   Procedures    CBC Auto Differential    Comprehensive Metabolic Panel    Hemoglobin A1C    TSH       SCREENINGS      DEXA scan:  02/09/2021, normal  Mammogram 12/19/16, declines rpt  Colonoscopy: 2009, Dr. Wright, normal.  Declined repeat     immunizations   Tetanus :Check with your pharmacy regarding getting the tetanus (Tdap) vaccine (once every 10 years)  PVX: 2017   Prevnar: 1/2016  Flu: declines  Zoster: utd  COVID (05/19/2021, 06/19/2021 ([employer]), has not had booster yet but do encourage getting booster shot when able                      Harsheen  Petros  MS4 UQ-OCS           I hereby acknowledge that I am relying upon documentation authored by a medical student working under my supervision and further I hereby attest that I have verified the student documentation or findings by personally re-performing the physical exam and medical decision making activities of the Evaluation and Management service to be billed.    Zion Villavicencio MD

## 2022-04-26 NOTE — PATIENT INSTRUCTIONS
DECREASE atenolol to 25 mg daily down from 50 mg daily (because blood pressure is controlled but you are feeling tired)    Goal blood pressure less than 140/90.     Continue all other medication    Labs to check diabetes control and also thryoid     Orders Placed This Encounter   Procedures    CBC Auto Differential    Comprehensive Metabolic Panel    Hemoglobin A1C    TSH       You can try over the counter ROGAINE (minoxidil) shampoo to help with hair growth. Sometimes we'll see temporary hair loss following a severe illness which could be in your case with your Covid hospital admission.

## 2022-04-27 ENCOUNTER — LAB VISIT (OUTPATIENT)
Dept: LAB | Facility: HOSPITAL | Age: 82
End: 2022-04-27
Attending: FAMILY MEDICINE
Payer: MEDICARE

## 2022-04-27 DIAGNOSIS — E11.22 TYPE 2 DIABETES MELLITUS WITH STAGE 3A CHRONIC KIDNEY DISEASE, WITHOUT LONG-TERM CURRENT USE OF INSULIN: ICD-10-CM

## 2022-04-27 DIAGNOSIS — N18.31 TYPE 2 DIABETES MELLITUS WITH STAGE 3A CHRONIC KIDNEY DISEASE, WITHOUT LONG-TERM CURRENT USE OF INSULIN: ICD-10-CM

## 2022-04-27 DIAGNOSIS — I10 HYPERTENSION, UNSPECIFIED TYPE: ICD-10-CM

## 2022-04-27 LAB
ALBUMIN SERPL BCP-MCNC: 4 G/DL (ref 3.5–5.2)
ALP SERPL-CCNC: 53 U/L (ref 55–135)
ALT SERPL W/O P-5'-P-CCNC: 30 U/L (ref 10–44)
ANION GAP SERPL CALC-SCNC: 11 MMOL/L (ref 8–16)
AST SERPL-CCNC: 26 U/L (ref 10–40)
BASOPHILS # BLD AUTO: 0.04 K/UL (ref 0–0.2)
BASOPHILS NFR BLD: 0.4 % (ref 0–1.9)
BILIRUB SERPL-MCNC: 0.4 MG/DL (ref 0.1–1)
BUN SERPL-MCNC: 18 MG/DL (ref 8–23)
CALCIUM SERPL-MCNC: 10.5 MG/DL (ref 8.7–10.5)
CHLORIDE SERPL-SCNC: 107 MMOL/L (ref 95–110)
CO2 SERPL-SCNC: 25 MMOL/L (ref 23–29)
CREAT SERPL-MCNC: 1 MG/DL (ref 0.5–1.4)
DIFFERENTIAL METHOD: ABNORMAL
EOSINOPHIL # BLD AUTO: 0.2 K/UL (ref 0–0.5)
EOSINOPHIL NFR BLD: 2.2 % (ref 0–8)
ERYTHROCYTE [DISTWIDTH] IN BLOOD BY AUTOMATED COUNT: 13.9 % (ref 11.5–14.5)
EST. GFR  (AFRICAN AMERICAN): >60 ML/MIN/1.73 M^2
EST. GFR  (NON AFRICAN AMERICAN): 54 ML/MIN/1.73 M^2
ESTIMATED AVG GLUCOSE: 134 MG/DL (ref 68–131)
GLUCOSE SERPL-MCNC: 101 MG/DL (ref 70–110)
HBA1C MFR BLD: 6.3 % (ref 4–5.6)
HCT VFR BLD AUTO: 34 % (ref 37–48.5)
HGB BLD-MCNC: 11 G/DL (ref 12–16)
IMM GRANULOCYTES # BLD AUTO: 0.02 K/UL (ref 0–0.04)
IMM GRANULOCYTES NFR BLD AUTO: 0.2 % (ref 0–0.5)
LYMPHOCYTES # BLD AUTO: 4.1 K/UL (ref 1–4.8)
LYMPHOCYTES NFR BLD: 42.9 % (ref 18–48)
MCH RBC QN AUTO: 29.1 PG (ref 27–31)
MCHC RBC AUTO-ENTMCNC: 32.4 G/DL (ref 32–36)
MCV RBC AUTO: 90 FL (ref 82–98)
MONOCYTES # BLD AUTO: 0.7 K/UL (ref 0.3–1)
MONOCYTES NFR BLD: 7.7 % (ref 4–15)
NEUTROPHILS # BLD AUTO: 4.5 K/UL (ref 1.8–7.7)
NEUTROPHILS NFR BLD: 46.6 % (ref 38–73)
NRBC BLD-RTO: 0 /100 WBC
PLATELET # BLD AUTO: 283 K/UL (ref 150–450)
PMV BLD AUTO: 11.2 FL (ref 9.2–12.9)
POTASSIUM SERPL-SCNC: 5.3 MMOL/L (ref 3.5–5.1)
PROT SERPL-MCNC: 7.7 G/DL (ref 6–8.4)
RBC # BLD AUTO: 3.78 M/UL (ref 4–5.4)
SODIUM SERPL-SCNC: 143 MMOL/L (ref 136–145)
TSH SERPL DL<=0.005 MIU/L-ACNC: 3.15 UIU/ML (ref 0.4–4)
WBC # BLD AUTO: 9.57 K/UL (ref 3.9–12.7)

## 2022-04-27 PROCEDURE — 84443 ASSAY THYROID STIM HORMONE: CPT | Performed by: FAMILY MEDICINE

## 2022-04-27 PROCEDURE — 83036 HEMOGLOBIN GLYCOSYLATED A1C: CPT | Performed by: FAMILY MEDICINE

## 2022-04-27 PROCEDURE — 36415 COLL VENOUS BLD VENIPUNCTURE: CPT | Performed by: FAMILY MEDICINE

## 2022-04-27 PROCEDURE — 85025 COMPLETE CBC W/AUTO DIFF WBC: CPT | Performed by: FAMILY MEDICINE

## 2022-04-27 PROCEDURE — 80053 COMPREHEN METABOLIC PANEL: CPT | Performed by: FAMILY MEDICINE

## 2022-05-03 ENCOUNTER — PATIENT MESSAGE (OUTPATIENT)
Dept: FAMILY MEDICINE | Facility: CLINIC | Age: 82
End: 2022-05-03
Payer: MEDICARE

## 2022-05-03 RX ORDER — ATENOLOL 50 MG/1
50 TABLET ORAL DAILY
Qty: 90 TABLET | Refills: 3 | Status: SHIPPED | OUTPATIENT
Start: 2022-05-03 | End: 2022-12-14 | Stop reason: SDUPTHER

## 2022-05-03 NOTE — TELEPHONE ENCOUNTER
Okay we can go to the 50 mg atenolol again.  I have sent a new prescription back to the pharmacy.  You can take 2 of your 25 mg pills until you get the new prescription just to get the blood pressure back down.     Zion Villavicencio MD      This Patient Portal message has not been read.     Oneyda Shah 1 hour ago (4:23 PM)            During my last visit you reduced the dosage of my high blood pressure medication to half of what I was taking. Since taking the new dosage I have not been feeling well, and get tachycardia once in a while. These have been my readings since 4/26/22:      150/66  140/67  154/67  126/62  159/67  134/68  152/66     Please advise, thank you

## 2022-05-16 ENCOUNTER — OFFICE VISIT (OUTPATIENT)
Dept: FAMILY MEDICINE | Facility: CLINIC | Age: 82
End: 2022-05-16
Payer: MEDICARE

## 2022-05-16 VITALS
SYSTOLIC BLOOD PRESSURE: 144 MMHG | HEIGHT: 61 IN | WEIGHT: 134.25 LBS | HEART RATE: 66 BPM | TEMPERATURE: 98 F | OXYGEN SATURATION: 98 % | DIASTOLIC BLOOD PRESSURE: 62 MMHG | BODY MASS INDEX: 25.34 KG/M2

## 2022-05-16 DIAGNOSIS — E03.9 HYPOTHYROIDISM, UNSPECIFIED TYPE: ICD-10-CM

## 2022-05-16 DIAGNOSIS — E11.22 TYPE 2 DIABETES MELLITUS WITH STAGE 3A CHRONIC KIDNEY DISEASE, WITHOUT LONG-TERM CURRENT USE OF INSULIN: Primary | ICD-10-CM

## 2022-05-16 DIAGNOSIS — K76.0 FATTY LIVER: ICD-10-CM

## 2022-05-16 DIAGNOSIS — M94.0 COSTOCHONDRITIS: ICD-10-CM

## 2022-05-16 DIAGNOSIS — I10 HYPERTENSION, UNSPECIFIED TYPE: ICD-10-CM

## 2022-05-16 DIAGNOSIS — M25.549 ARTHRALGIA OF HAND, UNSPECIFIED LATERALITY: ICD-10-CM

## 2022-05-16 DIAGNOSIS — N18.31 TYPE 2 DIABETES MELLITUS WITH STAGE 3A CHRONIC KIDNEY DISEASE, WITHOUT LONG-TERM CURRENT USE OF INSULIN: Primary | ICD-10-CM

## 2022-05-16 DIAGNOSIS — Z86.16 HISTORY OF COVID-19: ICD-10-CM

## 2022-05-16 PROCEDURE — 1101F PT FALLS ASSESS-DOCD LE1/YR: CPT | Mod: CPTII,S$GLB,, | Performed by: FAMILY MEDICINE

## 2022-05-16 PROCEDURE — 1101F PR PT FALLS ASSESS DOC 0-1 FALLS W/OUT INJ PAST YR: ICD-10-PCS | Mod: CPTII,S$GLB,, | Performed by: FAMILY MEDICINE

## 2022-05-16 PROCEDURE — 1159F PR MEDICATION LIST DOCUMENTED IN MEDICAL RECORD: ICD-10-PCS | Mod: CPTII,S$GLB,, | Performed by: FAMILY MEDICINE

## 2022-05-16 PROCEDURE — 3077F SYST BP >= 140 MM HG: CPT | Mod: CPTII,S$GLB,, | Performed by: FAMILY MEDICINE

## 2022-05-16 PROCEDURE — 3078F DIAST BP <80 MM HG: CPT | Mod: CPTII,S$GLB,, | Performed by: FAMILY MEDICINE

## 2022-05-16 PROCEDURE — 3077F PR MOST RECENT SYSTOLIC BLOOD PRESSURE >= 140 MM HG: ICD-10-PCS | Mod: CPTII,S$GLB,, | Performed by: FAMILY MEDICINE

## 2022-05-16 PROCEDURE — 3078F PR MOST RECENT DIASTOLIC BLOOD PRESSURE < 80 MM HG: ICD-10-PCS | Mod: CPTII,S$GLB,, | Performed by: FAMILY MEDICINE

## 2022-05-16 PROCEDURE — 99999 PR PBB SHADOW E&M-EST. PATIENT-LVL IV: CPT | Mod: PBBFAC,,, | Performed by: FAMILY MEDICINE

## 2022-05-16 PROCEDURE — 1159F MED LIST DOCD IN RCRD: CPT | Mod: CPTII,S$GLB,, | Performed by: FAMILY MEDICINE

## 2022-05-16 PROCEDURE — 99214 PR OFFICE/OUTPT VISIT, EST, LEVL IV, 30-39 MIN: ICD-10-PCS | Mod: S$GLB,,, | Performed by: FAMILY MEDICINE

## 2022-05-16 PROCEDURE — 1126F PR PAIN SEVERITY QUANTIFIED, NO PAIN PRESENT: ICD-10-PCS | Mod: CPTII,S$GLB,, | Performed by: FAMILY MEDICINE

## 2022-05-16 PROCEDURE — 3288F FALL RISK ASSESSMENT DOCD: CPT | Mod: CPTII,S$GLB,, | Performed by: FAMILY MEDICINE

## 2022-05-16 PROCEDURE — 1126F AMNT PAIN NOTED NONE PRSNT: CPT | Mod: CPTII,S$GLB,, | Performed by: FAMILY MEDICINE

## 2022-05-16 PROCEDURE — 3288F PR FALLS RISK ASSESSMENT DOCUMENTED: ICD-10-PCS | Mod: CPTII,S$GLB,, | Performed by: FAMILY MEDICINE

## 2022-05-16 PROCEDURE — 99999 PR PBB SHADOW E&M-EST. PATIENT-LVL IV: ICD-10-PCS | Mod: PBBFAC,,, | Performed by: FAMILY MEDICINE

## 2022-05-16 PROCEDURE — 99214 OFFICE O/P EST MOD 30 MIN: CPT | Mod: S$GLB,,, | Performed by: FAMILY MEDICINE

## 2022-05-16 NOTE — PROGRESS NOTES
(Portions of this note were dictated using voice recognition software and may contain dictation related errors in spelling/grammar/syntax not found on text review)     CC:       Chief Complaint   Patient presents with    Annual Exam         HPI: 81 y.o. female last seen J last month   Chronic health history including:     Diabetes with CKD 3 on metformin 1000 mg b.i.d. Reports post prandial readings of 170-180. Does not check blood glucose levels on a daily basis. She has been taking her medications daily.     Hypertension on losartan 100 mg daily, atenolol 50 mg daily, amlodipine 10 mg daily, doxazosin 2 mg daily. (BP at home consistently low normal, had tried to decrease atenolol 25 mg daily given some fatigue complains looking to pressures subtle elevated was not feeling well -therefore restarted atenolol at 50 mg daily).  BP is mildly suboptimal today 144/62, has not checked her pressure in the last few days.     Hyperlipidemia intolerant to all statins along with Zetia     Hypothyroidism on Synthroid 50 mcg daily     Fatty liver disease    COVID pneumonia in January 2022    The last week had pain in her right hand, left shoulder left chest wall.  Getting better currently.  Was using some pain gel suspect diclofenac gel topically on the hands 1 time, seems to be getting a bit better.  Sometimes index finger would get stiff.  Feels a little bit on the or left hand but more significant on the right side.  No fevers or chills.  No shortness of breath.  No nausea vomiting or diarrhea.  Left chest wall pain as sometimes along align and costochondral junctions down to the lower ribs under the breast.  Sometimes she would notice desire to want to belch             Past Medical History:   Diagnosis Date    Aortic atherosclerosis      CKD (chronic kidney disease), stage III      Diabetes mellitus type II      Fatty liver      High cholesterol      HLD (hyperlipidemia)      HTN (hypertension)      Hypothyroidism       Hypothyroidism      Osteopenia      Statin myopathy                 Past Surgical History:   Procedure Laterality Date    BREAST BIOPSY        BREAST SURGERY         biopsy    HYSTERECTOMY         still w/ovaries    ROTATOR CUFF REPAIR Left                 Family History   Problem Relation Age of Onset    Diabetes Mother      Hypertension Mother      Diabetes Brother      Coronary artery disease Sister 55         MI    Liver cancer Sister      No Known Problems Father      Diabetes Daughter      No Known Problems Son      No Known Problems Brother           Social History           Tobacco Use    Smoking status: Never Smoker    Smokeless tobacco: Never Used   Substance Use Topics    Alcohol use: No    Drug use: No         Lab Results   Component Value Date    WBC 9.57 04/27/2022    HGB 11.0 (L) 04/27/2022    HCT 34.0 (L) 04/27/2022    MCV 90 04/27/2022     04/27/2022    CHOL 229 (H) 11/26/2021    TRIG 129 11/26/2021    HDL 43 11/26/2021    ALT 30 04/27/2022    AST 26 04/27/2022    BILITOT 0.4 04/27/2022    ALKPHOS 53 (L) 04/27/2022     04/27/2022    K 5.3 (H) 04/27/2022     04/27/2022    CREATININE 1.0 04/27/2022    ESTGFRAFRICA >60 04/27/2022    EGFRNONAA 54 (A) 04/27/2022    CALCIUM 10.5 04/27/2022    ALBUMIN 4.0 04/27/2022    BUN 18 04/27/2022    CO2 25 04/27/2022    TSH 3.148 04/27/2022    INR 1.0 01/12/2022    HGBA1C 6.3 (H) 04/27/2022    MICALBCREAT 46.1 (H) 11/26/2021    LDLCALC 160.2 (H) 11/26/2021     04/27/2022    HDULBCGG86UZ 35 10/10/2017          Hemoglobin (g/dL)   Date Value   04/27/2022 11.0 (L)   01/12/2022 11.7 (L)   01/11/2022 10.4 (L)   11/26/2021 11.2 (L)   07/14/2021 11.8 (L)   06/11/2021 11.8 (L)   04/15/2021 11.8 (L)   01/11/2021 11.7 (L)   08/18/2020 12.1   02/12/2020 11.6 (L)     Hemoglobin A1C (%)   Date Value   04/27/2022 6.3 (H)   11/26/2021 6.4 (H)   04/15/2021 6.5 (H)   01/11/2021 6.6 (H)   08/18/2020 7.0 (H)   02/12/2020 7.3 (H)  "    eGFR if non African American (mL/min/1.73 m^2)   Date Value   04/27/2022 54 (A)   01/11/2022 43 (A)   12/20/2021 54 (A)   11/26/2021 48 (A)   06/11/2021 48 (A)     Potassium (mmol/L)   Date Value   04/27/2022 5.3 (H)   01/11/2022 4.6   12/20/2021 4.8   11/26/2021 5.2 (H)   06/11/2021 4.8   04/15/2021 5.2 (H)          Vitals:    05/16/22 0816   BP: (!) 144/62   BP Location: Right arm   Patient Position: Sitting   BP Method: Medium (Manual)   Pulse: 66   Temp: 98.2 °F (36.8 °C)   TempSrc: Oral   SpO2: 98%   Weight: 60.9 kg (134 lb 4.2 oz)   Height: 5' 1" (1.549 m)       Wt Readings from Last 5 Encounters:   04/26/22 61.6 kg (135 lb 12.9 oz)   02/02/22 61.7 kg (136 lb 2.1 oz)   01/25/22 61.4 kg (135 lb 5.8 oz)   01/12/22 62.4 kg (137 lb 9.1 oz)   01/04/22 62.6 kg (138 lb)        PE:   APPEARANCE: Well nourished, well developed, in no acute distress.    HEAD: Normocephalic, atraumatic.  EYES:   Conjunctivae noninjected.  NECK: Supple with no cervical lymphadenopathy.  No carotid bruits, no thyromegaly  CHEST: Good inspiratory effort. Lungs clear to auscultation with no wheezes or crackles.  No current chest wall tenderness to palpation although she points to the region in the costochondral junctions on the left side all the way down to the lower ribs of the location of her prior pain.  CARDIOVASCULAR: Normal S1, S2. No rubs, murmurs, or gallops.  ABDOMEN: Bowel sounds normal. Not distended. Soft. No tenderness or masses. No organomegaly.  EXTREMITIES: No edema  DIABETIC FOOT EXAM: utd 4/2022                IMPRESSION  1. Type 2 diabetes mellitus with stage 3a chronic kidney disease, without long-term current use of insulin    2. Hypertension, unspecified type    3. History of COVID-19    4. Fatty liver    5. Hypothyroidism, unspecified type    6. Arthralgia of hand, unspecified laterality    7. Costochondritis        PLAN         Type 2 Diabetes:  Controlled   Continue metformin 2 g daily and    Hypertension:  Had " been controlled prior, went down to atenolol 25 secondary to low normal blood pressures but blood pressure started going up.  Since has been back on atenolol 50 mg. Blood pressure is mildly suboptimal today.  For now advised  Continue losartan 100 mg daily, atenolol 50 mg daily, amlodipine 10 mg daily, doxazosin 2 mg daily.  advise send me blood pressure readings over the next 1-2 weeks from home    Joint pain hand, possibly DJD related.  By history sounds like maybe had some trigger finger as well the index finger.  Currently symptoms are improved.  Can use diclofenac gel p.r.n., warm compresses, stressful exercises to the hands    Costochondritis suspected on the left side.  Advised on chest wall stretches, printed, notify for any worsening symptoms    Lats 3 mo below with visit to follow  Orders Placed This Encounter   Procedures    CBC Auto Differential    Comprehensive Metabolic Panel    Hemoglobin A1C    TSH                SCREENINGS      DEXA scan:  02/09/2021, normal  Mammogram 12/19/16, declines rpt  Colonoscopy: 2009, Dr. Wright, normal.  Declined repeat     immunizations   Tetanus :Check with your pharmacy regarding getting the tetanus (Tdap) vaccine (once every 10 years)  PVX: 2017   Prevnar: 1/2016  Flu: declines  Zoster: utd  COVID (05/19/2021, 06/19/2021 (Pf[employer]), has not had booster yet but do encourage getting booster shot when able                      Wilma Morrell  MS4 UQ-OCS           I hereby acknowledge that I am relying upon documentation authored by a medical student working under my supervision and further I hereby attest that I have verified the student documentation or findings by personally re-performing the physical exam and medical decision making activities of the Evaluation and Management service to be billed.    Zion Villavicencio MD

## 2022-06-20 RX ORDER — BLOOD SUGAR DIAGNOSTIC
STRIP MISCELLANEOUS
Qty: 100 STRIP | Refills: 3 | Status: SHIPPED | OUTPATIENT
Start: 2022-06-20 | End: 2023-06-21

## 2022-06-20 NOTE — TELEPHONE ENCOUNTER
Refill Decision Note   Virginia Pablo  is requesting a refill authorization.  Brief Assessment and Rationale for Refill:  Approve     Medication Therapy Plan:       Medication Reconciliation Completed: No   Comments:     No Care Gaps recommended.     Note composed:7:56 AM 06/20/2022

## 2022-06-20 NOTE — TELEPHONE ENCOUNTER
No new care gaps identified.  [facility] Embedded Care Gaps. Reference number: [id number]. 6/20/2022   7:27:50 AM CDT

## 2022-08-12 ENCOUNTER — PATIENT MESSAGE (OUTPATIENT)
Dept: CARDIOLOGY | Facility: CLINIC | Age: 82
End: 2022-08-12
Payer: MEDICARE

## 2022-08-12 DIAGNOSIS — I10 PRIMARY HYPERTENSION: ICD-10-CM

## 2022-08-12 RX ORDER — DOXAZOSIN 2 MG/1
2 TABLET ORAL NIGHTLY
Qty: 60 TABLET | Refills: 4 | Status: SHIPPED | OUTPATIENT
Start: 2022-08-12 | End: 2022-08-12 | Stop reason: SDUPTHER

## 2022-08-15 ENCOUNTER — LAB VISIT (OUTPATIENT)
Dept: LAB | Facility: HOSPITAL | Age: 82
End: 2022-08-15
Attending: FAMILY MEDICINE
Payer: MEDICARE

## 2022-08-15 DIAGNOSIS — E11.22 TYPE 2 DIABETES MELLITUS WITH STAGE 3A CHRONIC KIDNEY DISEASE, WITHOUT LONG-TERM CURRENT USE OF INSULIN: ICD-10-CM

## 2022-08-15 DIAGNOSIS — N18.31 TYPE 2 DIABETES MELLITUS WITH STAGE 3A CHRONIC KIDNEY DISEASE, WITHOUT LONG-TERM CURRENT USE OF INSULIN: ICD-10-CM

## 2022-08-15 DIAGNOSIS — I10 HYPERTENSION, UNSPECIFIED TYPE: ICD-10-CM

## 2022-08-15 DIAGNOSIS — I10 PRIMARY HYPERTENSION: ICD-10-CM

## 2022-08-15 DIAGNOSIS — E03.9 HYPOTHYROIDISM, UNSPECIFIED TYPE: ICD-10-CM

## 2022-08-15 DIAGNOSIS — Z86.16 HISTORY OF COVID-19: ICD-10-CM

## 2022-08-15 DIAGNOSIS — K76.0 FATTY LIVER: ICD-10-CM

## 2022-08-15 LAB
ALBUMIN SERPL BCP-MCNC: 3.7 G/DL (ref 3.5–5.2)
ALP SERPL-CCNC: 56 U/L (ref 55–135)
ALT SERPL W/O P-5'-P-CCNC: 26 U/L (ref 10–44)
ANION GAP SERPL CALC-SCNC: 9 MMOL/L (ref 8–16)
AST SERPL-CCNC: 25 U/L (ref 10–40)
BASOPHILS # BLD AUTO: 0.06 K/UL (ref 0–0.2)
BASOPHILS NFR BLD: 0.6 % (ref 0–1.9)
BILIRUB SERPL-MCNC: 0.4 MG/DL (ref 0.1–1)
BUN SERPL-MCNC: 18 MG/DL (ref 8–23)
CALCIUM SERPL-MCNC: 9.5 MG/DL (ref 8.7–10.5)
CHLORIDE SERPL-SCNC: 106 MMOL/L (ref 95–110)
CO2 SERPL-SCNC: 26 MMOL/L (ref 23–29)
CREAT SERPL-MCNC: 1 MG/DL (ref 0.5–1.4)
DIFFERENTIAL METHOD: ABNORMAL
EOSINOPHIL # BLD AUTO: 0.2 K/UL (ref 0–0.5)
EOSINOPHIL NFR BLD: 2.2 % (ref 0–8)
ERYTHROCYTE [DISTWIDTH] IN BLOOD BY AUTOMATED COUNT: 14.2 % (ref 11.5–14.5)
EST. GFR  (NO RACE VARIABLE): 57 ML/MIN/1.73 M^2
ESTIMATED AVG GLUCOSE: 137 MG/DL (ref 68–131)
GLUCOSE SERPL-MCNC: 119 MG/DL (ref 70–110)
HBA1C MFR BLD: 6.4 % (ref 4–5.6)
HCT VFR BLD AUTO: 32.5 % (ref 37–48.5)
HGB BLD-MCNC: 10.9 G/DL (ref 12–16)
IMM GRANULOCYTES # BLD AUTO: 0.02 K/UL (ref 0–0.04)
IMM GRANULOCYTES NFR BLD AUTO: 0.2 % (ref 0–0.5)
LYMPHOCYTES # BLD AUTO: 3.2 K/UL (ref 1–4.8)
LYMPHOCYTES NFR BLD: 34.8 % (ref 18–48)
MCH RBC QN AUTO: 29.7 PG (ref 27–31)
MCHC RBC AUTO-ENTMCNC: 33.5 G/DL (ref 32–36)
MCV RBC AUTO: 89 FL (ref 82–98)
MONOCYTES # BLD AUTO: 0.7 K/UL (ref 0.3–1)
MONOCYTES NFR BLD: 7.4 % (ref 4–15)
NEUTROPHILS # BLD AUTO: 5.1 K/UL (ref 1.8–7.7)
NEUTROPHILS NFR BLD: 54.8 % (ref 38–73)
NRBC BLD-RTO: 0 /100 WBC
PLATELET # BLD AUTO: 282 K/UL (ref 150–450)
PMV BLD AUTO: 10.8 FL (ref 9.2–12.9)
POTASSIUM SERPL-SCNC: 5.1 MMOL/L (ref 3.5–5.1)
PROT SERPL-MCNC: 7.4 G/DL (ref 6–8.4)
RBC # BLD AUTO: 3.67 M/UL (ref 4–5.4)
SODIUM SERPL-SCNC: 141 MMOL/L (ref 136–145)
TSH SERPL DL<=0.005 MIU/L-ACNC: 2.8 UIU/ML (ref 0.4–4)
WBC # BLD AUTO: 9.25 K/UL (ref 3.9–12.7)

## 2022-08-15 PROCEDURE — 84443 ASSAY THYROID STIM HORMONE: CPT | Performed by: FAMILY MEDICINE

## 2022-08-15 PROCEDURE — 80053 COMPREHEN METABOLIC PANEL: CPT | Performed by: FAMILY MEDICINE

## 2022-08-15 PROCEDURE — 85025 COMPLETE CBC W/AUTO DIFF WBC: CPT | Performed by: FAMILY MEDICINE

## 2022-08-15 PROCEDURE — 83036 HEMOGLOBIN GLYCOSYLATED A1C: CPT | Performed by: FAMILY MEDICINE

## 2022-08-15 PROCEDURE — 36415 COLL VENOUS BLD VENIPUNCTURE: CPT | Performed by: FAMILY MEDICINE

## 2022-08-15 RX ORDER — DOXAZOSIN 2 MG/1
2 TABLET ORAL NIGHTLY
Qty: 60 TABLET | Refills: 4 | Status: SHIPPED | OUTPATIENT
Start: 2022-08-15 | End: 2023-03-15 | Stop reason: SDUPTHER

## 2022-08-18 ENCOUNTER — OFFICE VISIT (OUTPATIENT)
Dept: URGENT CARE | Facility: CLINIC | Age: 82
End: 2022-08-18
Payer: MEDICARE

## 2022-08-18 VITALS
RESPIRATION RATE: 16 BRPM | OXYGEN SATURATION: 96 % | SYSTOLIC BLOOD PRESSURE: 150 MMHG | HEART RATE: 67 BPM | HEIGHT: 61 IN | DIASTOLIC BLOOD PRESSURE: 68 MMHG | TEMPERATURE: 99 F | WEIGHT: 134 LBS | BODY MASS INDEX: 25.3 KG/M2

## 2022-08-18 DIAGNOSIS — R10.30 LOWER ABDOMINAL PAIN: Primary | ICD-10-CM

## 2022-08-18 DIAGNOSIS — R19.7 DIARRHEA, UNSPECIFIED TYPE: ICD-10-CM

## 2022-08-18 LAB
CTP QC/QA: YES
SARS-COV-2 RDRP RESP QL NAA+PROBE: NEGATIVE

## 2022-08-18 PROCEDURE — 3077F SYST BP >= 140 MM HG: CPT | Mod: CPTII,S$GLB,, | Performed by: FAMILY MEDICINE

## 2022-08-18 PROCEDURE — 74019 XR ABDOMEN FLAT AND ERECT: ICD-10-PCS | Mod: FY,S$GLB,, | Performed by: RADIOLOGY

## 2022-08-18 PROCEDURE — 3077F PR MOST RECENT SYSTOLIC BLOOD PRESSURE >= 140 MM HG: ICD-10-PCS | Mod: CPTII,S$GLB,, | Performed by: FAMILY MEDICINE

## 2022-08-18 PROCEDURE — 1125F AMNT PAIN NOTED PAIN PRSNT: CPT | Mod: CPTII,S$GLB,, | Performed by: FAMILY MEDICINE

## 2022-08-18 PROCEDURE — 1125F PR PAIN SEVERITY QUANTIFIED, PAIN PRESENT: ICD-10-PCS | Mod: CPTII,S$GLB,, | Performed by: FAMILY MEDICINE

## 2022-08-18 PROCEDURE — 3078F PR MOST RECENT DIASTOLIC BLOOD PRESSURE < 80 MM HG: ICD-10-PCS | Mod: CPTII,S$GLB,, | Performed by: FAMILY MEDICINE

## 2022-08-18 PROCEDURE — 74019 RADEX ABDOMEN 2 VIEWS: CPT | Mod: FY,S$GLB,, | Performed by: RADIOLOGY

## 2022-08-18 PROCEDURE — 3078F DIAST BP <80 MM HG: CPT | Mod: CPTII,S$GLB,, | Performed by: FAMILY MEDICINE

## 2022-08-18 PROCEDURE — U0002 COVID-19 LAB TEST NON-CDC: HCPCS | Mod: QW,S$GLB,, | Performed by: FAMILY MEDICINE

## 2022-08-18 PROCEDURE — 99214 OFFICE O/P EST MOD 30 MIN: CPT | Mod: S$GLB,,, | Performed by: FAMILY MEDICINE

## 2022-08-18 PROCEDURE — 99214 PR OFFICE/OUTPT VISIT, EST, LEVL IV, 30-39 MIN: ICD-10-PCS | Mod: S$GLB,,, | Performed by: FAMILY MEDICINE

## 2022-08-18 PROCEDURE — U0002: ICD-10-PCS | Mod: QW,S$GLB,, | Performed by: FAMILY MEDICINE

## 2022-08-18 RX ORDER — DICYCLOMINE HYDROCHLORIDE 20 MG/1
20 TABLET ORAL EVERY 6 HOURS
Qty: 30 TABLET | Refills: 1 | Status: SHIPPED | OUTPATIENT
Start: 2022-08-18 | End: 2022-09-17

## 2022-08-18 RX ORDER — DICYCLOMINE HYDROCHLORIDE 20 MG/1
20 TABLET ORAL
Status: COMPLETED | OUTPATIENT
Start: 2022-08-18 | End: 2022-08-18

## 2022-08-18 RX ADMIN — DICYCLOMINE HYDROCHLORIDE 20 MG: 20 TABLET ORAL at 04:08

## 2022-08-18 NOTE — PROGRESS NOTES
"Subjective:       Patient ID: Oneyda Shah is a 81 y.o. female.    Vitals:  height is 5' 1" (1.549 m) and weight is 60.8 kg (134 lb). Her temperature is 98.6 °F (37 °C). Her blood pressure is 150/68 (abnormal) and her pulse is 67. Her respiration is 16 and oxygen saturation is 96%.     Chief Complaint: Abdominal Pain    Pt presents abdominal pain, diarrhea and chills for since this am, no n/v, denies fever or chills  No unusual food,  Feels lower abdominal pain      Abdominal Pain  This is a new problem. The current episode started in the past 7 days. The onset quality is sudden. The problem occurs constantly. The problem has been unchanged. The pain is located in the generalized abdominal region. The quality of the pain is burning, cramping and aching. The abdominal pain does not radiate. Associated symptoms include diarrhea. Nothing aggravates the pain. The pain is relieved by nothing. She has tried nothing for the symptoms.       Gastrointestinal: Positive for abdominal pain and diarrhea.       Objective:      Physical Exam   Constitutional: She is oriented to person, place, and time. She appears well-developed. She is cooperative.  Non-toxic appearance. She does not appear ill. No distress.   HENT:   Head: Normocephalic and atraumatic.   Ears:   Right Ear: Hearing, tympanic membrane, external ear and ear canal normal.   Left Ear: Hearing, tympanic membrane, external ear and ear canal normal.   Nose: Nose normal. No mucosal edema, rhinorrhea or nasal deformity. No epistaxis. Right sinus exhibits no maxillary sinus tenderness and no frontal sinus tenderness. Left sinus exhibits no maxillary sinus tenderness and no frontal sinus tenderness.   Mouth/Throat: Uvula is midline, oropharynx is clear and moist and mucous membranes are normal. Mucous membranes are moist. No trismus in the jaw. Normal dentition. No uvula swelling. No posterior oropharyngeal erythema. Oropharynx is clear.   Eyes: Conjunctivae and lids " are normal. Pupils are equal, round, and reactive to light. Right eye exhibits no discharge. Left eye exhibits no discharge. No scleral icterus.   Neck: Trachea normal and phonation normal. Neck supple.   Cardiovascular: Normal rate, regular rhythm, normal heart sounds and normal pulses.   Pulmonary/Chest: Effort normal and breath sounds normal. No respiratory distress.   Abdominal: Normal appearance and bowel sounds are normal. She exhibits no distension, no pulsatile midline mass and no mass. Soft. There is no abdominal tenderness. There is no rebound, no guarding and no left CVA tenderness. No hernia.   Musculoskeletal: Normal range of motion.         General: No deformity. Normal range of motion.   Neurological: She is alert and oriented to person, place, and time. She exhibits normal muscle tone. Coordination normal.   Skin: Skin is warm, dry, intact, not diaphoretic and not pale.   Psychiatric: Her speech is normal and behavior is normal. Judgment and thought content normal.   Nursing note and vitals reviewed.        Assessment:       1. Lower abdominal pain    2. Diarrhea, unspecified type          Plan:         Lower abdominal pain  -     POCT COVID-19 Rapid Screening  -     X-Ray Abdomen Flat And Erect; Future; Expected date: 08/18/2022    Diarrhea, unspecified type  -     POCT COVID-19 Rapid Screening  -     X-Ray Abdomen Flat And Erect; Future; Expected date: 08/18/2022    Other orders  -     dicyclomine (BENTYL) 20 mg tablet; Take 1 tablet (20 mg total) by mouth every 6 (six) hours.  Dispense: 30 tablet; Refill: 1          Blend diet  Rest and Fluids        Results for orders placed or performed in visit on 08/18/22   POCT COVID-19 Rapid Screening   Result Value Ref Range    POC Rapid COVID Negative Negative     Acceptable Yes

## 2022-08-19 ENCOUNTER — HOSPITAL ENCOUNTER (OUTPATIENT)
Dept: RADIOLOGY | Facility: HOSPITAL | Age: 82
Discharge: HOME | End: 2022-08-19
Attending: FAMILY MEDICINE
Payer: MEDICARE

## 2022-08-19 ENCOUNTER — OFFICE VISIT (OUTPATIENT)
Dept: FAMILY MEDICINE | Facility: CLINIC | Age: 82
End: 2022-08-19
Attending: FAMILY MEDICINE
Payer: MEDICARE

## 2022-08-19 VITALS
DIASTOLIC BLOOD PRESSURE: 58 MMHG | OXYGEN SATURATION: 97 % | WEIGHT: 135.81 LBS | SYSTOLIC BLOOD PRESSURE: 130 MMHG | HEART RATE: 60 BPM | HEIGHT: 61 IN | BODY MASS INDEX: 25.64 KG/M2

## 2022-08-19 DIAGNOSIS — R10.30 LOWER ABDOMINAL PAIN: ICD-10-CM

## 2022-08-19 DIAGNOSIS — K92.2 GASTROINTESTINAL HEMORRHAGE, UNSPECIFIED GASTROINTESTINAL HEMORRHAGE TYPE: ICD-10-CM

## 2022-08-19 DIAGNOSIS — K62.5 RECTAL BLEEDING: ICD-10-CM

## 2022-08-19 DIAGNOSIS — K62.5 RECTAL BLEEDING: Primary | ICD-10-CM

## 2022-08-19 PROCEDURE — 1160F PR REVIEW ALL MEDS BY PRESCRIBER/CLIN PHARMACIST DOCUMENTED: ICD-10-PCS | Mod: CPTII,S$GLB,, | Performed by: FAMILY MEDICINE

## 2022-08-19 PROCEDURE — 1159F PR MEDICATION LIST DOCUMENTED IN MEDICAL RECORD: ICD-10-PCS | Mod: CPTII,S$GLB,, | Performed by: FAMILY MEDICINE

## 2022-08-19 PROCEDURE — 99214 OFFICE O/P EST MOD 30 MIN: CPT | Mod: S$GLB,,, | Performed by: FAMILY MEDICINE

## 2022-08-19 PROCEDURE — 99999 PR PBB SHADOW E&M-EST. PATIENT-LVL V: CPT | Mod: PBBFAC,,, | Performed by: FAMILY MEDICINE

## 2022-08-19 PROCEDURE — 1101F PR PT FALLS ASSESS DOC 0-1 FALLS W/OUT INJ PAST YR: ICD-10-PCS | Mod: CPTII,S$GLB,, | Performed by: FAMILY MEDICINE

## 2022-08-19 PROCEDURE — 3075F SYST BP GE 130 - 139MM HG: CPT | Mod: CPTII,S$GLB,, | Performed by: FAMILY MEDICINE

## 2022-08-19 PROCEDURE — 1159F MED LIST DOCD IN RCRD: CPT | Mod: CPTII,S$GLB,, | Performed by: FAMILY MEDICINE

## 2022-08-19 PROCEDURE — 74176 CT ABD & PELVIS W/O CONTRAST: CPT | Mod: 26,,, | Performed by: RADIOLOGY

## 2022-08-19 PROCEDURE — 1160F RVW MEDS BY RX/DR IN RCRD: CPT | Mod: CPTII,S$GLB,, | Performed by: FAMILY MEDICINE

## 2022-08-19 PROCEDURE — 74176 CT ABDOMEN PELVIS WITHOUT CONTRAST: ICD-10-PCS | Mod: 26,,, | Performed by: RADIOLOGY

## 2022-08-19 PROCEDURE — 99999 PR PBB SHADOW E&M-EST. PATIENT-LVL V: ICD-10-PCS | Mod: PBBFAC,,, | Performed by: FAMILY MEDICINE

## 2022-08-19 PROCEDURE — 74176 CT ABD & PELVIS W/O CONTRAST: CPT | Mod: TC

## 2022-08-19 PROCEDURE — 3078F DIAST BP <80 MM HG: CPT | Mod: CPTII,S$GLB,, | Performed by: FAMILY MEDICINE

## 2022-08-19 PROCEDURE — 1101F PT FALLS ASSESS-DOCD LE1/YR: CPT | Mod: CPTII,S$GLB,, | Performed by: FAMILY MEDICINE

## 2022-08-19 PROCEDURE — 1125F AMNT PAIN NOTED PAIN PRSNT: CPT | Mod: CPTII,S$GLB,, | Performed by: FAMILY MEDICINE

## 2022-08-19 PROCEDURE — 3288F PR FALLS RISK ASSESSMENT DOCUMENTED: ICD-10-PCS | Mod: CPTII,S$GLB,, | Performed by: FAMILY MEDICINE

## 2022-08-19 PROCEDURE — 3288F FALL RISK ASSESSMENT DOCD: CPT | Mod: CPTII,S$GLB,, | Performed by: FAMILY MEDICINE

## 2022-08-19 PROCEDURE — 3078F PR MOST RECENT DIASTOLIC BLOOD PRESSURE < 80 MM HG: ICD-10-PCS | Mod: CPTII,S$GLB,, | Performed by: FAMILY MEDICINE

## 2022-08-19 PROCEDURE — 1125F PR PAIN SEVERITY QUANTIFIED, PAIN PRESENT: ICD-10-PCS | Mod: CPTII,S$GLB,, | Performed by: FAMILY MEDICINE

## 2022-08-19 PROCEDURE — 99214 PR OFFICE/OUTPT VISIT, EST, LEVL IV, 30-39 MIN: ICD-10-PCS | Mod: S$GLB,,, | Performed by: FAMILY MEDICINE

## 2022-08-19 PROCEDURE — 3075F PR MOST RECENT SYSTOLIC BLOOD PRESS GE 130-139MM HG: ICD-10-PCS | Mod: CPTII,S$GLB,, | Performed by: FAMILY MEDICINE

## 2022-08-19 NOTE — PROGRESS NOTES
Subjective:       Patient ID: Oneyda Shah is a 81 y.o. female.    Chief Complaint: Rectal Bleeding    81 yr old pleasant female with DM II, HTN, HLD, and other co morbidities presents today for urgent evaluation of lower abdominal pain, rectal bleeding. Onset 2-3 days ago and she is having blood with each stool. She had diarrhea few days ago and had severe abdominal pain. Diarrhea is improved. She still has lower abdominal pain. 8-9/10 and aching type and partially relieved with dicyclomine. She went to urgent care yesterday. No weight changes or N/V/C/fever. No sick contacts/recent travel. Details as follows -    History as below  - reviewed     Rectal Bleeding  This is a new problem. The current episode started in the past 7 days. The problem occurs constantly. The problem has been gradually worsening. Associated symptoms include abdominal pain. Pertinent negatives include no arthralgias, chest pain, congestion, diaphoresis, headaches, myalgias, nausea or sore throat. Nothing aggravates the symptoms. Treatments tried: bentyl. The treatment provided mild relief.   Abdominal Pain  This is a new problem. The current episode started in the past 7 days. The onset quality is sudden. The problem occurs constantly. The problem has been rapidly worsening. The pain is located in the LLQ, RLQ, periumbilical region and suprapubic region. The pain is at a severity of 9/10. The pain is severe. The quality of the pain is colicky and cramping. The abdominal pain does not radiate. Associated symptoms include diarrhea and hematochezia. Pertinent negatives include no arthralgias, constipation, dysuria, frequency, headaches, myalgias or nausea. Nothing aggravates the pain. The pain is relieved by nothing. Treatments tried: bentyl. The treatment provided mild relief. There is no history of abdominal surgery, gallstones, GERD, pancreatitis or PUD. Patient's medical history does not include UTI.     Review of Systems    Constitutional: Negative.  Negative for activity change, diaphoresis and unexpected weight change.   HENT: Negative.  Negative for nasal congestion, ear discharge, hearing loss, rhinorrhea, sore throat and voice change.    Eyes: Negative.  Negative for pain, discharge and visual disturbance.   Respiratory: Negative.  Negative for chest tightness, shortness of breath and wheezing.    Cardiovascular: Negative.  Negative for chest pain.   Gastrointestinal: Positive for abdominal pain, blood in stool, diarrhea and hematochezia. Negative for abdominal distention, anal bleeding, constipation, nausea and rectal pain.   Endocrine: Negative.  Negative for cold intolerance, polydipsia and polyuria.   Genitourinary: Negative.  Negative for decreased urine volume, difficulty urinating, dysuria, frequency, menstrual problem and vaginal pain.   Musculoskeletal: Negative.  Negative for arthralgias, gait problem and myalgias.   Integumentary:  Negative for color change, pallor and wound. Negative.   Allergic/Immunologic: Negative.  Negative for environmental allergies and immunocompromised state.   Neurological: Negative.  Negative for dizziness, tremors, seizures, speech difficulty and headaches.   Hematological: Negative.  Negative for adenopathy. Does not bruise/bleed easily.   Psychiatric/Behavioral: Negative.  Negative for agitation, confusion, decreased concentration, hallucinations, self-injury and suicidal ideas. The patient is not nervous/anxious.          Active Ambulatory Problems     Diagnosis Date Noted    HTN (hypertension)     HLD (hyperlipidemia)     Hypothyroidism     Fatty liver     BMI 25.0-25.9,adult 02/16/2016    Aortic atherosclerosis 12/06/2013    Influenza vaccination declined 02/24/2017    Hypertension associated with diabetes 02/24/2017    Hyperlipidemia associated with type 2 diabetes mellitus 02/24/2017    Controlled type 2 diabetes mellitus with hyperglycemia, without long-term current use  of insulin 02/24/2017    Statin myopathy     CKD (chronic kidney disease), stage III     Asymmetric SNHL (sensorineural hearing loss) 04/29/2021    ETD (Eustachian tube dysfunction), bilateral 04/29/2021    SI (sacroiliac) joint inflammation 01/25/2022    Pneumonia due to COVID-19 virus 02/02/2022     Resolved Ambulatory Problems     Diagnosis Date Noted    Pneumonia due to COVID-19 virus 01/11/2022     Past Medical History:   Diagnosis Date    Diabetes mellitus type II     High cholesterol     Osteopenia      Past Surgical History:   Procedure Laterality Date    BREAST BIOPSY      BREAST SURGERY      biopsy    HYSTERECTOMY      still w/ovaries    ROTATOR CUFF REPAIR Left      Family History   Problem Relation Age of Onset    Diabetes Mother     Hypertension Mother     Diabetes Brother     Coronary artery disease Sister 55        MI    Liver cancer Sister     No Known Problems Father     Diabetes Daughter     No Known Problems Son     No Known Problems Brother      Social History     Socioeconomic History    Marital status:    Tobacco Use    Smoking status: Never Smoker    Smokeless tobacco: Never Used   Substance and Sexual Activity    Alcohol use: No    Drug use: No    Sexual activity: Yes     Partners: Male     Social Determinants of Health     Financial Resource Strain: Low Risk     Difficulty of Paying Living Expenses: Not very hard   Food Insecurity: No Food Insecurity    Worried About Running Out of Food in the Last Year: Never true    Ran Out of Food in the Last Year: Never true   Housing Stability: Low Risk     Unable to Pay for Housing in the Last Year: No    Number of Places Lived in the Last Year: 1    Unstable Housing in the Last Year: No     Review of patient's allergies indicates:   Allergen Reactions    Penicillins Other (See Comments)    Ace inhibitors      cough    Statins-hmg-coa reductase inhibitors      Myalgias,     Current Outpatient Medications on  File Prior to Visit   Medication Sig Dispense Refill    ACCU-CHEK GERTRUDE PLUS TEST STRP Strp TEST ONE TIME DAILY 100 strip 3    amLODIPine (NORVASC) 10 MG tablet TAKE 1 TABLET EVERY DAY 90 tablet 3    aspirin (ECOTRIN) 81 MG EC tablet Take 81 mg by mouth once daily.      atenoloL (TENORMIN) 50 MG tablet Take 1 tablet (50 mg total) by mouth once daily. 90 tablet 3    blood-glucose meter Misc Check daily sugars, dispense insurance covered blood sugar meter 1 each 0    cholecalciferol, vitamin D3, (VITAMIN D3) 25 mcg (1,000 unit) capsule Take 1,000 Units by mouth once daily.      ciprofloxacin HCl (CILOXAN) 0.3 % ophthalmic solution       dicyclomine (BENTYL) 20 mg tablet Take 1 tablet (20 mg total) by mouth every 6 (six) hours. 30 tablet 1    doxazosin (CARDURA) 2 MG tablet Take 1 tablet (2 mg total) by mouth every evening. 60 tablet 4    doxazosin (CARDURA) 2 MG tablet Take 1 tablet (2 mg total) by mouth every evening. 60 tablet 4    fluticasone propionate (FLONASE) 50 mcg/actuation nasal spray 1 spray (50 mcg total) by Each Nostril route 2 (two) times a day. 11.1 mL 11    lancets (ACCU-CHEK SOFTCLIX LANCETS) Misc Check sugar twice daily. 100 each 11    levothyroxine (SYNTHROID) 50 MCG tablet TAKE 1 TABLET (50 MCG TOTAL) BY MOUTH ONCE DAILY. 90 tablet 3    losartan (COZAAR) 100 MG tablet Take 1 tablet (100 mg total) by mouth once daily. 90 tablet 3    metFORMIN (GLUCOPHAGE) 1000 MG tablet TAKE 1 TABLET TWICE DAILY 180 tablet 11    azelastine (ASTELIN) 137 mcg (0.1 %) nasal spray 1 spray (137 mcg total) by Nasal route 2 (two) times daily. 30 mL 0    BD ALCOHOL SWABS PadM       pantoprazole (PROTONIX) 40 MG tablet Take 1 tablet (40 mg total) by mouth once daily. (Patient not taking: No sig reported) 30 tablet 11    [DISCONTINUED] ZETIA 10 mg tablet        Current Facility-Administered Medications on File Prior to Visit   Medication Dose Route Frequency Provider Last Rate Last Admin    acetaminophen  tablet 650 mg  650 mg Oral Once PRN Darrell Long MD        albuterol inhaler 2 puff  2 puff Inhalation Q20 Min PRN Darrell Long MD        [COMPLETED] dicyclomine tablet 20 mg  20 mg Oral 1 time in Clinic/HOD Donn Dalal MD   20 mg at 08/18/22 1625       Objective:       Vitals:    08/19/22 0925   BP: (!) 130/58   Pulse: 60       Physical Exam  Constitutional:       General: She is not in acute distress.     Appearance: She is well-developed. She is not diaphoretic.   HENT:      Head: Normocephalic and atraumatic.      Right Ear: External ear normal.      Left Ear: External ear normal.      Nose: Nose normal.      Mouth/Throat:      Pharynx: No oropharyngeal exudate.   Eyes:      General: No scleral icterus.        Right eye: No discharge.         Left eye: No discharge.      Conjunctiva/sclera: Conjunctivae normal.      Pupils: Pupils are equal, round, and reactive to light.   Neck:      Thyroid: No thyromegaly.      Vascular: No JVD.      Trachea: No tracheal deviation.   Cardiovascular:      Rate and Rhythm: Normal rate and regular rhythm.      Heart sounds: Normal heart sounds. No murmur heard.    No friction rub. No gallop.   Pulmonary:      Effort: Pulmonary effort is normal.      Breath sounds: Normal breath sounds. No stridor. No wheezing or rales.   Chest:      Chest wall: No tenderness.   Abdominal:      General: Bowel sounds are normal. There is distension.      Palpations: Abdomen is soft. There is no mass.      Tenderness: There is abdominal tenderness in the right lower quadrant, periumbilical area, suprapubic area and left lower quadrant. There is guarding and rebound.      Hernia: No hernia is present.       Musculoskeletal:         General: No tenderness. Normal range of motion.      Cervical back: Normal range of motion and neck supple.   Lymphadenopathy:      Cervical: No cervical adenopathy.   Skin:     General: Skin is warm and dry.      Coloration: Skin is not pale.       Findings: No erythema or rash.   Neurological:      Mental Status: She is alert and oriented to person, place, and time.      Cranial Nerves: No cranial nerve deficit.      Motor: No abnormal muscle tone.      Coordination: Coordination normal.      Deep Tendon Reflexes: Reflexes are normal and symmetric. Reflexes normal.   Psychiatric:         Behavior: Behavior normal.         Thought Content: Thought content normal.         Judgment: Judgment normal.         Assessment:       Problem List Items Addressed This Visit    None     Visit Diagnoses     Rectal bleeding    -  Primary    Relevant Orders    Ambulatory referral/consult to Gastroenterology    CBC Auto Differential    Comprehensive Metabolic Panel    CT Abdomen Pelvis  Without Contrast    Lower abdominal pain        Relevant Orders    Ambulatory referral/consult to Gastroenterology    CBC Auto Differential    Comprehensive Metabolic Panel    CT Abdomen Pelvis  Without Contrast    Gastrointestinal hemorrhage, unspecified gastrointestinal hemorrhage type        Relevant Orders    Ambulatory referral/consult to Gastroenterology    CBC Auto Differential    Comprehensive Metabolic Panel    CT Abdomen Pelvis  Without Contrast          Plan:           [place] was seen today for rectal bleeding.    Diagnoses and all orders for this visit:    Rectal bleeding  -     Ambulatory referral/consult to Gastroenterology; Future  -     CBC Auto Differential; Future  -     Comprehensive Metabolic Panel; Future  -     CT Abdomen Pelvis  Without Contrast; Future    Lower abdominal pain  -     Ambulatory referral/consult to Gastroenterology; Future  -     CBC Auto Differential; Future  -     Comprehensive Metabolic Panel; Future  -     CT Abdomen Pelvis  Without Contrast; Future    Gastrointestinal hemorrhage, unspecified gastrointestinal hemorrhage type  -     Ambulatory referral/consult to Gastroenterology; Future  -     CBC Auto Differential; Future  -     Comprehensive  Metabolic Panel; Future  -     CT Abdomen Pelvis  Without Contrast; Future      Rectal bleeding/lower abdominal pain  -DD colitis vs mass  -labs and CT stat  -ER precautions given  -refer GI for colonoscopy  -continue bentyl prn pain    Spent adequate time in obtaining history and explaining differentials    25 minutes spent during this visit of which greater than 50% devoted to face-face counseling and coordination of care regarding diagnosis and management plan    Follow up if symptoms worsen or fail to improve.

## 2022-08-22 ENCOUNTER — OFFICE VISIT (OUTPATIENT)
Dept: FAMILY MEDICINE | Facility: CLINIC | Age: 82
End: 2022-08-22
Payer: MEDICARE

## 2022-08-22 VITALS
HEIGHT: 61 IN | SYSTOLIC BLOOD PRESSURE: 132 MMHG | TEMPERATURE: 98 F | WEIGHT: 134.94 LBS | DIASTOLIC BLOOD PRESSURE: 60 MMHG | OXYGEN SATURATION: 98 % | HEART RATE: 57 BPM | BODY MASS INDEX: 25.48 KG/M2

## 2022-08-22 DIAGNOSIS — I10 HYPERTENSION, UNSPECIFIED TYPE: ICD-10-CM

## 2022-08-22 DIAGNOSIS — R19.7 DIARRHEA, UNSPECIFIED TYPE: Primary | ICD-10-CM

## 2022-08-22 DIAGNOSIS — E11.22 TYPE 2 DIABETES MELLITUS WITH STAGE 3A CHRONIC KIDNEY DISEASE, WITHOUT LONG-TERM CURRENT USE OF INSULIN: ICD-10-CM

## 2022-08-22 DIAGNOSIS — K76.0 FATTY LIVER: ICD-10-CM

## 2022-08-22 DIAGNOSIS — E03.9 HYPOTHYROIDISM, UNSPECIFIED TYPE: ICD-10-CM

## 2022-08-22 DIAGNOSIS — R10.30 LOWER ABDOMINAL PAIN: ICD-10-CM

## 2022-08-22 DIAGNOSIS — K62.5 RECTAL BLEEDING: ICD-10-CM

## 2022-08-22 DIAGNOSIS — N18.31 TYPE 2 DIABETES MELLITUS WITH STAGE 3A CHRONIC KIDNEY DISEASE, WITHOUT LONG-TERM CURRENT USE OF INSULIN: ICD-10-CM

## 2022-08-22 PROCEDURE — 1159F PR MEDICATION LIST DOCUMENTED IN MEDICAL RECORD: ICD-10-PCS | Mod: CPTII,S$GLB,, | Performed by: FAMILY MEDICINE

## 2022-08-22 PROCEDURE — 1159F MED LIST DOCD IN RCRD: CPT | Mod: CPTII,S$GLB,, | Performed by: FAMILY MEDICINE

## 2022-08-22 PROCEDURE — 3288F PR FALLS RISK ASSESSMENT DOCUMENTED: ICD-10-PCS | Mod: CPTII,S$GLB,, | Performed by: FAMILY MEDICINE

## 2022-08-22 PROCEDURE — 3075F SYST BP GE 130 - 139MM HG: CPT | Mod: CPTII,S$GLB,, | Performed by: FAMILY MEDICINE

## 2022-08-22 PROCEDURE — 99999 PR PBB SHADOW E&M-EST. PATIENT-LVL IV: ICD-10-PCS | Mod: PBBFAC,,, | Performed by: FAMILY MEDICINE

## 2022-08-22 PROCEDURE — 3078F DIAST BP <80 MM HG: CPT | Mod: CPTII,S$GLB,, | Performed by: FAMILY MEDICINE

## 2022-08-22 PROCEDURE — 3075F PR MOST RECENT SYSTOLIC BLOOD PRESS GE 130-139MM HG: ICD-10-PCS | Mod: CPTII,S$GLB,, | Performed by: FAMILY MEDICINE

## 2022-08-22 PROCEDURE — 99214 PR OFFICE/OUTPT VISIT, EST, LEVL IV, 30-39 MIN: ICD-10-PCS | Mod: S$GLB,,, | Performed by: FAMILY MEDICINE

## 2022-08-22 PROCEDURE — 1101F PR PT FALLS ASSESS DOC 0-1 FALLS W/OUT INJ PAST YR: ICD-10-PCS | Mod: CPTII,S$GLB,, | Performed by: FAMILY MEDICINE

## 2022-08-22 PROCEDURE — 3078F PR MOST RECENT DIASTOLIC BLOOD PRESSURE < 80 MM HG: ICD-10-PCS | Mod: CPTII,S$GLB,, | Performed by: FAMILY MEDICINE

## 2022-08-22 PROCEDURE — 99999 PR PBB SHADOW E&M-EST. PATIENT-LVL IV: CPT | Mod: PBBFAC,,, | Performed by: FAMILY MEDICINE

## 2022-08-22 PROCEDURE — 3288F FALL RISK ASSESSMENT DOCD: CPT | Mod: CPTII,S$GLB,, | Performed by: FAMILY MEDICINE

## 2022-08-22 PROCEDURE — 1126F AMNT PAIN NOTED NONE PRSNT: CPT | Mod: CPTII,S$GLB,, | Performed by: FAMILY MEDICINE

## 2022-08-22 PROCEDURE — 1126F PR PAIN SEVERITY QUANTIFIED, NO PAIN PRESENT: ICD-10-PCS | Mod: CPTII,S$GLB,, | Performed by: FAMILY MEDICINE

## 2022-08-22 PROCEDURE — 99214 OFFICE O/P EST MOD 30 MIN: CPT | Mod: S$GLB,,, | Performed by: FAMILY MEDICINE

## 2022-08-22 PROCEDURE — 1101F PT FALLS ASSESS-DOCD LE1/YR: CPT | Mod: CPTII,S$GLB,, | Performed by: FAMILY MEDICINE

## 2022-08-22 RX ORDER — ONDANSETRON 4 MG/1
4 TABLET, ORALLY DISINTEGRATING ORAL EVERY 8 HOURS PRN
Qty: 30 TABLET | Refills: 0 | Status: SHIPPED | OUTPATIENT
Start: 2022-08-22 | End: 2023-03-15

## 2022-08-22 NOTE — PROGRESS NOTES
(Portions of this note were dictated using voice recognition software and may contain dictation related errors in spelling/grammar/syntax not found on text review)     Chief Complaint   Patient presents with    Follow-up     Results from urgent care visit for diarrhea and abdominal pain    Nausea     X2 days       HPI: 81 y.o. female  last visit May 2022     Chronic health history including:     Diabetes with CKD 3 on metformin 1000 mg b.i.d.      Hypertension on losartan 100 mg daily, atenolol 50 mg daily, amlodipine 10 mg daily, doxazosin 2 mg daily. (BP at home consistently low normal, had tried to decrease atenolol 25 mg daily given some fatigue complains looking to pressures subtle elevated was not feeling well -therefore restarted atenolol at 50 mg daily).        Hyperlipidemia intolerant to all statins along with Zetia     Hypothyroidism on Synthroid 50 mcg daily     Fatty liver disease    COVID pneumonia in January 2022    Saw Dr Armstrong 8/19 for rectal bleeding.  Initially had gone to urgent care with lower abdominal pain and diarrhea.  Had partial relief of symptoms with Bentyl. Subsequently had blood in the stool.  CT scan showed slight indistinctness of the rectum, nonspecific but early changes of proctitis in the differential.  Multiple scattered diverticulosis in the sigmoid colon, few seemed a bit indistinct either motion artifact related but could be considered with early changes of diverticulitis although no significant wall thickening..  Was referred to GI for consideration of endoscopy .  States that she had gone to [place] about 2 weeks ago, did have an episode of diarrhea there 1 time when she had eaten some pizza.  However had no pain on that episode.  However last week started with the above symptoms with lower abdominal pain and diarrhea and blood in the stool.  Symptoms have resolved over the past 2 days although she does feel little bit nauseated last couple of days.  She does state that at  the onset of the symptoms while she was under she did start coughing a lot.  She did have a COVID test done when she went to urgent care and this was negative.  Coughing has resolved however.  No fevers but she was having some chills at that time.    Sometimes will get some periodic chest wall pain, very localized to left lower chest sharp and fleeting       Past Medical History:   Diagnosis Date    Aortic atherosclerosis     CKD (chronic kidney disease), stage III     Diabetes mellitus type II     Fatty liver     High cholesterol     HLD (hyperlipidemia)     HTN (hypertension)     Hypothyroidism     Hypothyroidism     Osteopenia     Statin myopathy        Past Surgical History:   Procedure Laterality Date    BREAST BIOPSY      BREAST SURGERY      biopsy    HYSTERECTOMY      still w/ovaries    ROTATOR CUFF REPAIR Left        Family History   Problem Relation Age of Onset    Diabetes Mother     Hypertension Mother     Diabetes Brother     Coronary artery disease Sister 55        MI    Liver cancer Sister     No Known Problems Father     Diabetes Daughter     No Known Problems Son     No Known Problems Brother        Social History     Socioeconomic History    Marital status:    Tobacco Use    Smoking status: Never Smoker    Smokeless tobacco: Never Used   Substance and Sexual Activity    Alcohol use: No    Drug use: No    Sexual activity: Yes     Partners: Male     Social Determinants of Health     Financial Resource Strain: Low Risk     Difficulty of Paying Living Expenses: Not very hard   Food Insecurity: No Food Insecurity    Worried About Running Out of Food in the Last Year: Never true    Ran Out of Food in the Last Year: Never true   Housing Stability: Low Risk     Unable to Pay for Housing in the Last Year: No    Number of Places Lived in the Last Year: 1    Unstable Housing in the Last Year: No             Lab Results   Component Value Date    WBC 11.41 08/19/2022     HGB 11.5 (L) 08/19/2022    HCT 33.9 (L) 08/19/2022    MCV 88 08/19/2022     08/19/2022    CHOL 229 (H) 11/26/2021    TRIG 129 11/26/2021    HDL 43 11/26/2021    ALT 25 08/19/2022    AST 25 08/19/2022    BILITOT 0.8 08/19/2022    ALKPHOS 69 08/19/2022     08/19/2022    K 4.2 08/19/2022     08/19/2022    CREATININE 1.1 08/19/2022    ESTGFRAFRICA >60 04/27/2022    EGFRNONAA 54 (A) 04/27/2022    CALCIUM 10.0 08/19/2022    ALBUMIN 4.1 08/19/2022    BUN 11 08/19/2022    CO2 25 08/19/2022    TSH 2.803 08/15/2022    INR 1.0 01/12/2022    HGBA1C 6.4 (H) 08/15/2022    MICALBCREAT 46.1 (H) 11/26/2021    LDLCALC 160.2 (H) 11/26/2021     (H) 08/19/2022    JVYZOCNJ10DC 35 10/10/2017          Hemoglobin (g/dL)   Date Value   08/19/2022 11.5 (L)   08/15/2022 10.9 (L)   04/27/2022 11.0 (L)   01/12/2022 11.7 (L)   01/11/2022 10.4 (L)   11/26/2021 11.2 (L)   07/14/2021 11.8 (L)   06/11/2021 11.8 (L)   04/15/2021 11.8 (L)   01/11/2021 11.7 (L)     Hemoglobin A1C (%)   Date Value   08/15/2022 6.4 (H)   04/27/2022 6.3 (H)   11/26/2021 6.4 (H)   04/15/2021 6.5 (H)   01/11/2021 6.6 (H)   08/18/2020 7.0 (H)     eGFR if non African American (mL/min/1.73 m^2)   Date Value   04/27/2022 54 (A)   01/11/2022 43 (A)   12/20/2021 54 (A)   11/26/2021 48 (A)   06/11/2021 48 (A)     Potassium (mmol/L)   Date Value   08/19/2022 4.2   08/15/2022 5.1   04/27/2022 5.3 (H)   01/11/2022 4.6   12/20/2021 4.8   11/26/2021 5.2 (H)          There were no vitals filed for this visit.    Wt Readings from Last 5 Encounters:   08/19/22 61.6 kg (135 lb 12.9 oz)   08/18/22 60.8 kg (134 lb)   05/16/22 60.9 kg (134 lb 4.2 oz)   04/26/22 61.6 kg (135 lb 12.9 oz)   02/02/22 61.7 kg (136 lb 2.1 oz)        PE:   APPEARANCE: Well nourished, well developed, in no acute distress.    HEAD: Normocephalic, atraumatic.  EYES:   Conjunctivae noninjected.  NECK: Supple with no cervical lymphadenopathy.  No carotid bruits, no thyromegaly  CHEST: Good  inspiratory effort. Lungs clear to auscultation with no wheezes or crackles.  No current chest wall tenderness to palpation although she points to the region in the costochondral junctions on the left side all the way down to the lower ribs of the location of her prior pain.  CARDIOVASCULAR: Normal S1, S2. No rubs, murmurs, or gallops.  ABDOMEN: Bowel sounds normal. Not distended. Soft. No tenderness or masses. No organomegaly.  EXTREMITIES: No edema  DIABETIC FOOT EXAM: utd 4/2022                IMPRESSION  1. Diarrhea, unspecified type    2. Lower abdominal pain    3. Type 2 diabetes mellitus with stage 3a chronic kidney disease, without long-term current use of insulin    4. Fatty liver    5. Rectal bleeding    6. Hypertension, unspecified type    7. Hypothyroidism, unspecified type        PLAN         Type 2 Diabetes:  Controlled   Continue metformin 2 g daily     Hypertension:  Stable.  Continue current medication    Likely colitis/proctitis.  Could be viral in nature given prior upper respiratory symptoms preceding the symptoms.  Had discussed further workup especially if persistent although her diarrhea has cleared over the last couple of days.  However, in light of her recent travel she does request stool testing for infections, as below.  Can not order C diff at this time secondary to form stool.    Hypothyroidism .  TSH stable     Was given some Zofran if needed for nausea.  Dietary precautions especially in light of recent GI illness    Does have upcoming GI appointment scheduled late September to discuss potential endoscopy especially given her symptoms para         Orders Placed This Encounter   Procedures    Adenovirus Antigen EIA, Stool    Stool culture    WBC, Stool    Rotavirus antigen, stool    Giardia / Cryptosporidum, EIA    Stool Exam-Ova,Cysts,Parasites                SCREENINGS      DEXA scan:  02/09/2021, normal  Mammogram 12/19/16, declines rpt  Colonoscopy: 2009, Dr. Wright, normal.   Declined repeat     immunizations   Tetanus :Check with your pharmacy regarding getting the tetanus (Tdap) vaccine (once every 10 years)  PVX: 2017   Prevnar: 1/2016  Flu: declines  Zoster: utd  COVID (05/19/2021, 06/19/2021 (Pfizer), has not had booster yet but do encourage getting booster shot when able

## 2022-08-23 ENCOUNTER — LAB VISIT (OUTPATIENT)
Dept: LAB | Facility: HOSPITAL | Age: 82
End: 2022-08-23
Attending: FAMILY MEDICINE
Payer: MEDICARE

## 2022-08-23 ENCOUNTER — PATIENT MESSAGE (OUTPATIENT)
Dept: FAMILY MEDICINE | Facility: CLINIC | Age: 82
End: 2022-08-23
Payer: MEDICARE

## 2022-08-23 DIAGNOSIS — R19.7 DIARRHEA, UNSPECIFIED TYPE: ICD-10-CM

## 2022-08-23 DIAGNOSIS — R10.30 LOWER ABDOMINAL PAIN: ICD-10-CM

## 2022-08-23 LAB — WBC #/AREA STL HPF: NORMAL /[HPF]

## 2022-08-23 PROCEDURE — 87045 FECES CULTURE AEROBIC BACT: CPT | Performed by: FAMILY MEDICINE

## 2022-08-23 PROCEDURE — 87209 SMEAR COMPLEX STAIN: CPT | Performed by: FAMILY MEDICINE

## 2022-08-23 PROCEDURE — 87427 SHIGA-LIKE TOXIN AG IA: CPT | Mod: 59 | Performed by: FAMILY MEDICINE

## 2022-08-23 PROCEDURE — 87177 OVA AND PARASITES SMEARS: CPT | Performed by: FAMILY MEDICINE

## 2022-08-23 PROCEDURE — 87425 ROTAVIRUS AG IA: CPT | Performed by: FAMILY MEDICINE

## 2022-08-23 PROCEDURE — 89055 LEUKOCYTE ASSESSMENT FECAL: CPT | Performed by: FAMILY MEDICINE

## 2022-08-23 PROCEDURE — 87046 STOOL CULTR AEROBIC BACT EA: CPT | Mod: 59 | Performed by: FAMILY MEDICINE

## 2022-08-23 PROCEDURE — 87329 GIARDIA AG IA: CPT | Performed by: FAMILY MEDICINE

## 2022-08-23 NOTE — TELEPHONE ENCOUNTER
Please call and inform    Nothing acute on CT scan - they reported some inflammation close to rectal area and few lung nodules that need to follow PCP. Also follow GI as scheduled

## 2022-08-24 LAB
CRYPTOSP AG STL QL IA: NEGATIVE
G LAMBLIA AG STL QL IA: NEGATIVE
RV AG STL QL IA.RAPID: NEGATIVE

## 2022-08-25 LAB
E COLI SXT1 STL QL IA: NEGATIVE
E COLI SXT2 STL QL IA: NEGATIVE

## 2022-08-26 LAB — O+P STL MICRO: NORMAL

## 2022-08-27 LAB — BACTERIA STL CULT: NORMAL

## 2022-09-01 ENCOUNTER — OFFICE VISIT (OUTPATIENT)
Dept: ORTHOPEDICS | Facility: CLINIC | Age: 82
End: 2022-09-01
Payer: MEDICARE

## 2022-09-01 VITALS — WEIGHT: 134 LBS | BODY MASS INDEX: 25.3 KG/M2 | HEIGHT: 61 IN

## 2022-09-01 DIAGNOSIS — M65.321 TRIGGER INDEX FINGER OF RIGHT HAND: ICD-10-CM

## 2022-09-01 PROCEDURE — 20550 NJX 1 TENDON SHEATH/LIGAMENT: CPT | Mod: RT,S$GLB,, | Performed by: ORTHOPAEDIC SURGERY

## 2022-09-01 PROCEDURE — 1159F MED LIST DOCD IN RCRD: CPT | Mod: CPTII,S$GLB,, | Performed by: ORTHOPAEDIC SURGERY

## 2022-09-01 PROCEDURE — 1101F PR PT FALLS ASSESS DOC 0-1 FALLS W/OUT INJ PAST YR: ICD-10-PCS | Mod: CPTII,S$GLB,, | Performed by: ORTHOPAEDIC SURGERY

## 2022-09-01 PROCEDURE — 99999 PR PBB SHADOW E&M-EST. PATIENT-LVL III: CPT | Mod: PBBFAC,,, | Performed by: ORTHOPAEDIC SURGERY

## 2022-09-01 PROCEDURE — 20550 PR INJECT TENDON SHEATH/LIGAMENT: ICD-10-PCS | Mod: RT,S$GLB,, | Performed by: ORTHOPAEDIC SURGERY

## 2022-09-01 PROCEDURE — 1125F AMNT PAIN NOTED PAIN PRSNT: CPT | Mod: CPTII,S$GLB,, | Performed by: ORTHOPAEDIC SURGERY

## 2022-09-01 PROCEDURE — 3288F PR FALLS RISK ASSESSMENT DOCUMENTED: ICD-10-PCS | Mod: CPTII,S$GLB,, | Performed by: ORTHOPAEDIC SURGERY

## 2022-09-01 PROCEDURE — 99999 PR PBB SHADOW E&M-EST. PATIENT-LVL III: ICD-10-PCS | Mod: PBBFAC,,, | Performed by: ORTHOPAEDIC SURGERY

## 2022-09-01 PROCEDURE — 1101F PT FALLS ASSESS-DOCD LE1/YR: CPT | Mod: CPTII,S$GLB,, | Performed by: ORTHOPAEDIC SURGERY

## 2022-09-01 PROCEDURE — 1159F PR MEDICATION LIST DOCUMENTED IN MEDICAL RECORD: ICD-10-PCS | Mod: CPTII,S$GLB,, | Performed by: ORTHOPAEDIC SURGERY

## 2022-09-01 PROCEDURE — 99213 OFFICE O/P EST LOW 20 MIN: CPT | Mod: 25,S$GLB,, | Performed by: ORTHOPAEDIC SURGERY

## 2022-09-01 PROCEDURE — 3288F FALL RISK ASSESSMENT DOCD: CPT | Mod: CPTII,S$GLB,, | Performed by: ORTHOPAEDIC SURGERY

## 2022-09-01 PROCEDURE — 1125F PR PAIN SEVERITY QUANTIFIED, PAIN PRESENT: ICD-10-PCS | Mod: CPTII,S$GLB,, | Performed by: ORTHOPAEDIC SURGERY

## 2022-09-01 PROCEDURE — 99213 PR OFFICE/OUTPT VISIT, EST, LEVL III, 20-29 MIN: ICD-10-PCS | Mod: 25,S$GLB,, | Performed by: ORTHOPAEDIC SURGERY

## 2022-09-01 RX ORDER — CELECOXIB 200 MG/1
200 CAPSULE ORAL DAILY
Qty: 30 CAPSULE | Refills: 0 | Status: SHIPPED | OUTPATIENT
Start: 2022-09-01 | End: 2022-09-14

## 2022-09-01 RX ORDER — TRIAMCINOLONE ACETONIDE 40 MG/ML
40 INJECTION, SUSPENSION INTRA-ARTICULAR; INTRAMUSCULAR
Status: COMPLETED | OUTPATIENT
Start: 2022-09-01 | End: 2022-09-01

## 2022-09-01 RX ADMIN — TRIAMCINOLONE ACETONIDE 40 MG: 40 INJECTION, SUSPENSION INTRA-ARTICULAR; INTRAMUSCULAR at 02:09

## 2022-09-01 NOTE — PROGRESS NOTES
Subjective:      Patient ID: Oneyda Shah is a 81 y.o. female.    Chief Complaint: Consult of the Right Hand      HPI  Oneyda Shah is a  81 y.o. female presenting today for painful locking of the right thumb and right index finger.  There was not a history of trauma.  Onset of symptoms began 1-2 months ago  no numbness or tingling reported no trauma.      Review of patient's allergies indicates:   Allergen Reactions    Penicillins Other (See Comments)    Ace inhibitors      cough    Statins-hmg-coa reductase inhibitors      Myalgias,         Current Outpatient Medications   Medication Sig Dispense Refill    ACCU-CHEK GERTRUDE PLUS TEST STRP Strp TEST ONE TIME DAILY 100 strip 3    amLODIPine (NORVASC) 10 MG tablet TAKE 1 TABLET EVERY DAY 90 tablet 3    aspirin (ECOTRIN) 81 MG EC tablet Take 81 mg by mouth once daily.      atenoloL (TENORMIN) 50 MG tablet Take 1 tablet (50 mg total) by mouth once daily. 90 tablet 3    blood-glucose meter Misc Check daily sugars, dispense insurance covered blood sugar meter 1 each 0    cholecalciferol, vitamin D3, (VITAMIN D3) 25 mcg (1,000 unit) capsule Take 1,000 Units by mouth once daily.      ciprofloxacin HCl (CILOXAN) 0.3 % ophthalmic solution       dicyclomine (BENTYL) 20 mg tablet Take 1 tablet (20 mg total) by mouth every 6 (six) hours. 30 tablet 1    doxazosin (CARDURA) 2 MG tablet Take 1 tablet (2 mg total) by mouth every evening. 60 tablet 4    doxazosin (CARDURA) 2 MG tablet Take 1 tablet (2 mg total) by mouth every evening. 60 tablet 4    fluticasone propionate (FLONASE) 50 mcg/actuation nasal spray 1 spray (50 mcg total) by Each Nostril route 2 (two) times a day. 11.1 mL 11    lancets (ACCU-CHEK SOFTCLIX LANCETS) Misc Check sugar twice daily. 100 each 11    levothyroxine (SYNTHROID) 50 MCG tablet TAKE 1 TABLET (50 MCG TOTAL) BY MOUTH ONCE DAILY. 90 tablet 3    losartan (COZAAR) 100 MG tablet Take 1 tablet (100 mg total) by mouth once daily. 90 tablet 3    metFORMIN  "(GLUCOPHAGE) 1000 MG tablet TAKE 1 TABLET TWICE DAILY 180 tablet 11    ondansetron (ZOFRAN-ODT) 4 MG TbDL Take 1 tablet (4 mg total) by mouth every 8 (eight) hours as needed (nausea). 30 tablet 0    azelastine (ASTELIN) 137 mcg (0.1 %) nasal spray 1 spray (137 mcg total) by Nasal route 2 (two) times daily. (Patient not taking: Reported on 8/22/2022) 30 mL 0    celecoxib (CELEBREX) 200 MG capsule Take 1 capsule (200 mg total) by mouth once daily. 30 capsule 0    pantoprazole (PROTONIX) 40 MG tablet Take 1 tablet (40 mg total) by mouth once daily. (Patient not taking: No sig reported) 30 tablet 11     Current Facility-Administered Medications   Medication Dose Route Frequency Provider Last Rate Last Admin    acetaminophen tablet 650 mg  650 mg Oral Once PRN Darrell Long MD        albuterol inhaler 2 puff  2 puff Inhalation Q20 Min PRN Darrell Long MD           Past Medical History:   Diagnosis Date    Aortic atherosclerosis     CKD (chronic kidney disease), stage III     Diabetes mellitus type II     Fatty liver     High cholesterol     HLD (hyperlipidemia)     HTN (hypertension)     Hypothyroidism     Hypothyroidism     Osteopenia     Statin myopathy        Past Surgical History:   Procedure Laterality Date    BREAST BIOPSY      BREAST SURGERY      biopsy    HYSTERECTOMY      still w/ovaries    ROTATOR CUFF REPAIR Left        Review of Systems:  ROS    OBJECTIVE:     PHYSICAL EXAM:  Height: 5' 1" (154.9 cm) Weight: 60.8 kg (134 lb)  Vitals:    09/01/22 1415   Weight: 60.8 kg (134 lb)   Height: 5' 1" (1.549 m)   PainSc:   6     Well developed, well nourished female in no acute distress  Alert and oriented x 3  HEENT- Normal exam  Lungs- Clear to auscultation  Heart- Regular rate and rhythm  Abdomen- Soft nontender  Extremity exam- examination right hand there is tenderness along the flexor tendon sheath to the right thumb and right index finger  limited flexion with triggering of the right thumb and index " finger  Tinel sign negative    strength is slightly decreased in line    RADIOGRAPHS:  AP lateral x-ray right hand demonstrate mild degenerative change  Comments: I have personally reviewed the imaging and I agree with the above radiologist's report.    ASSESSMENT/PLAN:     IMPRESSION:  Triggering of the right index and and right thumb    PLAN:  I explained the nature of the problem to the patient   recommended injection  after pause for time-out to identify the right index finger and right thumb each injected flexor tendon sheath combination Kenalog 20 mg 0.5 cc xylocaine sterile technique  tolerated the procedure well without complication   Also started on Celebrex 200 mg once a day with food  follow-up 4-6 weeks       - We talked at length about the anatomy and pathophysiology of   Encounter Diagnosis   Name Primary?    Trigger index finger of right hand            Disclaimer: This note has been generated using voice-recognition software. There may be typographical errors that have been missed during proof-reading.

## 2022-09-09 ENCOUNTER — OFFICE VISIT (OUTPATIENT)
Dept: CARDIOLOGY | Facility: CLINIC | Age: 82
End: 2022-09-09
Payer: MEDICARE

## 2022-09-09 VITALS
HEART RATE: 58 BPM | DIASTOLIC BLOOD PRESSURE: 72 MMHG | HEIGHT: 61 IN | BODY MASS INDEX: 25.64 KG/M2 | WEIGHT: 135.81 LBS | SYSTOLIC BLOOD PRESSURE: 138 MMHG | OXYGEN SATURATION: 98 %

## 2022-09-09 DIAGNOSIS — E11.65 CONTROLLED TYPE 2 DIABETES MELLITUS WITH HYPERGLYCEMIA, WITHOUT LONG-TERM CURRENT USE OF INSULIN: ICD-10-CM

## 2022-09-09 DIAGNOSIS — E11.69 HYPERLIPIDEMIA ASSOCIATED WITH TYPE 2 DIABETES MELLITUS: ICD-10-CM

## 2022-09-09 DIAGNOSIS — I10 PRIMARY HYPERTENSION: ICD-10-CM

## 2022-09-09 DIAGNOSIS — R00.2 PALPITATIONS: Primary | ICD-10-CM

## 2022-09-09 DIAGNOSIS — N18.31 STAGE 3A CHRONIC KIDNEY DISEASE: ICD-10-CM

## 2022-09-09 DIAGNOSIS — I70.0 AORTIC ATHEROSCLEROSIS: ICD-10-CM

## 2022-09-09 DIAGNOSIS — M25.559 HIP PAIN: Primary | ICD-10-CM

## 2022-09-09 DIAGNOSIS — E78.5 HYPERLIPIDEMIA ASSOCIATED WITH TYPE 2 DIABETES MELLITUS: ICD-10-CM

## 2022-09-09 PROCEDURE — 99214 OFFICE O/P EST MOD 30 MIN: CPT | Mod: S$GLB,,, | Performed by: INTERNAL MEDICINE

## 2022-09-09 PROCEDURE — 1125F AMNT PAIN NOTED PAIN PRSNT: CPT | Mod: CPTII,S$GLB,, | Performed by: INTERNAL MEDICINE

## 2022-09-09 PROCEDURE — 99999 PR PBB SHADOW E&M-EST. PATIENT-LVL IV: CPT | Mod: PBBFAC,,, | Performed by: INTERNAL MEDICINE

## 2022-09-09 PROCEDURE — 3288F FALL RISK ASSESSMENT DOCD: CPT | Mod: CPTII,S$GLB,, | Performed by: INTERNAL MEDICINE

## 2022-09-09 PROCEDURE — 1160F RVW MEDS BY RX/DR IN RCRD: CPT | Mod: CPTII,S$GLB,, | Performed by: INTERNAL MEDICINE

## 2022-09-09 PROCEDURE — 1101F PT FALLS ASSESS-DOCD LE1/YR: CPT | Mod: CPTII,S$GLB,, | Performed by: INTERNAL MEDICINE

## 2022-09-09 PROCEDURE — 1125F PR PAIN SEVERITY QUANTIFIED, PAIN PRESENT: ICD-10-PCS | Mod: CPTII,S$GLB,, | Performed by: INTERNAL MEDICINE

## 2022-09-09 PROCEDURE — 3078F DIAST BP <80 MM HG: CPT | Mod: CPTII,S$GLB,, | Performed by: INTERNAL MEDICINE

## 2022-09-09 PROCEDURE — 3078F PR MOST RECENT DIASTOLIC BLOOD PRESSURE < 80 MM HG: ICD-10-PCS | Mod: CPTII,S$GLB,, | Performed by: INTERNAL MEDICINE

## 2022-09-09 PROCEDURE — 1159F PR MEDICATION LIST DOCUMENTED IN MEDICAL RECORD: ICD-10-PCS | Mod: CPTII,S$GLB,, | Performed by: INTERNAL MEDICINE

## 2022-09-09 PROCEDURE — 1160F PR REVIEW ALL MEDS BY PRESCRIBER/CLIN PHARMACIST DOCUMENTED: ICD-10-PCS | Mod: CPTII,S$GLB,, | Performed by: INTERNAL MEDICINE

## 2022-09-09 PROCEDURE — 3288F PR FALLS RISK ASSESSMENT DOCUMENTED: ICD-10-PCS | Mod: CPTII,S$GLB,, | Performed by: INTERNAL MEDICINE

## 2022-09-09 PROCEDURE — 3075F PR MOST RECENT SYSTOLIC BLOOD PRESS GE 130-139MM HG: ICD-10-PCS | Mod: CPTII,S$GLB,, | Performed by: INTERNAL MEDICINE

## 2022-09-09 PROCEDURE — 99999 PR PBB SHADOW E&M-EST. PATIENT-LVL IV: ICD-10-PCS | Mod: PBBFAC,,, | Performed by: INTERNAL MEDICINE

## 2022-09-09 PROCEDURE — 3075F SYST BP GE 130 - 139MM HG: CPT | Mod: CPTII,S$GLB,, | Performed by: INTERNAL MEDICINE

## 2022-09-09 PROCEDURE — 99214 PR OFFICE/OUTPT VISIT, EST, LEVL IV, 30-39 MIN: ICD-10-PCS | Mod: S$GLB,,, | Performed by: INTERNAL MEDICINE

## 2022-09-09 PROCEDURE — 1159F MED LIST DOCD IN RCRD: CPT | Mod: CPTII,S$GLB,, | Performed by: INTERNAL MEDICINE

## 2022-09-09 PROCEDURE — 1101F PR PT FALLS ASSESS DOC 0-1 FALLS W/OUT INJ PAST YR: ICD-10-PCS | Mod: CPTII,S$GLB,, | Performed by: INTERNAL MEDICINE

## 2022-09-09 NOTE — ASSESSMENT & PLAN NOTE
At a pulse ox machine so that she could monitor her heart rates if the tachycardia recurs.  She had similar complaints in 2018 with negative Holter at that time.  She is on beta-blocker and her heart rate is 58 beats per minute today.    Holter monitor ordered.

## 2022-09-09 NOTE — ASSESSMENT & PLAN NOTE
She requires multiple drugs for hypertension control.  Blood pressures have been stable, current blood pressure 138/72 in my office today.  She is asymptomatic.

## 2022-09-09 NOTE — PROGRESS NOTES
[facility] Cardiology     Subjective:    Patient ID:  Oneyda Shah is a 81 y.o. female who presents for follow-up of Palpitations, Hypertension, Diabetes Mellitus, and Hyperlipidemia    Review of patient's allergies indicates:   Allergen Reactions    Penicillins Other (See Comments)    Ace inhibitors      cough    Statins-hmg-coa reductase inhibitors      Myalgias,        Mrs. Shah  recently experience tachycardia for a day or 2.  She did not measure the heart rate.  She did not have syncope.  She had started Celebrex because of back pain prescribed.  She took 4 pills and is convinced that it caused her tachycardia.  She wore a Holter monitor in 2018 which showed no SVT.  There were rare PACs and PVCs.  She is on atenolol.  She is on other antihypertensive meds as well.  Her blood pressure is 138/72 today heart rate 58.  She is diabetic.  She is statin intolerant.      Review of Systems   Constitutional: Negative for chills, decreased appetite, diaphoresis, fever, malaise/fatigue, night sweats, weight gain and weight loss.   HENT:  Negative for congestion, ear discharge, ear pain, hearing loss, hoarse voice, nosebleeds, odynophagia, sore throat, stridor and tinnitus.    Eyes:  Negative for blurred vision, discharge, double vision, pain, photophobia, redness, vision loss in left eye, vision loss in right eye, visual disturbance and visual halos.   Cardiovascular:  Positive for palpitations. Negative for chest pain, claudication, cyanosis, dyspnea on exertion, irregular heartbeat, leg swelling, near-syncope, orthopnea, paroxysmal nocturnal dyspnea and syncope.   Respiratory:  Negative for cough, hemoptysis, shortness of breath, sleep disturbances due to breathing, snoring, sputum production and wheezing.    Endocrine: Negative for cold intolerance, heat intolerance, polydipsia, polyphagia and polyuria.   Hematologic/Lymphatic: Negative for  "adenopathy and bleeding problem. Does not bruise/bleed easily.   Skin:  Negative for color change, dry skin, flushing, itching, nail changes, poor wound healing, rash, skin cancer, suspicious lesions and unusual hair distribution.   Musculoskeletal:  Positive for back pain. Negative for arthritis, falls, gout, joint pain, joint swelling, muscle cramps, muscle weakness, myalgias, neck pain and stiffness.   Gastrointestinal:  Positive for abdominal pain and hematochezia. Negative for bloating, anorexia, change in bowel habit, bowel incontinence, constipation, diarrhea, dysphagia, excessive appetite, flatus, heartburn, hematemesis, hemorrhoids, jaundice, melena, nausea and vomiting.   Genitourinary:  Negative for bladder incontinence, decreased libido, dysuria, flank pain, frequency, genital sores, hematuria, hesitancy, incomplete emptying, nocturia and urgency.   Neurological:  Negative for aphonia, brief paralysis, difficulty with concentration, disturbances in coordination, excessive daytime sleepiness, dizziness, focal weakness, headaches, light-headedness, loss of balance, numbness, paresthesias, seizures, sensory change, tremors, vertigo and weakness.   Psychiatric/Behavioral:  Negative for altered mental status, depression, hallucinations, memory loss, substance abuse, suicidal ideas and thoughts of violence. The patient does not have insomnia and is not nervous/anxious.    Allergic/Immunologic: Negative for hives and persistent infections.      Objective:       Vitals:    09/09/22 1212   BP: 138/72   Pulse: (!) 58   SpO2: 98%   Weight: 61.6 kg (135 lb 12.9 oz)   Height: 5' 1" (1.549 m)    Physical Exam  Constitutional:       General: She is not in acute distress.     Appearance: She is well-developed. She is not diaphoretic.   HENT:      Head: Normocephalic and atraumatic.      Nose: Nose normal.   Eyes:      General: No scleral icterus.        Right eye: No discharge.      Conjunctiva/sclera: Conjunctivae " normal.      Pupils: Pupils are equal, round, and reactive to light.   Neck:      Thyroid: No thyromegaly.      Vascular: No JVD.      Trachea: No tracheal deviation.   Cardiovascular:      Rate and Rhythm: Normal rate and regular rhythm.      Pulses:           Carotid pulses are 2+ on the right side and 2+ on the left side.       Radial pulses are 2+ on the right side and 2+ on the left side.        Dorsalis pedis pulses are 2+ on the right side and 2+ on the left side.        Posterior tibial pulses are 2+ on the right side and 2+ on the left side.      Heart sounds: Normal heart sounds. No murmur heard.    No friction rub. No gallop.   Pulmonary:      Effort: Pulmonary effort is normal. No respiratory distress.      Breath sounds: No stridor. No wheezing or rales.   Chest:      Chest wall: No tenderness.   Abdominal:      General: Bowel sounds are normal. There is no distension.      Palpations: Abdomen is soft. There is no mass.      Tenderness: There is no abdominal tenderness. There is no guarding or rebound.   Musculoskeletal:         General: No tenderness. Normal range of motion.      Cervical back: Normal range of motion and neck supple.   Lymphadenopathy:      Cervical: No cervical adenopathy.   Skin:     General: Skin is warm and dry.      Coloration: Skin is not pale.      Findings: No erythema or rash.   Neurological:      Mental Status: She is alert and oriented to person, place, and time.      Cranial Nerves: No cranial nerve deficit.      Coordination: Coordination normal.   Psychiatric:         Behavior: Behavior normal.         Thought Content: Thought content normal.         Judgment: Judgment normal.         Assessment:       1. Palpitations    2. Primary hypertension    3. Hyperlipidemia associated with type 2 diabetes mellitus    4. Aortic atherosclerosis    5. Stage 3a chronic kidney disease    6. Controlled type 2 diabetes mellitus with hyperglycemia, without long-term current use of insulin       Results for orders placed or performed in visit on 08/23/22   Stool culture    Specimen: Stool   Result Value Ref Range    Stool Culture       No Salmonella,Shigella,Vibrio,Campylobacter,Yersinia isolated.   E. coli 0157 antigen    Specimen: Stool   Result Value Ref Range    Shiga Toxin 1 E.coli Negative     Shiga Toxin 2 E.coli Negative    WBC, Stool   Result Value Ref Range    Stool WBC No neutrophils seen No neutrophils seen   Rotavirus antigen, stool   Result Value Ref Range    Rotavirus Negative Negative   Giardia / Cryptosporidum, EIA   Result Value Ref Range    Giardia Antigen - EIA Negative Negative    Cryptosporidium Antigen Negative Negative   Stool Exam-Ova,Cysts,Parasites   Result Value Ref Range    Stool Exam-Ova,Cysts,Parasites FINAL 08/26/2022 1255          Current Outpatient Medications:     ACCU-CHEK GERTRUDE PLUS TEST STRP Strp, TEST ONE TIME DAILY, Disp: 100 strip, Rfl: 3    amLODIPine (NORVASC) 10 MG tablet, TAKE 1 TABLET EVERY DAY, Disp: 90 tablet, Rfl: 3    aspirin (ECOTRIN) 81 MG EC tablet, Take 81 mg by mouth once daily., Disp: , Rfl:     atenoloL (TENORMIN) 50 MG tablet, Take 1 tablet (50 mg total) by mouth once daily., Disp: 90 tablet, Rfl: 3    blood-glucose meter Misc, Check daily sugars, dispense insurance covered blood sugar meter, Disp: 1 each, Rfl: 0    celecoxib (CELEBREX) 200 MG capsule, Take 1 capsule (200 mg total) by mouth once daily., Disp: 30 capsule, Rfl: 0    cholecalciferol, vitamin D3, (VITAMIN D3) 25 mcg (1,000 unit) capsule, Take 1,000 Units by mouth once daily., Disp: , Rfl:     ciprofloxacin HCl (CILOXAN) 0.3 % ophthalmic solution, , Disp: , Rfl:     dicyclomine (BENTYL) 20 mg tablet, Take 1 tablet (20 mg total) by mouth every 6 (six) hours., Disp: 30 tablet, Rfl: 1    doxazosin (CARDURA) 2 MG tablet, Take 1 tablet (2 mg total) by mouth every evening., Disp: 60 tablet, Rfl: 4    doxazosin (CARDURA) 2 MG tablet, Take 1 tablet (2 mg total) by mouth every evening., Disp:  60 tablet, Rfl: 4    fluticasone propionate (FLONASE) 50 mcg/actuation nasal spray, 1 spray (50 mcg total) by Each Nostril route 2 (two) times a day., Disp: 11.1 mL, Rfl: 11    lancets (ACCU-CHEK SOFTCLIX LANCETS) Misc, Check sugar twice daily., Disp: 100 each, Rfl: 11    levothyroxine (SYNTHROID) 50 MCG tablet, TAKE 1 TABLET (50 MCG TOTAL) BY MOUTH ONCE DAILY., Disp: 90 tablet, Rfl: 3    losartan (COZAAR) 100 MG tablet, Take 1 tablet (100 mg total) by mouth once daily., Disp: 90 tablet, Rfl: 3    metFORMIN (GLUCOPHAGE) 1000 MG tablet, TAKE 1 TABLET TWICE DAILY, Disp: 180 tablet, Rfl: 11    ondansetron (ZOFRAN-ODT) 4 MG TbDL, Take 1 tablet (4 mg total) by mouth every 8 (eight) hours as needed (nausea)., Disp: 30 tablet, Rfl: 0    azelastine (ASTELIN) 137 mcg (0.1 %) nasal spray, 1 spray (137 mcg total) by Nasal route 2 (two) times daily. (Patient not taking: Reported on 8/22/2022), Disp: 30 mL, Rfl: 0    pantoprazole (PROTONIX) 40 MG tablet, Take 1 tablet (40 mg total) by mouth once daily. (Patient not taking: No sig reported), Disp: 30 tablet, Rfl: 11    Current Facility-Administered Medications:     acetaminophen tablet 650 mg, 650 mg, Oral, Once PRN, Darrell Long MD    albuterol inhaler 2 puff, 2 puff, Inhalation, Q20 Min PRN, Darrell Long MD     Lab Results   Component Value Date    WBC 11.41 08/19/2022    RBC 3.85 (L) 08/19/2022    HGB 11.5 (L) 08/19/2022    HCT 33.9 (L) 08/19/2022    MCV 88 08/19/2022    MCH 29.9 08/19/2022    MCHC 33.9 08/19/2022    RDW 14.2 08/19/2022     08/19/2022    MPV 10.8 08/19/2022    GRAN 7.9 (H) 08/19/2022    GRAN 69.4 08/19/2022    LYMPH 2.4 08/19/2022    LYMPH 21.1 08/19/2022    MONO 0.9 08/19/2022    MONO 7.7 08/19/2022    EOS 0.1 08/19/2022    BASO 0.03 08/19/2022    EOSINOPHIL 1.1 08/19/2022    BASOPHIL 0.3 08/19/2022        CMP  Lab Results   Component Value Date     08/19/2022    K 4.2 08/19/2022     08/19/2022    CO2 25 08/19/2022     (H)  08/19/2022    BUN 11 08/19/2022    CREATININE 1.1 08/19/2022    CALCIUM 10.0 08/19/2022    PROT 7.9 08/19/2022    ALBUMIN 4.1 08/19/2022    BILITOT 0.8 08/19/2022    ALKPHOS 69 08/19/2022    AST 25 08/19/2022    ALT 25 08/19/2022    ANIONGAP 10 08/19/2022    ESTGFRAFRICA >60 04/27/2022    EGFRNONAA 54 (A) 04/27/2022        Lab Results   Component Value Date    LABBLOO No growth after 5 days. 01/11/2022    LABURIN No growth 11/26/2021            Results for orders placed or performed during the hospital encounter of 01/11/22   EKG 12-lead    Collection Time: 01/11/22  1:21 PM    Narrative    Test Reason : U07.1,    Vent. Rate : 087 BPM     Atrial Rate : 087 BPM     P-R Int : 124 ms          QRS Dur : 086 ms      QT Int : 336 ms       P-R-T Axes : 062 008 086 degrees     QTc Int : 404 ms    Normal sinus rhythm  Minimal voltage criteria for LVH, may be normal variant  Nonspecific T wave abnormality  Abnormal ECG  When compared with ECG of 26-MAR-2019 09:59,  Criteria for Septal infarct are no longer Present  Confirmed by Dalia MALDONADO, Regis TORREZ ([id number]) on 1/11/2022 4:58:48 PM    Referred By: [email or web address]   SELF           Confirmed By:Regis Gómez MD                  Plan:       Problem List Items Addressed This Visit          Cardiac/Vascular    HTN (hypertension)     She requires multiple drugs for hypertension control.  Blood pressures have been stable, current blood pressure 138/72 in my office today.  She is asymptomatic.         Aortic atherosclerosis     Condition stable.         Hyperlipidemia associated with type 2 diabetes mellitus     She is statin intolerant.  LDLs run in the 170 mg%.  I do not advise Repatha.         Palpitations - Primary     At a pulse ox machine so that she could monitor her heart rates if the tachycardia recurs.  She had similar complaints in 2018 with negative Holter at that time.  She is on beta-blocker and her heart rate is 58 beats per minute today.    Holter monitor ordered.          Relevant Orders    Holter monitor - 24 hour       Renal/    CKD (chronic kidney disease), stage III     Condition unchanged.  GFR 51 range.            Endocrine    Controlled type 2 diabetes mellitus with hyperglycemia, without long-term current use of insulin     Condition stable.  She takes metformin.  Last A1c 6.4.                   A Holter monitor is placed.  I told the patient that I am not convinced that Celebrex caused her tachycardia.  She has not had similar episodes in the past.  I advise continuing atenolol.      I advised a 1year follow-up.           Arsh Benavides MD  09/09/2022   1:04 PM

## 2022-09-12 ENCOUNTER — TELEPHONE (OUTPATIENT)
Dept: GASTROENTEROLOGY | Facility: CLINIC | Age: 82
End: 2022-09-12
Payer: MEDICARE

## 2022-09-12 ENCOUNTER — HOSPITAL ENCOUNTER (OUTPATIENT)
Dept: RADIOLOGY | Facility: HOSPITAL | Age: 82
Discharge: HOME | End: 2022-09-12
Attending: PHYSICIAN ASSISTANT
Payer: MEDICARE

## 2022-09-12 ENCOUNTER — OFFICE VISIT (OUTPATIENT)
Dept: ORTHOPEDICS | Facility: CLINIC | Age: 82
End: 2022-09-12
Payer: MEDICARE

## 2022-09-12 VITALS — WEIGHT: 135.81 LBS | BODY MASS INDEX: 25.64 KG/M2 | HEIGHT: 61 IN

## 2022-09-12 DIAGNOSIS — M46.1 SI (SACROILIAC) JOINT INFLAMMATION: ICD-10-CM

## 2022-09-12 DIAGNOSIS — M25.552 HIP PAIN, LEFT: Primary | ICD-10-CM

## 2022-09-12 DIAGNOSIS — M25.559 HIP PAIN: ICD-10-CM

## 2022-09-12 PROCEDURE — 99213 PR OFFICE/OUTPT VISIT, EST, LEVL III, 20-29 MIN: ICD-10-PCS | Mod: S$GLB,,, | Performed by: PHYSICIAN ASSISTANT

## 2022-09-12 PROCEDURE — 1125F AMNT PAIN NOTED PAIN PRSNT: CPT | Mod: CPTII,S$GLB,, | Performed by: PHYSICIAN ASSISTANT

## 2022-09-12 PROCEDURE — 1159F MED LIST DOCD IN RCRD: CPT | Mod: CPTII,S$GLB,, | Performed by: PHYSICIAN ASSISTANT

## 2022-09-12 PROCEDURE — 1101F PT FALLS ASSESS-DOCD LE1/YR: CPT | Mod: CPTII,S$GLB,, | Performed by: PHYSICIAN ASSISTANT

## 2022-09-12 PROCEDURE — 1101F PR PT FALLS ASSESS DOC 0-1 FALLS W/OUT INJ PAST YR: ICD-10-PCS | Mod: CPTII,S$GLB,, | Performed by: PHYSICIAN ASSISTANT

## 2022-09-12 PROCEDURE — 73521 XR HIPS BILATERAL 2 VIEW INCL AP PELVIS: ICD-10-PCS | Mod: 26,,, | Performed by: RADIOLOGY

## 2022-09-12 PROCEDURE — 73521 X-RAY EXAM HIPS BI 2 VIEWS: CPT | Mod: TC,FY

## 2022-09-12 PROCEDURE — 1159F PR MEDICATION LIST DOCUMENTED IN MEDICAL RECORD: ICD-10-PCS | Mod: CPTII,S$GLB,, | Performed by: PHYSICIAN ASSISTANT

## 2022-09-12 PROCEDURE — 73521 X-RAY EXAM HIPS BI 2 VIEWS: CPT | Mod: 26,,, | Performed by: RADIOLOGY

## 2022-09-12 PROCEDURE — 1160F PR REVIEW ALL MEDS BY PRESCRIBER/CLIN PHARMACIST DOCUMENTED: ICD-10-PCS | Mod: CPTII,S$GLB,, | Performed by: PHYSICIAN ASSISTANT

## 2022-09-12 PROCEDURE — 1125F PR PAIN SEVERITY QUANTIFIED, PAIN PRESENT: ICD-10-PCS | Mod: CPTII,S$GLB,, | Performed by: PHYSICIAN ASSISTANT

## 2022-09-12 PROCEDURE — 99999 PR PBB SHADOW E&M-EST. PATIENT-LVL IV: CPT | Mod: PBBFAC,,, | Performed by: PHYSICIAN ASSISTANT

## 2022-09-12 PROCEDURE — 1160F RVW MEDS BY RX/DR IN RCRD: CPT | Mod: CPTII,S$GLB,, | Performed by: PHYSICIAN ASSISTANT

## 2022-09-12 PROCEDURE — 3288F PR FALLS RISK ASSESSMENT DOCUMENTED: ICD-10-PCS | Mod: CPTII,S$GLB,, | Performed by: PHYSICIAN ASSISTANT

## 2022-09-12 PROCEDURE — 99999 PR PBB SHADOW E&M-EST. PATIENT-LVL IV: ICD-10-PCS | Mod: PBBFAC,,, | Performed by: PHYSICIAN ASSISTANT

## 2022-09-12 PROCEDURE — 99213 OFFICE O/P EST LOW 20 MIN: CPT | Mod: S$GLB,,, | Performed by: PHYSICIAN ASSISTANT

## 2022-09-12 PROCEDURE — 3288F FALL RISK ASSESSMENT DOCD: CPT | Mod: CPTII,S$GLB,, | Performed by: PHYSICIAN ASSISTANT

## 2022-09-12 NOTE — TELEPHONE ENCOUNTER
Spoke with patients daughter and patient wanted a sooner appointment. Appt scheduled on 9/14/22 at 3:30pm.

## 2022-09-12 NOTE — PROGRESS NOTES
Subjective:      Patient ID: nOeyda Shah is a 81 y.o. female.    Chief Complaint: Hip Pain (right ), Leg Pain (bilateral ), and Medication Management (discuss Celebrex )      HPI: Oneyda Shah returns for re-evaluation of bilateral low back/buttock/hip pain. She was last seen over a year ago. The pain subsided following a period of rest and NSAIDs. The pain is localized to the SI region and does not radiate downward. She does note a bit of LE weakness, but it not having difficulties ambulating or with sensation changes. The pt denies difficulties with hip ROM. She discontinued Celebrex 2/2 perceived cardiac palpitations. Pt currently takes Tylenol for intermittent pain relief.    Past Medical History:   Diagnosis Date    Aortic atherosclerosis     CKD (chronic kidney disease), stage III     Diabetes mellitus type II     Fatty liver     High cholesterol     HLD (hyperlipidemia)     HTN (hypertension)     Hypothyroidism     Hypothyroidism     Osteopenia     Statin myopathy        Current Outpatient Medications:     ACCU-CHEK GERTRUDE PLUS TEST STRP Strp, TEST ONE TIME DAILY, Disp: 100 strip, Rfl: 3    amLODIPine (NORVASC) 10 MG tablet, TAKE 1 TABLET EVERY DAY, Disp: 90 tablet, Rfl: 3    aspirin (ECOTRIN) 81 MG EC tablet, Take 81 mg by mouth once daily., Disp: , Rfl:     atenoloL (TENORMIN) 50 MG tablet, Take 1 tablet (50 mg total) by mouth once daily., Disp: 90 tablet, Rfl: 3    blood-glucose meter Misc, Check daily sugars, dispense insurance covered blood sugar meter, Disp: 1 each, Rfl: 0    cholecalciferol, vitamin D3, (VITAMIN D3) 25 mcg (1,000 unit) capsule, Take 1,000 Units by mouth once daily., Disp: , Rfl:     ciprofloxacin HCl (CILOXAN) 0.3 % ophthalmic solution, , Disp: , Rfl:     dicyclomine (BENTYL) 20 mg tablet, Take 1 tablet (20 mg total) by mouth every 6 (six) hours., Disp: 30 tablet, Rfl: 1    doxazosin (CARDURA) 2 MG tablet, Take 1 tablet (2 mg total) by mouth every evening., Disp: 60 tablet,  "Rfl: 4    doxazosin (CARDURA) 2 MG tablet, Take 1 tablet (2 mg total) by mouth every evening., Disp: 60 tablet, Rfl: 4    fluticasone propionate (FLONASE) 50 mcg/actuation nasal spray, 1 spray (50 mcg total) by Each Nostril route 2 (two) times a day., Disp: 11.1 mL, Rfl: 11    lancets (ACCU-CHEK SOFTCLIX LANCETS) Misc, Check sugar twice daily., Disp: 100 each, Rfl: 11    levothyroxine (SYNTHROID) 50 MCG tablet, TAKE 1 TABLET EVERY DAY, Disp: 90 tablet, Rfl: 3    losartan (COZAAR) 100 MG tablet, Take 1 tablet (100 mg total) by mouth once daily., Disp: 90 tablet, Rfl: 3    metFORMIN (GLUCOPHAGE) 1000 MG tablet, TAKE 1 TABLET TWICE DAILY, Disp: 180 tablet, Rfl: 11    azelastine (ASTELIN) 137 mcg (0.1 %) nasal spray, 1 spray (137 mcg total) by Nasal route 2 (two) times daily. (Patient not taking: Reported on 8/22/2022), Disp: 30 mL, Rfl: 0    celecoxib (CELEBREX) 200 MG capsule, Take 1 capsule (200 mg total) by mouth once daily. (Patient not taking: Reported on 9/12/2022), Disp: 30 capsule, Rfl: 0    ondansetron (ZOFRAN-ODT) 4 MG TbDL, Take 1 tablet (4 mg total) by mouth every 8 (eight) hours as needed (nausea). (Patient not taking: Reported on 9/12/2022), Disp: 30 tablet, Rfl: 0    pantoprazole (PROTONIX) 40 MG tablet, Take 1 tablet (40 mg total) by mouth once daily. (Patient not taking: No sig reported), Disp: 30 tablet, Rfl: 11    Current Facility-Administered Medications:     acetaminophen tablet 650 mg, 650 mg, Oral, Once PRN, Darrell Long MD    albuterol inhaler 2 puff, 2 puff, Inhalation, Q20 Min PRN, Darrell Long MD  Review of patient's allergies indicates:   Allergen Reactions    Penicillins Other (See Comments)    Ace inhibitors      cough    Statins-hmg-coa reductase inhibitors      Myalgias,       Ht 5' 1" (1.549 m)   Wt 61.6 kg (135 lb 12.9 oz)   BMI 25.66 kg/m²       Review of Systems   Constitutional: Negative for chills and fever.   Cardiovascular:  Negative for chest pain and palpitations. "   Respiratory:  Negative for shortness of breath and wheezing.    Skin:  Negative for poor wound healing and rash.   Musculoskeletal:  Negative for stiffness.   Gastrointestinal:  Negative for nausea and vomiting.   Genitourinary:  Negative for dysuria and hematuria.   Neurological:  Negative for numbness, paresthesias, seizures and tremors.   Psychiatric/Behavioral:  Negative for altered mental status.    Allergic/Immunologic: Negative for environmental allergies and persistent infections.     MSK: (+) low back/buttock pain. Denies Joint pain and myalgias      Objective:    Ortho Exam      General: Well developed, well nourished female. AAOX3. No acute distress.   Normocephalic, atraumatic.   Breathing unlabored.  Mood and affect appropriate.     RIght HIP EXAM  The patient is not in acute distress.   Body habitus is::normal.   The patient walks without a limp.   The skin over the hip is::intact.   There is:: local tenderness over the SI joint  bilaterally  Range of motion- Flexion full, External rotation full, internal rotation full.  Resisted SLR negative.  Pain with rotation negative  Sciatic tension findings negative.  Shortening/lengthening compared to the contralateral side exam deferred.  Pulses DP present, PT present.  Motor no muscle wasting or atrophy.   Sensory normal    Imaging:   XR HIPS BILATERAL 2 VIEW INCL AP PELVIS  CLINICAL HISTORY:  Pain in unspecified hip     TECHNIQUE:  AP view of the pelvis and frogleg lateral views of both hips were performed.     COMPARISON:  Pelvis radiograph dated 06/08/2021     FINDINGS:  No acute fracture, dislocation, or osseous destruction. Femoral heads are appropriately seated. No advanced hip joint space cartilage narrowing. Lumbar spondylosis at L5-S1.     Impression:  Findings as above without interval detrimental change from 06/08/2021.  Assessment:               1. Hip pain, left    2. SI (sacroiliac) joint inflammation            Plan:       The Xrays were  reviewed at the bedside. The hip joint is largely unremarkable for advanced degeneration. Her pain is localized to the SI joints bilaterally. We will pursue conservative tx to address pt's intermittent pain 2/2 SI dysfunctions.  Pain control: Rest, OTC Tylenol  PT: formal referral provided for ROM, strengthening, and other modalities as indicated  Follow Up: 2mos for re-evaluation

## 2022-09-14 ENCOUNTER — OFFICE VISIT (OUTPATIENT)
Dept: GASTROENTEROLOGY | Facility: CLINIC | Age: 82
End: 2022-09-14
Attending: FAMILY MEDICINE
Payer: MEDICARE

## 2022-09-14 VITALS — WEIGHT: 135.56 LBS | HEIGHT: 61 IN | BODY MASS INDEX: 25.59 KG/M2

## 2022-09-14 DIAGNOSIS — K62.5 RECTAL BLEEDING: ICD-10-CM

## 2022-09-14 DIAGNOSIS — R10.30 LOWER ABDOMINAL PAIN: ICD-10-CM

## 2022-09-14 PROCEDURE — 1126F AMNT PAIN NOTED NONE PRSNT: CPT | Mod: CPTII,S$GLB,, | Performed by: NURSE PRACTITIONER

## 2022-09-14 PROCEDURE — 99213 PR OFFICE/OUTPT VISIT, EST, LEVL III, 20-29 MIN: ICD-10-PCS | Mod: S$GLB,,, | Performed by: NURSE PRACTITIONER

## 2022-09-14 PROCEDURE — 99999 PR PBB SHADOW E&M-EST. PATIENT-LVL III: ICD-10-PCS | Mod: PBBFAC,,, | Performed by: NURSE PRACTITIONER

## 2022-09-14 PROCEDURE — 1101F PR PT FALLS ASSESS DOC 0-1 FALLS W/OUT INJ PAST YR: ICD-10-PCS | Mod: CPTII,S$GLB,, | Performed by: NURSE PRACTITIONER

## 2022-09-14 PROCEDURE — 1101F PT FALLS ASSESS-DOCD LE1/YR: CPT | Mod: CPTII,S$GLB,, | Performed by: NURSE PRACTITIONER

## 2022-09-14 PROCEDURE — 3288F PR FALLS RISK ASSESSMENT DOCUMENTED: ICD-10-PCS | Mod: CPTII,S$GLB,, | Performed by: NURSE PRACTITIONER

## 2022-09-14 PROCEDURE — 1126F PR PAIN SEVERITY QUANTIFIED, NO PAIN PRESENT: ICD-10-PCS | Mod: CPTII,S$GLB,, | Performed by: NURSE PRACTITIONER

## 2022-09-14 PROCEDURE — 3288F FALL RISK ASSESSMENT DOCD: CPT | Mod: CPTII,S$GLB,, | Performed by: NURSE PRACTITIONER

## 2022-09-14 PROCEDURE — 99999 PR PBB SHADOW E&M-EST. PATIENT-LVL III: CPT | Mod: PBBFAC,,, | Performed by: NURSE PRACTITIONER

## 2022-09-14 PROCEDURE — 99213 OFFICE O/P EST LOW 20 MIN: CPT | Mod: S$GLB,,, | Performed by: NURSE PRACTITIONER

## 2022-09-14 NOTE — PROGRESS NOTES
"       GASTROENTEROLOGY CLINIC NOTE    Chief Complaint: Diagnoses of Rectal bleeding and Lower abdominal pain were pertinent to this visit.  Referring provider/PCP: Zion Villavicencio MD    HPI:  Oneyda Shah is a 81 y.o. female who is a new patient to me with a PMH that is significant for Aortic atherosclerosis, CKD (chronic kidney disease), stage III, Diabetes mellitus type II, Fatty liver, High cholesterol, HLD (hyperlipidemia), HTN (hypertension), Hypothyroidism, Hypothyroidism, Osteopenia, and Statin myopathy and is accompanied by her daughter.  She is here today to establish care for black stool.  This is a new problem that began three weeks ago. She states she was out of town visiting her son in [place] when it first began.  It continued while she was out of town and resolved the week after she returned home.  She denies any changes in medications other than taking folic acid.  Denies taking Pepto Bismol, Maalox, or iron supplements. Denies epigastric pain, symptoms of reflux, diarrhea, constipation, hematochezia, fatigue, or SOB.  Her stool has returned back to normal and is now light brown in color. She is not currently taking Protonix.     Interval Note 9/14/2022  Ms. Oneyda Shah who is known to me is accompanied by her daughter and presents to clinic for abdominal pain and episode of rectal bleeding.  These are new problems that began a month ago and have since resolved. Prior to start of symptoms, she went to [place]; symptoms did not occur until she returned home.  Initially, she experienced abdominal pain and diarrhea which prompted her to seek care at urgent care. Abdominal xray obtained and revealed slight bowel loops on right side. She was given prescription for Bentyl and advised to follow bland diet. She then followed up with Dr. Armstrong d/t continued abdominal pain and episode of rectal bleeding.      CT was obtained which revealed "1. There is slight indistinctness about the rectum, finding " "is nonspecific however early changes of proctitis are a consideration, correlation with current symptomatology advised.  2. There are multiple scattered colonic diverticula, particularly involving the sigmoid colon.  There is slight indistinctness about a few diverticula along the sigmoid colon however may be on the basis of motion artifact, correlation is needed as early changes of diverticulitis would be difficult to definitively exclude although no wall thickening to support the same."    Stool studies were also obtained and it was recommended she follow up with GI.  Stool studies negative for infectious cause of symptoms.  She reports to me today that her symptoms have resolved. Abdominal pain was primarily located in the lower abdomen and described as cramping in nature. Bentyl helped abdominal pain.  Rectal bleeding has also resolved and states it lasted two days. Small amount of bright red blood noted with wiping.  Patient does report straining with bowel movements at that time but does not believe stool was very hard in consistency. Bleeding resolved spontaneously and has not recurred. Denies nausea, vomiting, fever, or nocturnal symptoms. CBC obtained by PCP and H/H were stable. Currently having two soft bowel movements per day. No straining.     Prior Upper Endoscopy: No  Prior Colonoscopy: 2008 Internal hemorrhoids. Two polyps removed in ascending and descending colon.  Diverticulosis noted. 5 year recall recommended  FIT 7/2021 Negative    Family h/o Colon Cancer: No  Family h/o Crohn's Disease or Ulcerative Colitis: No  Abdominal Surgeries: Hysterectomy    Treatments Tried: Bentyl   NSAIDs: No  Anticoagulation or Antiplatelet: No    Review of Systems   Constitutional:  Negative for weight loss.   HENT:  Negative for sore throat.    Eyes:  Negative for blurred vision.   Respiratory:  Negative for cough.    Cardiovascular:  Negative for chest pain.   Gastrointestinal:  Negative for abdominal pain, blood " in stool, constipation, diarrhea, heartburn, melena, nausea and vomiting.   Genitourinary:  Negative for dysuria.   Musculoskeletal:  Negative for myalgias.   Skin:  Negative for rash.   Neurological:  Negative for headaches.   Endo/Heme/Allergies:  Negative for environmental allergies.   Psychiatric/Behavioral:  Negative for suicidal ideas. The patient is not nervous/anxious.      Past Medical History: has a past medical history of Aortic atherosclerosis, CKD (chronic kidney disease), stage III, Diabetes mellitus type II, Fatty liver, High cholesterol, HLD (hyperlipidemia), HTN (hypertension), Hypothyroidism, Hypothyroidism, Osteopenia, and Statin myopathy.    Past Surgical History: has a past surgical history that includes Hysterectomy; Breast biopsy; Breast surgery; and Rotator cuff repair (Left).    Family History:family history includes Coronary artery disease (age of onset: 55) in her sister; Diabetes in her brother, daughter, and mother; Hypertension in her mother; Liver cancer in her sister; No Known Problems in her brother, father, and son.    Allergies:   Review of patient's allergies indicates:   Allergen Reactions    Penicillins Other (See Comments)    Ace inhibitors      cough    Statins-hmg-coa reductase inhibitors      Myalgias,       Social History: reports that she has never smoked. She has never used smokeless tobacco. She reports that she does not drink alcohol and does not use drugs.    Home medications:   Current Outpatient Medications on File Prior to Visit   Medication Sig Dispense Refill    ACCU-CHEK GERTRUDE PLUS TEST STRP Strp TEST ONE TIME DAILY 100 strip 3    amLODIPine (NORVASC) 10 MG tablet TAKE 1 TABLET EVERY DAY 90 tablet 3    aspirin (ECOTRIN) 81 MG EC tablet Take 81 mg by mouth once daily.      atenoloL (TENORMIN) 50 MG tablet Take 1 tablet (50 mg total) by mouth once daily. 90 tablet 3    blood-glucose meter Misc Check daily sugars, dispense insurance covered blood sugar meter 1 each  "0    cholecalciferol, vitamin D3, (VITAMIN D3) 25 mcg (1,000 unit) capsule Take 1,000 Units by mouth once daily.      ciprofloxacin HCl (CILOXAN) 0.3 % ophthalmic solution       dicyclomine (BENTYL) 20 mg tablet Take 1 tablet (20 mg total) by mouth every 6 (six) hours. 30 tablet 1    doxazosin (CARDURA) 2 MG tablet Take 1 tablet (2 mg total) by mouth every evening. 60 tablet 4    doxazosin (CARDURA) 2 MG tablet Take 1 tablet (2 mg total) by mouth every evening. 60 tablet 4    fluticasone propionate (FLONASE) 50 mcg/actuation nasal spray 1 spray (50 mcg total) by Each Nostril route 2 (two) times a day. 11.1 mL 11    lancets (ACCU-CHEK SOFTCLIX LANCETS) Misc Check sugar twice daily. 100 each 11    levothyroxine (SYNTHROID) 50 MCG tablet TAKE 1 TABLET EVERY DAY 90 tablet 3    losartan (COZAAR) 100 MG tablet Take 1 tablet (100 mg total) by mouth once daily. 90 tablet 3    metFORMIN (GLUCOPHAGE) 1000 MG tablet TAKE 1 TABLET TWICE DAILY 180 tablet 11    ondansetron (ZOFRAN-ODT) 4 MG TbDL Take 1 tablet (4 mg total) by mouth every 8 (eight) hours as needed (nausea). 30 tablet 0    [DISCONTINUED] azelastine (ASTELIN) 137 mcg (0.1 %) nasal spray 1 spray (137 mcg total) by Nasal route 2 (two) times daily. (Patient not taking: Reported on 8/22/2022) 30 mL 0    [DISCONTINUED] celecoxib (CELEBREX) 200 MG capsule Take 1 capsule (200 mg total) by mouth once daily. (Patient not taking: No sig reported) 30 capsule 0    [DISCONTINUED] pantoprazole (PROTONIX) 40 MG tablet Take 1 tablet (40 mg total) by mouth once daily. (Patient not taking: No sig reported) 30 tablet 11    [DISCONTINUED] ZETIA 10 mg tablet        Current Facility-Administered Medications on File Prior to Visit   Medication Dose Route Frequency Provider Last Rate Last Admin    acetaminophen tablet 650 mg  650 mg Oral Once PRN Darrell Long MD        albuterol inhaler 2 puff  2 puff Inhalation Q20 Min PRN Darrell Long MD           Vital signs:  Ht 5' 1" (1.549 " m)   Wt 61.5 kg (135 lb 9.3 oz)   BMI 25.62 kg/m²     Physical Exam  Vitals reviewed.   Constitutional:       General: She is not in acute distress.     Appearance: Normal appearance. She is not ill-appearing.   HENT:      Head: Normocephalic.   Cardiovascular:      Rate and Rhythm: Normal rate and regular rhythm.      Heart sounds: Normal heart sounds. No murmur heard.  Pulmonary:      Effort: Pulmonary effort is normal. No respiratory distress.      Breath sounds: Normal breath sounds.   Chest:      Chest wall: No tenderness.   Abdominal:      General: Bowel sounds are normal. There is no distension.      Palpations: Abdomen is soft.      Tenderness: There is no abdominal tenderness. Negative signs include De Jesus's sign.      Hernia: No hernia is present.   Skin:     General: Skin is warm.   Neurological:      Mental Status: She is alert and oriented to person, place, and time.   Psychiatric:         Mood and Affect: Mood normal.         Behavior: Behavior normal.       Routine labs:  Lab Results   Component Value Date    WBC 11.41 08/19/2022    HGB 11.5 (L) 08/19/2022    HCT 33.9 (L) 08/19/2022    MCV 88 08/19/2022     08/19/2022     Lab Results   Component Value Date    INR 1.0 01/12/2022     Lab Results   Component Value Date    IRON 69 12/05/2016    FERRITIN 175 01/11/2022    TIBC 407 12/05/2016    FESATURATED 17 (L) 12/05/2016     Lab Results   Component Value Date     08/19/2022    K 4.2 08/19/2022     08/19/2022    CO2 25 08/19/2022    BUN 11 08/19/2022    CREATININE 1.1 08/19/2022     Lab Results   Component Value Date    ALBUMIN 4.1 08/19/2022    ALT 25 08/19/2022    AST 25 08/19/2022    ALKPHOS 69 08/19/2022    BILITOT 0.8 08/19/2022     No results found for: GLUCOSE  Lab Results   Component Value Date    TSH 2.803 08/15/2022     Lab Results   Component Value Date    CALCIUM 10.0 08/19/2022       Imaging:  EXAMINATION:  CT ABDOMEN PELVIS WITHOUT CONTRAST     CLINICAL HISTORY:  GI  bleed; Hemorrhage of anus and rectum     TECHNIQUE:  Low dose axial images, sagittal and coronal reformations were obtained from the lung bases to the pubic symphysis.  Oral contrast was not administered.     COMPARISON:  12/06/2013     FINDINGS:  Images of the lower thorax are remarkable for bilateral dependent atelectasis.  There are a few scattered 2-3 mm pulmonary nodules within the right lower lobe.  There is minimal pericardial fluid.     The pancreas, gallbladder and adrenal glands have a grossly unremarkable noncontrast appearance.  There is calcification along the lateral periphery of the spleen.  There are scattered subcentimeter low attenuating lesions throughout the hepatic parenchyma, too small for characterization, similar to the previous exam.  There is no biliary dilation or ascites.  There are a few scattered abdominal lymph nodes especially in the gastrohepatic region.     There is right nonobstructive nephrolithiasis.  No left nephrolithiasis.  There are several subcentimeter high attenuating lesions arising from the interpolar region of the left kidney, too small for characterization.  A cyst arises from the lower pole of the left kidney measuring 1.8 cm.  There is a cyst within the interpolar region of the right kidney measuring 1.5 cm.  Additional low attenuating lesions are noted within the renal parenchyma bilaterally, too small for characterization.  The bilateral ureters are unable to be followed in their entirety to the urinary bladder, no definite calculi seen.  The urinary bladder is decompressed.  The uterus is absent the adnexa is unremarkable.     There is slight indistinctness about the rectum.  There are several scattered colonic diverticula without convincing surrounding inflammation.  There is moderate stool in the right colon.  The terminal ileum is unremarkable.  The appendix is unremarkable.  The small bowel is grossly unremarkable.  There are a few scattered shotty periaortic  and paracaval lymph nodes.  There is atherosclerotic calcification of the aorta and its branches.  No focal organized pelvic fluid collection.     Degenerative changes are noted of the pubic symphysis, sacroiliac joints, and spine.  There is osteopenia.  There is grade 1 anterolisthesis of L4 on L5.  No significant inguinal lymphadenopathy.     Impression:     1. There is slight indistinctness about the rectum, finding is nonspecific however early changes of proctitis are a consideration, correlation with current symptomatology advised.  2. There are multiple scattered colonic diverticula, particularly involving the sigmoid colon.  There is slight indistinctness about a few diverticula along the sigmoid colon however may be on the basis of motion artifact, correlation is needed as early changes of diverticulitis would be difficult to definitively exclude although no wall thickening to support the same.  3. Scattered low attenuating lesions throughout the hepatic parenchyma, stable.  4. Pulmonary micro nodules as described.  For multiple solid nodules all <6 mm, Fleischner Society 2017 guidelines recommend no routine follow up for a low risk patient, or follow up with non-contrast chest CT at 12 months after discovery in a high risk patient.  5. Please see above for several additional findings.        Electronically signed by: Gonzalez Plata MD  Date:                                            08/19/2022  Time:                                           16:36    I have reviewed prior labs, imaging, and notes.      Assessment:  1. Rectal bleeding    2. Lower abdominal pain          Plan:     Continue Bentyl as needed but cautioned patient to not take frequently as can cause constipation.   Start Metamucil daily  Recommend colonoscopy for colon cancer screening and to further evaluate episode of rectal bleeding. Patient is declining colonoscopy at this time but states she will think about having it. Discussed procedure  in detail with patient and daughter. Informed her that although symptoms have improved, colonoscopy is best to evaluate findings on CT and to further evaluate cause of bleeding. Also recommend d/t h/o colon polyps.     If patient elects to have colonoscopy, daughter will send message to schedule.     Plan of care discussed with patient who is in agreement and verbalized understanding.     I have explained the planned procedures to the patient.The risks, benefits and alternatives of the procedure were also explained in detail. Patient verbalized understanding, all questions were answered. The patient agrees to proceed as planned    Follow Up: As Needed          Elizabeth Manuel, TYREE,FNP-BC  [facility]

## 2022-09-15 ENCOUNTER — CLINICAL SUPPORT (OUTPATIENT)
Dept: REHABILITATION | Facility: HOSPITAL | Age: 82
End: 2022-09-15
Payer: MEDICARE

## 2022-09-15 ENCOUNTER — HOSPITAL ENCOUNTER (OUTPATIENT)
Dept: CARDIOLOGY | Facility: HOSPITAL | Age: 82
Discharge: HOME | End: 2022-09-15
Attending: INTERNAL MEDICINE
Payer: MEDICARE

## 2022-09-15 DIAGNOSIS — R00.2 PALPITATIONS: ICD-10-CM

## 2022-09-15 DIAGNOSIS — M46.1 SI (SACROILIAC) JOINT INFLAMMATION: ICD-10-CM

## 2022-09-15 DIAGNOSIS — Z74.09 DECREASED FUNCTIONAL MOBILITY AND ENDURANCE: ICD-10-CM

## 2022-09-15 PROCEDURE — 93227 HOLTER MONITOR - 24 HOUR (CUPID ONLY): ICD-10-PCS | Mod: ,,, | Performed by: INTERNAL MEDICINE

## 2022-09-15 PROCEDURE — 97162 PT EVAL MOD COMPLEX 30 MIN: CPT | Mod: PN | Performed by: PHYSICAL THERAPIST

## 2022-09-15 PROCEDURE — 93226 XTRNL ECG REC<48 HR SCAN A/R: CPT

## 2022-09-15 PROCEDURE — 93227 XTRNL ECG REC<48 HR R&I: CPT | Mod: ,,, | Performed by: INTERNAL MEDICINE

## 2022-09-16 PROBLEM — Z74.09 DECREASED FUNCTIONAL MOBILITY AND ENDURANCE: Status: ACTIVE | Noted: 2022-09-16

## 2022-09-16 NOTE — PLAN OF CARE
"[facility]  Physical Therapy Initial Evaluation    Date: 9/15/2022   Name: Oneyda Shah  Clinic Number: [id number]    Therapy Diagnosis:   Encounter Diagnoses   Name Primary?    SI (sacroiliac) joint inflammation     Decreased functional mobility and endurance        Physician: Esme Castro PA-C    Physician Orders: PT Eval and Treat   Medical Diagnosis from Referral: M46.1 (ICD-10-CM) - SI (sacroiliac) joint inflammation  Evaluation Date: 9/15/2022  Authorization Period Expiration: 9/12/24  Plan of Care Expiration: 11/10/22  Progress Note Due: 11/10/22  Visit # / Visits authorized: 1/ 20   FOTO: 1/ 5   PTA: 0/5    Precautions: Standard    Time In: 12:05 pm  Time Out: 1:00 pm  Total Appointment Time (timed & untimed codes): 50 minutes ([facility] [place])    SUBJECTIVE   Date of onset: end of august 2022    History of current condition - [place] reports: a history of LBP that had previously improved with NSAIDs and rest. This is the 4th time she's had this pain. She went to sit on the toilet and then couldn't stand up. Since then, she's had more pain sitting and standing. She complaint of leg weakness and "something" in her thighs. She's been doing piriformis stretch, SKTC and what appears to be a standing wall gastroc stretch that felt good on her back. She's been referred to [facility] for PT.    Falls: none    Imaging, bone scan films: No acute fracture, dislocation, or osseous destruction. Femoral heads are appropriately seated. No advanced hip joint space cartilage narrowing. Lumbar spondylosis at L5-S1. She discontinued celebrex due to heart palpitations and takes tylenol with intermittent relief. She reports mild Bilateral lower extremity weakness that does not affect gait. She was referred to [facility] for skilled PT.     Prior Therapy: no outpatient PT  Social History: lives in 1 story house with family who can help  Occupation: none  Prior Level of Function: work in her garden, " sew/kike, no limitations  Current Level of Function: slower with sit <> stand, unable to garden, clean her house      Pain:  Current 4/10, worst 8/10, best 0/10   Location: bilateral back/buttock/hip  Constant or intermittent: intermittent  Description: Aching  Aggravating Factors: walking, standing  Easing Factors: rest    Patients goals: walk without pain and garden     Medical History:   Past Medical History:   Diagnosis Date    Aortic atherosclerosis     CKD (chronic kidney disease), stage III     Diabetes mellitus type II     Fatty liver     High cholesterol     HLD (hyperlipidemia)     HTN (hypertension)     Hypothyroidism     Hypothyroidism     Osteopenia     Statin myopathy        Surgical History:   Oneyda Shah  has a past surgical history that includes Hysterectomy; Breast biopsy; Breast surgery; and Rotator cuff repair (Left).    Medications:   Virginia has a current medication list which includes the following prescription(s): accu-chek jabier plus test strp, amlodipine, aspirin, atenolol, blood-glucose meter, cholecalciferol (vitamin d3), ciprofloxacin hcl, dicyclomine, doxazosin, doxazosin, fluticasone propionate, lancets, levothyroxine, losartan, metformin, ondansetron, and [DISCONTINUED] zetia, and the following Facility-Administered Medications: acetaminophen and albuterol.    Allergies:   Review of patient's allergies indicates:   Allergen Reactions    Penicillins Other (See Comments)    Ace inhibitors      cough    Statins-hmg-coa reductase inhibitors      Myalgias,          OBJECTIVE       Palpation: tender to palpation right sacrum    Posture: flattened lumbar curve, left anterior innominate    AROM:   Degrees Pain/Dysfunction   Flexion shoes (+) gowers sign   Extension 15 NP   Right Rotation 75% NP   Left Rotation 75% NP   Right Side Bending Poor test Flexion compensation with pain   Left Side Bending Poor test Flexion compensation with pain     Strength:  RLE  LLE    Hip flexion: 4+/5*  "Hip flexion: 5/5   Hip Abduction: 4/5 Hip abduction: 4+/5   Hip extension 4-/5 Hip extension 4+/5   Hip ER 5/5 Hip ER 5/5   Hip IR 5/5 Hip IR 5/5   Knee flexion: 4+/5 Knee flexion: 5/5   Knee extension: 4/5 Knee extension: 4+/5   Ankle Dorsiflexion: 5/5 Ankle Dorsiflexion: 5/5   Ankle Plantarflexion: 5/5 Ankle Plantarflexion: 5/5     Special Tests:   Degrees Pain/Dysfunction   Hamstring 90/90 Right: 30  Left: 33 NP   Kevin Test NT NP   Obers Test NT NP   SLR Test - NP   Slump Test - NP   Flexion Preference NT NP   Extension Preference NT NP   Piriformis test - NP   FABERs + Right tightness and hip pain   FADIR - NP   SIJ Distraction - NP   SIJ Compression - NP   Gaenslens NT NP   Sacral Thrust NT NP   Thigh Thrust - NP   Sciatic nerve tension test - NP   Femoral nerve tension test + Communication issue. Unsure if quad stretch or symptomatic     Right posterior innominate    Sensation: intact light touch    TUG: out of time  TUG Cutoff Scores: 13.5         30 second sit-to-stand test (without U/E support): out of time  30" sit to stand Cutoff Scores:15         CMS Impairment/Limitation/Restriction for FOTO Back Survey    Therapist reviewed FOTO scores for Oneyda Shah on 9/15/2022.   FOTO documents entered into [employer] - see Media section.    Current Limitation Score: 46%  Predicted Limitation Score: 33%       TREATMENT     Total Treatment time (time-based codes) separate from Evaluation: 0 minutes (EVAL ONLY)    Virginia received therapeutic exercises to develop strength, endurance, ROM, flexibility, posture, and core stabilization for 0 minutes including:  Home exercise program education on the following:  Hip external rotation stretch  Isometric hip add  Isometric hip abd  Piriformis stretch  Hook lying bent knee fallout    Next:  Supine hip flexor/quad stretch  Side lying hip abduction  Oj SUAREZ  HSS    [place]ia received the following manual therapy techniques: Joint mobilizations, Manual " traction, Myofacial release, and Soft tissue Mobilization were applied to the: back for 0 minutes:    Consider LAD vs manual traction    PATIENT EDUCATION AND HOME EXERCISES     Education provided:   - anatomy  - importance of HEP    Written Home Exercises Provided: yes. Exercises were reviewed and [place] was able to demonstrate them prior to the end of the session.  Virginia demonstrated fair  understanding of the education provided. See EMR under Patient Instructions for exercises provided during therapy sessions.    ASSESSMENT   Virginia is a 81 y.o. female referred to outpatient Physical Therapy with a medical diagnosis of SIJ inflammation. Pt presents with a right posterior innominate, right sided LBP/SIJ pain, tender to palpation at right SIJ and right gluteal/lower extremity weakness. Limited assessment due to comprehension deficit. May benefit from use of  next visit. Difficult to determine if anterior thigh symptoms were related to femoral nerve with tension testing or a quad stretch. Pain mostly reproduced with sit <> stands and standing/walking for longer periods of time. Plan further assessment next visit with 30 second sit <> stand and TUG vs 6 minute walk test. She is appropriate for skilled PT to help improve her symptoms and reach her goals.    Pt prognosis is Good.   Pt will benefit from skilled outpatient Physical Therapy to address the deficits stated above and in the chart below, provide pt/family education, and to maximize pt's level of independence.     Plan of care discussed with patient: Yes  Pt's spiritual, cultural and educational needs considered and pt agreeable to plan of care and goals as stated below:     Anticipated Barriers for therapy: chronic nature of symptoms    Medical Necessity is demonstrated by the following  History  Co-morbidities and personal factors that may impact the plan of care Co-morbidities:   HTN, DM II, CKD stage III    Personal Factors:   no  deficits     high   Examination  Body Structures and Functions, activity limitations and participation restrictions that may impact the plan of care Body Regions:   back  lower extremities    Body Systems:    ROM  strength  balance  gait  transfers  motor control    Participation Restrictions:   Walking, gardening    Activity limitations:   Learning and applying knowledge  no deficits    General Tasks and Commands  no deficits    Communication  no deficits    Mobility  lifting and carrying objects  walking    Self care  no deficits    Domestic Life  shopping  cooking  doing house work (cleaning house, washing dishes, laundry)    Interactions/Relationships  family relationships    Life Areas  no deficits    Community and Social Life  community life  recreation and leisure         high     Clinical Presentation evolving clinical presentation with changing clinical characteristics moderate   Decision Making/ Complexity Score: moderate     Goals:  Short Term Goals: 4 weeks:  1. Patient will demonstrate and report a compliance and understanding of her home exercise program.  2. Patient will demonstrate all right lower extremity MMTs at least 4+/5 to improve ADL performance and walking tolerance.  3. Patient will report worst pain less than 5/10 in low back without anterior thigh pain for improved tolerance to ADL performance.    Long Term Goals: 8 weeks:  1. Patient will demonstrate ability to complete > 8 sit <> stands without upper extremity use or increased pain.  2. Patient will report ability to resume gardening with household chores at her previous baseline.  3. Patient will improve FOTO to < 34% to improve ADL performance.      PLAN   Plan of care Certification: 9/15/2022 to 11/10/22.    Outpatient Physical Therapy 2 times weekly for 8 weeks to include the following interventions: Cervical/Lumbar Traction, Electrical Stimulation TENS, IFC, NMES, Gait Training, Manual Therapy, Moist Heat/ Ice, Neuromuscular Re-ed,  Patient Education, Self Care, Therapeutic Activities, Therapeutic Exercise, and dry needling.     Mook Herrera, PT      I CERTIFY THE NEED FOR THESE SERVICES FURNISHED UNDER THIS PLAN OF TREATMENT AND WHILE UNDER MY CARE   Physician's comments:     Physician's Signature: ___________________________________________________

## 2022-09-19 LAB
OHS CV EVENT MONITOR DAY: 0
OHS CV HOLTER LENGTH DECIMAL HOURS: 24
OHS CV HOLTER LENGTH HOURS: 24
OHS CV HOLTER LENGTH MINUTES: 0
OHS CV HOLTER SINUS AVERAGE HR: 60
OHS CV HOLTER SINUS MAX HR: 90
OHS CV HOLTER SINUS MIN HR: 45

## 2022-09-20 ENCOUNTER — PATIENT MESSAGE (OUTPATIENT)
Dept: FAMILY MEDICINE | Facility: CLINIC | Age: 82
End: 2022-09-20
Payer: MEDICARE

## 2022-09-20 ENCOUNTER — CLINICAL SUPPORT (OUTPATIENT)
Dept: REHABILITATION | Facility: HOSPITAL | Age: 82
End: 2022-09-20
Payer: MEDICARE

## 2022-09-20 DIAGNOSIS — I10 ESSENTIAL HYPERTENSION: ICD-10-CM

## 2022-09-20 DIAGNOSIS — Z74.09 DECREASED FUNCTIONAL MOBILITY AND ENDURANCE: Primary | ICD-10-CM

## 2022-09-20 PROCEDURE — 97110 THERAPEUTIC EXERCISES: CPT | Mod: PN | Performed by: PHYSICAL THERAPIST

## 2022-09-20 RX ORDER — LOSARTAN POTASSIUM 100 MG/1
100 TABLET ORAL DAILY
Qty: 90 TABLET | Refills: 3 | Status: SHIPPED | OUTPATIENT
Start: 2022-09-20 | End: 2022-09-20 | Stop reason: SDUPTHER

## 2022-09-20 NOTE — TELEPHONE ENCOUNTER
No new care gaps identified.  [facility] Embedded Care Gaps. Reference number: [id number]. 9/20/2022   4:26:40 PM CDT

## 2022-09-20 NOTE — PROGRESS NOTES
"[facility]   Physical Therapy Treatment Note     Name: Oneyda Shah  [facility] Number: [id number]    Therapy Diagnosis:   Encounter Diagnosis   Name Primary?    Decreased functional mobility and endurance Yes       Visit Date: 9/20/2022    Physician: Esme Castro PA-C     Physician Orders: PT Eval and Treat   Medical Diagnosis from Referral: M46.1 (ICD-10-CM) - SI (sacroiliac) joint inflammation  Evaluation Date: 9/15/2022  Authorization Period Expiration: 9/12/24  Plan of Care Expiration: 11/10/22  Progress Note Due: 11/10/22  Visit # / Visits authorized: 2/ 20   FOTO: 2/ 5   PTA: 0/5     Precautions: Standard    Time In: 11:33 am  Time Out: 12:25 pm  Total Billable Time: 26 1:1 and 22 supervised minutes (Tex2)    SUBJECTIVE     Pt reports: "I feel much better"  She was compliant with home exercise program.  Response to previous treatment: decreased pain  Functional change: moving better    Pain: 1/10  Location: right hip flexor    OBJECTIVE     Next:   30 second sit <> stand  TUG:     Treatment     Virginia received the treatments listed below:      [place]ia received therapeutic exercises to develop strength, endurance, ROM, flexibility, posture, and core stabilization for 26 1:1 and 22 supervised minutes including:    Hip external rotation stretch: 3x20" holds  Isometric hip add: 20x5" holds  Isometric hip abd: 20x5" holds  Piriformis stretch: 3x20" holds  Hook lying bent knee fallout: 20x each  Supine hip flexor/quad stretch: 3x20" each  Bridges: 2x10 double leg   Hamstring stretch: 3x20" each    Side lying hip abduction: 2x10 each  Clamshells: 20x5" holds each  DANIELA: 2 minutes    Next:  Consider hip hikes, squats, theraband Paloff Press     [place]ia received the following manual therapy techniques: Joint mobilizations, Manual traction, Myofacial release, and Soft tissue Mobilization were applied to the: back for 0 minutes:     Consider LAD vs manual traction    Patient Education " and Home Exercises     Home Exercises Provided and Patient Education Provided     Education provided:   - importance of HEP    Written Home Exercises Provided: Patient instructed to cont prior HEP. Exercises were reviewed and Virginia was able to demonstrate them prior to the end of the session.  Virginia demonstrated good  understanding of the education provided. See EMR under Patient Instructions for exercises provided during therapy sessions    ASSESSMENT     Virginia is a 81 y.o. female referred to outpatient Physical Therapy with a medical diagnosis of SIJ inflammation. Patient arrived with very minimal symptoms and walking better upon arrival. Patient demonstrated a good understanding of her home exercise program today with appropriate stretches and muscle fatigue present. Plan to progress closed chain strengthening and functional movement patterns in upcoming visits.    Virginia Is progressing well towards her goals.   Pt prognosis is Good.     Pt will continue to benefit from skilled outpatient physical therapy to address the deficits listed in the problem list box on initial evaluation, provide pt/family education and to maximize pt's level of independence in the home and community environment.     Pt's spiritual, cultural and educational needs considered and pt agreeable to plan of care and goals.     Anticipated barriers to physical therapy: chronic nature of symptoms     Goals:   Short Term Goals: 4 weeks:  1. Patient will demonstrate and report a compliance and understanding of her home exercise program. PROGRESSING; NOT MET  2. Patient will demonstrate all right lower extremity MMTs at least 4+/5 to improve ADL performance and walking tolerance. PROGRESSING; NOT MET  3. Patient will report worst pain less than 5/10 in low back without anterior thigh pain for improved tolerance to ADL performance. PROGRESSING; NOT MET     Long Term Goals: 8 weeks:  1. Patient will demonstrate ability to complete > 8 sit  <> stands without upper extremity use or increased pain. PROGRESSING; NOT MET  2. Patient will report ability to resume gardening with household chores at her previous baseline. PROGRESSING; NOT MET  3. Patient will improve FOTO to < 34% to improve ADL performance. PROGRESSING; NOT MET    PLAN     Cont with POC. Progress functional movement patterns and closed chain strengthening.    Plan of care Certification: 9/15/2022 to 11/10/22.     Mook Herrera, PT

## 2022-09-21 ENCOUNTER — PATIENT MESSAGE (OUTPATIENT)
Dept: CARDIOLOGY | Facility: CLINIC | Age: 82
End: 2022-09-21
Payer: MEDICARE

## 2022-09-23 ENCOUNTER — CLINICAL SUPPORT (OUTPATIENT)
Dept: REHABILITATION | Facility: HOSPITAL | Age: 82
End: 2022-09-23
Payer: MEDICARE

## 2022-09-23 DIAGNOSIS — Z74.09 DECREASED FUNCTIONAL MOBILITY AND ENDURANCE: Primary | ICD-10-CM

## 2022-09-23 PROCEDURE — 97110 THERAPEUTIC EXERCISES: CPT | Mod: PN | Performed by: PHYSICAL THERAPIST

## 2022-09-23 NOTE — PROGRESS NOTES
"[facility]   Physical Therapy Treatment Note     Name: Oneyda Shah  [facility] Number: [id number]    Therapy Diagnosis:   Encounter Diagnosis   Name Primary?    Decreased functional mobility and endurance Yes       Visit Date: 9/23/2022    Physician: Esme Castro PA-C     Physician Orders: PT Eval and Treat   Medical Diagnosis from Referral: M46.1 (ICD-10-CM) - SI (sacroiliac) joint inflammation  Evaluation Date: 9/15/2022  Authorization Period Expiration: 9/12/24  Plan of Care Expiration: 11/10/22  Progress Note Due: 11/10/22  Visit # / Visits authorized: 3/ 20   FOTO: 3/ 5   PTA: 0/5     Precautions: Standard    Time In: 1:37 pm  Time Out: 2:30 pm  Total Billable Time: 26 1:1 and 22 supervised minutes (Tex2)    SUBJECTIVE     Pt reports: Only a little pain when she presses  She was compliant with home exercise program.  Response to previous treatment: decreased pain  Functional change: moving better    Pain: 0/10  Location: right hip flexor    OBJECTIVE     Next:   30 second sit <> stand  TUG:     Treatment     [place]ia received the treatments listed below:      [place]ia received therapeutic exercises to develop strength, endurance, ROM, flexibility, posture, and core stabilization for 26 1:1 and 22 supervised minutes including:     Hip external rotation stretch: 3x20" holds  Isometric hip add: 20x5" holds  Isometric hip abd: 20x5" holds  Piriformis stretch: 3x20" holds  Hook lying bent knee fallout: 20x each yellow theraband   Supine hip flexor/quad stretch: 3x20" each  Bridges: 2x10 double leg   Hamstring stretch: 3x20" each    Side lying hip abduction: 2x10 each (cues to avoid TFL compensation)  Clamshells: 20x5" holds each    Squats: 2x10 (mild upper extremity use)  Hip hikes on fitter: 2x10 each (difficulty with technique, performing more of lateral step down)    Next:  DANIELA: 2 minutes - not today  Consider theraband Paloff Press     [place]ia received the following manual " therapy techniques: Joint mobilizations, Manual traction, Myofacial release, and Soft tissue Mobilization were applied to the: back for 0 minutes:     Consider LAD vs manual traction    Patient Education and Home Exercises     Home Exercises Provided and Patient Education Provided     Education provided:   - importance of HEP    Written Home Exercises Provided: Patient instructed to cont prior HEP. Exercises were reviewed and [place] was able to demonstrate them prior to the end of the session.  [place]ia demonstrated good  understanding of the education provided. See EMR under Patient Instructions for exercises provided during therapy sessions    ASSESSMENT     Virginia is a 81 y.o. female referred to outpatient Physical Therapy with a medical diagnosis of SIJ inflammation. Patient arrived with very minimal symptoms and walking better upon arrival. Strength continues to improve and symptoms remain minimal to none at this time. Difficulty comprehending hip hikes addition today. Plan to progress closed chain and functional activities.      Virginia Is progressing well towards her goals.   Pt prognosis is Good.     Pt will continue to benefit from skilled outpatient physical therapy to address the deficits listed in the problem list box on initial evaluation, provide pt/family education and to maximize pt's level of independence in the home and community environment.     Pt's spiritual, cultural and educational needs considered and pt agreeable to plan of care and goals.     Anticipated barriers to physical therapy: chronic nature of symptoms     Goals:   Short Term Goals: 4 weeks:  1. Patient will demonstrate and report a compliance and understanding of her home exercise program. PROGRESSING; NOT MET  2. Patient will demonstrate all right lower extremity MMTs at least 4+/5 to improve ADL performance and walking tolerance. PROGRESSING; NOT MET  3. Patient will report worst pain less than 5/10 in low back without  anterior thigh pain for improved tolerance to ADL performance. PROGRESSING; NOT MET     Long Term Goals: 8 weeks:  1. Patient will demonstrate ability to complete > 8 sit <> stands without upper extremity use or increased pain. PROGRESSING; NOT MET  2. Patient will report ability to resume gardening with household chores at her previous baseline. PROGRESSING; NOT MET  3. Patient will improve FOTO to < 34% to improve ADL performance. PROGRESSING; NOT MET    PLAN     Cont with POC. Progress functional movement patterns and closed chain strengthening.    Plan of care Certification: 9/15/2022 to 11/10/22.     Mook Herrera, PT

## 2022-09-27 ENCOUNTER — CLINICAL SUPPORT (OUTPATIENT)
Dept: REHABILITATION | Facility: HOSPITAL | Age: 82
End: 2022-09-27
Payer: MEDICARE

## 2022-09-27 DIAGNOSIS — Z74.09 DECREASED FUNCTIONAL MOBILITY AND ENDURANCE: Primary | ICD-10-CM

## 2022-09-27 PROCEDURE — 97140 MANUAL THERAPY 1/> REGIONS: CPT | Mod: PN | Performed by: PHYSICAL THERAPIST

## 2022-09-27 PROCEDURE — 97110 THERAPEUTIC EXERCISES: CPT | Mod: PN | Performed by: PHYSICAL THERAPIST

## 2022-09-27 NOTE — PROGRESS NOTES
"[facility]   Physical Therapy Treatment Note     Name: Oneyda Shah  [facility] Number: [id number]    Therapy Diagnosis:   Encounter Diagnosis   Name Primary?    Decreased functional mobility and endurance Yes       Visit Date: 2022    Physician: Esme Castro PA-C     Physician Orders: PT Eval and Treat   Medical Diagnosis from Referral: M46.1 (ICD-10-CM) - SI (sacroiliac) joint inflammation  Evaluation Date: 9/15/2022  Authorization Period Expiration: 24  Plan of Care Expiration: 11/10/22  Progress Note Due: 11/10/22  Visit # / Visits authorized:    FOTO:    PTA: 05     Precautions: Standard    Time In: 11:40 pm  Time Out: 12:30 pm  Total Billable Time: 46 1:1 minutes (Tex2, Tax1)    SUBJECTIVE     Pt reports: Only a little pain when she presses  She was compliant with home exercise program.  Response to previous treatment: decreased pain  Functional change: moving better    Pain: 3/10  Location: right low back    OBJECTIVE     22:    30 second sit <> stand: 6x no upper extremity   TU seconds    Good pelvic alignment    Treatment     [place]ia received the treatments listed below:      [place]ia received therapeutic exercises to develop strength, endurance, ROM, flexibility, posture, and core stabilization for 36 1:1 minutes including:     Hip external rotation stretch: 3x20" holds  Isometric hip add: 20x5" holds  Isometric hip abd: 20x5" holds  Piriformis stretch: 3x20" holds  Hook lying bent knee fallout: 20x each yellow theraband   Supine hip flexor/quad stretch: 3x20" each  Bridges: 2x10 double leg     Side lying hip abduction: 2x10 each (cues to avoid TFL compensation)  Clamshells: 20x5" holds each    Squats: 2x10 (NO upper extremity use)    Not today:  Hip hikes on fitter: 2x10 each (difficulty with technique, performing more of lateral step down)  theraband Paloff Press       therapeutic activities to improve functional performance for 10  minutes, " including:    Education and demonstration of body mechanics with ADLs      Patient Education and Home Exercises     Home Exercises Provided and Patient Education Provided     Education provided:   - importance of home exercise program  - body mechanics with ADLs - handout provided    Written Home Exercises Provided: Patient instructed to cont prior HEP. Exercises were reviewed and [place] was able to demonstrate them prior to the end of the session.  [place]ia demonstrated good  understanding of the education provided. See EMR under Patient Instructions for exercises provided during therapy sessions    ASSESSMENT     Virginia is a 81 y.o. female referred to outpatient Physical Therapy with a medical diagnosis of SIJ inflammation. Patient arrived with very minimal symptoms and walking better upon arrival. Mild sit <> stand difficulties seen with testing today, but TUG WNL. No anterior hip pain today, instead more in right low back. She was able to demonstrate appropriate body mechanics with ADLs and admits to using excessive lumbar flexion with tasks.      [place]ia Is progressing well towards her goals.   Pt prognosis is Good.     Pt will continue to benefit from skilled outpatient physical therapy to address the deficits listed in the problem list box on initial evaluation, provide pt/family education and to maximize pt's level of independence in the home and community environment.     Pt's spiritual, cultural and educational needs considered and pt agreeable to plan of care and goals.     Anticipated barriers to physical therapy: chronic nature of symptoms     Goals:   Short Term Goals: 4 weeks:  1. Patient will demonstrate and report a compliance and understanding of her home exercise program. PROGRESSING; NOT MET  2. Patient will demonstrate all right lower extremity MMTs at least 4+/5 to improve ADL performance and walking tolerance. PROGRESSING; NOT MET  3. Patient will report worst pain less than 5/10 in low  back without anterior thigh pain for improved tolerance to ADL performance. PROGRESSING; NOT MET     Long Term Goals: 8 weeks:  1. Patient will demonstrate ability to complete > 8 sit <> stands without upper extremity use or increased pain. PROGRESSING; NOT MET  2. Patient will report ability to resume gardening with household chores at her previous baseline. PROGRESSING; NOT MET  3. Patient will improve FOTO to < 34% to improve ADL performance. PROGRESSING; NOT MET    PLAN     Cont with POC. Progress functional movement patterns and closed chain strengthening.    Plan of care Certification: 9/15/2022 to 11/10/22.     Mook Herrera, PT

## 2022-09-28 ENCOUNTER — DOCUMENTATION ONLY (OUTPATIENT)
Dept: REHABILITATION | Facility: HOSPITAL | Age: 82
End: 2022-09-28
Payer: MEDICARE

## 2022-09-28 NOTE — PROGRESS NOTES
PT met face to face with Mahesh Delgado PTA to discuss pt's treatment plan and progress towards established goals. Pt will be seen by a physical therapist minimally every 6th visit or every 30 days.    Mook Herrera, PT

## 2022-09-29 ENCOUNTER — CLINICAL SUPPORT (OUTPATIENT)
Dept: REHABILITATION | Facility: HOSPITAL | Age: 82
End: 2022-09-29
Payer: MEDICARE

## 2022-09-29 DIAGNOSIS — Z74.09 DECREASED FUNCTIONAL MOBILITY AND ENDURANCE: Primary | ICD-10-CM

## 2022-09-29 PROCEDURE — 97110 THERAPEUTIC EXERCISES: CPT | Mod: PN | Performed by: PHYSICAL THERAPIST

## 2022-09-29 PROCEDURE — 97140 MANUAL THERAPY 1/> REGIONS: CPT | Mod: PN | Performed by: PHYSICAL THERAPIST

## 2022-09-29 NOTE — PROGRESS NOTES
"[facility]   Physical Therapy Treatment Note     Name: Oneyda Shah  [facility] Number: [id number]    Therapy Diagnosis:   Encounter Diagnosis   Name Primary?    Decreased functional mobility and endurance Yes       Visit Date: 2022    Physician: Esme Castro PA-C     Physician Orders: PT Eval and Treat   Medical Diagnosis from Referral: M46.1 (ICD-10-CM) - SI (sacroiliac) joint inflammation  Evaluation Date: 9/15/2022  Authorization Period Expiration: 24  Plan of Care Expiration: 11/10/22  Progress Note Due: 11/10/22  Visit # / Visits authorized:    FOTO:    PTA: 05     Precautions: Standard    Time In: 12:00 pm  Time Out: 1:00 pm  Total Billable Time: 26 1:1 and 30 supervised minutes (Tex1, MTx1)    SUBJECTIVE     Pt reports: she has pain at her right PSIS. [place]. Pain increased in bed last night.  She was compliant with home exercise program.  Response to previous treatment: decreased pain  Functional change: moving better    Pain: 3/10  Location: right low back    OBJECTIVE     22:    30 second sit <> stand: 6x no upper extremity   TU seconds    Good pelvic alignment    Treatment     [place] received the treatments listed below:          manual therapy techniques: Myofacial release, joint mobilizations were applied to the: pelvis/back for 8 minutes, including:    MET for right anterior innominate followed by alternating isometric hip add/abd - 5 rounds      [place] received therapeutic exercises to develop strength, endurance, ROM, flexibility, posture, and core stabilization for 18 1:1 and 30 supervised minutes including:     Hip external rotation stretch: 3x20" holds  Isometric hip add: 20x5" holds  Isometric hip abd: 20x5" holds  Piriformis stretch: 3x20" holds  Hook lying bent knee fallout: 20x each yellow theraband   Supine hip flexor/quad stretch: 3x20" each  Bridges: 2x10 double leg     Side lying hip abduction: " "2x10 each (cues to avoid TFL compensation)  Clamshells: 20x5" holds each      Not today:  Hip hikes on fitter: 2x10 each (difficulty with technique, performing more of lateral step down)  theraband Paloff Press  Squats: 2x10 (NO upper extremity use)       therapeutic activities to improve functional performance for 00  minutes, including:    Education and demonstration of body mechanics with ADLs      Patient Education and Home Exercises     Home Exercises Provided and Patient Education Provided     Education provided:   - importance of home exercise program  - body mechanics with ADLs - handout provided    Written Home Exercises Provided: Patient instructed to cont prior HEP. Exercises were reviewed and [place] was able to demonstrate them prior to the end of the session.  [place]ia demonstrated good  understanding of the education provided. See EMR under Patient Instructions for exercises provided during therapy sessions    ASSESSMENT     [place]ia is a 81 y.o. female referred to outpatient Physical Therapy with a medical diagnosis of SIJ inflammation. She presents with right PSIS pain that's increased today upon arrival. 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 Right hip external rotation remains limited, but improving.      [place]ia Is progressing well towards her goals.   Pt prognosis is Good.     Pt will continue to benefit from skilled outpatient physical therapy to address the deficits listed in the problem list box on initial evaluation, provide pt/family education and to maximize pt's level of independence in the home and community environment.     Pt's spiritual, cultural and educational needs considered and pt agreeable to plan of care and goals.     Anticipated barriers to physical therapy: chronic nature of symptoms     Goals:   Short Term Goals: 4 weeks:  1. Patient will demonstrate and report a compliance and understanding of her home exercise program. PROGRESSING; NOT " MET  2. Patient will demonstrate all right lower extremity MMTs at least 4+/5 to improve ADL performance and walking tolerance. PROGRESSING; NOT MET  3. Patient will report worst pain less than 5/10 in low back without anterior thigh pain for improved tolerance to ADL performance. PROGRESSING; NOT MET     Long Term Goals: 8 weeks:  1. Patient will demonstrate ability to complete > 8 sit <> stands without upper extremity use or increased pain. PROGRESSING; NOT MET  2. Patient will report ability to resume gardening with household chores at her previous baseline. PROGRESSING; NOT MET  3. Patient will improve FOTO to < 34% to improve ADL performance. PROGRESSING; NOT MET    PLAN     Cont with POC. Progress functional movement patterns and closed chain strengthening.    Plan of care Certification: 9/15/2022 to 11/10/22.     Mook Herrera, PT

## 2022-09-30 ENCOUNTER — PES CALL (OUTPATIENT)
Dept: ADMINISTRATIVE | Facility: CLINIC | Age: 82
End: 2022-09-30
Payer: MEDICARE

## 2022-10-04 ENCOUNTER — TELEPHONE (OUTPATIENT)
Dept: OPHTHALMOLOGY | Facility: CLINIC | Age: 82
End: 2022-10-04
Payer: MEDICARE

## 2022-10-04 ENCOUNTER — PATIENT MESSAGE (OUTPATIENT)
Dept: OPHTHALMOLOGY | Facility: CLINIC | Age: 82
End: 2022-10-04
Payer: MEDICARE

## 2022-10-04 ENCOUNTER — CLINICAL SUPPORT (OUTPATIENT)
Dept: REHABILITATION | Facility: HOSPITAL | Age: 82
End: 2022-10-04
Payer: MEDICARE

## 2022-10-04 DIAGNOSIS — Z74.09 DECREASED FUNCTIONAL MOBILITY AND ENDURANCE: Primary | ICD-10-CM

## 2022-10-04 PROCEDURE — 97140 MANUAL THERAPY 1/> REGIONS: CPT | Mod: PN,CQ

## 2022-10-04 PROCEDURE — 97110 THERAPEUTIC EXERCISES: CPT | Mod: PN,CQ

## 2022-10-04 NOTE — TELEPHONE ENCOUNTER
----- Message from Monique Dumont sent at 10/4/2022  9:20 AM CDT -----    ----- Message -----  From: Patsy Gonzáles  Sent: 10/4/2022   8:21 AM CDT  To: [facility] Clinical Support Staff    Good morning,  Patient's daughter contacted the office requesting to schedule an appointment with an ophthalmologist who sees cataracts. Patient was previously seeing Dr. Yu Wang but is requesting to switch to an [facility] provider. Would you be able to assist in scheduling? You can contact the patient's daughter Annalee at [phone].    Thank you

## 2022-10-04 NOTE — PROGRESS NOTES
"[facility]   Physical Therapy Treatment Note     Name: Oneyda Shah  [facility] Number: [id number]    Therapy Diagnosis:   Encounter Diagnosis   Name Primary?    Decreased functional mobility and endurance Yes     Visit Date: 10/4/2022    Physician: Esme Castro PA-C     Physician Orders: PT Eval and Treat   Medical Diagnosis from Referral: M46.1 (ICD-10-CM) - SI (sacroiliac) joint inflammation  Evaluation Date: 9/15/2022  Authorization Period Expiration: 24  Plan of Care Expiration: 11/10/22  Progress Note Due: 11/10/22  Visit # / Visits authorized:    FOTO:    PTA:      Precautions: Standard    Time In: 12:00 pm  Time Out: 12:53 pm  Total Billable Time: 53 minutes (Tex3, MTx1)    SUBJECTIVE     Pt reports: she has pain at her right PSIS. She went to the supermarket yesterday. Pain increased in bed last night.  She was compliant with home exercise program.  Response to previous treatment: decreased pain  Functional change: moving better    Pain: 3/10  Location: right low back    OBJECTIVE     22:    30 second sit <> stand: 6x no upper extremity   TU seconds    Good pelvic alignment    Treatment     [place] received the treatments listed below:      manual therapy techniques: Myofacial release, joint mobilizations were applied to the: pelvis/back for 8 minutes, including:    MET for right anterior innominate followed by alternating isometric hip add/abd - 3 rounds    [place] received therapeutic exercises to develop strength, endurance, ROM, flexibility, posture, and core stabilization for 45 minutes including:     Hip external rotation stretch: 3x20" holds  Isometric hip add: 20x5" holds  Isometric hip abd: 20x5" holds  Piriformis stretch: 3x20" holds  Hook lying bent knee fallout: 20x each yellow theraband   Supine hip flexor/quad stretch: 3x20" each  Bridges: 2x10 double leg     Side lying hip abduction: 2x10 each (cues to avoid TFL " "compensation)  Clamshells: 20x5" holds each    Not today:  Hip hikes on fitter: 2x10 each (difficulty with technique, performing more of lateral step down)  theraband Paloff Press  Squats: 2x10 (NO upper extremity use)     therapeutic activities to improve functional performance for 00  minutes, including:    Education and demonstration of body mechanics with ADLs    Patient Education and Home Exercises     Home Exercises Provided and Patient Education Provided     Education provided:   - importance of home exercise program  - body mechanics with ADLs - handout provided    Written Home Exercises Provided: Patient instructed to cont prior HEP. Exercises were reviewed and [place] was able to demonstrate them prior to the end of the session.  [place] demonstrated good  understanding of the education provided. See EMR under Patient Instructions for exercises provided during therapy sessions    ASSESSMENT     [place]ia is a 81 y.o. female referred to outpatient Physical Therapy with a medical diagnosis of SIJ inflammation. Patient presents with a left anterior innominate that was corrected with 2 trials of muscle energy technique although another trial was needed at the end of today's treatment. Right hip external rotation remains limited, but improving. Patient required occasional short rest breaks due to fatigue.     [place]ia Is progressing well towards her goals.   Pt prognosis is Good.     Pt will continue to benefit from skilled outpatient physical therapy to address the deficits listed in the problem list box on initial evaluation, provide pt/family education and to maximize pt's level of independence in the home and community environment.     Pt's spiritual, cultural and educational needs considered and pt agreeable to plan of care and goals.     Anticipated barriers to physical therapy: chronic nature of symptoms     Goals:   Short Term Goals: 4 weeks:  1. Patient will demonstrate and report a compliance and " understanding of her home exercise program. PROGRESSING; NOT MET  2. Patient will demonstrate all right lower extremity MMTs at least 4+/5 to improve ADL performance and walking tolerance. PROGRESSING; NOT MET  3. Patient will report worst pain less than 5/10 in low back without anterior thigh pain for improved tolerance to ADL performance. PROGRESSING; NOT MET     Long Term Goals: 8 weeks:  1. Patient will demonstrate ability to complete > 8 sit <> stands without upper extremity use or increased pain. PROGRESSING; NOT MET  2. Patient will report ability to resume gardening with household chores at her previous baseline. PROGRESSING; NOT MET  3. Patient will improve FOTO to < 34% to improve ADL performance. PROGRESSING; NOT MET    PLAN     Cont with POC. Progress functional movement patterns and closed chain strengthening.    Plan of care Certification: 9/15/2022 to 11/10/22.     Mahesh Delgado, PTA

## 2022-10-06 ENCOUNTER — CLINICAL SUPPORT (OUTPATIENT)
Dept: REHABILITATION | Facility: HOSPITAL | Age: 82
End: 2022-10-06
Payer: MEDICARE

## 2022-10-06 DIAGNOSIS — Z74.09 DECREASED FUNCTIONAL MOBILITY AND ENDURANCE: Primary | ICD-10-CM

## 2022-10-06 PROCEDURE — 97110 THERAPEUTIC EXERCISES: CPT | Mod: PN | Performed by: PHYSICAL THERAPIST

## 2022-10-06 PROCEDURE — 97140 MANUAL THERAPY 1/> REGIONS: CPT | Mod: PN | Performed by: PHYSICAL THERAPIST

## 2022-10-06 NOTE — PROGRESS NOTES
"[facility]   Physical Therapy Treatment Note     Name: Oneyda Shah  [facility] Number: [id number]    Therapy Diagnosis:   Encounter Diagnosis   Name Primary?    Decreased functional mobility and endurance Yes       Visit Date: 10/6/2022    Physician: Esme Castro PA-C     Physician Orders: PT Eval and Treat   Medical Diagnosis from Referral: M46.1 (ICD-10-CM) - SI (sacroiliac) joint inflammation  Evaluation Date: 9/15/2022  Authorization Period Expiration: 24  Plan of Care Expiration: 11/10/22  Progress Note Due: 11/10/22  Visit # / Visits authorized:    FOTO: 1/ 5 (Performed on 10/6/22)   PTA: 0/5     Precautions: Standard    Time In: 12:02 pm  Time Out: 12:58 pm  Total Billable Time: 54 minutes (Tex3, MTx1)    SUBJECTIVE     Pt reports: she has some right LBP from helping her  up after he fell yesterday. He's currently in the hospital  She was compliant with home exercise program.  Response to previous treatment: decreased pain  Functional change: moving better    Pain: 310  Location: right low back    OBJECTIVE     22:    30 second sit <> stand: 6x no upper extremity   TU seconds    Good pelvic alignment    Treatment     [place] received the treatments listed below:      manual therapy techniques: Myofacial release, joint mobilizations were applied to the: pelvis/back for 8 minutes, including:    MET for right posterior innominate followed by alternating isometric hip add/abd - 3 rounds    [place] received therapeutic exercises to develop strength, endurance, ROM, flexibility, posture, and core stabilization for 46 minutes including FOTO:     Hip external rotation stretch: 3x20" holds  Isometric hip add: 20x5" holds  Isometric hip abd: 20x5" holds  Piriformis stretch: 3x20" holds  Hook lying bent knee fallout: 20x each yellow theraband   Supine hip flexor/quad stretch: 3x20" each  Bridges: 2x10 double leg     Side lying hip abduction: 2x10 each (cues " "to avoid TFL compensation)  Clamshells: 20x5" holds each  Side lying hip external rotation (bottom leg): 20x each    Not today:  Hip hikes on fitter: 2x10 each (difficulty with technique, performing more of lateral step down)  theraband Paloff Press  Squats: 2x10 (NO upper extremity use)     therapeutic activities to improve functional performance for 00  minutes, including:    Education and demonstration of body mechanics with ADLs    Patient Education and Home Exercises     Home Exercises Provided and Patient Education Provided     Education provided:   - importance of home exercise program  - body mechanics with ADLs - handout provided    Written Home Exercises Provided: Patient instructed to cont prior HEP. Exercises were reviewed and [place] was able to demonstrate them prior to the end of the session.  [place]ia [id number] good  understanding of the education provided. See EMR under Patient Instructions for exercises provided during therapy sessions    ASSESSMENT     [place]ia is a 81 y.o. female referred to outpatient Physical Therapy with a medical diagnosis of SIJ inflammation. Patient again presents with a right posterior innominate that was corrected with MET and symptoms resolved afterwards. Right Hip external rotation PROM limited to 30 degrees. Subjective improvements seen by progress in FOTO.     [place] Is progressing well towards her goals.   Pt prognosis is Good.     Pt will continue to benefit from skilled outpatient physical therapy to address the deficits listed in the problem list box on initial evaluation, provide pt/family education and to maximize pt's level of independence in the home and community environment.     Pt's spiritual, cultural and educational needs considered and pt agreeable to plan of care and goals.     Anticipated barriers to physical therapy: chronic nature of symptoms     Goals:   Short Term Goals: 4 weeks:  1. Patient will demonstrate and report a compliance and " understanding of her home exercise program. PROGRESSING; NOT MET  2. Patient will demonstrate all right lower extremity MMTs at least 4+/5 to improve ADL performance and walking tolerance. PROGRESSING; NOT MET  3. Patient will report worst pain less than 5/10 in low back without anterior thigh pain for improved tolerance to ADL performance. PROGRESSING; NOT MET     Long Term Goals: 8 weeks:  1. Patient will demonstrate ability to complete > 8 sit <> stands without upper extremity use or increased pain. PROGRESSING; NOT MET  2. Patient will report ability to resume gardening with household chores at her previous baseline. MET  3. Patient will improve FOTO to < 34% to improve ADL performance. PROGRESSING; NOT MET    PLAN     Cont with POC. Progress functional movement patterns and closed chain strengthening.    Plan of care Certification: 9/15/2022 to 11/10/22.     Mook Herrera, PT

## 2022-10-11 ENCOUNTER — CLINICAL SUPPORT (OUTPATIENT)
Dept: REHABILITATION | Facility: HOSPITAL | Age: 82
End: 2022-10-11
Payer: MEDICARE

## 2022-10-11 DIAGNOSIS — Z74.09 DECREASED FUNCTIONAL MOBILITY AND ENDURANCE: Primary | ICD-10-CM

## 2022-10-11 PROCEDURE — 97140 MANUAL THERAPY 1/> REGIONS: CPT | Mod: PN,CQ

## 2022-10-11 PROCEDURE — 97110 THERAPEUTIC EXERCISES: CPT | Mod: PN,CQ

## 2022-10-11 NOTE — PROGRESS NOTES
"[facility]   Physical Therapy Treatment Note     Name: Oneyda Shah  [facility] Number: [id number]    Therapy Diagnosis:   Encounter Diagnosis   Name Primary?    Decreased functional mobility and endurance Yes       Visit Date: 10/11/2022    Physician: Esme Castro PA-C     Physician Orders: PT Eval and Treat   Medical Diagnosis from Referral: M46.1 (ICD-10-CM) - SI (sacroiliac) joint inflammation  Evaluation Date: 9/15/2022  Authorization Period Expiration: 24  Plan of Care Expiration: 11/10/22  Progress Note Due: 11/10/22  Visit # / Visits authorized:    FOTO:  (Performed on 10/6/22)   PTA:      Precautions: Standard    Time In: 1:00 pm  Time Out: 1:53 pm  Total Billable Time: 53 minutes (Tex3, MTx1)    SUBJECTIVE     Pt reports: her pain level is about the same, "not bad".  She was compliant with home exercise program.  Response to previous treatment: decreased pain  Functional change: moving better    Pain: 3/10  Location: right low back    OBJECTIVE     22:    30 second sit <> stand: 6x no upper extremity   TU seconds    Good pelvic alignment    Treatment     [place] received the treatments listed below:      manual therapy techniques: Myofacial release, joint mobilizations were applied to the: pelvis/back for 8 minutes, including:    MET for right posterior innominate followed by alternating isometric hip add/abd - 3 rounds    [place] received therapeutic exercises to develop strength, endurance, ROM, flexibility, posture, and core stabilization for 45 minutes including:     Hip external rotation stretch: 3x20" holds  Isometric hip add: 20x5" holds  Isometric hip abd: 20x5" holds  Piriformis stretch: 3x20" holds  Hook lying bent knee fallout: 20x each yellow theraband   Supine hip flexor/quad stretch: 3x20" each  Bridges: 2x10 double leg     Side lying hip abduction: 2x10 each (cues to avoid TFL compensation)  Clamshells: 20x5" holds each  Side lying " hip external rotation (bottom leg): 20x each    Not today:  Hip hikes on fitter: 2x10 each (difficulty with technique, performing more of lateral step down)  theraband Paloff Press  Squats: 2x10 (NO upper extremity use)     therapeutic activities to improve functional performance for 00  minutes, including:    Education and demonstration of body mechanics with ADLs    Patient Education and Home Exercises     Home Exercises Provided and Patient Education Provided     Education provided:   - importance of home exercise program  - body mechanics with ADLs - handout provided    Written Home Exercises Provided: Patient instructed to cont prior HEP. Exercises were reviewed and [place] was able to demonstrate them prior to the end of the session.  [place] [id number] good  understanding of the education provided. See EMR under Patient Instructions for exercises provided during therapy sessions    ASSESSMENT     [place]ia is a 81 y.o. female referred to outpatient Physical Therapy with a medical diagnosis of SIJ inflammation. Patient again presents with a right posterior innominate that was corrected with MET and symptoms resolved afterwards. Patient completed her therapy with no increase in symptoms prior to leaving the clinic.      [place] Is progressing well towards her goals.   Pt prognosis is Good.     Pt will continue to benefit from skilled outpatient physical therapy to address the deficits listed in the problem list box on initial evaluation, provide pt/family education and to maximize pt's level of independence in the home and community environment.     Pt's spiritual, cultural and educational needs considered and pt agreeable to plan of care and goals.     Anticipated barriers to physical therapy: chronic nature of symptoms     Goals:   Short Term Goals: 4 weeks:  1. Patient will demonstrate and report a compliance and understanding of her home exercise program. PROGRESSING; NOT MET  2. Patient will  demonstrate all right lower extremity MMTs at least 4+/5 to improve ADL performance and walking tolerance. PROGRESSING; NOT MET  3. Patient will report worst pain less than 5/10 in low back without anterior thigh pain for improved tolerance to ADL performance. PROGRESSING; NOT MET     Long Term Goals: 8 weeks:  1. Patient will demonstrate ability to complete > 8 sit <> stands without upper extremity use or increased pain. PROGRESSING; NOT MET  2. Patient will report ability to resume gardening with household chores at her previous baseline. MET  3. Patient will improve FOTO to < 34% to improve ADL performance. PROGRESSING; NOT MET    PLAN     Cont with POC. Progress functional movement patterns and closed chain strengthening.    Plan of care Certification: 9/15/2022 to 11/10/22.     Mahesh Delgado, PTA

## 2022-10-13 ENCOUNTER — CLINICAL SUPPORT (OUTPATIENT)
Dept: REHABILITATION | Facility: HOSPITAL | Age: 82
End: 2022-10-13
Payer: MEDICARE

## 2022-10-13 DIAGNOSIS — Z74.09 DECREASED FUNCTIONAL MOBILITY AND ENDURANCE: Primary | ICD-10-CM

## 2022-10-13 PROCEDURE — 97110 THERAPEUTIC EXERCISES: CPT | Mod: PN,CQ

## 2022-10-13 NOTE — PROGRESS NOTES
"[facility]   Physical Therapy Treatment Note     Name: Oneyda Shah  [facility] Number: [id number]    Therapy Diagnosis:   Encounter Diagnosis   Name Primary?    Decreased functional mobility and endurance Yes       Visit Date: 10/13/2022    Physician: Esme Castro PA-C     Physician Orders: PT Eval and Treat   Medical Diagnosis from Referral: M46.1 (ICD-10-CM) - SI (sacroiliac) joint inflammation  Evaluation Date: 9/15/2022  Authorization Period Expiration: 24  Plan of Care Expiration: 11/10/22  Progress Note Due: 11/10/22  Visit # / Visits authorized:    FOTO: 3/ 5 (Performed on 10/6/22)   PTA: 2/5     Precautions: Standard    Time In: 12:00 pm  Time Out: 12:55 pm  Total Billable Time: 25 1:1 and 30 supervised minutes (Tex2)    SUBJECTIVE     Pt reports: her pain level is about the same.  She was compliant with home exercise program.  Response to previous treatment: decreased pain  Functional change: moving better    Pain: 3/10  Location: right low back    OBJECTIVE     22:    30 second sit <> stand: 6x no upper extremity   TU seconds    Good pelvic alignment    Treatment     [place] received the treatments listed below:      manual therapy techniques: Myofacial release, joint mobilizations were applied to the: pelvis/back for 00 minutes, including:    MET for right posterior innominate followed by alternating isometric hip add/abd - 3 rounds    [place] received therapeutic exercises to develop strength, endurance, ROM, flexibility, posture, and core stabilization for 25 1:1 and 30 supervised minutes including:     Hip external rotation stretch: 3x20" holds  Isometric hip add: 20x5" holds  Isometric hip abd: 20x5" holds  Piriformis stretch: 3x20" holds  Hook lying bent knee fallout: 20x each yellow theraband   Supine hip flexor/quad stretch: 3x20" each  Bridges: 2x10 double leg     Side lying hip abduction: 2x10 each (cues to avoid TFL compensation)  Clamshells: " "20x5" holds each  Side lying hip external rotation (bottom leg): 20x each    Not today:  Hip hikes on fitter: 2x10 each (difficulty with technique, performing more of lateral step down)  theraband Paloff Press  Squats: 2x10 (NO upper extremity use)     therapeutic activities to improve functional performance for 00  minutes, including:    Education and demonstration of body mechanics with ADLs    Patient Education and Home Exercises     Home Exercises Provided and Patient Education Provided     Education provided:   - importance of home exercise program  - body mechanics with ADLs - handout provided    Written Home Exercises Provided: Patient instructed to cont prior HEP. Exercises were reviewed and [place] was able to demonstrate them prior to the end of the session.  [place]ia [id number] good  understanding of the education provided. See EMR under Patient Instructions for exercises provided during therapy sessions    ASSESSMENT     Virginia is a 81 y.o. female referred to outpatient Physical Therapy with a medical diagnosis of SIJ inflammation. Patient presented with no innominate today. Patient requires occasional cues to maintain proper positioning with side lying exercises. Patient completed her therapy with no increase in symptoms prior to leaving the clinic.      [place]ia Is progressing well towards her goals.   Pt prognosis is Good.     Pt will continue to benefit from skilled outpatient physical therapy to address the deficits listed in the problem list box on initial evaluation, provide pt/family education and to maximize pt's level of independence in the home and community environment.     Pt's spiritual, cultural and educational needs considered and pt agreeable to plan of care and goals.     Anticipated barriers to physical therapy: chronic nature of symptoms     Goals:   Short Term Goals: 4 weeks:  1. Patient will demonstrate and report a compliance and understanding of her home exercise program. " PROGRESSING; NOT MET  2. Patient will demonstrate all right lower extremity MMTs at least 4+/5 to improve ADL performance and walking tolerance. PROGRESSING; NOT MET  3. Patient will report worst pain less than 5/10 in low back without anterior thigh pain for improved tolerance to ADL performance. PROGRESSING; NOT MET     Long Term Goals: 8 weeks:  1. Patient will demonstrate ability to complete > 8 sit <> stands without upper extremity use or increased pain. PROGRESSING; NOT MET  2. Patient will report ability to resume gardening with household chores at her previous baseline. MET  3. Patient will improve FOTO to < 34% to improve ADL performance. PROGRESSING; NOT MET    PLAN     Cont with POC. Progress functional movement patterns and closed chain strengthening.    Plan of care Certification: 9/15/2022 to 11/10/22.     Mahesh Delgado, PTA

## 2022-10-18 ENCOUNTER — CLINICAL SUPPORT (OUTPATIENT)
Dept: REHABILITATION | Facility: HOSPITAL | Age: 82
End: 2022-10-18
Payer: MEDICARE

## 2022-10-18 DIAGNOSIS — Z74.09 DECREASED FUNCTIONAL MOBILITY AND ENDURANCE: Primary | ICD-10-CM

## 2022-10-18 PROCEDURE — 97110 THERAPEUTIC EXERCISES: CPT | Mod: PN,CQ

## 2022-10-18 NOTE — PROGRESS NOTES
"[facility]   Physical Therapy Treatment Note     Name: Oneyda Shah  [facility] Number: [id number]    Therapy Diagnosis:   Encounter Diagnosis   Name Primary?    Decreased functional mobility and endurance Yes       Visit Date: 10/18/2022    Physician: Esme Castro PA-C     Physician Orders: PT Eval and Treat   Medical Diagnosis from Referral: M46.1 (ICD-10-CM) - SI (sacroiliac) joint inflammation  Evaluation Date: 9/15/2022  Authorization Period Expiration: 24  Plan of Care Expiration: 11/10/22  Progress Note Due: 11/10/22  Visit # / Visits authorized:    FOTO: 4/ 5 (Performed on 10/6/22)   PTA: 3/5     Precautions: Standard    Time In: 11:00 am  Time Out: 11:53 am  Total Billable Time: 53 minutes (Tex4)    SUBJECTIVE     Pt reports: her pain pain is about the same.  She was compliant with home exercise program.  Response to previous treatment: decreased pain  Functional change: moving better    Pain: 3/10  Location: right low back    OBJECTIVE     22:    30 second sit <> stand: 6x no upper extremity   TU seconds    Good pelvic alignment    Treatment     [place] received the treatments listed below:      manual therapy techniques: Myofacial release, joint mobilizations were applied to the: pelvis/back for 00 minutes, including:    MET for right posterior innominate followed by alternating isometric hip add/abd - 3 rounds    [place] received therapeutic exercises to develop strength, endurance, ROM, flexibility, posture, and core stabilization for 25 1:1 and 30 supervised minutes including:     Hip external rotation stretch: 3x20" holds   Isometric hip add: 20x5" holds   Isometric hip abd: 20x5" holds   Piriformis stretch: 3x20" holds   Hook lying bent knee fallout: 20x each yellow theraband   Supine hip flexor/quad stretch: 3x20" each   Bridges: 2x10 double leg     Side lying hip abduction: 2x10 each (cues to avoid TFL compensation)  Clamshells: 20x5" holds " each  Side lying hip external rotation (bottom leg): 20x each    Not today:  Hip hikes on fitter: 2x10 each (difficulty with technique, performing more of lateral step down)  theraband Paloff Press  Squats: 2x10 (NO upper extremity use)     therapeutic activities to improve functional performance for 00  minutes, including:    Education and demonstration of body mechanics with ADLs    Patient Education and Home Exercises     Home Exercises Provided and Patient Education Provided     Education provided:   - importance of home exercise program  - body mechanics with ADLs - handout provided    Written Home Exercises Provided: Patient instructed to cont prior HEP. Exercises were reviewed and [place] was able to demonstrate them prior to the end of the session.  [place]ia [email or web address] good  understanding of the education provided. See EMR under Patient Instructions for exercises provided during therapy sessions    ASSESSMENT     Virginia is a 81 y.o. female referred to outpatient Physical Therapy with a medical diagnosis of SIJ inflammation. Patient presented with no innominate today. Patient requires occasional cues to maintain proper positioning with side lying exercises. Patient was moving a little slow through her exercises today possibly due to the colder weather.       [place]ia Is progressing well towards her goals.   Pt prognosis is Good.     Pt will continue to benefit from skilled outpatient physical therapy to address the deficits listed in the problem list box on initial evaluation, provide pt/family education and to maximize pt's level of independence in the home and community environment.     Pt's spiritual, cultural and educational needs considered and pt agreeable to plan of care and goals.     Anticipated barriers to physical therapy: chronic nature of symptoms     Goals:   Short Term Goals: 4 weeks:  1. Patient will demonstrate and report a compliance and understanding of her home exercise program.  PROGRESSING; NOT MET  2. Patient will demonstrate all right lower extremity MMTs at least 4+/5 to improve ADL performance and walking tolerance. PROGRESSING; NOT MET  3. Patient will report worst pain less than 5/10 in low back without anterior thigh pain for improved tolerance to ADL performance. PROGRESSING; NOT MET     Long Term Goals: 8 weeks:  1. Patient will demonstrate ability to complete > 8 sit <> stands without upper extremity use or increased pain. PROGRESSING; NOT MET  2. Patient will report ability to resume gardening with household chores at her previous baseline. MET  3. Patient will improve FOTO to < 34% to improve ADL performance. PROGRESSING; NOT MET    PLAN     Cont with POC. Progress functional movement patterns and closed chain strengthening.    Plan of care Certification: 9/15/2022 to 11/10/22.     Mahesh Delgado, PTA

## 2022-10-20 ENCOUNTER — CLINICAL SUPPORT (OUTPATIENT)
Dept: REHABILITATION | Facility: HOSPITAL | Age: 82
End: 2022-10-20
Payer: MEDICARE

## 2022-10-20 DIAGNOSIS — Z74.09 DECREASED FUNCTIONAL MOBILITY AND ENDURANCE: Primary | ICD-10-CM

## 2022-10-20 PROCEDURE — 97110 THERAPEUTIC EXERCISES: CPT | Mod: PN,CQ

## 2022-10-20 NOTE — PROGRESS NOTES
"[facility]   Physical Therapy Treatment Note     Name: Oneyda Shah  Clinic Number: [id number]    Therapy Diagnosis:   Encounter Diagnosis   Name Primary?    Decreased functional mobility and endurance Yes       Visit Date: 10/20/2022    Physician: Esme Castro PA-C     Physician Orders: PT Eval and Treat   Medical Diagnosis from Referral: M46.1 (ICD-10-CM) - SI (sacroiliac) joint inflammation  Evaluation Date: 9/15/2022  Authorization Period Expiration: 24  Plan of Care Expiration: 11/10/22  Progress Note Due: 11/10/22  Visit # / Visits authorized: 10/ 20   FOTO:  5 (Performed on 10/6/22)   PTA:      Precautions: Standard    Time In: 12:00 pm  Time Out: 1:53 pm  Total Billable Time: 53 minutes (Tex4)    SUBJECTIVE     Pt reports: her pain pain is better today.  She was compliant with home exercise program.  Response to previous treatment: decreased pain  Functional change: moving better    Pain: 1/10  Location: right low back    OBJECTIVE     22:    30 second sit <> stand: 6x no upper extremity   TU seconds    Good pelvic alignment    Treatment     [place] received the treatments listed below:      manual therapy techniques: Myofacial release, joint mobilizations were applied to the: pelvis/back for 00 minutes, including:    MET for right posterior innominate followed by alternating isometric hip add/abd - 3 rounds    [place] received therapeutic exercises to develop strength, endurance, ROM, flexibility, posture, and core stabilization for 53 supervised minutes including:      Hip external rotation stretch: 3x20" holds   Isometric hip add: 20x5" holds   Isometric hip abd: 20x5" holds   Piriformis stretch: 3x20" holds   Hook lying bent knee fallout: 20x each yellow theraband   Supine hip flexor/quad stretch: 3x20" each   Bridges: 2x10 double leg     Side lying hip abduction: 2x10 each (cues to avoid TFL compensation)   Clamshells: 20x5" holds each  Side lying " hip external rotation (bottom leg): 20x each    Not today:  Hip hikes on fitter: 2x10 each (difficulty with technique, performing more of lateral step down)  theraband Paloff Press  Squats: 2x10 (NO upper extremity use)     therapeutic activities to improve functional performance for 00  minutes, including:    Education and demonstration of body mechanics with ADLs    Patient Education and Home Exercises     Home Exercises Provided and Patient Education Provided     Education provided:   - importance of home exercise program  - body mechanics with ADLs - handout provided    Written Home Exercises Provided: Patient instructed to cont prior HEP. Exercises were reviewed and [place] was able to demonstrate them prior to the end of the session.  [place]ia [id number] good  understanding of the education provided. See EMR under Patient Instructions for exercises provided during therapy sessions    ASSESSMENT     Virginia is a 81 y.o. female referred to outpatient Physical Therapy with a medical diagnosis of SIJ inflammation. Patient presented with no innominate today. Patient requires occasional cues to maintain proper positioning with side lying exercises. Patient completed her therapy with no increase in symptoms prior to leaving the clinic.        [place]ia Is progressing well towards her goals.   Pt prognosis is Good.     Pt will continue to benefit from skilled outpatient physical therapy to address the deficits listed in the problem list box on initial evaluation, provide pt/family education and to maximize pt's level of independence in the home and community environment.     Pt's spiritual, cultural and educational needs considered and pt agreeable to plan of care and goals.     Anticipated barriers to physical therapy: chronic nature of symptoms     Goals:   Short Term Goals: 4 weeks:  1. Patient will demonstrate and report a compliance and understanding of her home exercise program. PROGRESSING; NOT MET  2.  Patient will demonstrate all right lower extremity MMTs at least 4+/5 to improve ADL performance and walking tolerance. PROGRESSING; NOT MET  3. Patient will report worst pain less than 5/10 in low back without anterior thigh pain for improved tolerance to ADL performance. PROGRESSING; NOT MET     Long Term Goals: 8 weeks:  1. Patient will demonstrate ability to complete > 8 sit <> stands without upper extremity use or increased pain. PROGRESSING; NOT MET  2. Patient will report ability to resume gardening with household chores at her previous baseline. MET  3. Patient will improve FOTO to < 34% to improve ADL performance. PROGRESSING; NOT MET    PLAN     Cont with POC. Progress functional movement patterns and closed chain strengthening.    Plan of care Certification: 9/15/2022 to 11/10/22.     Mahesh Delgado, PTA

## 2022-10-25 ENCOUNTER — CLINICAL SUPPORT (OUTPATIENT)
Dept: REHABILITATION | Facility: HOSPITAL | Age: 82
End: 2022-10-25
Payer: MEDICARE

## 2022-10-25 DIAGNOSIS — Z74.09 DECREASED FUNCTIONAL MOBILITY AND ENDURANCE: Primary | ICD-10-CM

## 2022-10-25 PROCEDURE — 97110 THERAPEUTIC EXERCISES: CPT | Mod: PN,CQ

## 2022-10-25 NOTE — PROGRESS NOTES
"[facility]   Physical Therapy Treatment Note     Name: Oneyda Shah  [facility] Number: [id number]    Therapy Diagnosis:   Encounter Diagnosis   Name Primary?    Decreased functional mobility and endurance Yes     Visit Date: 10/25/2022    Physician: Esme Castro PA-C     Physician Orders: PT Eval and Treat   Medical Diagnosis from Referral: M46.1 (ICD-10-CM) - SI (sacroiliac) joint inflammation  Evaluation Date: 9/15/2022  Authorization Period Expiration: 24  Plan of Care Expiration: 11/10/22  Progress Note Due: 11/10/22  Visit # / Visits authorized:    FOTO:  (Performed on 10/6/22)   PTA:      Precautions: Standard    Time In: 12:00 pm  Time Out: 12:50 pm  Total Billable Time: 25 1:1 and 25 supervised minutes (Tex2)    SUBJECTIVE     Pt reports: having a little more pain today as she was doing some cleaning in her house yesterday, a lot of bending over and reaching down.  She was compliant with home exercise program.  Response to previous treatment: decreased pain  Functional change: moving better    Pain: 3/10  Location: right low back    OBJECTIVE     22:    30 second sit <> stand: 6x no upper extremity   TU seconds    Good pelvic alignment    Treatment     [place] received the treatments listed below:      manual therapy techniques: Myofacial release, joint mobilizations were applied to the: pelvis/back for 00 minutes, including:    MET for right posterior innominate followed by alternating isometric hip add/abd - 3 rounds    [place] received therapeutic exercises to develop strength, endurance, ROM, flexibility, posture, and core stabilization for 25 1:1 and 25 supervised minutes including:      Hip external rotation stretch: 3x20" holds   Isometric hip add: 20x5" holds   Isometric hip abd: 20x5" holds   Piriformis stretch: 3x20" holds   Hook lying bent knee fallout: 20x each yellow theraband   Supine hip flexor/quad stretch: 3x20" each   Bridges: " "2x10 double leg     Side lying hip abduction: 2x10 each (cues to avoid TFL compensation)   Clamshells: 20x5" holds each  Side lying hip external rotation (bottom leg): 20x each    Hip hikes on fitter: 2x10 each (difficulty with technique, performing more of lateral step down)  Squats: 2x10 (NO upper extremity use)    Not today:  theraband Paloff Press      therapeutic activities to improve functional performance for 00  minutes, including:    Education and demonstration of body mechanics with ADLs    Patient Education and Home Exercises     Home Exercises Provided and Patient Education Provided     Education provided:   - importance of home exercise program  - body mechanics with ADLs - handout provided    Written Home Exercises Provided: Patient instructed to cont prior HEP. Exercises were reviewed and [place] was able to demonstrate them prior to the end of the session.  [place] [id number] good  understanding of the education provided. See EMR under Patient Instructions for exercises provided during therapy sessions    ASSESSMENT     Virginia is a 81 y.o. female referred to outpatient Physical Therapy with a medical diagnosis of SIJ inflammation. Patient presented with no innominate today. Patient requires occasional cues to maintain proper positioning with side lying exercises.  Patient has some difficulty performing hip hikes even with heavy verbal and tactile cueing.  Patient completed her therapy with no increase in symptoms prior to leaving the clinic.        [place]ia Is progressing well towards her goals.   Pt prognosis is Good.     Pt will continue to benefit from skilled outpatient physical therapy to address the deficits listed in the problem list box on initial evaluation, provide pt/family education and to maximize pt's level of independence in the home and community environment.     Pt's spiritual, cultural and educational needs considered and pt agreeable to plan of care and goals.     Anticipated " barriers to physical therapy: chronic nature of symptoms     Goals:   Short Term Goals: 4 weeks:  1. Patient will demonstrate and report a compliance and understanding of her home exercise program. PROGRESSING; NOT MET  2. Patient will demonstrate all right lower extremity MMTs at least 4+/5 to improve ADL performance and walking tolerance. PROGRESSING; NOT MET  3. Patient will report worst pain less than 5/10 in low back without anterior thigh pain for improved tolerance to ADL performance. PROGRESSING; NOT MET     Long Term Goals: 8 weeks:  1. Patient will demonstrate ability to complete > 8 sit <> stands without upper extremity use or increased pain. PROGRESSING; NOT MET  2. Patient will report ability to resume gardening with household chores at her previous baseline. MET  3. Patient will improve FOTO to < 34% to improve ADL performance. PROGRESSING; NOT MET    PLAN     Cont with POC. Progress functional movement patterns and closed chain strengthening.    Plan of care Certification: 9/15/2022 to 11/10/22.     Mahesh Delgado, PTA

## 2022-10-26 ENCOUNTER — DOCUMENTATION ONLY (OUTPATIENT)
Dept: REHABILITATION | Facility: HOSPITAL | Age: 82
End: 2022-10-26
Payer: MEDICARE

## 2022-10-27 ENCOUNTER — CLINICAL SUPPORT (OUTPATIENT)
Dept: REHABILITATION | Facility: HOSPITAL | Age: 82
End: 2022-10-27
Payer: MEDICARE

## 2022-10-27 DIAGNOSIS — Z74.09 DECREASED FUNCTIONAL MOBILITY AND ENDURANCE: Primary | ICD-10-CM

## 2022-10-27 PROCEDURE — 97110 THERAPEUTIC EXERCISES: CPT | Mod: PN | Performed by: PHYSICAL THERAPIST

## 2022-10-27 NOTE — PROGRESS NOTES
"  [facility]   Physical Therapy Treatment Note     Name: Oneyda Shah  [facility] Number: [id number]    Therapy Diagnosis:   Encounter Diagnosis   Name Primary?    Decreased functional mobility and endurance Yes     Visit Date: 10/27/2022    Physician: Esme Castro PA-C     Physician Orders: PT Eval and Treat   Medical Diagnosis from Referral: M46.1 (ICD-10-CM) - SI (sacroiliac) joint inflammation  Evaluation Date: 9/15/2022  Authorization Period Expiration: 9/12/24  Plan of Care Expiration: 11/10/22  Progress Note Due: 11/10/22  Visit # / Visits authorized: 11/ 20   FOTO: 6/ 5 (Performed on 10/6/22)   PTA: 5/5     Precautions: Standard    Time In: 1:08 pm  Time Out: 1:40 pm  Total Billable Time: 30 1:1 minutes (Tex2)    SUBJECTIVE     Pt reports: after doing a lot, she feels a little sore and tired, but feels fine the next day. She's going away and thinks she's ready for discharge. She does get some LBP when she first gets up.    She was compliant with home exercise program.  Response to previous treatment: decreased pain  Functional change: moving better    Pain: 3/10  Location: right low back    OBJECTIVE     9/27/22:    AROM:    Degrees Pain/Dysfunction   Flexion shoes (-) gowers sign   Extension 15 NP   Right Rotation 100% NP   Left Rotation 100% NP   Right Side Bending 47 cm NP   Left Side Bending 50 cm NP      Strength:  RLE   LLE     Hip flexion: 5/5 Hip flexion: 5/5   Hip Abduction: 5/5 Hip abduction: 5/5   Hip extension 4+/5 Hip extension 4+/5   Hip ER 5/5 Hip ER 5/5   Hip IR 5/5 Hip IR 5/5   Knee flexion: 5/5 Knee flexion: 5/5   Knee extension: 5/5 Knee extension: 5/5   Ankle Dorsiflexion: 5/5 Ankle Dorsiflexion: 5/5   Ankle Plantarflexion: 5/5 Ankle Plantarflexion: 5/5      SEGUNDO: Bilateral tightness (symmetrical) no pain  Hamstring 90/90: 20 degree each    30 second sit-to-stand test (without U/E support): 7x  30" sit to stand Cutoff Scores:15    Treatment     [place]ia " received the treatments listed below:        [place]ia received therapeutic exercises to develop strength, endurance, ROM, flexibility, posture, and core stabilization for 30 1:1 minutes including re-assessment and FOTO:      Education and updated HEP         Patient Education and Home Exercises     Home Exercises Provided and Patient Education Provided     Education provided:   - importance of home exercise program  - body mechanics with ADLs - handout provided    Written Home Exercises Provided: Patient instructed to cont prior HEP. Exercises were reviewed and [place] was able to demonstrate them prior to the end of the session.  [place] [id number] good  understanding of the education provided. See EMR under Patient Instructions for exercises provided during therapy sessions    Updated on 10/27/22    ASSESSMENT     Virginia is a 81 y.o. female referred to outpatient Physical Therapy with a medical diagnosis of SIJ inflammation. She reports feeling much better and that she would like to be discharged. She will also be out of town. She does continue to have some aches in her back, but doesn't feel limited with daily life activities. She's met all goals other than missing her sit <> stand goal by 1. Her home exercise program was updated. She is appropriate for discharge at this time.    [place] Is progressing well towards her goals.   Pt prognosis is Good.     Pt will continue to benefit from skilled outpatient physical therapy to address the deficits listed in the problem list box on initial evaluation, provide pt/family education and to maximize pt's level of independence in the home and community environment.     Pt's spiritual, cultural and educational needs considered and pt agreeable to plan of care and goals.     Anticipated barriers to physical therapy: chronic nature of symptoms     Goals:   Short Term Goals: 4 weeks:  1. Patient will demonstrate and report a compliance and understanding of her home  exercise program. MET  2. Patient will demonstrate all right lower extremity MMTs at least 4+/5 to improve ADL performance and walking tolerance. MET  3. Patient will report worst pain less than 5/10 in low back without anterior thigh pain for improved tolerance to ADL performance. MET     Long Term Goals: 8 weeks:  1. Patient will demonstrate ability to complete > 8 sit <> stands without upper extremity use or increased pain. NOT MET  2. Patient will report ability to resume gardening with household chores at her previous baseline. MET  3. Patient will improve FOTO to < 34% to improve ADL performance. MET    PLAN     Discharge    Mook Herrera, PT

## 2022-12-14 ENCOUNTER — PATIENT MESSAGE (OUTPATIENT)
Dept: FAMILY MEDICINE | Facility: CLINIC | Age: 82
End: 2022-12-14
Payer: MEDICARE

## 2023-01-25 ENCOUNTER — OFFICE VISIT (OUTPATIENT)
Dept: OPHTHALMOLOGY | Facility: CLINIC | Age: 83
End: 2023-01-25
Payer: MEDICARE

## 2023-01-25 DIAGNOSIS — H25.13 NUCLEAR SCLEROSIS, BILATERAL: Primary | ICD-10-CM

## 2023-01-25 PROCEDURE — 1126F PR PAIN SEVERITY QUANTIFIED, NO PAIN PRESENT: ICD-10-PCS | Mod: CPTII,S$GLB,, | Performed by: OPHTHALMOLOGY

## 2023-01-25 PROCEDURE — 1126F AMNT PAIN NOTED NONE PRSNT: CPT | Mod: CPTII,S$GLB,, | Performed by: OPHTHALMOLOGY

## 2023-01-25 PROCEDURE — 1159F MED LIST DOCD IN RCRD: CPT | Mod: CPTII,S$GLB,, | Performed by: OPHTHALMOLOGY

## 2023-01-25 PROCEDURE — 1159F PR MEDICATION LIST DOCUMENTED IN MEDICAL RECORD: ICD-10-PCS | Mod: CPTII,S$GLB,, | Performed by: OPHTHALMOLOGY

## 2023-01-25 PROCEDURE — 1160F RVW MEDS BY RX/DR IN RCRD: CPT | Mod: CPTII,S$GLB,, | Performed by: OPHTHALMOLOGY

## 2023-01-25 PROCEDURE — 99999 PR PBB SHADOW E&M-EST. PATIENT-LVL III: ICD-10-PCS | Mod: PBBFAC,,, | Performed by: OPHTHALMOLOGY

## 2023-01-25 PROCEDURE — 1160F PR REVIEW ALL MEDS BY PRESCRIBER/CLIN PHARMACIST DOCUMENTED: ICD-10-PCS | Mod: CPTII,S$GLB,, | Performed by: OPHTHALMOLOGY

## 2023-01-25 PROCEDURE — 92004 PR EYE EXAM, NEW PATIENT,COMPREHESV: ICD-10-PCS | Mod: S$GLB,,, | Performed by: OPHTHALMOLOGY

## 2023-01-25 PROCEDURE — 92004 COMPRE OPH EXAM NEW PT 1/>: CPT | Mod: S$GLB,,, | Performed by: OPHTHALMOLOGY

## 2023-01-25 PROCEDURE — 1101F PT FALLS ASSESS-DOCD LE1/YR: CPT | Mod: CPTII,S$GLB,, | Performed by: OPHTHALMOLOGY

## 2023-01-25 PROCEDURE — 3288F FALL RISK ASSESSMENT DOCD: CPT | Mod: CPTII,S$GLB,, | Performed by: OPHTHALMOLOGY

## 2023-01-25 PROCEDURE — 99999 PR PBB SHADOW E&M-EST. PATIENT-LVL III: CPT | Mod: PBBFAC,,, | Performed by: OPHTHALMOLOGY

## 2023-01-25 PROCEDURE — 1101F PR PT FALLS ASSESS DOC 0-1 FALLS W/OUT INJ PAST YR: ICD-10-PCS | Mod: CPTII,S$GLB,, | Performed by: OPHTHALMOLOGY

## 2023-01-25 PROCEDURE — 3288F PR FALLS RISK ASSESSMENT DOCUMENTED: ICD-10-PCS | Mod: CPTII,S$GLB,, | Performed by: OPHTHALMOLOGY

## 2023-01-25 NOTE — PROGRESS NOTES
HPI    Patient here today for cat eval ou. C/o blurry [facility] ou, tearing and mild   pain OS, no f/f.    H/o blepharoplasty ou    No gtts  Last edited by Monique Leavitt on 1/25/2023  3:12 PM.            Assessment /Plan     For exam results, see Encounter Report.    Nuclear sclerosis, bilateral      Incipient cataract: Patient does reports mild visual decline, but not sufficient to affect activities of daily living. I recommend monitoring visual status and follow up when visual symptoms worsen.  Diboo 24.5//26.5

## 2023-02-07 DIAGNOSIS — Z00.00 ENCOUNTER FOR MEDICARE ANNUAL WELLNESS EXAM: ICD-10-CM

## 2023-02-09 DIAGNOSIS — Z00.00 ENCOUNTER FOR MEDICARE ANNUAL WELLNESS EXAM: ICD-10-CM

## 2023-02-27 ENCOUNTER — OFFICE VISIT (OUTPATIENT)
Dept: FAMILY MEDICINE | Facility: CLINIC | Age: 83
End: 2023-02-27
Payer: MEDICARE

## 2023-02-27 ENCOUNTER — LAB VISIT (OUTPATIENT)
Dept: LAB | Facility: HOSPITAL | Age: 83
End: 2023-02-27
Attending: FAMILY MEDICINE
Payer: MEDICARE

## 2023-02-27 VITALS
SYSTOLIC BLOOD PRESSURE: 128 MMHG | WEIGHT: 141.13 LBS | HEIGHT: 61 IN | DIASTOLIC BLOOD PRESSURE: 62 MMHG | TEMPERATURE: 98 F | OXYGEN SATURATION: 98 % | BODY MASS INDEX: 26.65 KG/M2 | HEART RATE: 66 BPM

## 2023-02-27 DIAGNOSIS — E03.9 HYPOTHYROIDISM, UNSPECIFIED TYPE: ICD-10-CM

## 2023-02-27 DIAGNOSIS — M46.1 SI (SACROILIAC) JOINT INFLAMMATION: ICD-10-CM

## 2023-02-27 DIAGNOSIS — M70.61 TROCHANTERIC BURSITIS, RIGHT HIP: ICD-10-CM

## 2023-02-27 DIAGNOSIS — I10 HYPERTENSION, UNSPECIFIED TYPE: ICD-10-CM

## 2023-02-27 DIAGNOSIS — N18.31 TYPE 2 DIABETES MELLITUS WITH STAGE 3A CHRONIC KIDNEY DISEASE, WITHOUT LONG-TERM CURRENT USE OF INSULIN: Primary | ICD-10-CM

## 2023-02-27 DIAGNOSIS — I70.0 AORTIC ATHEROSCLEROSIS: ICD-10-CM

## 2023-02-27 DIAGNOSIS — E11.22 TYPE 2 DIABETES MELLITUS WITH STAGE 3A CHRONIC KIDNEY DISEASE, WITHOUT LONG-TERM CURRENT USE OF INSULIN: Primary | ICD-10-CM

## 2023-02-27 DIAGNOSIS — E11.22 TYPE 2 DIABETES MELLITUS WITH STAGE 3A CHRONIC KIDNEY DISEASE, WITHOUT LONG-TERM CURRENT USE OF INSULIN: ICD-10-CM

## 2023-02-27 DIAGNOSIS — N18.31 TYPE 2 DIABETES MELLITUS WITH STAGE 3A CHRONIC KIDNEY DISEASE, WITHOUT LONG-TERM CURRENT USE OF INSULIN: ICD-10-CM

## 2023-02-27 LAB
ALBUMIN SERPL BCP-MCNC: 3.9 G/DL (ref 3.5–5.2)
ALP SERPL-CCNC: 67 U/L (ref 55–135)
ALT SERPL W/O P-5'-P-CCNC: 33 U/L (ref 10–44)
ANION GAP SERPL CALC-SCNC: 9 MMOL/L (ref 8–16)
AST SERPL-CCNC: 24 U/L (ref 10–40)
BASOPHILS # BLD AUTO: 0.04 K/UL (ref 0–0.2)
BASOPHILS NFR BLD: 0.5 % (ref 0–1.9)
BILIRUB SERPL-MCNC: 0.3 MG/DL (ref 0.1–1)
BUN SERPL-MCNC: 15 MG/DL (ref 8–23)
CALCIUM SERPL-MCNC: 9.7 MG/DL (ref 8.7–10.5)
CHLORIDE SERPL-SCNC: 104 MMOL/L (ref 95–110)
CHOLEST SERPL-MCNC: 229 MG/DL (ref 120–199)
CHOLEST/HDLC SERPL: 4.6 {RATIO} (ref 2–5)
CO2 SERPL-SCNC: 25 MMOL/L (ref 23–29)
CREAT SERPL-MCNC: 1 MG/DL (ref 0.5–1.4)
DIFFERENTIAL METHOD: ABNORMAL
EOSINOPHIL # BLD AUTO: 0.2 K/UL (ref 0–0.5)
EOSINOPHIL NFR BLD: 1.8 % (ref 0–8)
ERYTHROCYTE [DISTWIDTH] IN BLOOD BY AUTOMATED COUNT: 13.6 % (ref 11.5–14.5)
EST. GFR  (NO RACE VARIABLE): 57 ML/MIN/1.73 M^2
ESTIMATED AVG GLUCOSE: 157 MG/DL (ref 68–131)
GLUCOSE SERPL-MCNC: 141 MG/DL (ref 70–110)
HBA1C MFR BLD: 7.1 % (ref 4–5.6)
HCT VFR BLD AUTO: 35 % (ref 37–48.5)
HDLC SERPL-MCNC: 50 MG/DL (ref 40–75)
HDLC SERPL: 21.8 % (ref 20–50)
HGB BLD-MCNC: 11.6 G/DL (ref 12–16)
IMM GRANULOCYTES # BLD AUTO: 0.03 K/UL (ref 0–0.04)
IMM GRANULOCYTES NFR BLD AUTO: 0.3 % (ref 0–0.5)
LDLC SERPL CALC-MCNC: 159.8 MG/DL (ref 63–159)
LYMPHOCYTES # BLD AUTO: 3 K/UL (ref 1–4.8)
LYMPHOCYTES NFR BLD: 33.9 % (ref 18–48)
MCH RBC QN AUTO: 29.6 PG (ref 27–31)
MCHC RBC AUTO-ENTMCNC: 33.1 G/DL (ref 32–36)
MCV RBC AUTO: 89 FL (ref 82–98)
MONOCYTES # BLD AUTO: 0.7 K/UL (ref 0.3–1)
MONOCYTES NFR BLD: 7.7 % (ref 4–15)
NEUTROPHILS # BLD AUTO: 4.9 K/UL (ref 1.8–7.7)
NEUTROPHILS NFR BLD: 55.8 % (ref 38–73)
NONHDLC SERPL-MCNC: 179 MG/DL
NRBC BLD-RTO: 0 /100 WBC
PLATELET # BLD AUTO: 249 K/UL (ref 150–450)
PMV BLD AUTO: 11.7 FL (ref 9.2–12.9)
POTASSIUM SERPL-SCNC: 5 MMOL/L (ref 3.5–5.1)
PROT SERPL-MCNC: 7.7 G/DL (ref 6–8.4)
RBC # BLD AUTO: 3.92 M/UL (ref 4–5.4)
SODIUM SERPL-SCNC: 138 MMOL/L (ref 136–145)
TRIGL SERPL-MCNC: 96 MG/DL (ref 30–150)
TSH SERPL DL<=0.005 MIU/L-ACNC: 3 UIU/ML (ref 0.4–4)
WBC # BLD AUTO: 8.82 K/UL (ref 3.9–12.7)

## 2023-02-27 PROCEDURE — 3074F PR MOST RECENT SYSTOLIC BLOOD PRESSURE < 130 MM HG: ICD-10-PCS | Mod: CPTII,S$GLB,, | Performed by: FAMILY MEDICINE

## 2023-02-27 PROCEDURE — 84443 ASSAY THYROID STIM HORMONE: CPT | Performed by: FAMILY MEDICINE

## 2023-02-27 PROCEDURE — 1101F PT FALLS ASSESS-DOCD LE1/YR: CPT | Mod: CPTII,S$GLB,, | Performed by: FAMILY MEDICINE

## 2023-02-27 PROCEDURE — 1160F RVW MEDS BY RX/DR IN RCRD: CPT | Mod: CPTII,S$GLB,, | Performed by: FAMILY MEDICINE

## 2023-02-27 PROCEDURE — 3078F PR MOST RECENT DIASTOLIC BLOOD PRESSURE < 80 MM HG: ICD-10-PCS | Mod: CPTII,S$GLB,, | Performed by: FAMILY MEDICINE

## 2023-02-27 PROCEDURE — 83036 HEMOGLOBIN GLYCOSYLATED A1C: CPT | Performed by: FAMILY MEDICINE

## 2023-02-27 PROCEDURE — 3078F DIAST BP <80 MM HG: CPT | Mod: CPTII,S$GLB,, | Performed by: FAMILY MEDICINE

## 2023-02-27 PROCEDURE — 3288F FALL RISK ASSESSMENT DOCD: CPT | Mod: CPTII,S$GLB,, | Performed by: FAMILY MEDICINE

## 2023-02-27 PROCEDURE — 85025 COMPLETE CBC W/AUTO DIFF WBC: CPT | Performed by: FAMILY MEDICINE

## 2023-02-27 PROCEDURE — 99214 OFFICE O/P EST MOD 30 MIN: CPT | Mod: S$GLB,,, | Performed by: FAMILY MEDICINE

## 2023-02-27 PROCEDURE — 1159F PR MEDICATION LIST DOCUMENTED IN MEDICAL RECORD: ICD-10-PCS | Mod: CPTII,S$GLB,, | Performed by: FAMILY MEDICINE

## 2023-02-27 PROCEDURE — 3288F PR FALLS RISK ASSESSMENT DOCUMENTED: ICD-10-PCS | Mod: CPTII,S$GLB,, | Performed by: FAMILY MEDICINE

## 2023-02-27 PROCEDURE — 99214 PR OFFICE/OUTPT VISIT, EST, LEVL IV, 30-39 MIN: ICD-10-PCS | Mod: S$GLB,,, | Performed by: FAMILY MEDICINE

## 2023-02-27 PROCEDURE — 1101F PR PT FALLS ASSESS DOC 0-1 FALLS W/OUT INJ PAST YR: ICD-10-PCS | Mod: CPTII,S$GLB,, | Performed by: FAMILY MEDICINE

## 2023-02-27 PROCEDURE — 1159F MED LIST DOCD IN RCRD: CPT | Mod: CPTII,S$GLB,, | Performed by: FAMILY MEDICINE

## 2023-02-27 PROCEDURE — 1125F AMNT PAIN NOTED PAIN PRSNT: CPT | Mod: CPTII,S$GLB,, | Performed by: FAMILY MEDICINE

## 2023-02-27 PROCEDURE — 99999 PR PBB SHADOW E&M-EST. PATIENT-LVL IV: CPT | Mod: PBBFAC,,, | Performed by: FAMILY MEDICINE

## 2023-02-27 PROCEDURE — 3074F SYST BP LT 130 MM HG: CPT | Mod: CPTII,S$GLB,, | Performed by: FAMILY MEDICINE

## 2023-02-27 PROCEDURE — 1160F PR REVIEW ALL MEDS BY PRESCRIBER/CLIN PHARMACIST DOCUMENTED: ICD-10-PCS | Mod: CPTII,S$GLB,, | Performed by: FAMILY MEDICINE

## 2023-02-27 PROCEDURE — 99999 PR PBB SHADOW E&M-EST. PATIENT-LVL IV: ICD-10-PCS | Mod: PBBFAC,,, | Performed by: FAMILY MEDICINE

## 2023-02-27 PROCEDURE — 36415 COLL VENOUS BLD VENIPUNCTURE: CPT | Performed by: FAMILY MEDICINE

## 2023-02-27 PROCEDURE — 1125F PR PAIN SEVERITY QUANTIFIED, PAIN PRESENT: ICD-10-PCS | Mod: CPTII,S$GLB,, | Performed by: FAMILY MEDICINE

## 2023-02-27 PROCEDURE — 80053 COMPREHEN METABOLIC PANEL: CPT | Performed by: FAMILY MEDICINE

## 2023-02-27 PROCEDURE — 80061 LIPID PANEL: CPT | Performed by: FAMILY MEDICINE

## 2023-02-27 RX ORDER — FLUTICASONE PROPIONATE 50 MCG
2 SPRAY, SUSPENSION (ML) NASAL DAILY
Qty: 16 ML | Refills: 11 | Status: SHIPPED | OUTPATIENT
Start: 2023-02-27 | End: 2024-03-25

## 2023-02-27 NOTE — PROGRESS NOTES
(Portions of this note were dictated using voice recognition software and may contain dictation related errors in spelling/grammar/syntax not found on text review)     Chief Complaint   Patient presents with    Follow-up       HPI: 81 y.o. female  last visit August 2022    Chronic health history including:     Diabetes with CKD 3 on metformin 1000 mg b.i.d.      Hypertension on losartan 100 mg daily, atenolol 50 mg daily, amlodipine 10 mg daily, doxazosin 2 mg daily. (BP at home consistently low normal, had tried to decrease atenolol 25 mg daily given some fatigue complains looking to pressures subtle elevated was not feeling well -therefore restarted atenolol at 50 mg daily).        Hyperlipidemia intolerant to all statins along with Zetia     Hypothyroidism on Synthroid 50 mcg daily     Fatty liver disease    COVID pneumonia in January 2022     Does get some right back pains, sometimes into her hip, usually worse when she is 1st getting up, was wondering what she can take.  She had done therapy at 1 time, has some home exercises.  Does admit to not much exercise but plans to start.      Right ear sometimes feels like it is closed off.  No pain..  denies any stuffy or runny nose      Past Medical History:   Diagnosis Date    Aortic atherosclerosis     CKD (chronic kidney disease), stage III     Diabetes mellitus type II     Fatty liver     High cholesterol     HLD (hyperlipidemia)     HTN (hypertension)     Hypothyroidism     Hypothyroidism     Osteopenia     Statin myopathy        Past Surgical History:   Procedure Laterality Date    BREAST BIOPSY      BREAST SURGERY      biopsy    HYSTERECTOMY      still w/ovaries    ROTATOR CUFF REPAIR Left        Family History   Problem Relation Age of Onset    Diabetes Mother     Hypertension Mother     Blindness Father     Coronary artery disease Sister 55        MI    Liver cancer Sister     Diabetes Brother     No Known Problems Brother     Diabetes Daughter     No Known  Problems Son     Amblyopia Neg Hx     Cataracts Neg Hx     Glaucoma Neg Hx     Macular degeneration Neg Hx     Retinal detachment Neg Hx     Strabismus Neg Hx        Social History     Socioeconomic History    Marital status:    Tobacco Use    Smoking status: Never    Smokeless tobacco: Never   Substance and Sexual Activity    Alcohol use: No    Drug use: No    Sexual activity: Yes     Partners: Male             Lab Results   Component Value Date    WBC 11.41 08/19/2022    HGB 11.5 (L) 08/19/2022    HCT 33.9 (L) 08/19/2022    MCV 88 08/19/2022     08/19/2022    CHOL 229 (H) 11/26/2021    TRIG 129 11/26/2021    HDL 43 11/26/2021    ALT 25 08/19/2022    AST 25 08/19/2022    BILITOT 0.8 08/19/2022    ALKPHOS 69 08/19/2022     08/19/2022    K 4.2 08/19/2022     08/19/2022    CREATININE 1.1 08/19/2022    ESTGFRAFRICA >60 04/27/2022    EGFRNONAA 54 (A) 04/27/2022    CALCIUM 10.0 08/19/2022    ALBUMIN 4.1 08/19/2022    BUN 11 08/19/2022    CO2 25 08/19/2022    TSH 2.803 08/15/2022    INR 1.0 01/12/2022    HGBA1C 6.4 (H) 08/15/2022    MICALBCREAT 46.1 (H) 11/26/2021    LDLCALC 160.2 (H) 11/26/2021     (H) 08/19/2022    [id number] 35 10/10/2017          Hemoglobin (g/dL)   Date Value   08/19/2022 11.5 (L)   08/15/2022 10.9 (L)   04/27/2022 11.0 (L)   01/12/2022 11.7 (L)   01/11/2022 10.4 (L)   11/26/2021 11.2 (L)   07/14/2021 11.8 (L)   06/11/2021 11.8 (L)   04/15/2021 11.8 (L)   01/11/2021 11.7 (L)     Hemoglobin A1C (%)   Date Value   08/15/2022 6.4 (H)   04/27/2022 6.3 (H)   11/26/2021 6.4 (H)   04/15/2021 6.5 (H)   01/11/2021 6.6 (H)   08/18/2020 7.0 (H)     eGFR if non African American (mL/min/1.73 m^2)   Date Value   04/27/2022 54 (A)   01/11/2022 43 (A)   12/20/2021 54 (A)   11/26/2021 48 (A)   06/11/2021 48 (A)     Potassium (mmol/L)   Date Value   08/19/2022 4.2   08/15/2022 5.1   04/27/2022 5.3 (H)   01/11/2022 4.6   12/20/2021 4.8   11/26/2021 5.2 (H)          Vitals:    02/27/23  "0951   BP: 128/62   BP Location: Right arm   Patient Position: Sitting   BP Method: Medium (Manual)   Pulse: 66   Temp: 97.8 °F (36.6 °C)   TempSrc: Oral   SpO2: 98%   Weight: 64 kg (141 lb 1.5 oz)   Height: 5' 1" (1.549 m)       Wt Readings from Last 5 Encounters:   02/27/23 64 kg (141 lb 1.5 oz)   09/14/22 61.5 kg (135 lb 9.3 oz)   09/12/22 61.6 kg (135 lb 12.9 oz)   09/09/22 61.6 kg (135 lb 12.9 oz)   09/01/22 60.8 kg (134 lb)     Vital signs reviewed  PE:   APPEARANCE: Well nourished, well developed, in no acute distress.    HEAD: Normocephalic, atraumatic.  EYES: PERRL. EOMI.   Conjunctivae noninjected.  EARS: TM's intact. Light reflex normal. No retraction or perforation  NOSE:  Nasal turbinate hypertrophy bilaterally.  MOUTH & THROAT: No tonsillar enlargement. No pharyngeal erythema or exudate.   NECK: Supple with no cervical lymphadenopathy.  No carotid bruits.  No thyromegaly  CHEST: Good inspiratory effort. Lungs clear to auscultation with no wheezes or crackles.  CARDIOVASCULAR: Normal S1, S2. No rubs, murmurs, or gallops.  ABDOMEN: Bowel sounds normal. Not distended. Soft. No tenderness or masses. No organomegaly.  EXTREMITIES: No edema   MSK:  Slight pain at right SI joint.  Also pain had right greater trochanter, worse with external hip rotation.  No internal hip rotation restriction on the right side.  DIABETIC FOOT EXAM: Protective Sensation (w/ 10 gram monofilament):  Right: Intact  Left: Intact    Visual Inspection:  Normal -  Bilateral    Pedal Pulses:   Right: Present  Left: Present    Posterior tibialis:   Right:Present  Left: Present                        IMPRESSION  1. Type 2 diabetes mellitus with stage 3a chronic kidney disease, without long-term current use of insulin    2. Hypertension, unspecified type    3. Hypothyroidism, unspecified type    4. SI (sacroiliac) joint inflammation    5. Aortic atherosclerosis    6. Trochanteric bursitis, right hip          PLAN         Type 2 " Diabetes:  Controlled   Continue metformin 2 g daily     Hypertension:  Stable.  Continue current medication    Hypothyroidism .  TSH stable      Aortic atherosclerosis, dyslipidemia.  Unfortunately intolerant to all statins and also Zetia.  Update labs    In case of ET D causing some of her hearing difficulty and muffled symptoms in her right ear, advise Flonase 2 sprays each nostril daily for the next you weeks     She has home hip and back exercise continued exercises for this.  Discussed ice application for bursitis.  Advise regular exercise and stretching         Orders Placed This Encounter   Procedures    CBC Auto Differential    Comprehensive Metabolic Panel    Lipid Panel    TSH    Microalbumin/Creatinine Ratio, Urine    Hemoglobin A1C                SCREENINGS      DEXA scan:  02/09/2021, normal  Mammogram 12/19/16, declines rpt  Colonoscopy: 2009, Dr. Wright, normal.  Declined repeat     immunizations   Tetanus :Check with your pharmacy regarding getting the tetanus (Tdap) vaccine (once every 10 years)  PVX: 2017   Prevnar: 1/2016  Flu: declines  Zoster: utd  COVID (05/19/2021, 06/19/2021 (Pfizer), has not had booster yet but do encourage getting booster shot                             No

## 2023-02-27 NOTE — PATIENT INSTRUCTIONS
Check with your pharmacy regarding getting the tetanus (Tdap) vaccine (once every 10 years)      Look into getting the Shingles vaccine (SHINGRIX) at your local pharmacy (2 part series, done once at/after age 50)      Covid booster: updated omicron booster effective Sept 2022:  [facility] users can check availability and schedule their vaccinations via [facility]. If you don't have a [facility] account, you can sign up at [email or web address], call [phone] or visit [email or web address] for available locations and times

## 2023-03-15 ENCOUNTER — OFFICE VISIT (OUTPATIENT)
Dept: CARDIOLOGY | Facility: CLINIC | Age: 83
End: 2023-03-15
Payer: MEDICARE

## 2023-03-15 VITALS
WEIGHT: 138 LBS | BODY MASS INDEX: 26.07 KG/M2 | HEART RATE: 55 BPM | SYSTOLIC BLOOD PRESSURE: 133 MMHG | OXYGEN SATURATION: 97 % | DIASTOLIC BLOOD PRESSURE: 79 MMHG

## 2023-03-15 DIAGNOSIS — I10 PRIMARY HYPERTENSION: ICD-10-CM

## 2023-03-15 DIAGNOSIS — R00.2 PALPITATIONS: Primary | ICD-10-CM

## 2023-03-15 PROCEDURE — 3078F PR MOST RECENT DIASTOLIC BLOOD PRESSURE < 80 MM HG: ICD-10-PCS | Mod: CPTII,S$GLB,, | Performed by: INTERNAL MEDICINE

## 2023-03-15 PROCEDURE — 3078F DIAST BP <80 MM HG: CPT | Mod: CPTII,S$GLB,, | Performed by: INTERNAL MEDICINE

## 2023-03-15 PROCEDURE — 99204 OFFICE O/P NEW MOD 45 MIN: CPT | Mod: S$GLB,,, | Performed by: INTERNAL MEDICINE

## 2023-03-15 PROCEDURE — 99999 PR PBB SHADOW E&M-EST. PATIENT-LVL IV: CPT | Mod: PBBFAC,,, | Performed by: INTERNAL MEDICINE

## 2023-03-15 PROCEDURE — 3075F PR MOST RECENT SYSTOLIC BLOOD PRESS GE 130-139MM HG: ICD-10-PCS | Mod: CPTII,S$GLB,, | Performed by: INTERNAL MEDICINE

## 2023-03-15 PROCEDURE — 1160F PR REVIEW ALL MEDS BY PRESCRIBER/CLIN PHARMACIST DOCUMENTED: ICD-10-PCS | Mod: CPTII,S$GLB,, | Performed by: INTERNAL MEDICINE

## 2023-03-15 PROCEDURE — 99999 PR PBB SHADOW E&M-EST. PATIENT-LVL IV: ICD-10-PCS | Mod: PBBFAC,,, | Performed by: INTERNAL MEDICINE

## 2023-03-15 PROCEDURE — 1159F MED LIST DOCD IN RCRD: CPT | Mod: CPTII,S$GLB,, | Performed by: INTERNAL MEDICINE

## 2023-03-15 PROCEDURE — 3288F PR FALLS RISK ASSESSMENT DOCUMENTED: ICD-10-PCS | Mod: CPTII,S$GLB,, | Performed by: INTERNAL MEDICINE

## 2023-03-15 PROCEDURE — 99204 PR OFFICE/OUTPT VISIT, NEW, LEVL IV, 45-59 MIN: ICD-10-PCS | Mod: S$GLB,,, | Performed by: INTERNAL MEDICINE

## 2023-03-15 PROCEDURE — 1101F PT FALLS ASSESS-DOCD LE1/YR: CPT | Mod: CPTII,S$GLB,, | Performed by: INTERNAL MEDICINE

## 2023-03-15 PROCEDURE — 1160F RVW MEDS BY RX/DR IN RCRD: CPT | Mod: CPTII,S$GLB,, | Performed by: INTERNAL MEDICINE

## 2023-03-15 PROCEDURE — 1101F PR PT FALLS ASSESS DOC 0-1 FALLS W/OUT INJ PAST YR: ICD-10-PCS | Mod: CPTII,S$GLB,, | Performed by: INTERNAL MEDICINE

## 2023-03-15 PROCEDURE — 3288F FALL RISK ASSESSMENT DOCD: CPT | Mod: CPTII,S$GLB,, | Performed by: INTERNAL MEDICINE

## 2023-03-15 PROCEDURE — 3075F SYST BP GE 130 - 139MM HG: CPT | Mod: CPTII,S$GLB,, | Performed by: INTERNAL MEDICINE

## 2023-03-15 PROCEDURE — 1159F PR MEDICATION LIST DOCUMENTED IN MEDICAL RECORD: ICD-10-PCS | Mod: CPTII,S$GLB,, | Performed by: INTERNAL MEDICINE

## 2023-03-15 RX ORDER — EZETIMIBE 10 MG/1
10 TABLET ORAL DAILY
Qty: 90 TABLET | Refills: 3 | Status: SHIPPED | OUTPATIENT
Start: 2023-03-15 | End: 2023-09-19

## 2023-03-15 RX ORDER — DILTIAZEM HYDROCHLORIDE 240 MG/1
240 CAPSULE, COATED, EXTENDED RELEASE ORAL DAILY
Qty: 90 CAPSULE | Refills: 1 | Status: SHIPPED | OUTPATIENT
Start: 2023-03-15 | End: 2023-04-06 | Stop reason: SDUPTHER

## 2023-03-15 NOTE — PROGRESS NOTES
[facility]     SUBJECTIVE:     History of Present Illness:  Patient is a 82 y.o. female presents to establish care patient of . Seen by him for palpitations . Holter with no SVT and only rare PAC's and PVC's.     Primary Diagnosis:   Hypertension  DM:positive  Nonsmoker  Family history of  early heart disease: none  Heart disease: none  ROS  Blood pressures at home: normal at home  No chest pains  No shortness of breath; no PND or orthopnea  No syncope  Palpitations: usually happens at night after taking cardura; feels her heart beat fast 3 times. Sometimes used to feel her heart would stop.   Activity: no restrictions   Review of patient's allergies indicates:   Allergen Reactions    Penicillins Other (See Comments)    Ace inhibitors      cough    Statins-hmg-coa reductase inhibitors      Myalgias,       Past Medical History:   Diagnosis Date    Aortic atherosclerosis     CKD (chronic kidney disease), stage III     Diabetes mellitus type II     Fatty liver     High cholesterol     HLD (hyperlipidemia)     HTN (hypertension)     Hypothyroidism     Hypothyroidism     Osteopenia     Statin myopathy        Past Surgical History:   Procedure Laterality Date    BREAST BIOPSY      BREAST SURGERY      biopsy    HYSTERECTOMY      still w/ovaries    ROTATOR CUFF REPAIR Left        Family History   Problem Relation Age of Onset    Diabetes Mother     Hypertension Mother     Blindness Father     Coronary artery disease Sister 55        MI    Liver cancer Sister     Diabetes Brother     No Known Problems Brother     Diabetes Daughter     No Known Problems Son     Amblyopia Neg Hx     Cataracts Neg Hx     Glaucoma Neg Hx     Macular degeneration Neg Hx     Retinal detachment Neg Hx     Strabismus Neg Hx        Social History     Tobacco Use    Smoking status: Never    Smokeless tobacco: Never   Substance Use Topics    Alcohol use: No    Drug use: No        Past Hospitalization:     Home meds:  Current  Outpatient Medications on File Prior to Visit   Medication Sig Dispense Refill    amLODIPine (NORVASC) 10 MG tablet TAKE 1 TABLET EVERY DAY 90 tablet 2    aspirin (ECOTRIN) 81 MG EC tablet Take 81 mg by mouth once daily.      atenoloL (TENORMIN) 50 MG tablet Take 1 tablet (50 mg total) by mouth once daily. 90 tablet 3    cholecalciferol, vitamin D3, (VITAMIN D3) 25 mcg (1,000 unit) capsule Take 1,000 Units by mouth once daily.      doxazosin (CARDURA) 2 MG tablet Take 1 tablet (2 mg total) by mouth every evening. 60 tablet 4    fluticasone propionate (FLONASE) 50 mcg/actuation nasal spray 2 sprays (100 mcg total) by Each Nostril route once daily. 16 mL 11    levothyroxine (SYNTHROID) 50 MCG tablet TAKE 1 TABLET EVERY DAY 90 tablet 3    losartan (COZAAR) 100 MG tablet Take 1 tablet (100 mg total) by mouth once daily. 90 tablet 3    metFORMIN (GLUCOPHAGE) 1000 MG tablet TAKE 1 TABLET TWICE DAILY 180 tablet 11    ACCU-CHEK GERTRUDE PLUS TEST STRP Strp TEST ONE TIME DAILY 100 strip 3    blood-glucose meter Misc Check daily sugars, dispense insurance covered blood sugar meter 1 each 0    DROPSAFE ALCOHOL PREP PADS PadM USE FOR SKIN CLEANING PRIOR TO FINGERSTICK AS NEEDED AS DIRECTED 3 each 3    lancets (ACCU-CHEK SOFTCLIX LANCETS) Misc CHECK  SUGAR TWICE DAILY 200 each 3    ondansetron (ZOFRAN-ODT) 4 MG TbDL Take 1 tablet (4 mg total) by mouth every 8 (eight) hours as needed (nausea). (Patient not taking: Reported on 2/27/2023) 30 tablet 0    [DISCONTINUED] ciprofloxacin HCl (CILOXAN) 0.3 % ophthalmic solution       [DISCONTINUED] doxazosin (CARDURA) 2 MG tablet Take 1 tablet (2 mg total) by mouth every evening. 60 tablet 4    [DISCONTINUED] ZETIA 10 mg tablet        Current Facility-Administered Medications on File Prior to Visit   Medication Dose Route Frequency Provider Last Rate Last Admin    acetaminophen tablet 650 mg  650 mg Oral Once PRN Darrell Long MD        albuterol inhaler 2 puff  2 puff Inhalation Q20 Min  YOMAIRA Long MD           Cardiac meds:  Amlodipine 10 mg  ASA 81 mg  Cardura 2 mg pm   Thyroid  Losartan 100 mg   Statins: pain all over   Atenolol 50 mg   metformin  OBJECTIVE:     Vital Signs (Most Recent)  Vitals:    03/15/23 1632   BP: 133/79   Pulse: (!) 55   SpO2: 97%   Weight: 62.6 kg (138 lb)         Physical Exam:  Neck: normal carotids, no bruits; normal JVP  Lungs :clear  Heart: RR, normal S1,S2, systolic murmur LSB , no gallops  Abd: no masses; no bruits;   Exts: normal DP and PT pulses bilaterally, no edema noted           LABS    CMP  Recent Labs   Lab Result Units 23  1042   Potassium mmol/L 5.0       LIPID  Recent Labs   Lab Result Units 23  1042   HDL mg/dL 50   Cholesterol mg/dL 229*   Triglycerides mg/dL 96   LDL Cholesterol mg/dL 159.8*   HDL/Cholesterol Ratio % 21.8   Total Cholesterol/HDL Ratio  4.6       ; ; GFR 57   Diagnostic Results:    EK/22: normal; nonspecific T wave changes   Holter : rare PVC ; rare PAC; short burst of atrial tachycardia for 16 beats   Chart review:    Echo: 2018: normal       ASSESSMENT/PLAN:     Hypertension: controlled on multiple regimen  Hyperlipidemia - intolerant to statins  Systolic murmur: probably aortic sclerosis     Plan: 1. Zetia 10 mg ; continue losartan   2. Discontinue amlodipine, cardura and the atenolol and replace it with diltiazem 240 mg   3. Monitor blood pressures  4. Return 3 weeks     Mina Reynolds MD

## 2023-03-15 NOTE — PATIENT INSTRUCTIONS
Start zetia for high cholesterol  Blood pressure regimen:  A. Continue losartan; discontinue amlodipine, cardura and atenolol and replace with diltiazem   Monitor blood pressures

## 2023-03-27 ENCOUNTER — PATIENT MESSAGE (OUTPATIENT)
Dept: CARDIOLOGY | Facility: CLINIC | Age: 83
End: 2023-03-27
Payer: MEDICARE

## 2023-04-03 ENCOUNTER — DOCUMENTATION ONLY (OUTPATIENT)
Dept: CARDIOLOGY | Facility: CLINIC | Age: 83
End: 2023-04-03
Payer: MEDICARE

## 2023-04-03 NOTE — PROGRESS NOTES
Spoke to daughter. Mother is anxious; heart rate better over the weekend. Blood pressures when she wakes up around 2 am (heart rates 111); blood pressures 157 at times; 157 . Seeing her this week

## 2023-04-06 ENCOUNTER — OFFICE VISIT (OUTPATIENT)
Dept: CARDIOLOGY | Facility: CLINIC | Age: 83
End: 2023-04-06
Payer: MEDICARE

## 2023-04-06 VITALS
HEART RATE: 76 BPM | DIASTOLIC BLOOD PRESSURE: 77 MMHG | WEIGHT: 138 LBS | BODY MASS INDEX: 26.07 KG/M2 | OXYGEN SATURATION: 96 % | SYSTOLIC BLOOD PRESSURE: 143 MMHG

## 2023-04-06 DIAGNOSIS — I10 PRIMARY HYPERTENSION: ICD-10-CM

## 2023-04-06 DIAGNOSIS — R00.2 PALPITATIONS: Primary | ICD-10-CM

## 2023-04-06 PROCEDURE — 3288F FALL RISK ASSESSMENT DOCD: CPT | Mod: CPTII,S$GLB,, | Performed by: INTERNAL MEDICINE

## 2023-04-06 PROCEDURE — 1126F AMNT PAIN NOTED NONE PRSNT: CPT | Mod: CPTII,S$GLB,, | Performed by: INTERNAL MEDICINE

## 2023-04-06 PROCEDURE — 3288F PR FALLS RISK ASSESSMENT DOCUMENTED: ICD-10-PCS | Mod: CPTII,S$GLB,, | Performed by: INTERNAL MEDICINE

## 2023-04-06 PROCEDURE — 99999 PR PBB SHADOW E&M-EST. PATIENT-LVL III: CPT | Mod: PBBFAC,,, | Performed by: INTERNAL MEDICINE

## 2023-04-06 PROCEDURE — 1126F PR PAIN SEVERITY QUANTIFIED, NO PAIN PRESENT: ICD-10-PCS | Mod: CPTII,S$GLB,, | Performed by: INTERNAL MEDICINE

## 2023-04-06 PROCEDURE — 99999 PR PBB SHADOW E&M-EST. PATIENT-LVL III: ICD-10-PCS | Mod: PBBFAC,,, | Performed by: INTERNAL MEDICINE

## 2023-04-06 PROCEDURE — 3078F PR MOST RECENT DIASTOLIC BLOOD PRESSURE < 80 MM HG: ICD-10-PCS | Mod: CPTII,S$GLB,, | Performed by: INTERNAL MEDICINE

## 2023-04-06 PROCEDURE — 1101F PT FALLS ASSESS-DOCD LE1/YR: CPT | Mod: CPTII,S$GLB,, | Performed by: INTERNAL MEDICINE

## 2023-04-06 PROCEDURE — 99213 PR OFFICE/OUTPT VISIT, EST, LEVL III, 20-29 MIN: ICD-10-PCS | Mod: S$GLB,,, | Performed by: INTERNAL MEDICINE

## 2023-04-06 PROCEDURE — 3077F SYST BP >= 140 MM HG: CPT | Mod: CPTII,S$GLB,, | Performed by: INTERNAL MEDICINE

## 2023-04-06 PROCEDURE — 3077F PR MOST RECENT SYSTOLIC BLOOD PRESSURE >= 140 MM HG: ICD-10-PCS | Mod: CPTII,S$GLB,, | Performed by: INTERNAL MEDICINE

## 2023-04-06 PROCEDURE — 1160F PR REVIEW ALL MEDS BY PRESCRIBER/CLIN PHARMACIST DOCUMENTED: ICD-10-PCS | Mod: CPTII,S$GLB,, | Performed by: INTERNAL MEDICINE

## 2023-04-06 PROCEDURE — 3078F DIAST BP <80 MM HG: CPT | Mod: CPTII,S$GLB,, | Performed by: INTERNAL MEDICINE

## 2023-04-06 PROCEDURE — 99213 OFFICE O/P EST LOW 20 MIN: CPT | Mod: S$GLB,,, | Performed by: INTERNAL MEDICINE

## 2023-04-06 PROCEDURE — 1159F MED LIST DOCD IN RCRD: CPT | Mod: CPTII,S$GLB,, | Performed by: INTERNAL MEDICINE

## 2023-04-06 PROCEDURE — 1160F RVW MEDS BY RX/DR IN RCRD: CPT | Mod: CPTII,S$GLB,, | Performed by: INTERNAL MEDICINE

## 2023-04-06 PROCEDURE — 1101F PR PT FALLS ASSESS DOC 0-1 FALLS W/OUT INJ PAST YR: ICD-10-PCS | Mod: CPTII,S$GLB,, | Performed by: INTERNAL MEDICINE

## 2023-04-06 PROCEDURE — 1159F PR MEDICATION LIST DOCUMENTED IN MEDICAL RECORD: ICD-10-PCS | Mod: CPTII,S$GLB,, | Performed by: INTERNAL MEDICINE

## 2023-04-06 RX ORDER — DILTIAZEM HYDROCHLORIDE 360 MG/1
360 CAPSULE, EXTENDED RELEASE ORAL DAILY
Qty: 90 CAPSULE | Refills: 1 | Status: SHIPPED | OUTPATIENT
Start: 2023-04-06 | End: 2023-05-17

## 2023-04-06 RX ORDER — DILTIAZEM HYDROCHLORIDE 360 MG/1
360 CAPSULE, EXTENDED RELEASE ORAL DAILY
Qty: 90 CAPSULE | Refills: 1 | Status: SHIPPED | OUTPATIENT
Start: 2023-04-06 | End: 2023-04-06

## 2023-04-06 NOTE — PROGRESS NOTES
[facility]     SUBJECTIVE:     History of Present Illness:  Patient is a 82 y.o. female presents to establish care patient of . Seen by him for palpitations . Holter with no SVT and only rare PAC's and PVC's.     Primary Diagnosis:   Hypertension  DM:positive  Nonsmoker  Family history of  early heart disease: none  Heart disease: none  ROS  Since last visit 3/23:   Blood pressures at home: all over: 140 - 160's  No chest pains  No shortness of breath; no PND or orthopnea  No syncope  Palpitations: usually happens at night after taking cardura; feels her heart beat fast 3 times. Checks her pulse and around 80's. Sometimes used to feel her heart would stop.   Activity: no restrictions   Review of patient's allergies indicates:   Allergen Reactions    Penicillins Other (See Comments)    Ace inhibitors      cough    Statins-hmg-coa reductase inhibitors      Myalgias,       Past Medical History:   Diagnosis Date    Aortic atherosclerosis     CKD (chronic kidney disease), stage III     Diabetes mellitus type II     Fatty liver     High cholesterol     HLD (hyperlipidemia)     HTN (hypertension)     Hypothyroidism     Hypothyroidism     Osteopenia     Statin myopathy        Past Surgical History:   Procedure Laterality Date    BREAST BIOPSY      BREAST SURGERY      biopsy    HYSTERECTOMY      still w/ovaries    ROTATOR CUFF REPAIR Left        Family History   Problem Relation Age of Onset    Diabetes Mother     Hypertension Mother     Blindness Father     Coronary artery disease Sister 55        MI    Liver cancer Sister     Diabetes Brother     No Known Problems Brother     Diabetes Daughter     No Known Problems Son     Amblyopia Neg Hx     Cataracts Neg Hx     Glaucoma Neg Hx     Macular degeneration Neg Hx     Retinal detachment Neg Hx     Strabismus Neg Hx        Social History     Tobacco Use    Smoking status: Never    Smokeless tobacco: Never   Substance Use Topics    Alcohol use: No    Drug  use: No        Past Hospitalization:     Home meds:  Current Outpatient Medications on File Prior to Visit   Medication Sig Dispense Refill    aspirin (ECOTRIN) 81 MG EC tablet Take 81 mg by mouth once daily.      cholecalciferol, vitamin D3, (VITAMIN D3) 25 mcg (1,000 unit) capsule Take 1,000 Units by mouth once daily.      diltiaZEM (CARDIZEM CD) 240 MG 24 hr capsule Take 1 capsule (240 mg total) by mouth once daily. 90 capsule 1    ezetimibe (ZETIA) 10 mg tablet Take 1 tablet (10 mg total) by mouth once daily. 90 tablet 3    fluticasone propionate (FLONASE) 50 mcg/actuation nasal spray 2 sprays (100 mcg total) by Each Nostril route once daily. 16 mL 11    levothyroxine (SYNTHROID) 50 MCG tablet TAKE 1 TABLET EVERY DAY 90 tablet 3    losartan (COZAAR) 100 MG tablet Take 1 tablet (100 mg total) by mouth once daily. 90 tablet 3    metFORMIN (GLUCOPHAGE) 1000 MG tablet TAKE 1 TABLET TWICE DAILY 180 tablet 11    ACCU-CHEK GERTRUDE PLUS TEST STRP Strp TEST ONE TIME DAILY 100 strip 3    blood-glucose meter Misc Check daily sugars, dispense insurance covered blood sugar meter 1 each 0    DROPSAFE ALCOHOL PREP PADS PadM USE FOR SKIN CLEANING PRIOR TO FINGERSTICK AS NEEDED AS DIRECTED 3 each 3    lancets (ACCU-CHEK SOFTCLIX LANCETS) Misc CHECK  SUGAR TWICE DAILY 200 each 3    [DISCONTINUED] atenoloL (TENORMIN) 50 MG tablet Take 1 tablet (50 mg total) by mouth once daily. (Patient not taking: Reported on 4/6/2023) 90 tablet 3    [DISCONTINUED] doxazosin (CARDURA) 2 MG tablet Take 1 tablet (2 mg total) by mouth every evening. (Patient not taking: Reported on 4/6/2023) 60 tablet 4     Current Facility-Administered Medications on File Prior to Visit   Medication Dose Route Frequency Provider Last Rate Last Admin    acetaminophen tablet 650 mg  650 mg Oral Once PRN Darrell Long MD        [DISCONTINUED] albuterol inhaler 2 puff  2 puff Inhalation Q20 Min PRN Darrell Long MD           Cardiac meds:  ASA 81 mg  Diltiazem  240mg   Thyroid  Losartan 100 mg   Zetia 10 mg   metformin  OBJECTIVE:     Vital Signs (Most Recent)  Vitals:    23 1531   BP: (!) 143/77   Pulse: 76   SpO2: 96%   Weight: 62.6 kg (138 lb)           Physical Exam:  Neck: normal carotids, no bruits; normal JVP  Lungs :clear  Heart: RR, normal S1,S2, systolic murmur LSB , no gallops  Abd: no masses; no bruits;   Exts: normal DP and PT pulses bilaterally, no edema noted           LABS    CMP  Recent Labs   Lab Result Units 23  1042   Potassium mmol/L 5.0       LIPID  Recent Labs   Lab Result Units 23  1042   HDL mg/dL 50   Cholesterol mg/dL 229*   Triglycerides mg/dL 96   LDL Cholesterol mg/dL 159.8*   HDL/Cholesterol Ratio % 21.8   Total Cholesterol/HDL Ratio  4.6       ; ; GFR 57   Diagnostic Results:    EK/22: normal; nonspecific T wave changes   Holter : rare PVC ; rare PAC; short burst of atrial tachycardia for 16 beats   Chart review:    Echo: 2018: normal       ASSESSMENT/PLAN:     Hypertension: controlled on multiple regimen - needs to be better   Hyperlipidemia - intolerant to statins  Systolic murmur: probably aortic sclerosis   Palpitations still present at times but heart rates always below 100's     Plan: 1. Increase diltiazem to 360 mg   2. Continue others  3. Return 3 months       Mina Reynolds MD

## 2023-04-10 ENCOUNTER — TELEPHONE (OUTPATIENT)
Dept: ORTHOPEDICS | Facility: CLINIC | Age: 83
End: 2023-04-10
Payer: MEDICARE

## 2023-04-10 ENCOUNTER — HOSPITAL ENCOUNTER (OUTPATIENT)
Dept: RADIOLOGY | Facility: HOSPITAL | Age: 83
Discharge: HOME | End: 2023-04-10
Attending: ORTHOPAEDIC SURGERY
Payer: MEDICARE

## 2023-04-10 ENCOUNTER — OFFICE VISIT (OUTPATIENT)
Dept: ORTHOPEDICS | Facility: CLINIC | Age: 83
End: 2023-04-10
Payer: MEDICARE

## 2023-04-10 VITALS — HEIGHT: 61 IN | BODY MASS INDEX: 26.07 KG/M2

## 2023-04-10 DIAGNOSIS — M25.532 LEFT WRIST PAIN: Primary | ICD-10-CM

## 2023-04-10 DIAGNOSIS — M25.532 LEFT WRIST PAIN: ICD-10-CM

## 2023-04-10 DIAGNOSIS — M65.311 TRIGGER FINGER OF RIGHT THUMB: Primary | ICD-10-CM

## 2023-04-10 PROCEDURE — 1125F AMNT PAIN NOTED PAIN PRSNT: CPT | Mod: CPTII,S$GLB,, | Performed by: ORTHOPAEDIC SURGERY

## 2023-04-10 PROCEDURE — 73110 X-RAY EXAM OF WRIST: CPT | Mod: TC,PN,LT

## 2023-04-10 PROCEDURE — 99999 PR PBB SHADOW E&M-EST. PATIENT-LVL III: CPT | Mod: PBBFAC,,, | Performed by: ORTHOPAEDIC SURGERY

## 2023-04-10 PROCEDURE — 1159F PR MEDICATION LIST DOCUMENTED IN MEDICAL RECORD: ICD-10-PCS | Mod: CPTII,S$GLB,, | Performed by: ORTHOPAEDIC SURGERY

## 2023-04-10 PROCEDURE — 1159F MED LIST DOCD IN RCRD: CPT | Mod: CPTII,S$GLB,, | Performed by: ORTHOPAEDIC SURGERY

## 2023-04-10 PROCEDURE — 20550 PR INJECT TENDON SHEATH/LIGAMENT: ICD-10-PCS | Mod: RT,S$GLB,, | Performed by: ORTHOPAEDIC SURGERY

## 2023-04-10 PROCEDURE — 1101F PT FALLS ASSESS-DOCD LE1/YR: CPT | Mod: CPTII,S$GLB,, | Performed by: ORTHOPAEDIC SURGERY

## 2023-04-10 PROCEDURE — 3288F FALL RISK ASSESSMENT DOCD: CPT | Mod: CPTII,S$GLB,, | Performed by: ORTHOPAEDIC SURGERY

## 2023-04-10 PROCEDURE — 3288F PR FALLS RISK ASSESSMENT DOCUMENTED: ICD-10-PCS | Mod: CPTII,S$GLB,, | Performed by: ORTHOPAEDIC SURGERY

## 2023-04-10 PROCEDURE — 20550 NJX 1 TENDON SHEATH/LIGAMENT: CPT | Mod: RT,S$GLB,, | Performed by: ORTHOPAEDIC SURGERY

## 2023-04-10 PROCEDURE — 1101F PR PT FALLS ASSESS DOC 0-1 FALLS W/OUT INJ PAST YR: ICD-10-PCS | Mod: CPTII,S$GLB,, | Performed by: ORTHOPAEDIC SURGERY

## 2023-04-10 PROCEDURE — 99213 PR OFFICE/OUTPT VISIT, EST, LEVL III, 20-29 MIN: ICD-10-PCS | Mod: 25,S$GLB,, | Performed by: ORTHOPAEDIC SURGERY

## 2023-04-10 PROCEDURE — 73110 XR WRIST COMPLETE 3 VIEWS LEFT: ICD-10-PCS | Mod: 26,LT,, | Performed by: RADIOLOGY

## 2023-04-10 PROCEDURE — 1125F PR PAIN SEVERITY QUANTIFIED, PAIN PRESENT: ICD-10-PCS | Mod: CPTII,S$GLB,, | Performed by: ORTHOPAEDIC SURGERY

## 2023-04-10 PROCEDURE — 73110 X-RAY EXAM OF WRIST: CPT | Mod: 26,LT,, | Performed by: RADIOLOGY

## 2023-04-10 PROCEDURE — 99213 OFFICE O/P EST LOW 20 MIN: CPT | Mod: 25,S$GLB,, | Performed by: ORTHOPAEDIC SURGERY

## 2023-04-10 PROCEDURE — 99999 PR PBB SHADOW E&M-EST. PATIENT-LVL III: ICD-10-PCS | Mod: PBBFAC,,, | Performed by: ORTHOPAEDIC SURGERY

## 2023-04-10 RX ORDER — TRIAMCINOLONE ACETONIDE 40 MG/ML
20 INJECTION, SUSPENSION INTRA-ARTICULAR; INTRAMUSCULAR
Status: COMPLETED | OUTPATIENT
Start: 2023-04-10 | End: 2023-04-10

## 2023-04-10 RX ADMIN — TRIAMCINOLONE ACETONIDE 20 MG: 40 INJECTION, SUSPENSION INTRA-ARTICULAR; INTRAMUSCULAR at 01:04

## 2023-04-10 NOTE — PROGRESS NOTES
Subjective:      Patient ID: Oneyda Shah is a 82 y.o. female.    Chief Complaint: Pain and Swelling of the Left Wrist and Finger Pain (Right thumb trigger)      HPI  Oneyda Shah is a  82 y.o. female presenting today for bruising of the left wrist and painful locking of the right thumb.  There was not a history of trauma.  Onset of symptoms began about a week ago she may have bumped her left wrist she is not sure but no direct trauma or fall reported.      Review of patient's allergies indicates:   Allergen Reactions    Penicillins Other (See Comments)    Ace inhibitors      cough    Statins-hmg-coa reductase inhibitors      Myalgias,         Current Outpatient Medications   Medication Sig Dispense Refill    ACCU-CHEK GERTRUDE PLUS TEST STRP Strp TEST ONE TIME DAILY 100 strip 3    aspirin (ECOTRIN) 81 MG EC tablet Take 81 mg by mouth once daily.      blood-glucose meter Misc Check daily sugars, dispense insurance covered blood sugar meter 1 each 0    cholecalciferol, vitamin D3, (VITAMIN D3) 25 mcg (1,000 unit) capsule Take 1,000 Units by mouth once daily.      diltiaZEM (CARDIZEM CD) 360 MG 24 hr capsule Take 1 capsule (360 mg total) by mouth once daily. 90 capsule 1    DROPSAFE ALCOHOL PREP PADS PadM USE FOR SKIN CLEANING PRIOR TO FINGERSTICK AS NEEDED AS DIRECTED 3 each 3    ezetimibe (ZETIA) 10 mg tablet Take 1 tablet (10 mg total) by mouth once daily. 90 tablet 3    fluticasone propionate (FLONASE) 50 mcg/actuation nasal spray 2 sprays (100 mcg total) by Each Nostril route once daily. 16 mL 11    lancets (ACCU-CHEK SOFTCLIX LANCETS) Misc CHECK  SUGAR TWICE DAILY 200 each 3    levothyroxine (SYNTHROID) 50 MCG tablet TAKE 1 TABLET EVERY DAY 90 tablet 3    losartan (COZAAR) 100 MG tablet Take 1 tablet (100 mg total) by mouth once daily. 90 tablet 3    metFORMIN (GLUCOPHAGE) 1000 MG tablet TAKE 1 TABLET TWICE DAILY 180 tablet 11     Current Facility-Administered Medications   Medication Dose Route Frequency  "Provider Last Rate Last Admin    acetaminophen tablet 650 mg  650 mg Oral Once PRN Darrell Long MD           Past Medical History:   Diagnosis Date    Aortic atherosclerosis     CKD (chronic kidney disease), stage III     Diabetes mellitus type II     Fatty liver     High cholesterol     HLD (hyperlipidemia)     HTN (hypertension)     Hypothyroidism     Hypothyroidism     Osteopenia     Statin myopathy        Past Surgical History:   Procedure Laterality Date    BREAST BIOPSY      BREAST SURGERY      biopsy    HYSTERECTOMY      still w/ovaries    ROTATOR CUFF REPAIR Left        Review of Systems:  ROS    OBJECTIVE:     PHYSICAL EXAM:  Height: 5' 1" (154.9 cm)    Vitals:    04/10/23 1330 04/10/23 1331   Height: 5' 1" (1.549 m)    PainSc:   8   8   PainLoc: Wrist Finger     Well developed, well nourished female in no acute distress  Alert and oriented x 3  HEENT- Normal exam  Lungs- Clear to auscultation  Heart- Regular rate and rhythm  Abdomen- Soft nontender  Extremity exam- examination right hand there is tenderness flexor tendon sheath right thumb with mild triggering  Tinel sign negative   Left wrist has some bruising over the dorsal ulnar side of the wrist near the distal ulna range of motion wrist fingers full mild tenderness    RADIOGRAPHS:  AP lateral x-ray left wrist shows no fractures or other abnormalities  Comments: I have personally reviewed the imaging and I agree with the above radiologist's report.    ASSESSMENT/PLAN:     IMPRESSION:  1. Contusion left wrist ulnar-sided.      2. Triggering right thumb    PLAN:  Left wrist I recommended ice a brace light activities and Tylenol for pain   For the right thumb I recommended injection  After pause for time-out identified the right thumb injected flexor tendon sheath combination Kenalog 20 mg 0.5 cc xylocaine sterile technique   Tolerated the procedure well without complication  Consider surgery if symptoms persist       - We talked at length about the " anatomy and pathophysiology of   Encounter Diagnosis   Name Primary?    Trigger finger of right thumb Yes           Disclaimer: This note has been generated using voice-recognition software. There may be typographical errors that have been missed during proof-reading.

## 2023-04-18 ENCOUNTER — PATIENT MESSAGE (OUTPATIENT)
Dept: CARDIOLOGY | Facility: CLINIC | Age: 83
End: 2023-04-18
Payer: MEDICARE

## 2023-04-19 ENCOUNTER — TELEPHONE (OUTPATIENT)
Dept: CARDIOLOGY | Facility: CLINIC | Age: 83
End: 2023-04-19
Payer: MEDICARE

## 2023-04-19 NOTE — TELEPHONE ENCOUNTER
I received a message from Ms. Shah  daughter  being concern about her mom not feeling well so I advised her to go to urgent care or the emergency room and I also sent the message over to Dr. Snider

## 2023-04-27 ENCOUNTER — DOCUMENTATION ONLY (OUTPATIENT)
Dept: CARDIOLOGY | Facility: CLINIC | Age: 83
End: 2023-04-27
Payer: MEDICARE

## 2023-05-16 NOTE — PROGRESS NOTES
"Subjective:   Oneyda Shah is a 82 y.o. female who presents for AD ear fullness that has been bothering her for about 1 year. She describes a "clogged" sensation. She notes that the fullness is constant and the only think that helps is popping her ear.  She does wear hearing aids currently. Her last audiogram was in April 2021. She also has a history of ETD. She has noted a longstanding difference in hearing between the ears, with the right ear being the worse hearing ear- there is documented asymmetry on audiometric testing. MRI brain without contrast was free of retrocochlear pathology.     Past Medical History  She has a past medical history of Aortic atherosclerosis, CKD (chronic kidney disease), stage III, Diabetes mellitus type II, Fatty liver, High cholesterol, HLD (hyperlipidemia), HTN (hypertension), Hypothyroidism, Hypothyroidism, Osteopenia, and Statin myopathy.    Past Surgical History  She has a past surgical history that includes Hysterectomy; Breast biopsy; Breast surgery; and Rotator cuff repair (Left).    Family History  Her family history includes Blindness in her father; Coronary artery disease (age of onset: 55) in her sister; Diabetes in her brother, daughter, and mother; Hypertension in her mother; Liver cancer in her sister; No Known Problems in her brother and son.    Social History  She reports that she has never smoked. She has never used smokeless tobacco. She reports that she does not drink alcohol and does not use drugs.    Allergies  She is allergic to penicillins, ace inhibitors, and statins-hmg-coa reductase inhibitors.    Medications  She has a current medication list which includes the following prescription(s): accu-chek jabier plus test strp, aspirin, blood-glucose meter, cholecalciferol (vitamin d3), diltiazem, dropsafe alcohol prep pads, ezetimibe, fluticasone propionate, lancets, levothyroxine, losartan, and metformin, and the following Facility-Administered Medications: " acetaminophen.    Objective:     Constitutional:   She is oriented to person, place, and time. She appears well-developed and well-nourished. She appears alert. She is cooperative.  Non-toxic appearance. She does not have a sickly appearance. She does not appear ill. Normal speech.      Head:  Normocephalic and atraumatic. Not macrocephalic and not microcephalic. Head is without raccoon's eyes, without Felix's sign, without abrasion, without laceration, without right periorbital erythema, without left periorbital erythema and without TMJ tenderness.     Ears:    Right Ear: No lacerations. No drainage, swelling or tenderness. No foreign bodies. No mastoid tenderness. Tympanic membrane is not injected, not scarred, not perforated, not erythematous, not retracted and not bulging. No middle ear effusion. No hemotympanum.   Left Ear: No lacerations. No drainage, swelling or tenderness. No foreign bodies. No mastoid tenderness. Tympanic membrane is not injected, not scarred, not perforated, not erythematous, not retracted and not bulging.  No middle ear effusion. No hemotympanum.     Pulmonary/Chest:   Effort normal.     Psychiatric:   She has a normal mood and affect. Her speech is normal and behavior is normal.     Neurological:   She is alert and oriented to person, place, and time.   Procedure  None    Data Reviewed  WBC (K/uL)   Date Value   02/27/2023 8.82     Eosinophil % (%)   Date Value   02/27/2023 1.8     Eos # (K/uL)   Date Value   02/27/2023 0.2     Platelets (K/uL)   Date Value   02/27/2023 249     Glucose (mg/dL)   Date Value   02/27/2023 141 (H)     No results found for: IGE    Audiogram        I independently reviewed the tracings of the complete audiometric evaluation performed today.  I reviewed the audiogram with the patient as well.  Pertinent findings include moderate to profound mixed hearing loss (SNHL) in the right ear and a mild to moderate SNHL in the left ear.   Assessment:     1. Asymmetric  SNHL (sensorineural hearing loss)    2. Wears hearing aid in both ears    3. Ear fullness, right      Plan:     Asymmetric SNHL (sensorineural hearing loss)  Discussed the presence of a longstanding asymmetry-prior MRI clear of retrocochlear pathology.  She currently wears hearing aids for her sensorineural hearing loss. However Audiogram now showing mixed hearing in the right ear that was not present on prior audiograms. Will treat conservatively for eustachian tube dysfunction and follow-up in 10 weeks with audiometric testing. If no improvement will consider nasal endoscopy with possible CT temporal bone.    Wears hearing aid in both ears    Ear fullness, right  Otoscopic exam benign- no cerumen, infection or fluid noted.  Ear fullness may be a result of eustachian tube dysfunction however tympanometry WNL.

## 2023-05-17 ENCOUNTER — OFFICE VISIT (OUTPATIENT)
Dept: OTOLARYNGOLOGY | Facility: CLINIC | Age: 83
End: 2023-05-17
Payer: MEDICARE

## 2023-05-17 ENCOUNTER — CLINICAL SUPPORT (OUTPATIENT)
Dept: AUDIOLOGY | Facility: CLINIC | Age: 83
End: 2023-05-17
Payer: MEDICARE

## 2023-05-17 ENCOUNTER — OFFICE VISIT (OUTPATIENT)
Dept: CARDIOLOGY | Facility: CLINIC | Age: 83
End: 2023-05-17
Payer: MEDICARE

## 2023-05-17 VITALS
SYSTOLIC BLOOD PRESSURE: 147 MMHG | WEIGHT: 136 LBS | HEART RATE: 71 BPM | DIASTOLIC BLOOD PRESSURE: 73 MMHG | BODY MASS INDEX: 25.68 KG/M2 | HEIGHT: 61 IN | OXYGEN SATURATION: 97 %

## 2023-05-17 DIAGNOSIS — H69.93 ETD (EUSTACHIAN TUBE DYSFUNCTION), BILATERAL: Chronic | ICD-10-CM

## 2023-05-17 DIAGNOSIS — Z97.4 WEARS HEARING AID IN BOTH EARS: ICD-10-CM

## 2023-05-17 DIAGNOSIS — H90.3 ASYMMETRIC SNHL (SENSORINEURAL HEARING LOSS): Primary | ICD-10-CM

## 2023-05-17 DIAGNOSIS — H93.8X1 EAR FULLNESS, RIGHT: ICD-10-CM

## 2023-05-17 DIAGNOSIS — H90.A31 MIXED CONDUCTIVE AND SENSORINEURAL HEARING LOSS OF RIGHT EAR WITH RESTRICTED HEARING OF LEFT EAR: Primary | ICD-10-CM

## 2023-05-17 DIAGNOSIS — N18.31 STAGE 3A CHRONIC KIDNEY DISEASE: ICD-10-CM

## 2023-05-17 DIAGNOSIS — I10 PRIMARY HYPERTENSION: Primary | ICD-10-CM

## 2023-05-17 PROCEDURE — 99213 OFFICE O/P EST LOW 20 MIN: CPT | Mod: 27,PN | Performed by: INTERNAL MEDICINE

## 2023-05-17 PROCEDURE — 99999 PR PBB SHADOW E&M-EST. PATIENT-LVL III: ICD-10-PCS | Mod: PBBFAC,,, | Performed by: NURSE PRACTITIONER

## 2023-05-17 PROCEDURE — 99214 PR OFFICE/OUTPT VISIT, EST, LEVL IV, 30-39 MIN: ICD-10-PCS | Mod: ,,, | Performed by: INTERNAL MEDICINE

## 2023-05-17 PROCEDURE — 1126F AMNT PAIN NOTED NONE PRSNT: CPT | Mod: CPTII,,, | Performed by: NURSE PRACTITIONER

## 2023-05-17 PROCEDURE — 1126F PR PAIN SEVERITY QUANTIFIED, NO PAIN PRESENT: ICD-10-PCS | Mod: CPTII,,, | Performed by: NURSE PRACTITIONER

## 2023-05-17 PROCEDURE — 3288F PR FALLS RISK ASSESSMENT DOCUMENTED: ICD-10-PCS | Mod: CPTII,,, | Performed by: NURSE PRACTITIONER

## 2023-05-17 PROCEDURE — 3077F PR MOST RECENT SYSTOLIC BLOOD PRESSURE >= 140 MM HG: ICD-10-PCS | Mod: CPTII,,, | Performed by: INTERNAL MEDICINE

## 2023-05-17 PROCEDURE — 3077F SYST BP >= 140 MM HG: CPT | Mod: CPTII,,, | Performed by: INTERNAL MEDICINE

## 2023-05-17 PROCEDURE — 1159F MED LIST DOCD IN RCRD: CPT | Mod: CPTII,,, | Performed by: INTERNAL MEDICINE

## 2023-05-17 PROCEDURE — 3288F FALL RISK ASSESSMENT DOCD: CPT | Mod: CPTII,,, | Performed by: NURSE PRACTITIONER

## 2023-05-17 PROCEDURE — 99999 PR PBB SHADOW E&M-EST. PATIENT-LVL III: CPT | Mod: PBBFAC,,, | Performed by: NURSE PRACTITIONER

## 2023-05-17 PROCEDURE — 1159F PR MEDICATION LIST DOCUMENTED IN MEDICAL RECORD: ICD-10-PCS | Mod: CPTII,,, | Performed by: NURSE PRACTITIONER

## 2023-05-17 PROCEDURE — 1101F PT FALLS ASSESS-DOCD LE1/YR: CPT | Mod: CPTII,,, | Performed by: INTERNAL MEDICINE

## 2023-05-17 PROCEDURE — 1159F PR MEDICATION LIST DOCUMENTED IN MEDICAL RECORD: ICD-10-PCS | Mod: CPTII,,, | Performed by: INTERNAL MEDICINE

## 2023-05-17 PROCEDURE — 99214 OFFICE O/P EST MOD 30 MIN: CPT | Mod: ,,, | Performed by: INTERNAL MEDICINE

## 2023-05-17 PROCEDURE — 1126F PR PAIN SEVERITY QUANTIFIED, NO PAIN PRESENT: ICD-10-PCS | Mod: CPTII,,, | Performed by: INTERNAL MEDICINE

## 2023-05-17 PROCEDURE — 3078F PR MOST RECENT DIASTOLIC BLOOD PRESSURE < 80 MM HG: ICD-10-PCS | Mod: CPTII,,, | Performed by: INTERNAL MEDICINE

## 2023-05-17 PROCEDURE — 3288F FALL RISK ASSESSMENT DOCD: CPT | Mod: CPTII,,, | Performed by: INTERNAL MEDICINE

## 2023-05-17 PROCEDURE — 99214 PR OFFICE/OUTPT VISIT, EST, LEVL IV, 30-39 MIN: ICD-10-PCS | Mod: ,,, | Performed by: NURSE PRACTITIONER

## 2023-05-17 PROCEDURE — 99999 PR PBB SHADOW E&M-EST. PATIENT-LVL III: CPT | Mod: PBBFAC,,, | Performed by: INTERNAL MEDICINE

## 2023-05-17 PROCEDURE — 3288F PR FALLS RISK ASSESSMENT DOCUMENTED: ICD-10-PCS | Mod: CPTII,,, | Performed by: INTERNAL MEDICINE

## 2023-05-17 PROCEDURE — 1126F AMNT PAIN NOTED NONE PRSNT: CPT | Mod: CPTII,,, | Performed by: INTERNAL MEDICINE

## 2023-05-17 PROCEDURE — 99214 OFFICE O/P EST MOD 30 MIN: CPT | Mod: ,,, | Performed by: NURSE PRACTITIONER

## 2023-05-17 PROCEDURE — 99999 PR PBB SHADOW E&M-EST. PATIENT-LVL III: ICD-10-PCS | Mod: PBBFAC,,, | Performed by: INTERNAL MEDICINE

## 2023-05-17 PROCEDURE — 1159F MED LIST DOCD IN RCRD: CPT | Mod: CPTII,,, | Performed by: NURSE PRACTITIONER

## 2023-05-17 PROCEDURE — 3078F DIAST BP <80 MM HG: CPT | Mod: CPTII,,, | Performed by: INTERNAL MEDICINE

## 2023-05-17 PROCEDURE — 1101F PT FALLS ASSESS-DOCD LE1/YR: CPT | Mod: CPTII,,, | Performed by: NURSE PRACTITIONER

## 2023-05-17 PROCEDURE — 92567 TYMPANOMETRY: CPT | Mod: ,,,

## 2023-05-17 PROCEDURE — 92557 PR COMPREHENSIVE HEARING TEST: ICD-10-PCS | Mod: ,,,

## 2023-05-17 PROCEDURE — 99213 OFFICE O/P EST LOW 20 MIN: CPT | Performed by: NURSE PRACTITIONER

## 2023-05-17 PROCEDURE — 1101F PR PT FALLS ASSESS DOC 0-1 FALLS W/OUT INJ PAST YR: ICD-10-PCS | Mod: CPTII,,, | Performed by: NURSE PRACTITIONER

## 2023-05-17 PROCEDURE — 1101F PR PT FALLS ASSESS DOC 0-1 FALLS W/OUT INJ PAST YR: ICD-10-PCS | Mod: CPTII,,, | Performed by: INTERNAL MEDICINE

## 2023-05-17 PROCEDURE — 1160F RVW MEDS BY RX/DR IN RCRD: CPT | Mod: CPTII,,, | Performed by: INTERNAL MEDICINE

## 2023-05-17 PROCEDURE — 92557 COMPREHENSIVE HEARING TEST: CPT | Mod: ,,,

## 2023-05-17 PROCEDURE — 92567 PR TYMPA2METRY: ICD-10-PCS | Mod: ,,,

## 2023-05-17 PROCEDURE — 1160F PR REVIEW ALL MEDS BY PRESCRIBER/CLIN PHARMACIST DOCUMENTED: ICD-10-PCS | Mod: CPTII,,, | Performed by: INTERNAL MEDICINE

## 2023-05-17 RX ORDER — LABETALOL 200 MG/1
200 TABLET, FILM COATED ORAL 2 TIMES DAILY
Qty: 180 TABLET | Refills: 1 | Status: SHIPPED | OUTPATIENT
Start: 2023-05-17 | End: 2023-05-17 | Stop reason: SDUPTHER

## 2023-05-17 RX ORDER — LABETALOL 200 MG/1
200 TABLET, FILM COATED ORAL 2 TIMES DAILY
Qty: 180 TABLET | Refills: 1 | Status: SHIPPED | OUTPATIENT
Start: 2023-05-17 | End: 2023-09-19

## 2023-05-17 NOTE — PROGRESS NOTES
[facility] [address]     SUBJECTIVE:     History of Present Illness:  Patient is a 82 y.o. female presents to establish care patient of . Seen by him for palpitations . Holter with no SVT and only rare PAC's and PVC's. Complaining of bad taste bitter in taking the medications since being on cardizem    Primary Diagnosis:   Hypertension  DM:positive  Nonsmoker  Family history of  early heart disease: none  Heart disease: none  ROS  Since last visit 4/23:   Blood pressures at home: all over: 140 - 160's  Occasionally feels breast pain going to her back while lying down   No shortness of breath; no PND or orthopnea  No syncope  Palpitations: usually happens at night after taking cardura; feels her heart beat fast 3 times. Checks her pulse and around 80's. Sometimes used to feel her heart would stop.   Activity: no restrictions   Review of patient's allergies indicates:   Allergen Reactions    Penicillins Other (See Comments)    Ace inhibitors      cough    Statins-hmg-coa reductase inhibitors      Myalgias,       Past Medical History:   Diagnosis Date    Aortic atherosclerosis     CKD (chronic kidney disease), stage III     Diabetes mellitus type II     Fatty liver     High cholesterol     HLD (hyperlipidemia)     HTN (hypertension)     Hypothyroidism     Hypothyroidism     Osteopenia     Statin myopathy        Past Surgical History:   Procedure Laterality Date    BREAST BIOPSY      BREAST SURGERY      biopsy    HYSTERECTOMY      still w/ovaries    ROTATOR CUFF REPAIR Left        Family History   Problem Relation Age of Onset    Diabetes Mother     Hypertension Mother     Blindness Father     Coronary artery disease Sister 55        MI    Liver cancer Sister     Diabetes Brother     No Known Problems Brother     Diabetes Daughter     No Known Problems Son     Amblyopia Neg Hx     Cataracts Neg Hx     Glaucoma Neg Hx     Macular degeneration Neg Hx     Retinal detachment Neg Hx     Strabismus Neg Hx   "      Social History     Tobacco Use    Smoking status: Never    Smokeless tobacco: Never   Substance Use Topics    Alcohol use: No    Drug use: No        Past Hospitalization:     Home meds:  Current Outpatient Medications on File Prior to Visit   Medication Sig Dispense Refill    aspirin (ECOTRIN) 81 MG EC tablet Take 81 mg by mouth once daily.      diltiaZEM (CARDIZEM CD) 360 MG 24 hr capsule Take 1 capsule (360 mg total) by mouth once daily. 90 capsule 1    fluticasone propionate (FLONASE) 50 mcg/actuation nasal spray 2 sprays (100 mcg total) by Each Nostril route once daily. 16 mL 11    levothyroxine (SYNTHROID) 50 MCG tablet TAKE 1 TABLET EVERY DAY 90 tablet 3    losartan (COZAAR) 100 MG tablet Take 1 tablet (100 mg total) by mouth once daily. 90 tablet 3    metFORMIN (GLUCOPHAGE) 1000 MG tablet TAKE 1 TABLET TWICE DAILY 180 tablet 11    ACCU-CHEK GERTRUDE PLUS TEST STRP Strp TEST ONE TIME DAILY 100 strip 3    blood-glucose meter Misc Check daily sugars, dispense insurance covered blood sugar meter 1 each 0    cholecalciferol, vitamin D3, (VITAMIN D3) 25 mcg (1,000 unit) capsule Take 1,000 Units by mouth once daily.      DROPSAFE ALCOHOL PREP PADS PadM USE FOR SKIN CLEANING PRIOR TO FINGERSTICK AS NEEDED AS DIRECTED 3 each 3    ezetimibe (ZETIA) 10 mg tablet Take 1 tablet (10 mg total) by mouth once daily. (Patient not taking: Reported on 5/17/2023) 90 tablet 3    lancets (ACCU-CHEK SOFTCLIX LANCETS) Misc CHECK  SUGAR TWICE DAILY 200 each 3     Current Facility-Administered Medications on File Prior to Visit   Medication Dose Route Frequency Provider Last Rate Last Admin    acetaminophen tablet 650 mg  650 mg Oral Once PRN Darrell Long MD           Cardiac meds:  ASA 81 mg  Diltiazem 240mg   Thyroid  Losartan 100 mg   Zetia 10 mg   metformin  OBJECTIVE:     Vital Signs (Most Recent)  Vitals:    05/17/23 1634   BP: (!) 147/73   Pulse: 71   SpO2: 97%   Weight: 61.7 kg (136 lb)   Height: 5' 1" (1.549 m) "             Physical Exam:  Neck: normal carotids, no bruits; normal JVP  Lungs :clear  Heart: RR, normal S1,S2, systolic murmur LSB , no gallops  Abd: no masses; no bruits;   Exts: normal DP and PT pulses bilaterally, no edema noted           LABS    CMP  Recent Labs   Lab Result Units 23  1042   Potassium mmol/L 5.0       LIPID  Recent Labs   Lab Result Units 23  1042   HDL mg/dL 50   Cholesterol mg/dL 229*   Triglycerides mg/dL 96   LDL Cholesterol mg/dL 159.8*   HDL/Cholesterol Ratio % 21.8   Total Cholesterol/HDL Ratio  4.6       ; ; GFR 57   Diagnostic Results:    EK/22: normal; nonspecific T wave changes   Holter : rare PVC ; rare PAC; short burst of atrial tachycardia for 16 beats   Chart review:    Echo: 2018: normal       ASSESSMENT/PLAN:     Hypertension: controlled on multiple regimen - needs to be better   Hyperlipidemia - intolerant to statins  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 Start labetolol 200 mg BID  3. Monitor blood pressures  4.  Return 3 months       Mina Reynolds MD

## 2023-05-17 NOTE — PATIENT INSTRUCTIONS
The eustachian tube is behind the eardrum. It connects the middle ear to the back of the throat. The tube is usually closed. But it opens periodically to help make the air pressure in the middle ear the same as outside the ear. The tube also drains mucus made in the middle ear.  A blocked tube is called a eustachian tube obstruction.  Eustachian tube dysfunction (ETD) is the failure of the Eustachian tube (a passage that leads to your middle ear) to open and/or close properly.  A eustachian tube that is blocked causes pressure, pain, and loss of hearing. Sounds may be muffled. The ear may feel full.  An adult may complain of ear pain, ear fullness/ pressure in the ear, dizziness, or even trouble hearing.  The blockage often goes away on its own without treatment. In other cases, treatments may be given to help reduce swelling within the tube. These may include a nasal decongestant, antihistamine, nasal spray (prescription steroid or over-the-counter saline), or allergy treatment.  A blocked eustachian tube is usually a short-term problem.   Home care  Nasal Steroid Spray such as Flonase.  2 sprays in each nostril once daily.   Use an antihistamine of your choice such as Zyrtec, Allegra, or Claritin during the day for allergy relief or you may use Xyzal at night (it may make you sleepy)  Try swallow, yawn or 'pop' your ears to open the tubes and equalize pressure 1-2 times daily.  Do NOT do this if there is any pain or discomfort.     FLONASE INSTRUCTIONS    You have been prescribed or instructed to take a nasal steroid spray.  Examples of this medication include Flonase (fluticasone), Nasacort (triamcinolone), and Rhinocort (budesonide). Some symptoms will experience relief within a few days; however, it may take 2-3 weeks to begin to see improvement. This medication needs to be taken consistently to see results- be patient :)    Helpful hints for maximizing medication into the nose  - Use the opposite hand to  spray the nostril (example: right hand for left nostril). This will help avoid spraying the medication onto the septum (the area that divides the left and right nasal cavity.)  - Tilt the bottle so that it is facing at a slight angle up or straight back, but avoid pointing the bottle straight up while spraying.   - Gently sniff (do not snort) a few seconds after spraying.

## 2023-05-17 NOTE — PROGRESS NOTES
"Oneyda Shah was seen today in the clinic for an audiologic evaluation.  Patient's main complaint was decreased hearing, worse in the right ear than the left ear. Mrs. Shah reported a "clogged" sensation in her right ear only. She reported she wears hearing aids, bilaterally, and perceives benefit from them. Mrs. Shah denied tinnitus, otalgia, aural fullness, and true vertigo.    Tympanometry revealed Type As in the right ear and Type As in the left ear.     Audiogram results revealed a moderate to profound mixed hearing loss in the right ear and a mild to moderate sensorineural hearing loss (SNHL) in the left ear. There was a significant asymmetry noted between ears from 250-8000 Hz, with the right ear poorer than the left ear.    Speech reception thresholds were noted at 60 dBHL in the right ear and 40 dBHL in the left ear.    Speech discrimination scores were 64% in the right ear and 80% in the left ear. There was a speech discrimination asymmetry noted between ears, with the right ear poorer than the left ear.    Recommendations:  Otologic evaluation  Daily and consistent use of amplification   Hearing aid follow-up with fitting provider as needed  Annual audiogram or sooner if change perceived  Hearing protection in noise          "

## 2023-06-21 ENCOUNTER — PATIENT MESSAGE (OUTPATIENT)
Dept: ADMINISTRATIVE | Facility: HOSPITAL | Age: 83
End: 2023-06-21
Payer: MEDICARE

## 2023-06-21 RX ORDER — BLOOD SUGAR DIAGNOSTIC
STRIP MISCELLANEOUS
Qty: 100 STRIP | Refills: 3 | Status: SHIPPED | OUTPATIENT
Start: 2023-06-21

## 2023-06-21 NOTE — TELEPHONE ENCOUNTER
No care due was identified.  [facility] Embedded Care Due Messages. Reference number: [id number].   6/21/2023 2:46:19 AM CDT

## 2023-06-21 NOTE — TELEPHONE ENCOUNTER
Refill Decision Note   Virginia Pablo  is requesting a refill authorization.  Brief Assessment and Rationale for Refill:  Approve     Medication Therapy Plan:       Medication Reconciliation Completed: No   Comments:     No Care Gaps recommended.     Note composed:7:53 AM 06/21/2023

## 2023-06-23 ENCOUNTER — OFFICE VISIT (OUTPATIENT)
Dept: FAMILY MEDICINE | Facility: CLINIC | Age: 83
End: 2023-06-23
Payer: MEDICARE

## 2023-06-23 VITALS
SYSTOLIC BLOOD PRESSURE: 120 MMHG | HEART RATE: 67 BPM | BODY MASS INDEX: 25.43 KG/M2 | HEIGHT: 61 IN | TEMPERATURE: 98 F | OXYGEN SATURATION: 97 % | DIASTOLIC BLOOD PRESSURE: 70 MMHG | WEIGHT: 134.69 LBS

## 2023-06-23 DIAGNOSIS — E11.22 TYPE 2 DIABETES MELLITUS WITH STAGE 3A CHRONIC KIDNEY DISEASE, WITHOUT LONG-TERM CURRENT USE OF INSULIN: ICD-10-CM

## 2023-06-23 DIAGNOSIS — N18.31 TYPE 2 DIABETES MELLITUS WITH STAGE 3A CHRONIC KIDNEY DISEASE, WITHOUT LONG-TERM CURRENT USE OF INSULIN: ICD-10-CM

## 2023-06-23 DIAGNOSIS — R10.30 LOWER ABDOMINAL PAIN: Primary | ICD-10-CM

## 2023-06-23 DIAGNOSIS — I10 PRIMARY HYPERTENSION: ICD-10-CM

## 2023-06-23 LAB
BACTERIA #/AREA URNS AUTO: ABNORMAL /HPF
BILIRUB UR QL STRIP: NEGATIVE
CLARITY UR REFRACT.AUTO: ABNORMAL
COLOR UR AUTO: YELLOW
GLUCOSE UR QL STRIP: NEGATIVE
HGB UR QL STRIP: NEGATIVE
HYALINE CASTS UR QL AUTO: 0 /LPF
KETONES UR QL STRIP: NEGATIVE
LEUKOCYTE ESTERASE UR QL STRIP: ABNORMAL
MICROSCOPIC COMMENT: ABNORMAL
NITRITE UR QL STRIP: NEGATIVE
NON-SQ EPI CELLS #/AREA URNS AUTO: 0 /HPF
PH UR STRIP: 6 [PH] (ref 5–8)
PROT UR QL STRIP: ABNORMAL
RBC #/AREA URNS AUTO: 10 /HPF (ref 0–4)
SP GR UR STRIP: 1.01 (ref 1–1.03)
SQUAMOUS #/AREA URNS AUTO: 38 /HPF
URN SPEC COLLECT METH UR: ABNORMAL
WBC #/AREA URNS AUTO: 22 /HPF (ref 0–5)

## 2023-06-23 PROCEDURE — 99999 PR PBB SHADOW E&M-EST. PATIENT-LVL III: ICD-10-PCS | Mod: PBBFAC,,, | Performed by: FAMILY MEDICINE

## 2023-06-23 PROCEDURE — 3288F PR FALLS RISK ASSESSMENT DOCUMENTED: ICD-10-PCS | Mod: CPTII,S$GLB,, | Performed by: FAMILY MEDICINE

## 2023-06-23 PROCEDURE — 1101F PR PT FALLS ASSESS DOC 0-1 FALLS W/OUT INJ PAST YR: ICD-10-PCS | Mod: CPTII,S$GLB,, | Performed by: FAMILY MEDICINE

## 2023-06-23 PROCEDURE — 3078F DIAST BP <80 MM HG: CPT | Mod: CPTII,S$GLB,, | Performed by: FAMILY MEDICINE

## 2023-06-23 PROCEDURE — 81001 URINALYSIS AUTO W/SCOPE: CPT | Performed by: FAMILY MEDICINE

## 2023-06-23 PROCEDURE — 1101F PT FALLS ASSESS-DOCD LE1/YR: CPT | Mod: CPTII,S$GLB,, | Performed by: FAMILY MEDICINE

## 2023-06-23 PROCEDURE — 3078F PR MOST RECENT DIASTOLIC BLOOD PRESSURE < 80 MM HG: ICD-10-PCS | Mod: CPTII,S$GLB,, | Performed by: FAMILY MEDICINE

## 2023-06-23 PROCEDURE — 1126F AMNT PAIN NOTED NONE PRSNT: CPT | Mod: CPTII,S$GLB,, | Performed by: FAMILY MEDICINE

## 2023-06-23 PROCEDURE — 1159F PR MEDICATION LIST DOCUMENTED IN MEDICAL RECORD: ICD-10-PCS | Mod: CPTII,S$GLB,, | Performed by: FAMILY MEDICINE

## 2023-06-23 PROCEDURE — 87086 URINE CULTURE/COLONY COUNT: CPT | Performed by: FAMILY MEDICINE

## 2023-06-23 PROCEDURE — 99214 OFFICE O/P EST MOD 30 MIN: CPT | Mod: S$GLB,,, | Performed by: FAMILY MEDICINE

## 2023-06-23 PROCEDURE — 99214 PR OFFICE/OUTPT VISIT, EST, LEVL IV, 30-39 MIN: ICD-10-PCS | Mod: S$GLB,,, | Performed by: FAMILY MEDICINE

## 2023-06-23 PROCEDURE — 1126F PR PAIN SEVERITY QUANTIFIED, NO PAIN PRESENT: ICD-10-PCS | Mod: CPTII,S$GLB,, | Performed by: FAMILY MEDICINE

## 2023-06-23 PROCEDURE — 99999 PR PBB SHADOW E&M-EST. PATIENT-LVL III: CPT | Mod: PBBFAC,,, | Performed by: FAMILY MEDICINE

## 2023-06-23 PROCEDURE — 1159F MED LIST DOCD IN RCRD: CPT | Mod: CPTII,S$GLB,, | Performed by: FAMILY MEDICINE

## 2023-06-23 PROCEDURE — 3288F FALL RISK ASSESSMENT DOCD: CPT | Mod: CPTII,S$GLB,, | Performed by: FAMILY MEDICINE

## 2023-06-23 PROCEDURE — 3074F SYST BP LT 130 MM HG: CPT | Mod: CPTII,S$GLB,, | Performed by: FAMILY MEDICINE

## 2023-06-23 PROCEDURE — 3074F PR MOST RECENT SYSTOLIC BLOOD PRESSURE < 130 MM HG: ICD-10-PCS | Mod: CPTII,S$GLB,, | Performed by: FAMILY MEDICINE

## 2023-06-23 RX ORDER — HYOSCYAMINE SULFATE 0.12 MG/1
0.12 TABLET SUBLINGUAL 3 TIMES DAILY PRN
Qty: 30 TABLET | Refills: 0 | Status: SHIPPED | OUTPATIENT
Start: 2023-06-23 | End: 2024-03-25

## 2023-06-23 NOTE — PROGRESS NOTES
(Portions of this note were dictated using voice recognition software and may contain dictation related errors in spelling/grammar/syntax not found on text review)     Chief Complaint   Patient presents with    Abdominal Pain     Lower abdominal pain, mostly resolved; no changes to bowel habits       HPI: 81 y.o. female  last visit August 2022  Last visit 02/27/2023.  History of diabetes with CKD 3, hypertension, hyperlipidemia, fatty liver disease, hypothyroidism.  Presents for urgent care symptom of significant abdominal pain.  She describes pain for the last 4-5 days around left lower quadrant, suprapubic, right lower quadrant.  No epigastric pain.  No nausea or vomiting but she was burping.  No fevers.  No change in foods only change in medication was that she had seen cardiology last on 05/17 and was placed on labetalol, off atenolol currently (initially on diltiazem but did not tolerate, changed over to labetalol).  Did not notice the above symptoms immediately upon starting the new medication.  Thinks she may have had some dysuria but is not sure.  No radiation to the back.  No rectal bleeding, constipation, diarrhea.  She does state that she took some apple cider vinegar yesterday.  Yesterday evening and into today she does feel like her pain has gotten better.  Pain was described as a crampy pain.      Of note chart reviewed, had gone to urgent care and had seen Dr. Armstrong in August 2022 for rectal bleeding, lower abdominal pain, diarrhea.  CT showed indistinctness of the rectum the nonspecific but early changes of proctitis in differential.  Multiple scattered diverticulosis sigmoid colon, possibly early changes of diverticulitis verses motion artifact.  She is referred to GI for further evaluation, was seen there September 2022, had recommended colonoscopy although patient declined at that time given improvement of symptoms.    CT 08/19/2022 abdomen pelvis  1. There is slight indistinctness about the  rectum, finding is nonspecific however early changes of proctitis are a consideration, correlation with current symptomatology advised.  2. There are multiple scattered colonic diverticula, particularly involving the sigmoid colon.  There is slight indistinctness about a few diverticula along the sigmoid colon however may be on the basis of motion artifact, correlation is needed as early changes of diverticulitis would be difficult to definitively exclude although no wall thickening to support the same.  3. Scattered low attenuating lesions throughout the hepatic parenchyma, stable.  4. Pulmonary micro nodules as described.  For multiple solid nodules all <6 mm, Fleischner Society 2017 guidelines recommend no routine follow up for a low risk patient, or follow up with non-contrast chest CT at 12 months after discovery in a high risk patient          Hypertension on losartan 100 mg daily, labetalol 200 mg b.i.d., amlodipine 10 mg daily,. BP is elevated today on intake but rechecked at 1 20/70.  Taking all the above medications regularly.    Past Medical History:   Diagnosis Date    Anemia, unspecified     Aortic atherosclerosis     Bilateral renal cysts     CKD (chronic kidney disease), stage III     Diabetes mellitus type II     Fatty liver     High cholesterol     HLD (hyperlipidemia)     HTN (hypertension)     Hypothyroidism     Hypothyroidism     Osteopenia     Proteinuria     Statin myopathy        Past Surgical History:   Procedure Laterality Date    BREAST BIOPSY      BREAST SURGERY      biopsy    HYSTERECTOMY      still w/ovaries    ROTATOR CUFF REPAIR Left        Family History   Problem Relation Age of Onset    Diabetes Mother     Hypertension Mother     Blindness Father     Coronary artery disease Sister 55        MI    Liver cancer Sister     Diabetes Brother     No Known Problems Brother     Diabetes Daughter     No Known Problems Son     Amblyopia Neg Hx     Cataracts Neg Hx     Glaucoma Neg Hx      Macular degeneration Neg Hx     Retinal detachment Neg Hx     Strabismus Neg Hx        Social History     Socioeconomic History    Marital status:    Tobacco Use    Smoking status: Never    Smokeless tobacco: Never   Substance and Sexual Activity    Alcohol use: No    Drug use: No    Sexual activity: Yes     Partners: Male             Lab Results   Component Value Date    WBC 8.82 02/27/2023    HGB 11.6 (L) 02/27/2023    HCT 35.0 (L) 02/27/2023    MCV 89 02/27/2023     02/27/2023    CHOL 229 (H) 02/27/2023    TRIG 96 02/27/2023    HDL 50 02/27/2023    ALT 33 02/27/2023    AST 24 02/27/2023    BILITOT 0.3 02/27/2023    ALKPHOS 67 02/27/2023     02/27/2023    K 5.0 02/27/2023     02/27/2023    CREATININE 1.0 02/27/2023    ESTGFRAFRICA >60 04/27/2022    EGFRNONAA 54 (A) 04/27/2022    CALCIUM 9.7 02/27/2023    ALBUMIN 3.9 02/27/2023    BUN 15 02/27/2023    CO2 25 02/27/2023    TSH 3.003 02/27/2023    INR 1.0 01/12/2022    HGBA1C 7.1 (H) 02/27/2023    MICALBCREAT 47.3 (H) 02/27/2023    LDLCALC 159.8 (H) 02/27/2023     (H) 02/27/2023    [id number] 35 10/10/2017          Hemoglobin (g/dL)   Date Value   02/27/2023 11.6 (L)   08/19/2022 11.5 (L)   08/15/2022 10.9 (L)   04/27/2022 11.0 (L)   01/12/2022 11.7 (L)   01/11/2022 10.4 (L)   11/26/2021 11.2 (L)   07/14/2021 11.8 (L)   06/11/2021 11.8 (L)   04/15/2021 11.8 (L)     Hemoglobin A1C (%)   Date Value   02/27/2023 7.1 (H)   08/15/2022 6.4 (H)   04/27/2022 6.3 (H)   11/26/2021 6.4 (H)   04/15/2021 6.5 (H)   01/11/2021 6.6 (H)     eGFR if non African American (mL/min/1.73 m^2)   Date Value   04/27/2022 54 (A)   01/11/2022 43 (A)   12/20/2021 54 (A)   11/26/2021 48 (A)   06/11/2021 48 (A)     Potassium (mmol/L)   Date Value   02/27/2023 5.0   08/19/2022 4.2   08/15/2022 5.1   04/27/2022 5.3 (H)   01/11/2022 4.6   12/20/2021 4.8          Vitals:    06/23/23 1143   BP: 120/70   BP Location: Right arm   Patient Position: Sitting   BP Method:  "Medium (Manual)   Pulse: 67   Temp: 98 °F (36.7 °C)   TempSrc: Oral   SpO2: 97%   Weight: 61.1 kg (134 lb 11.2 oz)   Height: 5' 1" (1.549 m)         Wt Readings from Last 5 Encounters:   06/23/23 61.1 kg (134 lb 11.2 oz)   05/17/23 61.7 kg (136 lb)   04/06/23 62.6 kg (138 lb)   03/15/23 62.6 kg (138 lb)   02/27/23 64 kg (141 lb 1.5 oz)     Vital signs reviewed  PE:   APPEARANCE: Well nourished, well developed, in no acute distress.    HEAD: Normocephalic, atraumatic.  NECK: Supple with no cervical lymphadenopathy.  No carotid bruits.  No thyromegaly  CHEST: Good inspiratory effort. Lungs clear to auscultation with no wheezes or crackles.  CARDIOVASCULAR: Normal S1, S2. No rubs, murmurs, or gallops.  ABDOMEN: Bowel sounds normal. Not distended. Soft.  Minor left lower quadrant, suprapubic, right lower quadrant discomfort but no rebound or guarding..  EXTREMITIES: No edema                       IMPRESSION  1. Lower abdominal pain    2. Primary hypertension    3. Type 2 diabetes mellitus with stage 3a chronic kidney disease, without long-term current use of insulin            PLAN    Discussed differential which would include UTI, diverticulitis.  Symptoms have been improving yesterday night and into today.  Will get urinalysis and culture.  No need for urgent imaging at this time as she feels like her pain has improved somewhat since yesterday.  Will prescribe Levsin p.r.n. cramping, will wait on urine studies and treat accordingly for UTI if noted.  Discussed if pain returns, would need CT abdomen pelvis imaging.  ER precautions discussed especially if pain drastically worsens over weekend.      Hypertension, improved on recheck.  Continue current therapy    Diabetes, last A1c was mildly suboptimal.  For now can continue current medications, will reassess at a future visit           Orders Placed This Encounter   Procedures    Urine culture    Urinalysis                SCREENINGS      DEXA scan:  02/09/2021, " normal  Mammogram 12/19/16, declines rpt  Colonoscopy: 2009, Dr. Wright, normal.  Declined repeat     immunizations   Tetanus :Check with your pharmacy regarding getting the tetanus (Tdap) vaccine (once every 10 years)  PVX: 2017   Prevnar: 1/2016  Flu: declines  Zoster: utd  COVID (05/19/2021, 06/19/2021 (Pfizer), has not had booster yet but do encourage getting booster shot

## 2023-06-25 LAB — BACTERIA UR CULT: NORMAL

## 2023-06-26 ENCOUNTER — PATIENT OUTREACH (OUTPATIENT)
Dept: ADMINISTRATIVE | Facility: HOSPITAL | Age: 83
End: 2023-06-26
Payer: MEDICARE

## 2023-06-26 NOTE — PROGRESS NOTES
Care Everywhere updates requested and reviewed.  Immunizations reconciled. Media reports reviewed.  Duplicate HM overrides and  orders removed.  Overdue HM topic chart audit and/or requested.  Overdue lab testing linked to upcoming lab appointments if applies.       updated with external BP report.       Health Maintenance Due   Topic Date Due    TETANUS VACCINE  Never done    Shingles Vaccine (1 of 2) Never done    COVID-19 Vaccine (3 - Pfizer series) 2021

## 2023-07-26 ENCOUNTER — OFFICE VISIT (OUTPATIENT)
Dept: OTOLARYNGOLOGY | Facility: CLINIC | Age: 83
End: 2023-07-26
Payer: MEDICARE

## 2023-07-26 ENCOUNTER — CLINICAL SUPPORT (OUTPATIENT)
Dept: AUDIOLOGY | Facility: CLINIC | Age: 83
End: 2023-07-26
Payer: MEDICARE

## 2023-07-26 DIAGNOSIS — H91.8X3 ASYMMETRICAL HEARING LOSS: Primary | ICD-10-CM

## 2023-07-26 DIAGNOSIS — H69.93 DYSFUNCTION OF BOTH EUSTACHIAN TUBES: Primary | ICD-10-CM

## 2023-07-26 DIAGNOSIS — H90.3 ASYMMETRICAL SENSORINEURAL HEARING LOSS: ICD-10-CM

## 2023-07-26 PROCEDURE — 92557 PR COMPREHENSIVE HEARING TEST: ICD-10-PCS | Mod: S$GLB,,, | Performed by: AUDIOLOGIST

## 2023-07-26 PROCEDURE — 92557 COMPREHENSIVE HEARING TEST: CPT | Mod: S$GLB,,, | Performed by: AUDIOLOGIST

## 2023-07-26 PROCEDURE — 99214 PR OFFICE/OUTPT VISIT, EST, LEVL IV, 30-39 MIN: ICD-10-PCS | Mod: S$GLB,,, | Performed by: OTOLARYNGOLOGY

## 2023-07-26 PROCEDURE — 99214 OFFICE O/P EST MOD 30 MIN: CPT | Mod: S$GLB,,, | Performed by: OTOLARYNGOLOGY

## 2023-07-26 PROCEDURE — 92567 PR TYMPA2METRY: ICD-10-PCS | Mod: S$GLB,,, | Performed by: AUDIOLOGIST

## 2023-07-26 PROCEDURE — 1125F AMNT PAIN NOTED PAIN PRSNT: CPT | Mod: CPTII,S$GLB,, | Performed by: OTOLARYNGOLOGY

## 2023-07-26 PROCEDURE — 99999 PR PBB SHADOW E&M-EST. PATIENT-LVL II: CPT | Mod: PBBFAC,,, | Performed by: OTOLARYNGOLOGY

## 2023-07-26 PROCEDURE — 1159F MED LIST DOCD IN RCRD: CPT | Mod: CPTII,S$GLB,, | Performed by: OTOLARYNGOLOGY

## 2023-07-26 PROCEDURE — 99999 PR PBB SHADOW E&M-EST. PATIENT-LVL II: ICD-10-PCS | Mod: PBBFAC,,, | Performed by: OTOLARYNGOLOGY

## 2023-07-26 PROCEDURE — 1125F PR PAIN SEVERITY QUANTIFIED, PAIN PRESENT: ICD-10-PCS | Mod: CPTII,S$GLB,, | Performed by: OTOLARYNGOLOGY

## 2023-07-26 PROCEDURE — 92567 TYMPANOMETRY: CPT | Mod: S$GLB,,, | Performed by: AUDIOLOGIST

## 2023-07-26 PROCEDURE — 1159F PR MEDICATION LIST DOCUMENTED IN MEDICAL RECORD: ICD-10-PCS | Mod: CPTII,S$GLB,, | Performed by: OTOLARYNGOLOGY

## 2023-07-26 NOTE — PROGRESS NOTES
Ms. Oneyda Shah was seen in the clinic today for a follow-up audiological evaluation ordered by Arielle Mcnair DNP, due to a mixed component on her last audiogram.  Ms. Shah has a long-standing, previously documented history of asymmetrical hearing loss.    Audiological testing revealed a moderate to profound sensorineural hearing loss for the right ear and a mild to moderately-severe rising to moderate sensorineural hearing loss for the left ear.  A speech reception threshold was obtained at 65 dBHL for the right ear and at 45 dBHL for the left ear.  Speech discrimination scores could not be obtained due to a language barrier.  Ms. Shah spoke very limited English.      Tympanometry testing revealed a Type As tympanogram for the right ear and a Type As tympanogram for the left ear.      Recommendations:  1. Otologic evaluation  2. Annual audiological evaluation  3. Hearing protection when in noise   4. Hearing aid consultation following medical clearance

## 2023-07-26 NOTE — PROGRESS NOTES
Subjective     Patient ID: Oneyda Shah is a 82 y.o. female.    Chief Complaint: Ear Fullness and Hearing Loss    HPI: Ref S. Sanjeeva for L CHL.    Long standing Assym HL L>>R.    MRI neg.    Has HA AU.    Past Medical History: Patient has a past medical history of Anemia, unspecified, Aortic atherosclerosis, Bilateral renal cysts, CKD (chronic kidney disease), stage III, Diabetes mellitus type II, Fatty liver, High cholesterol, HLD (hyperlipidemia), HTN (hypertension), Hypothyroidism, Hypothyroidism, Osteopenia, Proteinuria, and Statin myopathy.    Past Surgical History: Patient has a past surgical history that includes Hysterectomy; Breast biopsy; Breast surgery; and Rotator cuff repair (Left).    Social History: Patient reports that she has never smoked. She has never used smokeless tobacco. She reports that she does not drink alcohol and does not use drugs.    Family History: family history includes Blindness in her father; Coronary artery disease (age of onset: 55) in her sister; Diabetes in her brother, daughter, and mother; Hypertension in her mother; Liver cancer in her sister; No Known Problems in her brother and son.    Medications:   Current Outpatient Medications   Medication Sig    ACCU-CHEK GERTRUDE PLUS TEST STRP Strp TEST ONE TIME DAILY    aspirin (ECOTRIN) 81 MG EC tablet Take 81 mg by mouth once daily.    blood-glucose meter Misc Check daily sugars, dispense insurance covered blood sugar meter    cholecalciferol, vitamin D3, (VITAMIN D3) 25 mcg (1,000 unit) capsule Take 1,000 Units by mouth once daily.    DROPSAFE ALCOHOL PREP PADS PadM USE FOR SKIN CLEANING PRIOR TO FINGERSTICK AS NEEDED AS DIRECTED    ezetimibe (ZETIA) 10 mg tablet Take 1 tablet (10 mg total) by mouth once daily.    fluticasone propionate (FLONASE) 50 mcg/actuation nasal spray 2 sprays (100 mcg total) by Each Nostril route once daily.    hyoscyamine (LEVSIN/SL) 0.125 mg Subl Place 1 tablet (0.125 mg total) under the tongue 3  (three) times daily as needed (abdominal cramping).    labetaloL (NORMODYNE) 200 MG tablet Take 1 tablet (200 mg total) by mouth 2 (two) times daily.    lancets (ACCU-CHEK SOFTCLIX LANCETS) Misc CHECK  SUGAR TWICE DAILY    levothyroxine (SYNTHROID) 50 MCG tablet TAKE 1 TABLET EVERY DAY    losartan (COZAAR) 100 MG tablet Take 1 tablet (100 mg total) by mouth once daily.    metFORMIN (GLUCOPHAGE) 1000 MG tablet TAKE 1 TABLET TWICE DAILY     Current Facility-Administered Medications   Medication    acetaminophen tablet 650 mg       Allergies: Patient is allergic to penicillins, ace inhibitors, and statins-hmg-coa reductase inhibitors.    Review of Systems   HENT:  Positive for hearing loss. Negative for ear discharge, ear pain and tinnitus.    Neurological:  Negative for dizziness, facial asymmetry, weakness, light-headedness and headaches.        Objective     Physical Exam  Constitutional:       General: She is not in acute distress.     Appearance: She is well-developed. She is not diaphoretic.   HENT:      Right Ear: External ear normal. Decreased hearing noted. No laceration, drainage, swelling or tenderness. No middle ear effusion. No foreign body. No mastoid tenderness. No hemotympanum. Tympanic membrane is not injected, scarred, perforated, erythematous, retracted or bulging. Tympanic membrane has normal mobility.      Left Ear: External ear normal. Decreased hearing noted. No laceration, drainage, swelling or tenderness.  No middle ear effusion. No foreign body. No mastoid tenderness. No hemotympanum. Tympanic membrane is not injected, scarred, perforated, erythematous, retracted or bulging. Tympanic membrane has normal mobility.   Eyes:      Extraocular Movements:      Right eye: Normal extraocular motion and no nystagmus.      Left eye: Normal extraocular motion and no nystagmus.   Neurological:      Cranial Nerves: No cranial nerve deficit.      Sensory: No sensory deficit.      Coordination: Coordination  normal.      Gait: Gait normal.              Assessment and Plan     1. Asymmetrical hearing loss    Stable AU    Con't HA's.    F/U prn         No follow-ups on file.     not examined

## 2023-08-08 NOTE — PROGRESS NOTES
[facility] Card[facility] [address]     SUBJECTIVE:     History of Present Illness:  Patient is a 82 y.o. female presents to establish care patient of . Seen by him for palpitations and hypertension. She did not bring her medications or list.  Primary Diagnosis:   Hypertension  DM:positive  Nonsmoker  Family history of  early heart disease: none  Heart disease: none  ROS  Since last visit 5/23:   Blood pressures at home: still very high   Occasionally feels breast pain going to her back while lying down   No shortness of breath; no PND or orthopnea  No syncope  Palpitations: none   Activity: no restrictions   Review of patient's allergies indicates:   Allergen Reactions    Penicillins Other (See Comments)    Ace inhibitors      cough    Statins-hmg-coa reductase inhibitors      Myalgias,       Past Medical History:   Diagnosis Date    Anemia, unspecified     Aortic atherosclerosis     Bilateral renal cysts     CKD (chronic kidney disease), stage III     Diabetes mellitus type II     Fatty liver     High cholesterol     HLD (hyperlipidemia)     HTN (hypertension)     Hypothyroidism     Hypothyroidism     Osteopenia     Proteinuria     Statin myopathy        Past Surgical History:   Procedure Laterality Date    BREAST BIOPSY      BREAST SURGERY      biopsy    HYSTERECTOMY      still w/ovaries    ROTATOR CUFF REPAIR Left        Family History   Problem Relation Age of Onset    Diabetes Mother     Hypertension Mother     Blindness Father     Coronary artery disease Sister 55        MI    Liver cancer Sister     Diabetes Brother     No Known Problems Brother     Diabetes Daughter     No Known Problems Son     Amblyopia Neg Hx     Cataracts Neg Hx     Glaucoma Neg Hx     Macular degeneration Neg Hx     Retinal detachment Neg Hx     Strabismus Neg Hx        Social History     Tobacco Use    Smoking status: Never    Smokeless tobacco: Never   Substance Use Topics    Alcohol use: No    Drug use: No        Past Hospitalization:      Home meds:  Current Outpatient Medications on File Prior to Visit   Medication Sig Dispense Refill    aspirin (ECOTRIN) 81 MG EC tablet Take 81 mg by mouth once daily.      cholecalciferol, vitamin D3, (VITAMIN D3) 25 mcg (1,000 unit) capsule Take 1,000 Units by mouth once daily.      fluticasone propionate (FLONASE) 50 mcg/actuation nasal spray 2 sprays (100 mcg total) by Each Nostril route once daily. 16 mL 11    labetaloL (NORMODYNE) 200 MG tablet Take 1 tablet (200 mg total) by mouth 2 (two) times daily. 180 tablet 1    levothyroxine (SYNTHROID) 50 MCG tablet TAKE 1 TABLET EVERY DAY 90 tablet 3    losartan (COZAAR) 100 MG tablet Take 1 tablet (100 mg total) by mouth once daily. 90 tablet 3    metFORMIN (GLUCOPHAGE) 1000 MG tablet TAKE 1 TABLET TWICE DAILY 180 tablet 0    ACCU-CHEK GERTRUDE PLUS TEST STRP Strp TEST ONE TIME DAILY 100 strip 3    blood-glucose meter Misc Check daily sugars, dispense insurance covered blood sugar meter 1 each 0    DROPSAFE ALCOHOL PREP PADS PadM USE FOR SKIN CLEANING PRIOR TO FINGERSTICK AS NEEDED AS DIRECTED 3 each 3    ezetimibe (ZETIA) 10 mg tablet Take 1 tablet (10 mg total) by mouth once daily. (Patient not taking: Reported on 8/9/2023) 90 tablet 3    hyoscyamine (LEVSIN/SL) 0.125 mg Subl Place 1 tablet (0.125 mg total) under the tongue 3 (three) times daily as needed (abdominal cramping). (Patient not taking: Reported on 8/9/2023) 30 tablet 0    lancets (ACCU-CHEK SOFTCLIX LANCETS) Misc CHECK  SUGAR TWICE DAILY 200 each 3     Current Facility-Administered Medications on File Prior to Visit   Medication Dose Route Frequency Provider Last Rate Last Admin    acetaminophen tablet 650 mg  650 mg Oral Once PRN Darrell Long MD           Cardiac meds:  ASA 81 mg  Labetolol 200 mg BID  Thyroid  Losartan 100 mg   Zetia 10 mg - does not take this   metformin  OBJECTIVE:     Vital Signs (Most Recent)  Vitals:    08/09/23 1417   BP: (!) 169/77   Pulse: 64   SpO2: 98%   Weight: 60.7  "kg (133 lb 14.9 oz)   Height: 5' 1" (1.549 m)               Physical Exam:  Neck: normal carotids, no bruits; normal JVP  Lungs :clear  Heart: RR, normal S1,S2, systolic murmur LSB , no gallops  Abd: no masses; no bruits;   Exts: normal DP and PT pulses bilaterally, no edema noted           LABS    ; ; GFR 57   Diagnostic Results:    EK/22: normal; nonspecific T wave changes   Holter : rare PVC ; rare PAC; short burst of atrial tachycardia for 16 beats   Chart review:    Echo: 2018: normal       ASSESSMENT/PLAN:     Hypertension: controlled on multiple regimen - needs to be better   Hyperlipidemia - intolerant to statins. Why did she stop the zetia? 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 Increase labetolol 200 mg TID  3. Bring all medications  4.she is going to [place], in few days and she will monitor blood pressures and send it to me     5.Return 3 months       Mina Reynolds MD        "

## 2023-08-09 ENCOUNTER — OFFICE VISIT (OUTPATIENT)
Dept: CARDIOLOGY | Facility: CLINIC | Age: 83
End: 2023-08-09
Payer: MEDICARE

## 2023-08-09 VITALS
WEIGHT: 133.94 LBS | HEIGHT: 61 IN | HEART RATE: 64 BPM | DIASTOLIC BLOOD PRESSURE: 77 MMHG | SYSTOLIC BLOOD PRESSURE: 169 MMHG | BODY MASS INDEX: 25.29 KG/M2 | OXYGEN SATURATION: 98 %

## 2023-08-09 DIAGNOSIS — N18.31 STAGE 3A CHRONIC KIDNEY DISEASE: ICD-10-CM

## 2023-08-09 DIAGNOSIS — I10 PRIMARY HYPERTENSION: Primary | ICD-10-CM

## 2023-08-09 PROCEDURE — 1126F AMNT PAIN NOTED NONE PRSNT: CPT | Mod: CPTII,S$GLB,, | Performed by: INTERNAL MEDICINE

## 2023-08-09 PROCEDURE — 1101F PT FALLS ASSESS-DOCD LE1/YR: CPT | Mod: CPTII,S$GLB,, | Performed by: INTERNAL MEDICINE

## 2023-08-09 PROCEDURE — 3078F DIAST BP <80 MM HG: CPT | Mod: CPTII,S$GLB,, | Performed by: INTERNAL MEDICINE

## 2023-08-09 PROCEDURE — 3288F FALL RISK ASSESSMENT DOCD: CPT | Mod: CPTII,S$GLB,, | Performed by: INTERNAL MEDICINE

## 2023-08-09 PROCEDURE — 99213 PR OFFICE/OUTPT VISIT, EST, LEVL III, 20-29 MIN: ICD-10-PCS | Mod: S$GLB,,, | Performed by: INTERNAL MEDICINE

## 2023-08-09 PROCEDURE — 99213 OFFICE O/P EST LOW 20 MIN: CPT | Mod: S$GLB,,, | Performed by: INTERNAL MEDICINE

## 2023-08-09 PROCEDURE — 1160F RVW MEDS BY RX/DR IN RCRD: CPT | Mod: CPTII,S$GLB,, | Performed by: INTERNAL MEDICINE

## 2023-08-09 PROCEDURE — 1159F MED LIST DOCD IN RCRD: CPT | Mod: CPTII,S$GLB,, | Performed by: INTERNAL MEDICINE

## 2023-08-09 PROCEDURE — 3077F SYST BP >= 140 MM HG: CPT | Mod: CPTII,S$GLB,, | Performed by: INTERNAL MEDICINE

## 2023-08-09 PROCEDURE — 3077F PR MOST RECENT SYSTOLIC BLOOD PRESSURE >= 140 MM HG: ICD-10-PCS | Mod: CPTII,S$GLB,, | Performed by: INTERNAL MEDICINE

## 2023-08-09 PROCEDURE — 1160F PR REVIEW ALL MEDS BY PRESCRIBER/CLIN PHARMACIST DOCUMENTED: ICD-10-PCS | Mod: CPTII,S$GLB,, | Performed by: INTERNAL MEDICINE

## 2023-08-09 PROCEDURE — 3078F PR MOST RECENT DIASTOLIC BLOOD PRESSURE < 80 MM HG: ICD-10-PCS | Mod: CPTII,S$GLB,, | Performed by: INTERNAL MEDICINE

## 2023-08-09 PROCEDURE — 1101F PR PT FALLS ASSESS DOC 0-1 FALLS W/OUT INJ PAST YR: ICD-10-PCS | Mod: CPTII,S$GLB,, | Performed by: INTERNAL MEDICINE

## 2023-08-09 PROCEDURE — 1126F PR PAIN SEVERITY QUANTIFIED, NO PAIN PRESENT: ICD-10-PCS | Mod: CPTII,S$GLB,, | Performed by: INTERNAL MEDICINE

## 2023-08-09 PROCEDURE — 3288F PR FALLS RISK ASSESSMENT DOCUMENTED: ICD-10-PCS | Mod: CPTII,S$GLB,, | Performed by: INTERNAL MEDICINE

## 2023-08-09 PROCEDURE — 99999 PR PBB SHADOW E&M-EST. PATIENT-LVL III: CPT | Mod: PBBFAC,,, | Performed by: INTERNAL MEDICINE

## 2023-08-09 PROCEDURE — 1159F PR MEDICATION LIST DOCUMENTED IN MEDICAL RECORD: ICD-10-PCS | Mod: CPTII,S$GLB,, | Performed by: INTERNAL MEDICINE

## 2023-08-09 PROCEDURE — 99999 PR PBB SHADOW E&M-EST. PATIENT-LVL III: ICD-10-PCS | Mod: PBBFAC,,, | Performed by: INTERNAL MEDICINE

## 2023-08-09 NOTE — PATIENT INSTRUCTIONS
Increase labetolol 200 mg three times a day  Continue losartan 100 mg daily  Monitor blood pressures and let me know

## 2023-08-24 ENCOUNTER — PATIENT MESSAGE (OUTPATIENT)
Dept: FAMILY MEDICINE | Facility: CLINIC | Age: 83
End: 2023-08-24
Payer: MEDICARE

## 2023-08-25 RX ORDER — AMLODIPINE BESYLATE 10 MG/1
10 TABLET ORAL DAILY
Qty: 30 TABLET | Refills: 11
Start: 2023-08-25 | End: 2023-08-28 | Stop reason: SDUPTHER

## 2023-08-28 RX ORDER — AMLODIPINE BESYLATE 10 MG/1
10 TABLET ORAL DAILY
Qty: 30 TABLET | Refills: 11 | Status: SHIPPED | OUTPATIENT
Start: 2023-08-28 | End: 2023-09-12 | Stop reason: SDUPTHER

## 2023-09-12 RX ORDER — AMLODIPINE BESYLATE 10 MG/1
10 TABLET ORAL DAILY
Qty: 30 TABLET | Refills: 11 | Status: SHIPPED | OUTPATIENT
Start: 2023-09-12 | End: 2024-09-11

## 2023-09-12 NOTE — TELEPHONE ENCOUNTER
Care Due:                  Date            Visit Type   Department     Provider  --------------------------------------------------------------------------------                                MYCHART                              FOLLOWUP/OF  [facility] FAMILY  Last Visit: 06-      FICE VISIT   [facility]       Zion Villavicencio                               -                              PRIMARY      [facility]  Next Visit: 09-      CARE ([facility])   [facility]                                                            Last  Test          Frequency    Reason                     Performed    Due Date  --------------------------------------------------------------------------------    HBA1C.......  6 months...  metFORMIN................  02- 08-    [facility] Embedded Care Due Messages. Reference number: [id number].   9/12/2023 3:05:33 PM CDT

## 2023-09-14 RX ORDER — LEVOTHYROXINE SODIUM 50 UG/1
TABLET ORAL
Qty: 90 TABLET | Refills: 1 | Status: SHIPPED | OUTPATIENT
Start: 2023-09-14

## 2023-09-14 NOTE — TELEPHONE ENCOUNTER
No care due was identified.  [facility] Embedded Care Due Messages. Reference number: [id number].   9/14/2023 3:14:50 AM CDT

## 2023-09-14 NOTE — TELEPHONE ENCOUNTER
Refill Decision Note   Virginia Pablo  is requesting a refill authorization.  Brief Assessment and Rationale for Refill:  Approve     Medication Therapy Plan:       Medication Reconciliation Completed: No   Comments:     No Care Gaps recommended.     Note composed:7:37 AM 09/14/2023

## 2023-09-20 ENCOUNTER — OFFICE VISIT (OUTPATIENT)
Dept: FAMILY MEDICINE | Facility: CLINIC | Age: 83
End: 2023-09-20
Payer: MEDICARE

## 2023-09-20 VITALS
HEIGHT: 61 IN | RESPIRATION RATE: 18 BRPM | WEIGHT: 130.94 LBS | SYSTOLIC BLOOD PRESSURE: 118 MMHG | OXYGEN SATURATION: 97 % | HEART RATE: 91 BPM | BODY MASS INDEX: 24.72 KG/M2 | DIASTOLIC BLOOD PRESSURE: 66 MMHG

## 2023-09-20 DIAGNOSIS — N18.31 TYPE 2 DIABETES MELLITUS WITH STAGE 3A CHRONIC KIDNEY DISEASE, WITHOUT LONG-TERM CURRENT USE OF INSULIN: ICD-10-CM

## 2023-09-20 DIAGNOSIS — I70.0 AORTIC ATHEROSCLEROSIS: ICD-10-CM

## 2023-09-20 DIAGNOSIS — I10 PRIMARY HYPERTENSION: Primary | ICD-10-CM

## 2023-09-20 DIAGNOSIS — E03.9 HYPOTHYROIDISM, UNSPECIFIED TYPE: ICD-10-CM

## 2023-09-20 DIAGNOSIS — E11.22 TYPE 2 DIABETES MELLITUS WITH STAGE 3A CHRONIC KIDNEY DISEASE, WITHOUT LONG-TERM CURRENT USE OF INSULIN: ICD-10-CM

## 2023-09-20 PROCEDURE — 1126F AMNT PAIN NOTED NONE PRSNT: CPT | Mod: CPTII,S$GLB,, | Performed by: FAMILY MEDICINE

## 2023-09-20 PROCEDURE — 3078F PR MOST RECENT DIASTOLIC BLOOD PRESSURE < 80 MM HG: ICD-10-PCS | Mod: CPTII,S$GLB,, | Performed by: FAMILY MEDICINE

## 2023-09-20 PROCEDURE — 3288F FALL RISK ASSESSMENT DOCD: CPT | Mod: CPTII,S$GLB,, | Performed by: FAMILY MEDICINE

## 2023-09-20 PROCEDURE — 1160F RVW MEDS BY RX/DR IN RCRD: CPT | Mod: CPTII,S$GLB,, | Performed by: FAMILY MEDICINE

## 2023-09-20 PROCEDURE — 99999 PR PBB SHADOW E&M-EST. PATIENT-LVL IV: ICD-10-PCS | Mod: PBBFAC,,, | Performed by: FAMILY MEDICINE

## 2023-09-20 PROCEDURE — 1101F PT FALLS ASSESS-DOCD LE1/YR: CPT | Mod: CPTII,S$GLB,, | Performed by: FAMILY MEDICINE

## 2023-09-20 PROCEDURE — 1160F PR REVIEW ALL MEDS BY PRESCRIBER/CLIN PHARMACIST DOCUMENTED: ICD-10-PCS | Mod: CPTII,S$GLB,, | Performed by: FAMILY MEDICINE

## 2023-09-20 PROCEDURE — 99214 PR OFFICE/OUTPT VISIT, EST, LEVL IV, 30-39 MIN: ICD-10-PCS | Mod: S$GLB,,, | Performed by: FAMILY MEDICINE

## 2023-09-20 PROCEDURE — 99214 OFFICE O/P EST MOD 30 MIN: CPT | Mod: S$GLB,,, | Performed by: FAMILY MEDICINE

## 2023-09-20 PROCEDURE — 1159F MED LIST DOCD IN RCRD: CPT | Mod: CPTII,S$GLB,, | Performed by: FAMILY MEDICINE

## 2023-09-20 PROCEDURE — 1101F PR PT FALLS ASSESS DOC 0-1 FALLS W/OUT INJ PAST YR: ICD-10-PCS | Mod: CPTII,S$GLB,, | Performed by: FAMILY MEDICINE

## 2023-09-20 PROCEDURE — 99999 PR PBB SHADOW E&M-EST. PATIENT-LVL IV: CPT | Mod: PBBFAC,,, | Performed by: FAMILY MEDICINE

## 2023-09-20 PROCEDURE — 3288F PR FALLS RISK ASSESSMENT DOCUMENTED: ICD-10-PCS | Mod: CPTII,S$GLB,, | Performed by: FAMILY MEDICINE

## 2023-09-20 PROCEDURE — 1159F PR MEDICATION LIST DOCUMENTED IN MEDICAL RECORD: ICD-10-PCS | Mod: CPTII,S$GLB,, | Performed by: FAMILY MEDICINE

## 2023-09-20 PROCEDURE — 3078F DIAST BP <80 MM HG: CPT | Mod: CPTII,S$GLB,, | Performed by: FAMILY MEDICINE

## 2023-09-20 PROCEDURE — 3074F PR MOST RECENT SYSTOLIC BLOOD PRESSURE < 130 MM HG: ICD-10-PCS | Mod: CPTII,S$GLB,, | Performed by: FAMILY MEDICINE

## 2023-09-20 PROCEDURE — 3074F SYST BP LT 130 MM HG: CPT | Mod: CPTII,S$GLB,, | Performed by: FAMILY MEDICINE

## 2023-09-20 PROCEDURE — 1126F PR PAIN SEVERITY QUANTIFIED, NO PAIN PRESENT: ICD-10-PCS | Mod: CPTII,S$GLB,, | Performed by: FAMILY MEDICINE

## 2023-09-20 NOTE — PROGRESS NOTES
(Portions of this note were dictated using voice recognition software and may contain dictation related errors in spelling/grammar/syntax not found on text review)     No chief complaint on file.      HPI:      Hypertension on losartan 100 mg daily, labetalol started in place of atenolol by cardiology and increased to 200 mg tid at cardiology visit 8/9/23 but not currently taking because she felt like it was impacting her energy level.. Was prior on  amlodipine 10 mg daily but looks to have fallen off her list, and per msg 8/24 to her dtr was reordered amlodipine 10 mg daily. Prior also was on doxazosin 2 mg daily but not currently.     Current blood pressure medications   Losartan 100 mg daily   Amlodipine 10 mg daily   Blood pressure is doing overall very well at home.  Occasional high readings but before she takes her medicines.  Blood pressure today was 118/66, rechecked at 120 4/60.  She feels much better, has more energy       Chronic health history including:     Diabetes with CKD 3 on metformin 1000 mg b.i.d. nephrology did start on Jardiance 25 mg daily     Hyperlipidemia intolerant to all statins along with Zetia     Hypothyroidism on Synthroid 50 mcg daily     Fatty liver disease    Past Medical History:   Diagnosis Date    Anemia, unspecified     Aortic atherosclerosis     Bilateral renal cysts     CKD (chronic kidney disease), stage III     Diabetes mellitus type II     Fatty liver     High cholesterol     HLD (hyperlipidemia)     HTN (hypertension)     Hypothyroidism     Hypothyroidism     Osteopenia     Proteinuria     Statin myopathy        Past Surgical History:   Procedure Laterality Date    BREAST BIOPSY      BREAST SURGERY      biopsy    HYSTERECTOMY      still w/ovaries    ROTATOR CUFF REPAIR Left        Family History   Problem Relation Age of Onset    Diabetes Mother     Hypertension Mother     Blindness Father     Coronary artery disease Sister 55        MI    Liver cancer Sister     Diabetes  Brother     No Known Problems Brother     Diabetes Daughter     No Known Problems Son     Amblyopia Neg Hx     Cataracts Neg Hx     Glaucoma Neg Hx     Macular degeneration Neg Hx     Retinal detachment Neg Hx     Strabismus Neg Hx        Social History     Socioeconomic History    Marital status:    Tobacco Use    Smoking status: Never    Smokeless tobacco: Never   Substance and Sexual Activity    Alcohol use: No    Drug use: No    Sexual activity: Yes     Partners: Male     Social Determinants of Health     Financial Resource Strain: Low Risk  (1/13/2022)    Overall Financial Resource Strain (CARDIA)     Difficulty of Paying Living Expenses: Not very hard   Food Insecurity: No Food Insecurity (1/13/2022)    Hunger Vital Sign     Worried About Running Out of Food in the Last Year: Never true     Ran Out of Food in the Last Year: Never true   Housing Stability: Low Risk  (1/13/2022)    Housing Stability Vital Sign     Unable to Pay for Housing in the Last Year: No     Number of Places Lived in the Last Year: 1     Unstable Housing in the Last Year: No             Lab Results   Component Value Date    WBC 8.82 02/27/2023    HGB 11.6 (L) 02/27/2023    HCT 35.0 (L) 02/27/2023    MCV 89 02/27/2023     02/27/2023    CHOL 229 (H) 02/27/2023    TRIG 96 02/27/2023    HDL 50 02/27/2023    ALT 33 02/27/2023    AST 24 02/27/2023    BILITOT 0.3 02/27/2023    ALKPHOS 67 02/27/2023     02/27/2023    K 5.0 02/27/2023     02/27/2023    CREATININE 1.0 02/27/2023    ESTGFRAFRICA >60 04/27/2022    EGFRNONAA 54 (A) 04/27/2022    CALCIUM 9.7 02/27/2023    ALBUMIN 3.9 02/27/2023    BUN 15 02/27/2023    CO2 25 02/27/2023    TSH 3.003 02/27/2023    INR 1.0 01/12/2022    HGBA1C 7.1 (H) 02/27/2023    MICALBCREAT 47.3 (H) 02/27/2023    LDLCALC 159.8 (H) 02/27/2023     (H) 02/27/2023    DVAOQVDS04SS 35 10/10/2017          Hemoglobin (g/dL)   Date Value   02/27/2023 11.6 (L)   08/19/2022 11.5 (L)   08/15/2022  "10.9 (L)   04/27/2022 11.0 (L)   01/12/2022 11.7 (L)   01/11/2022 10.4 (L)   11/26/2021 11.2 (L)   07/14/2021 11.8 (L)   06/11/2021 11.8 (L)   04/15/2021 11.8 (L)     Hemoglobin A1C (%)   Date Value   02/27/2023 7.1 (H)   08/15/2022 6.4 (H)   04/27/2022 6.3 (H)   11/26/2021 6.4 (H)   04/15/2021 6.5 (H)   01/11/2021 6.6 (H)     eGFR if non African American (mL/min/1.73 m^2)   Date Value   04/27/2022 54 (A)   01/11/2022 43 (A)   12/20/2021 54 (A)   11/26/2021 48 (A)   06/11/2021 48 (A)     Potassium (mmol/L)   Date Value   02/27/2023 5.0   08/19/2022 4.2   08/15/2022 5.1   04/27/2022 5.3 (H)   01/11/2022 4.6   12/20/2021 4.8          Vitals:    09/20/23 1541   BP: 118/66   BP Location: Right arm   Patient Position: Sitting   Pulse: 91   Resp: 18   SpO2: 97%   Weight: 59.4 kg (130 lb 15.3 oz)   Height: 5' 1" (1.549 m)         Wt Readings from Last 5 Encounters:   09/20/23 59.4 kg (130 lb 15.3 oz)   09/19/23 59.9 kg (132 lb)   08/09/23 60.7 kg (133 lb 14.9 oz)   06/23/23 61.1 kg (134 lb 11.2 oz)   05/17/23 61.7 kg (136 lb)     Vital signs reviewed  PE:   APPEARANCE: Well nourished, well developed, in no acute distress.    HEAD: Normocephalic, atraumatic.  EYES:     Conjunctivae noninjected.  NECK: Supple with no cervical lymphadenopathy.  No carotid bruits.  No thyromegaly  CHEST: Good inspiratory effort. Lungs clear to auscultation with no wheezes or crackles.  CARDIOVASCULAR: Normal S1, S2. No rubs, murmurs, or gallops.  ABDOMEN: Bowel sounds normal. Not distended. Soft. No tenderness or masses. No organomegaly.  EXTREMITIES: No edema   DIABETIC FOOT EXAM utd 2/2023                        IMPRESSION  1. Primary hypertension    2. Type 2 diabetes mellitus with stage 3a chronic kidney disease, without long-term current use of insulin    3. Hypothyroidism, unspecified type    4. Aortic atherosclerosis            PLAN         Type 2 Diabetes:  Controlled   Continue metformin 2 g daily and Jardiance 25 mg daily.  Her " nephrologist has labs coming up in the next 2 months.    Hypertension:  Stable.  Continue losartan 100 mg daily and amlodipine 10 mg daily.  She can stay off labetalol since she notices more energy off of it.  She states that sometimes she will have increased heart rate.  For now can monitor and stay hydrated.  prior was on atenolol but was taken off in favor labetalol.  If any persistent issues with tachycardia, could try low-dose metoprolol in addition to her current medications to see if she tolerates this better.    Hypothyroidism .  TSH stable      Aortic atherosclerosis, dyslipidemia.  Unfortunately intolerant to all statins and also Zetia.    Updated labs upcoming through Nephrology     Can revisit with her in 6 months or sooner if needed               No orders of the defined types were placed in this encounter.               SCREENINGS      DEXA scan:  02/09/2021, normal  Mammogram 12/19/16, declines rpt  Colonoscopy: 2009, Dr. Wright, normal.  Declined repeat     immunizations   Tetanus :Check with your pharmacy regarding getting the tetanus (Tdap) vaccine (once every 10 years)  PVX: 2017   Prevnar: 1/2016  Flu: declines  Zoster: utd  COVID (05/19/2021, 06/19/2021 (Pfizer), has not had booster yet but do encourage getting booster shot

## 2023-10-26 RX ORDER — METFORMIN HYDROCHLORIDE 1000 MG/1
TABLET ORAL
Qty: 180 TABLET | Refills: 0 | Status: SHIPPED | OUTPATIENT
Start: 2023-10-26 | End: 2024-03-20

## 2023-10-26 NOTE — TELEPHONE ENCOUNTER
Refill Routing Note   Medication(s) are not appropriate for processing by [facility] for the following reason(s):      Required labs outdated    [facility] action(s):  Defer Care Due:  None identified            Appointments  past 12m or future 3m with PCP    Date Provider   Last Visit   9/20/2023 Zion Villavicencio MD   Next Visit   12/8/2023 Zion Villavicencio MD   ED visits in past 90 days: 0        Note composed:10:10 AM 10/26/2023

## 2023-10-26 NOTE — TELEPHONE ENCOUNTER
No care due was identified.  [facility] Embedded Care Due Messages. Reference number: [id number].   10/26/2023 3:07:50 AM CDT

## 2023-11-06 ENCOUNTER — HOSPITAL ENCOUNTER (OUTPATIENT)
Dept: RADIOLOGY | Facility: HOSPITAL | Age: 83
Discharge: HOME | End: 2023-11-06
Attending: INTERNAL MEDICINE
Payer: MEDICARE

## 2023-11-06 DIAGNOSIS — N18.31 STAGE 3A CHRONIC KIDNEY DISEASE: ICD-10-CM

## 2023-11-06 PROCEDURE — 76770 US EXAM ABDO BACK WALL COMP: CPT | Mod: 26,,, | Performed by: INTERNAL MEDICINE

## 2023-11-06 PROCEDURE — 76770 US EXAM ABDO BACK WALL COMP: CPT | Mod: TC

## 2023-11-06 PROCEDURE — 76770 US RETROPERITONEAL COMPLETE: ICD-10-PCS | Mod: 26,,, | Performed by: INTERNAL MEDICINE

## 2023-11-12 NOTE — TELEPHONE ENCOUNTER
No care due was identified.  [facility] Embedded Care Due Messages. Reference number: [id number].   11/12/2023 2:50:58 AM CST

## 2023-11-13 RX ORDER — LANCETS
EACH MISCELLANEOUS
Qty: 200 EACH | Refills: 3 | Status: SHIPPED | OUTPATIENT
Start: 2023-11-13

## 2023-11-13 NOTE — TELEPHONE ENCOUNTER
Refill Decision Note   Oneyda Shah  is requesting a refill authorization.  Brief Assessment and Rationale for Refill:  Approve     Medication Therapy Plan:         Comments:     Note composed:3:17 AM 11/13/2023

## 2023-12-12 RX ORDER — BLOOD-GLUCOSE METER
EACH MISCELLANEOUS
Qty: 1 EACH | Refills: 0 | OUTPATIENT
Start: 2023-12-12

## 2023-12-12 RX ORDER — DEXTROSE 4 G
TABLET,CHEWABLE ORAL
Refills: 0 | OUTPATIENT
Start: 2023-12-12

## 2023-12-12 RX ORDER — ISOPROPYL ALCOHOL 70 ML/100ML
SWAB TOPICAL
Refills: 0 | OUTPATIENT
Start: 2023-12-12

## 2023-12-12 RX ORDER — LANCETS 33 GAUGE
EACH MISCELLANEOUS
Qty: 200 EACH | Refills: 0 | OUTPATIENT
Start: 2023-12-12

## 2023-12-12 NOTE — TELEPHONE ENCOUNTER
No care due was identified.  [facility] Embedded Care Due Messages. Reference number: [id number].   12/12/2023 3:31:15 PM CST

## 2023-12-12 NOTE — TELEPHONE ENCOUNTER
Refill Decision Note   Oneyda Shah  is requesting a refill authorization.  Brief Assessment and Rationale for Refill:  Quick Discontinue     Medication Therapy Plan:    Pharmacy is requesting new scripts for the following medications without required information, (sig/ frequency/qty/etc)      Medication Reconciliation Completed: No     Comments: Pharmacies have been requesting medications for patients without required information, (sig, frequency, qty, etc.). In addition, requests are sent for medication(s) pt. are currently not taking, and medications patients have never taken.    We have spoken to the pharmacies about these request types and advised their teams previously that we are unable to assess these New Script requests and require all details for these requests. This is a known issue and has been reported.     Note composed:4:47 PM 12/12/2023

## 2023-12-15 ENCOUNTER — TELEPHONE (OUTPATIENT)
Dept: OBSTETRICS AND GYNECOLOGY | Facility: CLINIC | Age: 83
End: 2023-12-15
Payer: MEDICARE

## 2023-12-15 NOTE — TELEPHONE ENCOUNTER
Spoke with patient. She has been experiencing pelvic pain for about a week. Offered pt an appointment in February with Dr. rBown, but pt said this was too far out. Pt requested a sooner appointment. Informed pt that there is an appointment on Monday with Dr. Babb. Pt agreed to appointment and verbalized an understanding. Pt had no further questions.

## 2023-12-18 ENCOUNTER — HOSPITAL ENCOUNTER (OUTPATIENT)
Dept: RADIOLOGY | Facility: HOSPITAL | Age: 83
Discharge: HOME | End: 2023-12-18
Attending: STUDENT IN AN ORGANIZED HEALTH CARE EDUCATION/TRAINING PROGRAM
Payer: MEDICARE

## 2023-12-18 ENCOUNTER — OFFICE VISIT (OUTPATIENT)
Dept: OBSTETRICS AND GYNECOLOGY | Facility: CLINIC | Age: 83
End: 2023-12-18
Payer: MEDICARE

## 2023-12-18 VITALS — SYSTOLIC BLOOD PRESSURE: 134 MMHG | BODY MASS INDEX: 24.85 KG/M2 | DIASTOLIC BLOOD PRESSURE: 79 MMHG | WEIGHT: 131.5 LBS

## 2023-12-18 DIAGNOSIS — R10.2 PELVIC PRESSURE IN FEMALE: ICD-10-CM

## 2023-12-18 DIAGNOSIS — R10.2 PELVIC PRESSURE IN FEMALE: Primary | ICD-10-CM

## 2023-12-18 PROCEDURE — 99213 OFFICE O/P EST LOW 20 MIN: CPT | Mod: S$GLB,,, | Performed by: STUDENT IN AN ORGANIZED HEALTH CARE EDUCATION/TRAINING PROGRAM

## 2023-12-18 PROCEDURE — 3078F PR MOST RECENT DIASTOLIC BLOOD PRESSURE < 80 MM HG: ICD-10-PCS | Mod: CPTII,S$GLB,, | Performed by: STUDENT IN AN ORGANIZED HEALTH CARE EDUCATION/TRAINING PROGRAM

## 2023-12-18 PROCEDURE — 76830 US PELVIS COMP WITH TRANSVAG NON-OB (XPD): ICD-10-PCS | Mod: 26,,, | Performed by: RADIOLOGY

## 2023-12-18 PROCEDURE — 76856 US EXAM PELVIC COMPLETE: CPT | Mod: 26,,, | Performed by: RADIOLOGY

## 2023-12-18 PROCEDURE — 99999 PR PBB SHADOW E&M-EST. PATIENT-LVL III: CPT | Mod: PBBFAC,,, | Performed by: STUDENT IN AN ORGANIZED HEALTH CARE EDUCATION/TRAINING PROGRAM

## 2023-12-18 PROCEDURE — 1126F PR PAIN SEVERITY QUANTIFIED, NO PAIN PRESENT: ICD-10-PCS | Mod: CPTII,S$GLB,, | Performed by: STUDENT IN AN ORGANIZED HEALTH CARE EDUCATION/TRAINING PROGRAM

## 2023-12-18 PROCEDURE — 3078F DIAST BP <80 MM HG: CPT | Mod: CPTII,S$GLB,, | Performed by: STUDENT IN AN ORGANIZED HEALTH CARE EDUCATION/TRAINING PROGRAM

## 2023-12-18 PROCEDURE — 99999 PR PBB SHADOW E&M-EST. PATIENT-LVL III: ICD-10-PCS | Mod: PBBFAC,,, | Performed by: STUDENT IN AN ORGANIZED HEALTH CARE EDUCATION/TRAINING PROGRAM

## 2023-12-18 PROCEDURE — 99213 PR OFFICE/OUTPT VISIT, EST, LEVL III, 20-29 MIN: ICD-10-PCS | Mod: S$GLB,,, | Performed by: STUDENT IN AN ORGANIZED HEALTH CARE EDUCATION/TRAINING PROGRAM

## 2023-12-18 PROCEDURE — 3075F SYST BP GE 130 - 139MM HG: CPT | Mod: CPTII,S$GLB,, | Performed by: STUDENT IN AN ORGANIZED HEALTH CARE EDUCATION/TRAINING PROGRAM

## 2023-12-18 PROCEDURE — 3075F PR MOST RECENT SYSTOLIC BLOOD PRESS GE 130-139MM HG: ICD-10-PCS | Mod: CPTII,S$GLB,, | Performed by: STUDENT IN AN ORGANIZED HEALTH CARE EDUCATION/TRAINING PROGRAM

## 2023-12-18 PROCEDURE — 76830 TRANSVAGINAL US NON-OB: CPT | Mod: 26,,, | Performed by: RADIOLOGY

## 2023-12-18 PROCEDURE — 1101F PR PT FALLS ASSESS DOC 0-1 FALLS W/OUT INJ PAST YR: ICD-10-PCS | Mod: CPTII,S$GLB,, | Performed by: STUDENT IN AN ORGANIZED HEALTH CARE EDUCATION/TRAINING PROGRAM

## 2023-12-18 PROCEDURE — 3288F PR FALLS RISK ASSESSMENT DOCUMENTED: ICD-10-PCS | Mod: CPTII,S$GLB,, | Performed by: STUDENT IN AN ORGANIZED HEALTH CARE EDUCATION/TRAINING PROGRAM

## 2023-12-18 PROCEDURE — 1126F AMNT PAIN NOTED NONE PRSNT: CPT | Mod: CPTII,S$GLB,, | Performed by: STUDENT IN AN ORGANIZED HEALTH CARE EDUCATION/TRAINING PROGRAM

## 2023-12-18 PROCEDURE — 76856 US PELVIS COMP WITH TRANSVAG NON-OB (XPD): ICD-10-PCS | Mod: 26,,, | Performed by: RADIOLOGY

## 2023-12-18 PROCEDURE — 1159F MED LIST DOCD IN RCRD: CPT | Mod: CPTII,S$GLB,, | Performed by: STUDENT IN AN ORGANIZED HEALTH CARE EDUCATION/TRAINING PROGRAM

## 2023-12-18 PROCEDURE — 76830 TRANSVAGINAL US NON-OB: CPT | Mod: TC

## 2023-12-18 PROCEDURE — 1159F PR MEDICATION LIST DOCUMENTED IN MEDICAL RECORD: ICD-10-PCS | Mod: CPTII,S$GLB,, | Performed by: STUDENT IN AN ORGANIZED HEALTH CARE EDUCATION/TRAINING PROGRAM

## 2023-12-18 PROCEDURE — 1101F PT FALLS ASSESS-DOCD LE1/YR: CPT | Mod: CPTII,S$GLB,, | Performed by: STUDENT IN AN ORGANIZED HEALTH CARE EDUCATION/TRAINING PROGRAM

## 2023-12-18 PROCEDURE — 3288F FALL RISK ASSESSMENT DOCD: CPT | Mod: CPTII,S$GLB,, | Performed by: STUDENT IN AN ORGANIZED HEALTH CARE EDUCATION/TRAINING PROGRAM

## 2023-12-18 NOTE — PROGRESS NOTES
CC: PELVIC PRESSURE (Feels pressure when she picks up something over 10lbs in pelvic area)    HPI:  Oneyda Shah is a 82 y.o. female  presents with complaint of pelvic pressure. Notices the pressure anteriorly when she picks up or pushes objects >10lbs, sometimes feels the pain in her back/rectum. No bleeding or discharge, no masses outside of the vagina. She has been doing PT for back/sciatic nerve pain, has two more sessions left. Sp hyst, ovaries in place, last US  negative/normal. Normal BM and urination.    ROS:  GENERAL: No fever, chills, fatigability or weight loss.  VULVAR: No pain, no lesions and no itching.  VAGINAL: No relaxation, no itching, no discharge, no abnormal bleeding and no lesions.  ABDOMEN: No abdominal pain. Denies nausea. Denies vomiting. No diarrhea. No constipation  BREAST: Denies pain. No lumps. No discharge.  URINARY: No incontinence, no nocturia, no frequency and no dysuria.  CARDIOVASCULAR: No chest pain. No shortness of breath. No leg cramps.  NEUROLOGICAL: No headaches. No vision changes.      Patient History:  Past Medical History:   Diagnosis Date    Anemia, unspecified     Aortic atherosclerosis     Bilateral renal cysts     CKD (chronic kidney disease), stage III     Diabetes mellitus type II     Fatty liver     High cholesterol     HLD (hyperlipidemia)     HTN (hypertension)     Hypothyroidism     Hypothyroidism     Osteopenia     Proteinuria     Statin myopathy      Past Surgical History:   Procedure Laterality Date    BREAST BIOPSY      BREAST SURGERY      biopsy    HYSTERECTOMY      still w/ovaries    ROTATOR CUFF REPAIR Left      Social History     Tobacco Use    Smoking status: Never    Smokeless tobacco: Never   Substance Use Topics    Alcohol use: No    Drug use: No     Family History   Problem Relation Age of Onset    Diabetes Mother     Hypertension Mother     Blindness Father     Coronary artery disease Sister 55        MI    Liver cancer Sister      Diabetes Brother     No Known Problems Brother     Diabetes Daughter     No Known Problems Son     Amblyopia Neg Hx     Cataracts Neg Hx     Glaucoma Neg Hx     Macular degeneration Neg Hx     Retinal detachment Neg Hx     Strabismus Neg Hx      OB History    Para Term  AB Living   9 8     1 8   SAB IAB Ectopic Multiple Live Births   1       8      # Outcome Date GA Lbr Alberto/2nd Weight Sex Delivery Anes PTL Lv   9 SAB            8 Para      Vag-Spont   JO   7 Para      Vag-Spont   JO   6 Para      Vag-Spont   JO   5 Para      Vag-Spont   OJ   4 Para      Vag-Spont   JO   3 Para      Vag-Spont   JO   2 Para      Vag-Spont   JO   1 Para      Vag-Spont   JO       Objective:   /79   Wt 59.6 kg (131 lb 8.1 oz)   BMI 24.85 kg/m²   No LMP recorded. Patient has had a hysterectomy.      PHYSICAL EXAM:  APPEARANCE: Well nourished, well developed, in no acute distress.  AFFECT: WNL, alert and oriented x 3  SKIN: No acne or hirsutism  NECK: Neck symmetric without masses or thyromegaly  CHEST: Good respiratory effect  ABDOMEN: Soft.  No tenderness or masses.  No hepatosplenomegaly.  No hernias.  PELVIC: Normal external genitalia without lesions.  Normal hair distribution.  Adequate perineal body, normal urethral meatus.  Vagina atrophic without lesions or discharge.  Cervix absent.  No significant cystocele or rectocele with valsalva.  Vaginal cuff well healed.  EXTREMITIES: No edema.      ASSESSMENT and PLAN:    ICD-10-CM ICD-9-CM    1. Pelvic pressure in female  R10.2 625.8 US Pelvis Comp with Transvag NON-OB (xpd          Pelvic pressure   - no obvious prolapse on exam today, no masses  - discussed US to ensure no masses in pelvis, patient desire to complete  - can consider pelvic floor PT if pressure persistent/worsening  - all questions answered      Follow up: as needed if symptoms worsen       Machelle Babb MD  OBGYN [facility]

## 2024-01-31 ENCOUNTER — PATIENT MESSAGE (OUTPATIENT)
Dept: OBSTETRICS AND GYNECOLOGY | Facility: CLINIC | Age: 84
End: 2024-01-31
Payer: MEDICARE

## 2024-02-06 ENCOUNTER — OFFICE VISIT (OUTPATIENT)
Dept: FAMILY MEDICINE | Facility: CLINIC | Age: 84
End: 2024-02-06
Attending: FAMILY MEDICINE
Payer: MEDICARE

## 2024-02-06 VITALS
BODY MASS INDEX: 25.18 KG/M2 | SYSTOLIC BLOOD PRESSURE: 134 MMHG | WEIGHT: 133.38 LBS | HEIGHT: 61 IN | OXYGEN SATURATION: 98 % | DIASTOLIC BLOOD PRESSURE: 66 MMHG | HEART RATE: 82 BPM

## 2024-02-06 DIAGNOSIS — M25.849 CYST OF JOINT OF HAND, UNSPECIFIED LATERALITY: Primary | ICD-10-CM

## 2024-02-06 PROCEDURE — 1160F RVW MEDS BY RX/DR IN RCRD: CPT | Mod: CPTII,S$GLB,, | Performed by: FAMILY MEDICINE

## 2024-02-06 PROCEDURE — 3078F DIAST BP <80 MM HG: CPT | Mod: CPTII,S$GLB,, | Performed by: FAMILY MEDICINE

## 2024-02-06 PROCEDURE — 1159F MED LIST DOCD IN RCRD: CPT | Mod: CPTII,S$GLB,, | Performed by: FAMILY MEDICINE

## 2024-02-06 PROCEDURE — 99214 OFFICE O/P EST MOD 30 MIN: CPT | Mod: S$GLB,,, | Performed by: FAMILY MEDICINE

## 2024-02-06 PROCEDURE — 99999 PR PBB SHADOW E&M-EST. PATIENT-LVL IV: CPT | Mod: PBBFAC,,, | Performed by: FAMILY MEDICINE

## 2024-02-06 PROCEDURE — 1126F AMNT PAIN NOTED NONE PRSNT: CPT | Mod: CPTII,S$GLB,, | Performed by: FAMILY MEDICINE

## 2024-02-06 PROCEDURE — 3075F SYST BP GE 130 - 139MM HG: CPT | Mod: CPTII,S$GLB,, | Performed by: FAMILY MEDICINE

## 2024-02-07 ENCOUNTER — OFFICE VISIT (OUTPATIENT)
Dept: ORTHOPEDICS | Facility: CLINIC | Age: 84
End: 2024-02-07
Payer: MEDICARE

## 2024-02-07 VITALS — HEIGHT: 61 IN | WEIGHT: 133 LBS | BODY MASS INDEX: 25.11 KG/M2

## 2024-02-07 DIAGNOSIS — M72.0 DUPUYTREN'S CONTRACTURE OF BOTH HANDS: Primary | ICD-10-CM

## 2024-02-07 PROCEDURE — 1160F RVW MEDS BY RX/DR IN RCRD: CPT | Mod: CPTII,S$GLB,, | Performed by: PHYSICIAN ASSISTANT

## 2024-02-07 PROCEDURE — 99213 OFFICE O/P EST LOW 20 MIN: CPT | Mod: S$GLB,,, | Performed by: PHYSICIAN ASSISTANT

## 2024-02-07 PROCEDURE — 1125F AMNT PAIN NOTED PAIN PRSNT: CPT | Mod: CPTII,S$GLB,, | Performed by: PHYSICIAN ASSISTANT

## 2024-02-07 PROCEDURE — 99999 PR PBB SHADOW E&M-EST. PATIENT-LVL III: CPT | Mod: PBBFAC,,, | Performed by: PHYSICIAN ASSISTANT

## 2024-02-07 PROCEDURE — 1101F PT FALLS ASSESS-DOCD LE1/YR: CPT | Mod: CPTII,S$GLB,, | Performed by: PHYSICIAN ASSISTANT

## 2024-02-07 PROCEDURE — 1159F MED LIST DOCD IN RCRD: CPT | Mod: CPTII,S$GLB,, | Performed by: PHYSICIAN ASSISTANT

## 2024-02-07 PROCEDURE — 3288F FALL RISK ASSESSMENT DOCD: CPT | Mod: CPTII,S$GLB,, | Performed by: PHYSICIAN ASSISTANT

## 2024-02-07 NOTE — PROGRESS NOTES
SUBJECTIVE:     Chief Complaint & History of Present Illness:  Oneyda Shah is an 84yo F who present with c/o bilateral hand nodules.  The nodules have been slow growing over the past year.  They are nontender to touch with no overlying skin redness.  She has not having any difficulties with digit range of motion.  She does note thumb joint pain bilaterally, but states she has been treated previously for arthritis changes to these joints.  She denies sensation changes throughout and/or any additional injuries.      Review of patient's allergies indicates:   Allergen Reactions    Penicillins Other (See Comments)    Ace inhibitors      cough    Statins-hmg-coa reductase inhibitors      Myalgias,         Current Outpatient Medications   Medication Sig Dispense Refill    ACCU-CHEK GERTRUDE PLUS TEST STRP Strp TEST ONE TIME DAILY 100 strip 3    amLODIPine (NORVASC) 10 MG tablet Take 1 tablet (10 mg total) by mouth once daily. 30 tablet 11    aspirin (ECOTRIN) 81 MG EC tablet Take 81 mg by mouth once daily.      b complex vitamins tablet Take 1 tablet by mouth once daily.      blood-glucose meter Misc Check daily sugars, dispense insurance covered blood sugar meter 1 each 0    cholecalciferol, vitamin D3, (VITAMIN D3) 25 mcg (1,000 unit) capsule Take 1,000 Units by mouth once daily.      DROPSAFE ALCOHOL PREP PADS PadM USE FOR SKIN CLEANING PRIOR TO FINGERSTICK AS NEEDED AS DIRECTED 3 each 3    empagliflozin (JARDIANCE) 25 mg tablet Take 1 tablet (25 mg total) by mouth once daily. 90 tablet 3    fluticasone propionate (FLONASE) 50 mcg/actuation nasal spray 2 sprays (100 mcg total) by Each Nostril route once daily. 16 mL 11    hyoscyamine (LEVSIN/SL) 0.125 mg Subl Place 1 tablet (0.125 mg total) under the tongue 3 (three) times daily as needed (abdominal cramping). 30 tablet 0    lancets (ACCU-CHEK SOFTCLIX LANCETS) Misc CHECK SUGAR TWO TIMES DAILY 200 each 3    levothyroxine (SYNTHROID) 50 MCG tablet TAKE 1 TABLET  EVERY DAY 90 tablet 1    losartan (COZAAR) 100 MG tablet Take 1 tablet (100 mg total) by mouth once daily. 90 tablet 3    metFORMIN (GLUCOPHAGE) 1000 MG tablet TAKE 1 TABLET TWICE DAILY 180 tablet 0    omega-3 fatty acids/fish oil (FISH OIL-OMEGA-3 FATTY ACIDS) 300-1,000 mg capsule Take 1 capsule by mouth once daily.       Current Facility-Administered Medications   Medication Dose Route Frequency Provider Last Rate Last Admin    acetaminophen tablet 650 mg  650 mg Oral Once PRN Darrell Long MD           Past Medical History:   Diagnosis Date    Anemia, unspecified     Aortic atherosclerosis     Bilateral renal cysts     CKD (chronic kidney disease), stage III     Diabetes mellitus type II     Fatty liver     High cholesterol     HLD (hyperlipidemia)     HTN (hypertension)     Hypothyroidism     Hypothyroidism     Osteopenia     Proteinuria     Statin myopathy        Past Surgical History:   Procedure Laterality Date    BREAST BIOPSY      BREAST SURGERY      biopsy    HYSTERECTOMY      still w/ovaries    ROTATOR CUFF REPAIR Left        Vital Signs (Most Recent)  There were no vitals filed for this visit.    Review of Systems:  Review of Systems   Constitutional:  Negative for chills and fever.   Respiratory:  Negative for shortness of breath.    Cardiovascular:  Negative for chest pain and leg swelling.   Gastrointestinal:  Negative for nausea and vomiting.   Genitourinary:  Negative for dysuria.   Musculoskeletal:  Positive for joint pain. Negative for falls.   Skin:  Negative for rash.   Neurological:  Negative for dizziness, tingling, sensory change and focal weakness.     OBJECTIVE:     PHYSICAL EXAM:       Ortho/SPM Exam  General  General: alert & oriented x 3, NAD, sitting comfortably in the exam room  Resp: breathing unlabored  GI: abdomen soft and nondistended  Psych: mood and affect appropriate  HEENT: PERRLA    Examination Bilateral HAND:   Skin: No evidence of lacerations, lesions, open wounds or  visible deformity.  Swelling: (+) mild palpable cording in nodules to multiple flexor tendons of bilateral hands.  No skin puckering or forced digit flexion.  Palpation:   NTTP to cording/nodules  Otherwise, NTTP to bony prominences and soft tissues throughout.   ROM (active and passive):  Full to WRIST flex/ext/radial and ulnar deviation.  Full to DIGIT MCP/PIP/DIP digits without pain or difficulty   Sensation: grossly intact to radial/median/ulnar nerve distributions  Special Testing: Thumb CMC grind (positive)  NV: Pulses palpable. Cap refill brisk      ASSESSMENT/PLAN:     1. Dupuytren's contracture of both hands           We discussed the anatomy and pathophysiology of the above diagnosis.  This is essentially a benign overgrowth of tissue along the flexor tendons.  We reviewed conservative treatment and observation.    Activity as tolerated    Discussion:  If contractures progress to the point of digits contraction and flexion, we can certainly review surgical intervention.  Otherwise, no specific intervention is warranted.    Follow Up: PRN    We also reviewed that if her hand arthritis pain returns, she can certainly returned to the clinic for workup will we will obtain hand x-rays and provide pain relief treatment options.

## 2024-02-19 DIAGNOSIS — I10 ESSENTIAL HYPERTENSION: ICD-10-CM

## 2024-02-19 RX ORDER — LOSARTAN POTASSIUM 100 MG/1
100 TABLET ORAL
Qty: 90 TABLET | Refills: 3 | OUTPATIENT
Start: 2024-02-19

## 2024-02-19 RX ORDER — LOSARTAN POTASSIUM 100 MG/1
100 TABLET ORAL DAILY
Qty: 90 TABLET | Refills: 3 | Status: SHIPPED | OUTPATIENT
Start: 2024-02-19

## 2024-02-19 NOTE — TELEPHONE ENCOUNTER
No care due was identified.  [facility] Embedded Care Due Messages. Reference number: [id number].   2/19/2024 3:31:15 PM CST

## 2024-02-19 NOTE — TELEPHONE ENCOUNTER
Care Due:                  Date            Visit Type   Department     Provider  --------------------------------------------------------------------------------                                MYCHART                              FOLLOWUP/OF  [facility] FAMILY    Daquan Elleronesimo  Last Visit: 02-      FICE VISIT   MEDICINE       Nathaniel STOCKTON                              ANNUAL                              CHECKUP/PHY  [facility]  Next Visit: 03-      S            MEDICINE       Zion S  Saud                                                            Last  Test          Frequency    Reason                     Performed    Due Date  --------------------------------------------------------------------------------    HBA1C.......  6 months...  metFORMIN................  11- 05-    [facility] Embedded Care Due Messages. Reference number: [id number].   2/19/2024 2:56:44 PM CST

## 2024-02-20 NOTE — TELEPHONE ENCOUNTER
Refill Decision Note   Oneyda Shah  is requesting a refill authorization.  Brief Assessment and Rationale for Refill:  Quick Discontinue     Medication Therapy Plan:  Receipt confirmed by pharmacy (2/19/2024  4:20 PM CST)      Comments:     Note composed:6:58 PM 02/19/2024

## 2024-03-13 DIAGNOSIS — Z78.0 MENOPAUSE: ICD-10-CM

## 2024-03-20 RX ORDER — METFORMIN HYDROCHLORIDE 1000 MG/1
TABLET ORAL
Qty: 180 TABLET | Refills: 3 | Status: SHIPPED | OUTPATIENT
Start: 2024-03-20

## 2024-03-20 NOTE — TELEPHONE ENCOUNTER
Refill Routing Note   Medication(s) are not appropriate for processing by [facility] for the following reason(s):        Drug-disease interaction    [facility] action(s):  Defer        Medication Therapy Plan: metFORMIN and CKD (chronic kidney disease), stage III    Pharmacist review requested: Yes     Appointments  past 12m or future 3m with PCP    Date Provider   Last Visit   9/20/2023 Zion Villavicencio MD   Next Visit   3/25/2024 Zion Villavicencio MD   ED visits in past 90 days: 0        Note composed:5:59 AM 03/20/2024

## 2024-03-20 NOTE — TELEPHONE ENCOUNTER
No care due was identified.  [facility] Embedded Care Due Messages. Reference number: [id number].   3/20/2024 1:41:18 AM CDT

## 2024-03-25 ENCOUNTER — OFFICE VISIT (OUTPATIENT)
Dept: FAMILY MEDICINE | Facility: CLINIC | Age: 84
End: 2024-03-25
Payer: MEDICARE

## 2024-03-25 VITALS
OXYGEN SATURATION: 98 % | WEIGHT: 133.38 LBS | HEART RATE: 78 BPM | SYSTOLIC BLOOD PRESSURE: 130 MMHG | DIASTOLIC BLOOD PRESSURE: 66 MMHG | HEIGHT: 61 IN | BODY MASS INDEX: 25.18 KG/M2 | TEMPERATURE: 99 F

## 2024-03-25 DIAGNOSIS — K76.0 FATTY LIVER: ICD-10-CM

## 2024-03-25 DIAGNOSIS — E03.9 HYPOTHYROIDISM, UNSPECIFIED TYPE: ICD-10-CM

## 2024-03-25 DIAGNOSIS — N18.31 TYPE 2 DIABETES MELLITUS WITH STAGE 3A CHRONIC KIDNEY DISEASE, WITHOUT LONG-TERM CURRENT USE OF INSULIN: ICD-10-CM

## 2024-03-25 DIAGNOSIS — I70.0 AORTIC ATHEROSCLEROSIS: ICD-10-CM

## 2024-03-25 DIAGNOSIS — N25.81 SECONDARY HYPERPARATHYROIDISM OF RENAL ORIGIN: ICD-10-CM

## 2024-03-25 DIAGNOSIS — I10 HYPERTENSION, UNSPECIFIED TYPE: ICD-10-CM

## 2024-03-25 DIAGNOSIS — G72.0 STATIN MYOPATHY: ICD-10-CM

## 2024-03-25 DIAGNOSIS — M46.1 SI (SACROILIAC) JOINT INFLAMMATION: ICD-10-CM

## 2024-03-25 DIAGNOSIS — I10 PRIMARY HYPERTENSION: ICD-10-CM

## 2024-03-25 DIAGNOSIS — Z00.00 ROUTINE GENERAL MEDICAL EXAMINATION AT A HEALTH CARE FACILITY: Primary | ICD-10-CM

## 2024-03-25 DIAGNOSIS — T46.6X5A STATIN MYOPATHY: ICD-10-CM

## 2024-03-25 DIAGNOSIS — E11.22 TYPE 2 DIABETES MELLITUS WITH STAGE 3A CHRONIC KIDNEY DISEASE, WITHOUT LONG-TERM CURRENT USE OF INSULIN: ICD-10-CM

## 2024-03-25 PROCEDURE — 99397 PER PM REEVAL EST PAT 65+ YR: CPT | Mod: S$GLB,,, | Performed by: FAMILY MEDICINE

## 2024-03-25 PROCEDURE — 1125F AMNT PAIN NOTED PAIN PRSNT: CPT | Mod: CPTII,S$GLB,, | Performed by: FAMILY MEDICINE

## 2024-03-25 PROCEDURE — 99999 PR PBB SHADOW E&M-EST. PATIENT-LVL IV: CPT | Mod: PBBFAC,,, | Performed by: FAMILY MEDICINE

## 2024-03-25 PROCEDURE — 3075F SYST BP GE 130 - 139MM HG: CPT | Mod: CPTII,S$GLB,, | Performed by: FAMILY MEDICINE

## 2024-03-25 PROCEDURE — 3078F DIAST BP <80 MM HG: CPT | Mod: CPTII,S$GLB,, | Performed by: FAMILY MEDICINE

## 2024-03-25 PROCEDURE — 1159F MED LIST DOCD IN RCRD: CPT | Mod: CPTII,S$GLB,, | Performed by: FAMILY MEDICINE

## 2024-03-25 PROCEDURE — 1160F RVW MEDS BY RX/DR IN RCRD: CPT | Mod: CPTII,S$GLB,, | Performed by: FAMILY MEDICINE

## 2024-03-25 NOTE — PATIENT INSTRUCTIONS
Check with your pharmacy regarding getting the tetanus (Tdap) vaccine (once every 10 years)    Covid booster: updated formula omicron booster effective Sept 2023:  [facility] users can check availability and schedule their vaccinations via [facility]. If you don't have a [facility] account, you can sign up at [email or web address], call [phone] or visit [email or web address] for available locations and times

## 2024-03-25 NOTE — PROGRESS NOTES
(Portions of this note were dictated using voice recognition software and may contain dictation related errors in spelling/grammar/syntax not found on text review)     Chief Complaint   Patient presents with    Annual Exam       HPI:  Last visit September 2023     Hypertension    Current blood pressure medications   Losartan 100 mg daily   Amlodipine 10 mg daily   Blood pressure is doing overall very well at home.  Occasional high readings but before she takes her medicines.  Blood pressure today was 118/66, rechecked at 120 4/60.  She feels much better, has more energy     Diabetes with CKD 3 on metformin 1000 mg b.i.d. ,nephrology did start on Jardiance 25 mg daily (although she states that she has not currently taking the Jardiance, not sure why but she thinks she is because she read something about it lowering blood pressure too much)    Hyperlipidemia intolerant to all statins along with Zetia     Hypothyroidism on Synthroid 50 mcg daily     Fatty liver disease    Past Medical History:   Diagnosis Date    Anemia, unspecified     Aortic atherosclerosis     Bilateral renal cysts     CKD (chronic kidney disease), stage III     Diabetes mellitus type II     Fatty liver     High cholesterol     HLD (hyperlipidemia)     HTN (hypertension)     Hypothyroidism     Hypothyroidism     Osteopenia     Proteinuria     Statin myopathy        Past Surgical History:   Procedure Laterality Date    BREAST BIOPSY      BREAST SURGERY      biopsy    HYSTERECTOMY      still w/ovaries    ROTATOR CUFF REPAIR Left        Family History   Problem Relation Age of Onset    Diabetes Mother     Hypertension Mother     Blindness Father     Coronary artery disease Sister 55        MI    Liver cancer Sister     Diabetes Brother     No Known Problems Brother     Diabetes Daughter     No Known Problems Son     Amblyopia Neg Hx     Cataracts Neg Hx     Glaucoma Neg Hx     Macular degeneration Neg Hx     Retinal detachment Neg Hx     Strabismus Neg  Hx        Social History     Socioeconomic History    Marital status:    Tobacco Use    Smoking status: Never    Smokeless tobacco: Never   Substance and Sexual Activity    Alcohol use: No    Drug use: No    Sexual activity: Yes     Partners: Male     Social Determinants of Health     Financial Resource Strain: Low Risk  (1/13/2022)    Overall Financial Resource Strain (CARDIA)     Difficulty of Paying Living Expenses: Not very hard   Food Insecurity: No Food Insecurity (1/13/2022)    Hunger Vital Sign     Worried About Running Out of Food in the Last Year: Never true     Ran Out of Food in the Last Year: Never true   Housing Stability: Low Risk  (1/13/2022)    Housing Stability Vital Sign     Unable to Pay for Housing in the Last Year: No     Number of Places Lived in the Last Year: 1     Unstable Housing in the Last Year: No             Lab Results   Component Value Date    WBC 8.06 11/06/2023    HGB 11.8 (L) 11/06/2023    HCT 35.9 (L) 11/06/2023    MCV 90 11/06/2023     11/06/2023    CHOL 229 (H) 02/27/2023    TRIG 96 02/27/2023    HDL 50 02/27/2023    ALT 33 02/27/2023    AST 24 02/27/2023    BILITOT 0.3 02/27/2023    ALKPHOS 67 02/27/2023     11/06/2023    K 4.8 11/06/2023     11/06/2023    CREATININE 1.2 11/06/2023    ESTGFRAFRICA >60 04/27/2022    EGFRNONAA 54 (A) 04/27/2022    EGFRNORACEVR 46 (A) 11/06/2023    CALCIUM 10.2 11/06/2023    ALBUMIN 4.1 11/06/2023    BUN 17 11/06/2023    CO2 27 11/06/2023    TSH 3.003 02/27/2023    INR 1.0 01/12/2022    HGBA1C 6.9 (H) 11/06/2023    MICALBCREAT 227.9 (H) 11/06/2023    LDLCALC 159.8 (H) 02/27/2023     (H) 11/06/2023    UCUVLDBH42GQ 34 11/06/2023                Hemoglobin (g/dL)   Date Value   11/06/2023 11.8 (L)   02/27/2023 11.6 (L)   08/19/2022 11.5 (L)   08/15/2022 10.9 (L)   04/27/2022 11.0 (L)   01/12/2022 11.7 (L)   01/11/2022 10.4 (L)   11/26/2021 11.2 (L)   07/14/2021 11.8 (L)   06/11/2021 11.8 (L)     Hemoglobin A1C (%)  "  Date Value   11/06/2023 6.9 (H)   02/27/2023 7.1 (H)   08/15/2022 6.4 (H)   04/27/2022 6.3 (H)   11/26/2021 6.4 (H)   04/15/2021 6.5 (H)     eGFR if non African American (mL/min/1.73 m^2)   Date Value   04/27/2022 54 (A)   01/11/2022 43 (A)   12/20/2021 54 (A)   11/26/2021 48 (A)   06/11/2021 48 (A)     Potassium (mmol/L)   Date Value   11/06/2023 4.8   02/27/2023 5.0   08/19/2022 4.2   08/15/2022 5.1   04/27/2022 5.3 (H)   01/11/2022 4.6          Vitals:    03/25/24 1504   BP: 130/66   BP Location: Left arm   Patient Position: Sitting   BP Method: Medium (Manual)   Pulse: 78   Temp: 98.6 °F (37 °C)   TempSrc: Oral   SpO2: 98%   Weight: 60.5 kg (133 lb 6.1 oz)   Height: 5' 1" (1.549 m)           Wt Readings from Last 5 Encounters:   03/25/24 60.5 kg (133 lb 6.1 oz)   02/07/24 60.3 kg (133 lb)   02/06/24 60.5 kg (133 lb 6.1 oz)   12/18/23 59.6 kg (131 lb 8.1 oz)   11/21/23 60.3 kg (133 lb)     Vital signs reviewed  PE:   APPEARANCE: Well nourished, well developed, in no acute distress.    HEAD: Normocephalic, atraumatic.  EYES:     Conjunctivae noninjected.  NECK: Supple with no cervical lymphadenopathy.  No carotid bruits.  No thyromegaly  CHEST: Good inspiratory effort. Lungs clear to auscultation with no wheezes or crackles.  CARDIOVASCULAR: Normal S1, S2. No rubs, murmurs, or gallops.  ABDOMEN: Bowel sounds normal. Not distended. Soft. No tenderness or masses. No organomegaly.  EXTREMITIES: No edema   DIABETIC FOOT EXAM: Protective Sensation (w/ 10 gram monofilament):  Right: Intact  Left: Intact    Visual Inspection:  Normal -  Bilateral    Pedal Pulses:   Right: Present  Left: Present    Posterior Tibialis Pulses:   Right:Present  Left: Present                          IMPRESSION  1. Routine general medical examination at a health care facility    2. Primary hypertension    3. Type 2 diabetes mellitus with stage 3a chronic kidney disease, without long-term current use of insulin    4. Aortic atherosclerosis  "   5. Hypothyroidism, unspecified type    6. Hypertension, unspecified type    7. Statin myopathy    8. Fatty liver    9. Secondary hyperparathyroidism of renal origin    10. SI (sacroiliac) joint inflammation              PLAN    Update labs     Type 2 Diabetes with CKD 3:  Monitor labs  Continue metformin 2 g daily .  Have advise trying to get back on  Jardiance 25 mg daily to help with diabetes control and help with CKD.  Can notify if she is having any issues with hypotension once starting    Needs eye exam, referral placed     Hypertension:  Stable.  Continue losartan 100 mg daily and amlodipine 10 mg daily.     Hypothyroidism .  TSH stable      Aortic atherosclerosis, dyslipidemia.  Unfortunately intolerant to all statins and also Zetia.          Orders Placed This Encounter   Procedures    CBC Auto Differential    Comprehensive Metabolic Panel    Lipid Panel    TSH    Hemoglobin A1C                SCREENINGS      DEXA scan:  02/09/2021, normal, repeat ordered  Mammogram 12/19/16, declines rpt  Colonoscopy: 2009, Dr. Wright, normal.  Declined repeat     immunizations   Tetanus :Check with your pharmacy regarding getting the tetanus (Tdap) vaccine (once every 10 years)  PVX: 2017   Prevnar: 1/2016  Flu: declines  Zoster: utd  COVID (05/19/2021, 06/19/2021 (Pfizer), has not had booster yet but do encourage getting booster shot

## 2024-03-26 ENCOUNTER — PATIENT MESSAGE (OUTPATIENT)
Dept: FAMILY MEDICINE | Facility: CLINIC | Age: 84
End: 2024-03-26
Payer: MEDICARE

## 2024-03-26 ENCOUNTER — LAB VISIT (OUTPATIENT)
Dept: LAB | Facility: HOSPITAL | Age: 84
End: 2024-03-26
Attending: FAMILY MEDICINE
Payer: MEDICARE

## 2024-03-26 DIAGNOSIS — N25.81 SECONDARY HYPERPARATHYROIDISM OF RENAL ORIGIN: ICD-10-CM

## 2024-03-26 DIAGNOSIS — G72.0 STATIN MYOPATHY: ICD-10-CM

## 2024-03-26 DIAGNOSIS — M46.1 SI (SACROILIAC) JOINT INFLAMMATION: ICD-10-CM

## 2024-03-26 DIAGNOSIS — E03.9 HYPOTHYROIDISM, UNSPECIFIED TYPE: ICD-10-CM

## 2024-03-26 DIAGNOSIS — E11.22 TYPE 2 DIABETES MELLITUS WITH STAGE 3A CHRONIC KIDNEY DISEASE, WITHOUT LONG-TERM CURRENT USE OF INSULIN: Primary | ICD-10-CM

## 2024-03-26 DIAGNOSIS — N18.31 TYPE 2 DIABETES MELLITUS WITH STAGE 3A CHRONIC KIDNEY DISEASE, WITHOUT LONG-TERM CURRENT USE OF INSULIN: ICD-10-CM

## 2024-03-26 DIAGNOSIS — I10 HYPERTENSION, UNSPECIFIED TYPE: ICD-10-CM

## 2024-03-26 DIAGNOSIS — K76.0 FATTY LIVER: ICD-10-CM

## 2024-03-26 DIAGNOSIS — I70.0 AORTIC ATHEROSCLEROSIS: ICD-10-CM

## 2024-03-26 DIAGNOSIS — N18.31 TYPE 2 DIABETES MELLITUS WITH STAGE 3A CHRONIC KIDNEY DISEASE, WITHOUT LONG-TERM CURRENT USE OF INSULIN: Primary | ICD-10-CM

## 2024-03-26 DIAGNOSIS — T46.6X5A STATIN MYOPATHY: ICD-10-CM

## 2024-03-26 DIAGNOSIS — I10 PRIMARY HYPERTENSION: ICD-10-CM

## 2024-03-26 DIAGNOSIS — E11.22 TYPE 2 DIABETES MELLITUS WITH STAGE 3A CHRONIC KIDNEY DISEASE, WITHOUT LONG-TERM CURRENT USE OF INSULIN: ICD-10-CM

## 2024-03-26 LAB
ALBUMIN SERPL BCP-MCNC: 3.9 G/DL (ref 3.5–5.2)
ALP SERPL-CCNC: 91 U/L (ref 55–135)
ALT SERPL W/O P-5'-P-CCNC: 27 U/L (ref 10–44)
ANION GAP SERPL CALC-SCNC: 9 MMOL/L (ref 8–16)
AST SERPL-CCNC: 26 U/L (ref 10–40)
BASOPHILS # BLD AUTO: 0.04 K/UL (ref 0–0.2)
BASOPHILS NFR BLD: 0.4 % (ref 0–1.9)
BILIRUB SERPL-MCNC: 0.4 MG/DL (ref 0.1–1)
BUN SERPL-MCNC: 16 MG/DL (ref 8–23)
CALCIUM SERPL-MCNC: 10.1 MG/DL (ref 8.7–10.5)
CHLORIDE SERPL-SCNC: 105 MMOL/L (ref 95–110)
CHOLEST SERPL-MCNC: 238 MG/DL (ref 120–199)
CHOLEST/HDLC SERPL: 4.5 {RATIO} (ref 2–5)
CO2 SERPL-SCNC: 28 MMOL/L (ref 23–29)
CREAT SERPL-MCNC: 1.2 MG/DL (ref 0.5–1.4)
DIFFERENTIAL METHOD BLD: ABNORMAL
EOSINOPHIL # BLD AUTO: 0.2 K/UL (ref 0–0.5)
EOSINOPHIL NFR BLD: 2.1 % (ref 0–8)
ERYTHROCYTE [DISTWIDTH] IN BLOOD BY AUTOMATED COUNT: 13.4 % (ref 11.5–14.5)
EST. GFR  (NO RACE VARIABLE): 45 ML/MIN/1.73 M^2
ESTIMATED AVG GLUCOSE: 146 MG/DL (ref 68–131)
GLUCOSE SERPL-MCNC: 133 MG/DL (ref 70–110)
HBA1C MFR BLD: 6.7 % (ref 4–5.6)
HCT VFR BLD AUTO: 33.8 % (ref 37–48.5)
HDLC SERPL-MCNC: 53 MG/DL (ref 40–75)
HDLC SERPL: 22.3 % (ref 20–50)
HGB BLD-MCNC: 11.6 G/DL (ref 12–16)
IMM GRANULOCYTES # BLD AUTO: 0.02 K/UL (ref 0–0.04)
IMM GRANULOCYTES NFR BLD AUTO: 0.2 % (ref 0–0.5)
LDLC SERPL CALC-MCNC: 160.6 MG/DL (ref 63–159)
LEFT EYE DM RETINOPATHY: NEGATIVE
LYMPHOCYTES # BLD AUTO: 2.9 K/UL (ref 1–4.8)
LYMPHOCYTES NFR BLD: 32.8 % (ref 18–48)
MCH RBC QN AUTO: 30.2 PG (ref 27–31)
MCHC RBC AUTO-ENTMCNC: 34.3 G/DL (ref 32–36)
MCV RBC AUTO: 88 FL (ref 82–98)
MONOCYTES # BLD AUTO: 0.7 K/UL (ref 0.3–1)
MONOCYTES NFR BLD: 7.5 % (ref 4–15)
NEUTROPHILS # BLD AUTO: 5.1 K/UL (ref 1.8–7.7)
NEUTROPHILS NFR BLD: 57 % (ref 38–73)
NONHDLC SERPL-MCNC: 185 MG/DL
NRBC BLD-RTO: 0 /100 WBC
PLATELET # BLD AUTO: 266 K/UL (ref 150–450)
PMV BLD AUTO: 11.4 FL (ref 9.2–12.9)
POTASSIUM SERPL-SCNC: 5.4 MMOL/L (ref 3.5–5.1)
PROT SERPL-MCNC: 8.1 G/DL (ref 6–8.4)
RBC # BLD AUTO: 3.84 M/UL (ref 4–5.4)
RIGHT EYE DM RETINOPATHY: NEGATIVE
SODIUM SERPL-SCNC: 142 MMOL/L (ref 136–145)
TRIGL SERPL-MCNC: 122 MG/DL (ref 30–150)
TSH SERPL DL<=0.005 MIU/L-ACNC: 3.17 UIU/ML (ref 0.4–4)
WBC # BLD AUTO: 8.92 K/UL (ref 3.9–12.7)

## 2024-03-26 PROCEDURE — 85025 COMPLETE CBC W/AUTO DIFF WBC: CPT | Performed by: FAMILY MEDICINE

## 2024-03-26 PROCEDURE — 84443 ASSAY THYROID STIM HORMONE: CPT | Performed by: FAMILY MEDICINE

## 2024-03-26 PROCEDURE — 80053 COMPREHEN METABOLIC PANEL: CPT | Performed by: FAMILY MEDICINE

## 2024-03-26 PROCEDURE — 83036 HEMOGLOBIN GLYCOSYLATED A1C: CPT | Performed by: FAMILY MEDICINE

## 2024-03-26 PROCEDURE — 36415 COLL VENOUS BLD VENIPUNCTURE: CPT | Performed by: FAMILY MEDICINE

## 2024-03-26 PROCEDURE — 80061 LIPID PANEL: CPT | Performed by: FAMILY MEDICINE

## 2024-03-28 ENCOUNTER — HOSPITAL ENCOUNTER (OUTPATIENT)
Dept: RADIOLOGY | Facility: HOSPITAL | Age: 84
Discharge: HOME | End: 2024-03-28
Attending: FAMILY MEDICINE
Payer: MEDICARE

## 2024-03-28 DIAGNOSIS — Z78.0 MENOPAUSE: ICD-10-CM

## 2024-03-28 PROCEDURE — 77080 DXA BONE DENSITY AXIAL: CPT | Mod: 26,,, | Performed by: RADIOLOGY

## 2024-03-28 PROCEDURE — 77080 DXA BONE DENSITY AXIAL: CPT | Mod: TC

## 2024-04-03 ENCOUNTER — PATIENT OUTREACH (OUTPATIENT)
Dept: ADMINISTRATIVE | Facility: HOSPITAL | Age: 84
End: 2024-04-03
Payer: MEDICARE

## 2024-04-03 ENCOUNTER — OFFICE VISIT (OUTPATIENT)
Dept: URGENT CARE | Facility: CLINIC | Age: 84
End: 2024-04-03
Payer: MEDICARE

## 2024-04-03 VITALS
SYSTOLIC BLOOD PRESSURE: 145 MMHG | HEART RATE: 76 BPM | HEIGHT: 61 IN | BODY MASS INDEX: 24.97 KG/M2 | OXYGEN SATURATION: 98 % | RESPIRATION RATE: 18 BRPM | TEMPERATURE: 98 F | WEIGHT: 132.25 LBS | DIASTOLIC BLOOD PRESSURE: 80 MMHG

## 2024-04-03 DIAGNOSIS — H10.9 BACTERIAL CONJUNCTIVITIS OF BOTH EYES: Primary | ICD-10-CM

## 2024-04-03 DIAGNOSIS — B96.89 BACTERIAL CONJUNCTIVITIS OF BOTH EYES: Primary | ICD-10-CM

## 2024-04-03 PROCEDURE — 99213 OFFICE O/P EST LOW 20 MIN: CPT | Mod: S$GLB,,,

## 2024-04-03 RX ORDER — OFLOXACIN 3 MG/ML
1 SOLUTION/ DROPS OPHTHALMIC 4 TIMES DAILY
Qty: 10 ML | Refills: 1 | Status: SHIPPED | OUTPATIENT
Start: 2024-04-03 | End: 2024-04-10

## 2024-04-03 NOTE — PATIENT INSTRUCTIONS
Most cases of pink eye go away on their own without treatment. But some types of pink eye can be treated.  When pink eye is caused by infection, it is usually caused by a virus, so antibiotics will not help. Still, pink eye caused by a virus can last several days.  Pink eye caused by an infection with bacteria can be treated with antibiotic eye drops, gel, or ointment.  Pink eye caused by other problems can be treated with eye drops normally used to treat allergies. These drops will not cure the pink eye, but they can help with itchiness and irritation.  When using eye drops for infection, do not touch your healthy eye after touching your infected eye. Also, do not touch the bottle or dropper directly onto 1 eye and then use it in the other. These things can cause the infection to spread from 1 eye to the other.  If your eyelids feel swollen, it might also help to hold a cool wet cloth on the area.    Can pink eye be prevented?  To keep from getting or spreading pink eye caused by an infection:  Wash your hands often with soap and water.  Try not to touch your eyes.  Avoid sharing towels, bedding, or other personal items with a person who has pink eye.  If your pink eye is caused by allergies, it might help to stay inside with the windows shut as much as possible during peak allergy seasons.      Common side effects include stinging and burning upon instillation. Patients may find it helpful to refrigerate the drops and/or use refrigerated artificial tears before using these medications. Other adverse effects include headache and increased ocular dryness.       Please remember that you have received care at an urgent care today. Urgent cares are not emergency rooms and are not equipped to handle life threatening emergencies and cannot rule in or out certain medical conditions and you may be released before all of your medical problems are known or treated.     Please arrange follow up with your primary care  physician or speciality clinic within 2-5 days if your signs and symptoms have not resolved or worsen.     Patient can call our Referral Hotline at [phone] to make an appointment.      Please return here or go to the Emergency Department for any concerns or worsening of condition.   (Worsening vision, eye pain, sensitivity to light, increased eye discharge)

## 2024-04-03 NOTE — PROGRESS NOTES
"Subjective:      Patient ID: Oneyda Shah is a 83 y.o. female.    Vitals:  height is 5' 1" (1.549 m) and weight is 60 kg (132 lb 4.4 oz). Her oral temperature is 98 °F (36.7 °C). Her blood pressure is 145/80 (abnormal) and her pulse is 76. Her respiration is 18 and oxygen saturation is 98%.     Chief Complaint: Eye Problem    Pt present with irritation to both eyes, redness, pain. Started yesterday.     Provider note starts below:  Patient presents to clinic for evaluation of eye itching, redness, and discharge which onset yesterday. Patient reports waking up yesterday morning and noticed symptoms. Initially, just the left eye was affected, but she believes she spread it to her right eye now as well. She describes sensation as "sand in my eyes" as well as eye itching. She reports constant drainage throughout the day and having to use tissues to wipe her eyes all day. Patient denies any changes in her vision. No blurry vision, double vision, photophobia. No foreign bodies in the eyes. No eye trauma. No eye pain.     Eye Problem   Both eyes are affected. This is a new problem. The current episode started in the past 7 days. The problem occurs constantly. The problem has been gradually worsening. There was no injury mechanism. The pain is at a severity of 4/10. The pain is mild. There is No known exposure to pink eye. She Does not wear contacts. Associated symptoms include an eye discharge, eye redness and itching. Pertinent negatives include no blurred vision, double vision, fever, nausea, photophobia or vomiting. She has tried nothing for the symptoms. The treatment provided no relief.       Constitution: Negative for chills and fever.   HENT:  Negative for ear pain, congestion and sore throat.    Neck: Negative for neck pain and neck stiffness.   Cardiovascular:  Negative for chest pain and palpitations.   Eyes:  Positive for eye discharge, eye itching and eye redness. Negative for eye trauma, foreign body in " eye, eye pain, photophobia, vision loss, double vision, blurred vision and eyelid swelling.   Respiratory:  Negative for cough and shortness of breath.    Gastrointestinal:  Negative for abdominal pain, nausea and vomiting.   Musculoskeletal:  Negative for muscle cramps and muscle ache.   Skin:  Negative for pale and rash.   Neurological:  Negative for dizziness, light-headedness, headaches, disorientation and altered mental status.   Psychiatric/Behavioral:  Negative for altered mental status, disorientation and confusion.       Objective:     Physical Exam   Constitutional: She is oriented to person, place, and time.  Non-toxic appearance. She does not appear ill. No distress.   HENT:   Head: Normocephalic and atraumatic.   Eyes: Pupils are equal, round, and reactive to light. Right eye exhibits discharge. Right eye exhibits no hordeolum. No foreign body present in the right eye. Left eye exhibits discharge. Left eye exhibits no hordeolum. No foreign body present in the left eye. Right conjunctiva is injected. Left conjunctiva is injected. Extraocular movement intact   Neck: Neck supple.   Cardiovascular: Normal rate, regular rhythm, normal heart sounds and normal pulses.   Pulmonary/Chest: Effort normal and breath sounds normal.   Abdominal: Normal appearance.   Musculoskeletal: Normal range of motion.         General: Normal range of motion.   Neurological: She is alert, oriented to person, place, and time and at baseline.   Skin: Skin is warm and dry.   Psychiatric: Her behavior is normal. Mood normal.   Nursing note and vitals reviewed.      Assessment:     1. Bacterial conjunctivitis of both eyes      Plan:     Bacterial conjunctivitis of both eyes  -     ofloxacin (OCUFLOX) 0.3 % ophthalmic solution; Place 1 drop into both eyes 4 (four) times daily. for 7 days  Dispense: 10 mL; Refill: 1            Patient Instructions   Most cases of pink eye go away on their own without treatment. But some types of pink  eye can be treated.  When pink eye is caused by infection, it is usually caused by a virus, so antibiotics will not help. Still, pink eye caused by a virus can last several days.  Pink eye caused by an infection with bacteria can be treated with antibiotic eye drops, gel, or ointment.  Pink eye caused by other problems can be treated with eye drops normally used to treat allergies. These drops will not cure the pink eye, but they can help with itchiness and irritation.  When using eye drops for infection, do not touch your healthy eye after touching your infected eye. Also, do not touch the bottle or dropper directly onto 1 eye and then use it in the other. These things can cause the infection to spread from 1 eye to the other.  If your eyelids feel swollen, it might also help to hold a cool wet cloth on the area.    Can pink eye be prevented?  To keep from getting or spreading pink eye caused by an infection:  Wash your hands often with soap and water.  Try not to touch your eyes.  Avoid sharing towels, bedding, or other personal items with a person who has pink eye.  If your pink eye is caused by allergies, it might help to stay inside with the windows shut as much as possible during peak allergy seasons.      Common side effects include stinging and burning upon instillation. Patients may find it helpful to refrigerate the drops and/or use refrigerated artificial tears before using these medications. Other adverse effects include headache and increased ocular dryness.       Please remember that you have received care at an urgent care today. Urgent cares are not emergency rooms and are not equipped to handle life threatening emergencies and cannot rule in or out certain medical conditions and you may be released before all of your medical problems are known or treated.     Please arrange follow up with your primary care physician or speciality clinic within 2-5 days if your signs and symptoms have not resolved or  worsen.     Patient can call our Referral Hotline at [phone] to make an appointment.      Please return here or go to the Emergency Department for any concerns or worsening of condition.   (Worsening vision, eye pain, sensitivity to light, increased eye discharge)

## 2024-04-10 ENCOUNTER — PATIENT MESSAGE (OUTPATIENT)
Dept: FAMILY MEDICINE | Facility: CLINIC | Age: 84
End: 2024-04-10
Payer: MEDICARE

## 2024-04-16 ENCOUNTER — TELEPHONE (OUTPATIENT)
Dept: OPHTHALMOLOGY | Facility: CLINIC | Age: 84
End: 2024-04-16
Payer: MEDICARE

## 2024-04-16 ENCOUNTER — TELEPHONE (OUTPATIENT)
Dept: OPTOMETRY | Facility: CLINIC | Age: 84
End: 2024-04-16
Payer: MEDICARE

## 2024-04-16 NOTE — TELEPHONE ENCOUNTER
----- Message from Patsy Gonzáles sent at 4/16/2024  3:39 PM CDT -----  Good afternoon,  Patient recently had a bacterial eye infection of both eyes. She has since recovered from the infection but is now experiencing blurry vision. She has requested an appointment at the [place] location but I was unable to find anything. Would you be able to assist in scheduling? You can contact her daughter Annalee at [phone].    Thank you

## 2024-04-16 NOTE — TELEPHONE ENCOUNTER
----- Message from Gilma Terrell sent at 4/16/2024  3:47 PM CDT -----  Hi,     Spoke with patient's daughter to try scheduling appt. Daughter declined first available and asked that message be sent to [facility] for soonest available urgent with any provider.  ----- Message -----  From: Patsy Gonzáles  Sent: 4/16/2024   3:41 PM CDT  To: [facility] Clinical Support Staff    Good afternoon,  Patient recently had a bacterial eye infection of both eyes. She has since recovered from the infection but is now experiencing blurry vision. She has requested an appointment at the [place] location but I was unable to find anything. Would you be able to assist in scheduling? You can contact her daughter Annalee at [phone].    Thank you

## 2024-04-29 ENCOUNTER — LAB VISIT (OUTPATIENT)
Dept: LAB | Facility: HOSPITAL | Age: 84
End: 2024-04-29
Attending: FAMILY MEDICINE
Payer: MEDICARE

## 2024-04-29 DIAGNOSIS — E11.22 TYPE 2 DIABETES MELLITUS WITH STAGE 3A CHRONIC KIDNEY DISEASE, WITHOUT LONG-TERM CURRENT USE OF INSULIN: ICD-10-CM

## 2024-04-29 DIAGNOSIS — N18.31 TYPE 2 DIABETES MELLITUS WITH STAGE 3A CHRONIC KIDNEY DISEASE, WITHOUT LONG-TERM CURRENT USE OF INSULIN: ICD-10-CM

## 2024-04-29 LAB
ANION GAP SERPL CALC-SCNC: 12 MMOL/L (ref 8–16)
BUN SERPL-MCNC: 23 MG/DL (ref 8–23)
CALCIUM SERPL-MCNC: 9.9 MG/DL (ref 8.7–10.5)
CHLORIDE SERPL-SCNC: 105 MMOL/L (ref 95–110)
CO2 SERPL-SCNC: 23 MMOL/L (ref 23–29)
CREAT SERPL-MCNC: 1.2 MG/DL (ref 0.5–1.4)
EST. GFR  (NO RACE VARIABLE): 45 ML/MIN/1.73 M^2
GLUCOSE SERPL-MCNC: 106 MG/DL (ref 70–110)
POTASSIUM SERPL-SCNC: 4.8 MMOL/L (ref 3.5–5.1)
SODIUM SERPL-SCNC: 140 MMOL/L (ref 136–145)

## 2024-04-29 PROCEDURE — 80048 BASIC METABOLIC PNL TOTAL CA: CPT | Mod: HCNC | Performed by: FAMILY MEDICINE

## 2024-04-29 PROCEDURE — 36415 COLL VENOUS BLD VENIPUNCTURE: CPT | Mod: HCNC | Performed by: FAMILY MEDICINE

## 2024-05-17 ENCOUNTER — CLINICAL SUPPORT (OUTPATIENT)
Dept: OTOLARYNGOLOGY | Facility: CLINIC | Age: 84
End: 2024-05-17
Payer: MEDICARE

## 2024-05-17 ENCOUNTER — OFFICE VISIT (OUTPATIENT)
Dept: OTOLARYNGOLOGY | Facility: CLINIC | Age: 84
End: 2024-05-17
Payer: MEDICARE

## 2024-05-17 VITALS
DIASTOLIC BLOOD PRESSURE: 84 MMHG | HEART RATE: 72 BPM | WEIGHT: 130.81 LBS | BODY MASS INDEX: 24.72 KG/M2 | SYSTOLIC BLOOD PRESSURE: 158 MMHG

## 2024-05-17 DIAGNOSIS — H90.3 ASYMMETRIC SNHL (SENSORINEURAL HEARING LOSS): Chronic | ICD-10-CM

## 2024-05-17 DIAGNOSIS — H93.8X1 EAR FULLNESS, RIGHT: Chronic | ICD-10-CM

## 2024-05-17 DIAGNOSIS — H69.93 ETD (EUSTACHIAN TUBE DYSFUNCTION), BILATERAL: Chronic | ICD-10-CM

## 2024-05-17 DIAGNOSIS — H90.A31 MIXED CONDUCTIVE AND SENSORINEURAL HEARING LOSS OF RIGHT EAR WITH RESTRICTED HEARING OF LEFT EAR: Primary | Chronic | ICD-10-CM

## 2024-05-17 DIAGNOSIS — H90.A31 MIXED CONDUCTIVE AND SENSORINEURAL HEARING LOSS OF RIGHT EAR WITH RESTRICTED HEARING OF LEFT EAR: Primary | ICD-10-CM

## 2024-05-17 PROCEDURE — 1159F MED LIST DOCD IN RCRD: CPT | Mod: HCNC,CPTII,S$GLB, | Performed by: OTOLARYNGOLOGY

## 2024-05-17 PROCEDURE — 92555 SPEECH THRESHOLD AUDIOMETRY: CPT | Mod: HCNC,S$GLB,, | Performed by: AUDIOLOGIST

## 2024-05-17 PROCEDURE — 1160F RVW MEDS BY RX/DR IN RCRD: CPT | Mod: HCNC,CPTII,S$GLB, | Performed by: OTOLARYNGOLOGY

## 2024-05-17 PROCEDURE — 1126F AMNT PAIN NOTED NONE PRSNT: CPT | Mod: HCNC,CPTII,S$GLB, | Performed by: OTOLARYNGOLOGY

## 2024-05-17 PROCEDURE — 92553 AUDIOMETRY AIR & BONE: CPT | Mod: HCNC,S$GLB,, | Performed by: AUDIOLOGIST

## 2024-05-17 PROCEDURE — 3077F SYST BP >= 140 MM HG: CPT | Mod: HCNC,CPTII,S$GLB, | Performed by: OTOLARYNGOLOGY

## 2024-05-17 PROCEDURE — 3288F FALL RISK ASSESSMENT DOCD: CPT | Mod: HCNC,CPTII,S$GLB, | Performed by: OTOLARYNGOLOGY

## 2024-05-17 PROCEDURE — 99214 OFFICE O/P EST MOD 30 MIN: CPT | Mod: HCNC,S$GLB,, | Performed by: OTOLARYNGOLOGY

## 2024-05-17 PROCEDURE — 1101F PT FALLS ASSESS-DOCD LE1/YR: CPT | Mod: HCNC,CPTII,S$GLB, | Performed by: OTOLARYNGOLOGY

## 2024-05-17 PROCEDURE — 92567 TYMPANOMETRY: CPT | Mod: HCNC,S$GLB,, | Performed by: AUDIOLOGIST

## 2024-05-17 PROCEDURE — 3079F DIAST BP 80-89 MM HG: CPT | Mod: HCNC,CPTII,S$GLB, | Performed by: OTOLARYNGOLOGY

## 2024-05-17 PROCEDURE — 99999 PR PBB SHADOW E&M-EST. PATIENT-LVL III: CPT | Mod: PBBFAC,HCNC,, | Performed by: OTOLARYNGOLOGY

## 2024-05-17 RX ORDER — METHYLPREDNISOLONE 4 MG/1
TABLET ORAL
Qty: 1 EACH | Refills: 0 | Status: SHIPPED | OUTPATIENT
Start: 2024-05-17

## 2024-05-17 RX ORDER — LEVOCETIRIZINE DIHYDROCHLORIDE 5 MG/1
5 TABLET, FILM COATED ORAL NIGHTLY
Qty: 30 TABLET | Refills: 11 | Status: SHIPPED | OUTPATIENT
Start: 2024-05-17 | End: 2025-05-17

## 2024-05-17 RX ORDER — FLUTICASONE PROPIONATE 50 MCG
2 SPRAY, SUSPENSION (ML) NASAL DAILY
Qty: 9.9 ML | Refills: 11 | Status: SHIPPED | OUTPATIENT
Start: 2024-05-17

## 2024-05-17 NOTE — PROGRESS NOTES
"Subjective:   Oneyda Shah is a 83 y.o. female who presents for AD ear fullness that has been bothering her for several months.   She describes a "clogged" sensation. She notes that the fullness is constant and the only think that helps is popping her ear.  She does wear hearing aids currently. This has been a problem in the past. She has known asymmetric SNHL but has had bouts of mixed HL Ad.  She also has a history of ETD. She has noted a longstanding difference in hearing between the ears, with the right ear being the worse hearing ear- there is documented asymmetry on audiometric testing. MRI brain without contrast was free of retrocochlear pathology.     Past Medical History  She has a past medical history of Anemia, unspecified, Aortic atherosclerosis, Bilateral renal cysts, CKD (chronic kidney disease), stage III, Diabetes mellitus type II, Fatty liver, High cholesterol, HLD (hyperlipidemia), HTN (hypertension), Hypothyroidism, Hypothyroidism, Osteopenia, Proteinuria, and Statin myopathy.    Past Surgical History  She has a past surgical history that includes Hysterectomy; Breast biopsy; Breast surgery; and Rotator cuff repair (Left).    Family History  Her family history includes Blindness in her father; Coronary artery disease (age of onset: 55) in her sister; Diabetes in her brother, daughter, and mother; Hypertension in her mother; Liver cancer in her sister; No Known Problems in her brother and son.    Social History  She reports that she has never smoked. She has never used smokeless tobacco. She reports that she does not drink alcohol and does not use drugs.    Allergies  She is allergic to penicillins, ace inhibitors, and statins-hmg-coa reductase inhibitors.    Medications  She has a current medication list which includes the following prescription(s): accu-chek jabier plus test strp, amlodipine, aspirin, b complex vitamins, blood-glucose meter, cholecalciferol (vitamin d3), dropsafe alcohol prep " "pads, empagliflozin, lancets, levothyroxine, losartan, metformin, and fish oil-omega-3 fatty acids, and the following Facility-Administered Medications: acetaminophen.    Objective:     Constitutional:   She is oriented to person, place, and time. She appears well-developed and well-nourished. She appears alert. She is cooperative.  Non-toxic appearance. She does not have a sickly appearance. She does not appear ill. Normal speech.      Head:  Normocephalic and atraumatic. Not macrocephalic and not microcephalic. Head is without raccoon's eyes, without Felix's sign, without abrasion, without laceration, without right periorbital erythema, without left periorbital erythema and without TMJ tenderness.     Ears:    Right Ear: No lacerations. No drainage, swelling or tenderness. No foreign bodies. No mastoid tenderness. Tympanic membrane is not injected, not scarred, not perforated, not erythematous, not retracted and not bulging. No middle ear effusion. No hemotympanum.   Left Ear: No lacerations. No drainage, swelling or tenderness. No foreign bodies. No mastoid tenderness. Tympanic membrane is not injected, not scarred, not perforated, not erythematous, not retracted and not bulging.  No middle ear effusion. No hemotympanum.     Pulmonary/Chest:   Effort normal.     Psychiatric:   She has a normal mood and affect. Her speech is normal and behavior is normal.     Neurological:   She is alert and oriented to person, place, and time.     Procedure  None    Data Reviewed  WBC (K/uL)   Date Value   03/26/2024 8.92     Eosinophil % (%)   Date Value   03/26/2024 2.1     Eos # (K/uL)   Date Value   03/26/2024 0.2     Platelets (K/uL)   Date Value   03/26/2024 266     Glucose (mg/dL)   Date Value   04/29/2024 106     No results found for: "IGE"    Audiogram  Audiogram interpreted personally by me and discussed in detail with the patient today.   Tympanometry revealed a Type As tympanogram for both ears. Audiogram results " revealed a moderate sensorineural hearing loss for the left ear and a moderate to profound mixed hearing loss for the right ear. Speech reception thresholds were obtained at 55dB for the right ear and 40dB for the left ear. Speech discrimination testing could not be completed because English is not her primary language.           I independently reviewed the tracings of the complete audiometric evaluation performed today.  I reviewed the audiogram with the patient as well.  Pertinent findings include moderate to profound mixed hearing loss (SNHL) in the right ear and a mild to moderate SNHL in the left ear.   Assessment:     1. 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 Asymmetric SNHL (sensorineural hearing loss)    3. ETD (Eustachian tube dysfunction), bilateral    4. Ear fullness, right        Plan:     Asymmetric SNHL (sensorineural hearing loss)  Discussed the presence of a longstanding asymmetry-prior MRI clear of retrocochlear pathology.  She currently wears hearing aids for her sensorineural hearing loss. However Audiogram now showing mixed hearing in the right ear that was not present on prior audiograms. Will treat conservatively for eustachian tube dysfunction and follow-up in 10 weeks with audiometric testing. If no improvement will consider nasal endoscopy with possible CT temporal bone.      Lurdes Hi MD

## 2024-05-17 NOTE — PROGRESS NOTES
"Oneyda Shah was seen today in the clinic for an audiologic evaluation.  Her main complaint was feeling like her right ear is sometimes "closed up" and decreased hearing in the right ear.  She reported a history of right ear asymmetry in hearing and currently wears a pair of hearing aids purchased at an outside clinic.    Tympanometry revealed a Type As tympanogram for both ears.  Audiogram results revealed a moderate sensorineural hearing loss for the left ear and a moderate to profound mixed hearing loss for the right ear.  Speech reception thresholds were obtained at 55dB for the right ear and 40dB for the left ear.  Speech discrimination testing could not be completed because English is not her primary language.    Recommendations:  Otologic evaluation  Annual evaluation  Continue use of hearing aids  Hearing protection in noise               "

## 2024-05-24 ENCOUNTER — LAB VISIT (OUTPATIENT)
Dept: LAB | Facility: HOSPITAL | Age: 84
End: 2024-05-24
Attending: FAMILY MEDICINE
Payer: MEDICARE

## 2024-05-24 DIAGNOSIS — N18.31 STAGE 3A CHRONIC KIDNEY DISEASE: ICD-10-CM

## 2024-05-24 LAB
BILIRUB UR QL STRIP: NEGATIVE
CLARITY UR: ABNORMAL
COLOR UR: YELLOW
CREAT UR-MCNC: 192.2 MG/DL (ref 15–325)
GLUCOSE UR QL STRIP: NEGATIVE
HGB UR QL STRIP: NEGATIVE
KETONES UR QL STRIP: ABNORMAL
LEUKOCYTE ESTERASE UR QL STRIP: NEGATIVE
NITRITE UR QL STRIP: NEGATIVE
PH UR STRIP: 5 [PH] (ref 5–8)
PROT UR QL STRIP: ABNORMAL
PROT UR-MCNC: 59 MG/DL (ref 0–15)
PROT/CREAT UR: 0.31 MG/G{CREAT} (ref 0–0.2)
SP GR UR STRIP: 1.02 (ref 1–1.03)
URN SPEC COLLECT METH UR: ABNORMAL
UROBILINOGEN UR STRIP-ACNC: NEGATIVE EU/DL

## 2024-05-24 PROCEDURE — 82043 UR ALBUMIN QUANTITATIVE: CPT | Mod: HCNC | Performed by: INTERNAL MEDICINE

## 2024-05-24 PROCEDURE — 81003 URINALYSIS AUTO W/O SCOPE: CPT | Mod: HCNC | Performed by: INTERNAL MEDICINE

## 2024-05-24 PROCEDURE — 82570 ASSAY OF URINE CREATININE: CPT | Mod: HCNC | Performed by: INTERNAL MEDICINE

## 2024-05-25 LAB
ALBUMIN/CREAT UR: 194.1 UG/MG (ref 0–30)
CREAT UR-MCNC: 192.2 MG/DL (ref 15–325)
MICROALBUMIN UR DL<=1MG/L-MCNC: 373 UG/ML

## 2024-06-25 ENCOUNTER — PATIENT MESSAGE (OUTPATIENT)
Dept: ADMINISTRATIVE | Facility: HOSPITAL | Age: 84
End: 2024-06-25
Payer: MEDICARE

## 2024-07-31 ENCOUNTER — PATIENT MESSAGE (OUTPATIENT)
Dept: ADMINISTRATIVE | Facility: HOSPITAL | Age: 84
End: 2024-07-31
Payer: MEDICARE

## 2024-08-20 DIAGNOSIS — E11.65 CONTROLLED TYPE 2 DIABETES MELLITUS WITH HYPERGLYCEMIA, WITHOUT LONG-TERM CURRENT USE OF INSULIN: Primary | ICD-10-CM

## 2024-08-20 NOTE — TELEPHONE ENCOUNTER
No care due was identified.  [facility] Embedded Care Due Messages. Reference number: [id number].   8/20/2024 6:09:59 PM CDT

## 2024-08-21 RX ORDER — AMLODIPINE BESYLATE 10 MG/1
10 TABLET ORAL DAILY
Qty: 90 TABLET | Refills: 2 | Status: SHIPPED | OUTPATIENT
Start: 2024-08-21

## 2024-08-21 RX ORDER — LEVOTHYROXINE SODIUM 50 UG/1
50 TABLET ORAL DAILY
Qty: 90 TABLET | Refills: 2 | Status: SHIPPED | OUTPATIENT
Start: 2024-08-21

## 2024-08-21 NOTE — TELEPHONE ENCOUNTER
Refill Routing Note   Medication(s) are not appropriate for processing by [facility] for the following reason(s):        Required vitals abnormal: BP (!) 144/82     [facility] action(s):  Defer  Approve      Medication Therapy Plan:         Appointments  past 12m or future 3m with PCP    Date Provider   Last Visit   3/25/2024 Zion Villavicencio MD   Next Visit   Visit date not found Zion Villavicencio MD   ED visits in past 90 days: 0        Note composed:11:18 AM 08/21/2024

## 2024-08-31 DIAGNOSIS — E11.65 CONTROLLED TYPE 2 DIABETES MELLITUS WITH HYPERGLYCEMIA, WITHOUT LONG-TERM CURRENT USE OF INSULIN: Primary | ICD-10-CM

## 2024-08-31 NOTE — TELEPHONE ENCOUNTER
Care Due:                  Date            Visit Type   Department     Provider  --------------------------------------------------------------------------------                                MYCHART                              ANNUAL                              CHECKUP/PHY  [facility]  Last Visit: 03-      [facility]       Zion Villavicencio  Next Visit: None Scheduled  None         None Found                                                            Last  Test          Frequency    Reason                     Performed    Due Date  --------------------------------------------------------------------------------    HBA1C.......  6 months...  metFORMIN................  05- 11-    [facility] Embedded Care Due Messages. Reference number: [id number].   8/31/2024 2:47:06 AM CDT

## 2024-09-01 RX ORDER — LANCETS
EACH MISCELLANEOUS
Qty: 200 EACH | Refills: 3 | Status: SHIPPED | OUTPATIENT
Start: 2024-09-01

## 2024-09-01 NOTE — TELEPHONE ENCOUNTER
Provider Staff:  Action required for this patient    Requires labs      Please see care gap opportunities below in Care Due Message.    Thanks!  [facility]     Appointments      Date Provider   Last Visit   3/25/2024 Zion Villavicencio MD   Next Visit   Visit date not found Zion Villavicencio MD     Refill Decision Note   Oneyda Shah  is requesting a refill authorization.  Brief Assessment and Rationale for Refill:  Approve     Medication Therapy Plan:         Comments:     Note composed:5:47 PM 09/01/2024

## 2024-10-09 ENCOUNTER — PATIENT MESSAGE (OUTPATIENT)
Dept: ADMINISTRATIVE | Facility: HOSPITAL | Age: 84
End: 2024-10-09
Payer: MEDICARE

## 2024-10-25 ENCOUNTER — OFFICE VISIT (OUTPATIENT)
Dept: FAMILY MEDICINE | Facility: CLINIC | Age: 84
End: 2024-10-25
Payer: MEDICARE

## 2024-10-25 VITALS
OXYGEN SATURATION: 97 % | SYSTOLIC BLOOD PRESSURE: 124 MMHG | HEIGHT: 61 IN | DIASTOLIC BLOOD PRESSURE: 60 MMHG | HEART RATE: 80 BPM | WEIGHT: 131.81 LBS | TEMPERATURE: 98 F | BODY MASS INDEX: 24.89 KG/M2

## 2024-10-25 DIAGNOSIS — R10.9 ABDOMINAL PAIN, UNSPECIFIED ABDOMINAL LOCATION: ICD-10-CM

## 2024-10-25 DIAGNOSIS — I10 PRIMARY HYPERTENSION: ICD-10-CM

## 2024-10-25 DIAGNOSIS — E78.00 PURE HYPERCHOLESTEROLEMIA: ICD-10-CM

## 2024-10-25 DIAGNOSIS — Z84.89: ICD-10-CM

## 2024-10-25 DIAGNOSIS — Z09 FOLLOW-UP EXAMINATION: Primary | ICD-10-CM

## 2024-10-25 DIAGNOSIS — E11.65 CONTROLLED TYPE 2 DIABETES MELLITUS WITH HYPERGLYCEMIA, WITHOUT LONG-TERM CURRENT USE OF INSULIN: ICD-10-CM

## 2024-10-25 PROCEDURE — 99999 PR PBB SHADOW E&M-EST. PATIENT-LVL IV: CPT | Mod: PBBFAC,HCNC,,

## 2024-10-25 NOTE — PROGRESS NOTES
Subjective     Patient ID: Oneyda Shah is a 83 y.o. female.    Chief Complaint: Follow-up      Patient is an 83 year old female with DM, HTN, HLD, CKD that presents to clinic alone today as a new patient to me, established with Dr. Villavicencio, for a diabetes follow up. Patient taking Metformin. Not taking Jardiance. Blood sugars at night have been running in 140s. Patient also states she was recently discharged from the ER in [place] while out of town. States she passed out while on the toilet, did not fall or hit her head. Had diffuse abdominal pain with n/v that night of 10/15/24.   EKG was benign per discharge summary, CT showed gallbladder wall thickening, but no gallstones; Possible cysts in liver and kidney, and possible colonic diverticulosis.US revealed right renal cysts. Patient denies any symptoms today of chest pain, SOB, n/v/d, abdominal pain, or urinary complaints. Recommended follow up with PCP. Patient has an upcoming appointment with nephrology in November. Encouraged to follow up and keep appointment.  Will recheck labs today- patient is non-fasting so will do them on upcoming Monday.  ER precautions discussed with patient.     Follow-up  This is a new problem. The current episode started 1 to 4 weeks ago. The problem has been resolved. Pertinent negatives include no abdominal pain, chest pain, chills, coughing, fatigue, fever, headaches, nausea, neck pain, numbness, rash, sore throat, urinary symptoms, vertigo, visual change, vomiting or weakness. Nothing aggravates the symptoms. She has tried nothing for the symptoms.     Review of Systems   Constitutional:  Negative for appetite change, chills, fatigue and fever.   HENT:  Negative for ear pain, sore throat and trouble swallowing.    Eyes:  Negative for pain and visual disturbance.   Respiratory:  Negative for cough, chest tightness and shortness of breath.    Cardiovascular:  Negative for chest pain and palpitations.   Gastrointestinal:   Negative for abdominal pain, diarrhea, nausea and vomiting.   Genitourinary:  Negative for bladder incontinence, difficulty urinating, dysuria and hematuria.   Musculoskeletal:  Positive for back pain. Negative for leg pain and neck pain.   Integumentary:  Negative for rash and wound.   Neurological:  Negative for dizziness, vertigo, weakness, light-headedness, numbness and headaches.   Psychiatric/Behavioral:  Negative for confusion and hallucinations.      Past Medical History:   Diagnosis Date    Anemia, unspecified     Aortic atherosclerosis     Bilateral renal cysts     CKD (chronic kidney disease), stage III     Diabetes mellitus type II     Fatty liver     High cholesterol     HLD (hyperlipidemia)     HTN (hypertension)     Hypothyroidism     Hypothyroidism     Osteopenia     Proteinuria     Statin myopathy        Past Surgical History:   Procedure Laterality Date    BREAST BIOPSY      BREAST SURGERY      biopsy    HYSTERECTOMY      still w/ovaries    ROTATOR CUFF REPAIR Left        Family History   Problem Relation Name Age of Onset    Diabetes Mother      Hypertension Mother      Blindness Father      Coronary artery disease Sister x3 55        MI    Liver cancer Sister x3     Diabetes Brother x2     No Known Problems Brother      Diabetes Daughter x5     No Known Problems Son x3     Amblyopia Neg Hx      Cataracts Neg Hx      Glaucoma Neg Hx      Macular degeneration Neg Hx      Retinal detachment Neg Hx      Strabismus Neg Hx         Social History     Socioeconomic History    Marital status:    Tobacco Use    Smoking status: Never     Passive exposure: Never    Smokeless tobacco: Never   Substance and Sexual Activity    Alcohol use: No    Drug use: No    Sexual activity: Yes     Partners: Male     Social Drivers of Health     Financial Resource Strain: Low Risk  (1/13/2022)    Overall Financial Resource Strain (CARDIA)     Difficulty of Paying Living Expenses: Not very hard   Food Insecurity: No  Food Insecurity (1/13/2022)    Hunger Vital Sign     Worried About Running Out of Food in the Last Year: Never true     Ran Out of Food in the Last Year: Never true   Housing Stability: Low Risk  (1/13/2022)    Housing Stability Vital Sign     Unable to Pay for Housing in the Last Year: No     Number of Places Lived in the Last Year: 1     Unstable Housing in the Last Year: No       Current Outpatient Medications   Medication Sig Dispense Refill    amLODIPine (NORVASC) 10 MG tablet Take 1 tablet (10 mg total) by mouth once daily. 90 tablet 2    aspirin (ECOTRIN) 81 MG EC tablet Take 81 mg by mouth once daily.      blood sugar diagnostic (ACCU-CHEK GERTRUDE PLUS TEST STRP) Strp Use to test blood glucose one (1) time a day, to be used with insurance-preferred brand of glucometer/supplies. 100 strip 3    blood-glucose meter Misc Check daily sugars, dispense insurance covered blood sugar meter 1 each 0    cholecalciferol, vitamin D3, (VITAMIN D3) 25 mcg (1,000 unit) capsule Take 1,000 Units by mouth once daily.      DROPSAFE ALCOHOL PREP PADS PadM USE FOR SKIN CLEANING PRIOR TO FINGERSTICK AS NEEDED AS DIRECTED 3 each 3    lancets (ACCU-CHEK SOFTCLIX LANCETS) Misc CHECK BLOOD SUGAR TWO TIMES DAILY 200 each 3    levothyroxine (SYNTHROID) 50 MCG tablet Take 1 tablet (50 mcg total) by mouth once daily. 90 tablet 2    losartan (COZAAR) 100 MG tablet Take 1 tablet (100 mg total) by mouth once daily. 90 tablet 3    metFORMIN (GLUCOPHAGE) 1000 MG tablet TAKE 1 TABLET TWICE DAILY 180 tablet 3    b complex vitamins tablet Take 1 tablet by mouth once daily. (Patient not taking: Reported on 10/25/2024)      empagliflozin (JARDIANCE) 25 mg tablet Take 1 tablet (25 mg total) by mouth once daily. (Patient not taking: Reported on 10/25/2024) 90 tablet 3    fluticasone propionate (FLONASE) 50 mcg/actuation nasal spray 2 sprays (100 mcg total) by Each Nostril route once daily. (Patient not taking: Reported on 10/25/2024) 9.9 mL 11     "levocetirizine (XYZAL) 5 MG tablet Take 1 tablet (5 mg total) by mouth every evening. (Patient not taking: Reported on 10/25/2024) 30 tablet 11    methylPREDNISolone (MEDROL DOSEPACK) 4 mg tablet use as directed (Patient not taking: Reported on 10/25/2024) 1 each 0    omega-3 fatty acids/fish oil (FISH OIL-OMEGA-3 FATTY ACIDS) 300-1,000 mg capsule Take 1 capsule by mouth once daily. (Patient not taking: Reported on 10/25/2024)       Current Facility-Administered Medications   Medication Dose Route Frequency Provider Last Rate Last Admin    acetaminophen tablet 650 mg  650 mg Oral Once PRN Darrell Long MD           Review of patient's allergies indicates:   Allergen Reactions    Penicillins Other (See Comments)    Ace inhibitors      cough    Statins-hmg-coa reductase inhibitors      Myalgias,           Objective     Vitals:    10/25/24 1559   BP: 124/60   Pulse: 80   Temp: 98.2 °F (36.8 °C)   TempSrc: Oral   SpO2: 97%   Weight: 59.8 kg (131 lb 13.4 oz)   Height: 5' 1" (1.549 m)   PainSc: 0-No pain      Physical Exam  Vitals and nursing note reviewed.   Constitutional:       General: She is not in acute distress.     Appearance: Normal appearance. She is not ill-appearing.   HENT:      Head: Normocephalic and atraumatic.      Right Ear: Tympanic membrane normal.      Left Ear: Tympanic membrane normal.      Nose: Nose normal. No congestion.      Mouth/Throat:      Mouth: Mucous membranes are moist.      Pharynx: Oropharynx is clear. No oropharyngeal exudate or posterior oropharyngeal erythema.   Eyes:      General: No scleral icterus.        Right eye: No discharge.         Left eye: No discharge.      Extraocular Movements: Extraocular movements intact.      Conjunctiva/sclera: Conjunctivae normal.   Cardiovascular:      Rate and Rhythm: Normal rate and regular rhythm.      Pulses: Normal pulses.      Heart sounds: Normal heart sounds. No murmur heard.  Pulmonary:      Effort: Pulmonary effort is normal. No " respiratory distress.      Breath sounds: Normal breath sounds.   Abdominal:      General: Abdomen is flat. Bowel sounds are normal. There is no distension.      Palpations: Abdomen is soft. There is no mass.      Tenderness: There is no abdominal tenderness. There is no right CVA tenderness, left CVA tenderness or rebound.   Musculoskeletal:         General: Normal range of motion.      Cervical back: Normal range of motion.   Skin:     General: Skin is warm and dry.      Capillary Refill: Capillary refill takes less than 2 seconds.      Findings: No rash.   Neurological:      General: No focal deficit present.      Mental Status: She is alert and oriented to person, place, and time.   Psychiatric:         Mood and Affect: Mood normal.         Behavior: Behavior normal. Behavior is cooperative.         Cognition and Memory: Cognition and memory normal.            Assessment and Plan     1. Follow-up examination  - DM follow up and ED follow up for abdominal pain- no abdominal pain today.  - Last A1C 6.6 in May, 2024. Not taking Jardiance. Only taking Metformin.   -     CBC Auto Differential; Future; Expected date: 10/25/2024  -     Comprehensive metabolic panel; Future; Expected date: 10/25/2024  -     LIPID PANEL; Future; Expected date: 10/25/2024  -     TSH; Future; Expected date: 10/25/2024  -     HEMOGLOBIN A1C; Future; Expected date: 10/25/2024  -     LIPASE; Future; Expected date: 10/25/2024  -     AMYLASE; Future; Expected date: 10/25/2024    2. Abdominal pain, unspecified abdominal location  - New problem; resolved; will recheck labs today. Follow up with PCP.  -     CBC Auto Differential; Future; Expected date: 10/25/2024  -     Comprehensive metabolic panel; Future; Expected date: 10/25/2024  -     LIPASE; Future; Expected date: 10/25/2024  -     AMYLASE; Future; Expected date: 10/25/2024    3. Primary hypertension  - Chronic; stable on current treatment plan; follow up with PCP  -     CBC Auto  Differential; Future; Expected date: 10/25/2024  -     Comprehensive metabolic panel; Future; Expected date: 10/25/2024  -     TSH; Future; Expected date: 10/25/2024    4. Pure hypercholesterolemia  - Chronic; improving on current treatment plan; follow up with PCP  -     CBC Auto Differential; Future; Expected date: 10/25/2024  -     Comprehensive metabolic panel; Future; Expected date: 10/25/2024  -     LIPID PANEL; Future; Expected date: 10/25/2024    5. Controlled type 2 diabetes mellitus with hyperglycemia, without long-term current use of insulin  - Chronic; improving on current treatment plan; follow up with PCP  -     Comprehensive metabolic panel; Future; Expected date: 10/25/2024  -     HEMOGLOBIN A1C; Future; Expected date: 10/25/2024    6. Family history of neoplasm of skin  - Requested referral to Derm. Follow up with PCP or Dermatology.  -     Ambulatory referral/consult to Dermatology; Future; Expected date: 11/01/2024    Will follow up about labs with patient when resulted.        Follow up in about 6 months (around 4/25/2025), or if symptoms worsen or fail to improve, for follow up Diabetes.     35 minutes of total time spent on the encounter, which includes face to face time and non-face to face time preparing to see the patient (eg, review of tests), Obtaining and/or reviewing separately obtained history, Documenting clinical information in the electronic or other health record, Independently interpreting results (not separately reported) and communicating results to the patient/family/caregiver, or Care coordination (not separately reported).      Milagros Smalls, MSN, APRN, FNP-C  Family Medicine  Office #[phone]

## 2024-10-26 ENCOUNTER — TELEPHONE (OUTPATIENT)
Dept: FAMILY MEDICINE | Facility: CLINIC | Age: 84
End: 2024-10-26
Payer: MEDICARE

## 2024-10-26 NOTE — TELEPHONE ENCOUNTER
----- Message from Aissatou sent at 10/25/2024  2:54 PM CDT -----  Type:  Patient Returning Call    Who Called:pt  Does the patient know what this is regarding?:returning call  Would the patient rather a call back or a response via [facility]? call  Best Call Back Number: [phone]  Additional Information:

## 2024-10-28 ENCOUNTER — LAB VISIT (OUTPATIENT)
Dept: LAB | Facility: HOSPITAL | Age: 84
End: 2024-10-28
Attending: FAMILY MEDICINE
Payer: MEDICARE

## 2024-10-28 DIAGNOSIS — R10.9 ABDOMINAL PAIN, UNSPECIFIED ABDOMINAL LOCATION: ICD-10-CM

## 2024-10-28 DIAGNOSIS — I10 PRIMARY HYPERTENSION: ICD-10-CM

## 2024-10-28 DIAGNOSIS — Z09 FOLLOW-UP EXAMINATION: ICD-10-CM

## 2024-10-28 DIAGNOSIS — E11.65 CONTROLLED TYPE 2 DIABETES MELLITUS WITH HYPERGLYCEMIA, WITHOUT LONG-TERM CURRENT USE OF INSULIN: ICD-10-CM

## 2024-10-28 DIAGNOSIS — E78.00 PURE HYPERCHOLESTEROLEMIA: ICD-10-CM

## 2024-10-28 LAB
ALBUMIN SERPL BCP-MCNC: 4 G/DL (ref 3.5–5.2)
ALP SERPL-CCNC: 97 U/L (ref 40–150)
ALT SERPL W/O P-5'-P-CCNC: 19 U/L (ref 10–44)
AMYLASE SERPL-CCNC: 128 U/L (ref 20–110)
ANION GAP SERPL CALC-SCNC: 10 MMOL/L (ref 8–16)
AST SERPL-CCNC: 21 U/L (ref 10–40)
BASOPHILS # BLD AUTO: 0.04 K/UL (ref 0–0.2)
BASOPHILS NFR BLD: 0.4 % (ref 0–1.9)
BILIRUB SERPL-MCNC: 0.4 MG/DL (ref 0.1–1)
BUN SERPL-MCNC: 16 MG/DL (ref 8–23)
CALCIUM SERPL-MCNC: 10.2 MG/DL (ref 8.7–10.5)
CHLORIDE SERPL-SCNC: 105 MMOL/L (ref 95–110)
CHOLEST SERPL-MCNC: 239 MG/DL (ref 120–199)
CHOLEST/HDLC SERPL: 5.1 {RATIO} (ref 2–5)
CO2 SERPL-SCNC: 25 MMOL/L (ref 23–29)
CREAT SERPL-MCNC: 1.3 MG/DL (ref 0.5–1.4)
DIFFERENTIAL METHOD BLD: ABNORMAL
EOSINOPHIL # BLD AUTO: 0.2 K/UL (ref 0–0.5)
EOSINOPHIL NFR BLD: 2.5 % (ref 0–8)
ERYTHROCYTE [DISTWIDTH] IN BLOOD BY AUTOMATED COUNT: 13.7 % (ref 11.5–14.5)
EST. GFR  (NO RACE VARIABLE): 41 ML/MIN/1.73 M^2
ESTIMATED AVG GLUCOSE: 154 MG/DL (ref 68–131)
GLUCOSE SERPL-MCNC: 144 MG/DL (ref 70–110)
HBA1C MFR BLD: 7 % (ref 4–5.6)
HCT VFR BLD AUTO: 35.6 % (ref 37–48.5)
HDLC SERPL-MCNC: 47 MG/DL (ref 40–75)
HDLC SERPL: 19.7 % (ref 20–50)
HGB BLD-MCNC: 11.6 G/DL (ref 12–16)
IMM GRANULOCYTES # BLD AUTO: 0.03 K/UL (ref 0–0.04)
IMM GRANULOCYTES NFR BLD AUTO: 0.3 % (ref 0–0.5)
LDLC SERPL CALC-MCNC: 162.2 MG/DL (ref 63–159)
LIPASE SERPL-CCNC: 51 U/L (ref 4–60)
LYMPHOCYTES # BLD AUTO: 2.7 K/UL (ref 1–4.8)
LYMPHOCYTES NFR BLD: 29 % (ref 18–48)
MCH RBC QN AUTO: 29 PG (ref 27–31)
MCHC RBC AUTO-ENTMCNC: 32.6 G/DL (ref 32–36)
MCV RBC AUTO: 89 FL (ref 82–98)
MONOCYTES # BLD AUTO: 0.7 K/UL (ref 0.3–1)
MONOCYTES NFR BLD: 8 % (ref 4–15)
NEUTROPHILS # BLD AUTO: 5.6 K/UL (ref 1.8–7.7)
NEUTROPHILS NFR BLD: 59.8 % (ref 38–73)
NONHDLC SERPL-MCNC: 192 MG/DL
NRBC BLD-RTO: 0 /100 WBC
PLATELET # BLD AUTO: 307 K/UL (ref 150–450)
PMV BLD AUTO: 11 FL (ref 9.2–12.9)
POTASSIUM SERPL-SCNC: 4.9 MMOL/L (ref 3.5–5.1)
PROT SERPL-MCNC: 8.1 G/DL (ref 6–8.4)
RBC # BLD AUTO: 4 M/UL (ref 4–5.4)
SODIUM SERPL-SCNC: 140 MMOL/L (ref 136–145)
TRIGL SERPL-MCNC: 149 MG/DL (ref 30–150)
TSH SERPL DL<=0.005 MIU/L-ACNC: 2.41 UIU/ML (ref 0.4–4)
WBC # BLD AUTO: 9.28 K/UL (ref 3.9–12.7)

## 2024-10-28 PROCEDURE — 80061 LIPID PANEL: CPT | Mod: HCNC

## 2024-10-28 PROCEDURE — 36415 COLL VENOUS BLD VENIPUNCTURE: CPT | Mod: HCNC

## 2024-10-28 PROCEDURE — 84443 ASSAY THYROID STIM HORMONE: CPT | Mod: HCNC

## 2024-10-28 PROCEDURE — 80053 COMPREHEN METABOLIC PANEL: CPT | Mod: HCNC

## 2024-10-28 PROCEDURE — 83690 ASSAY OF LIPASE: CPT | Mod: HCNC

## 2024-10-28 PROCEDURE — 83036 HEMOGLOBIN GLYCOSYLATED A1C: CPT | Mod: HCNC

## 2024-10-28 PROCEDURE — 82150 ASSAY OF AMYLASE: CPT | Mod: HCNC

## 2024-10-28 PROCEDURE — 85025 COMPLETE CBC W/AUTO DIFF WBC: CPT | Mod: HCNC

## 2024-11-01 ENCOUNTER — TELEPHONE (OUTPATIENT)
Dept: DERMATOLOGY | Facility: CLINIC | Age: 84
End: 2024-11-01
Payer: MEDICARE

## 2024-11-04 ENCOUNTER — OFFICE VISIT (OUTPATIENT)
Dept: DERMATOLOGY | Facility: CLINIC | Age: 84
End: 2024-11-04
Payer: MEDICARE

## 2024-11-04 DIAGNOSIS — L82.1 SEBORRHEIC KERATOSES: ICD-10-CM

## 2024-11-04 DIAGNOSIS — D18.01 CHERRY ANGIOMA: ICD-10-CM

## 2024-11-04 DIAGNOSIS — L81.4 LENTIGO: ICD-10-CM

## 2024-11-04 DIAGNOSIS — D22.9 MULTIPLE BENIGN NEVI: ICD-10-CM

## 2024-11-04 DIAGNOSIS — Z12.83 SCREENING EXAM FOR SKIN CANCER: ICD-10-CM

## 2024-11-04 PROCEDURE — 99999 PR PBB SHADOW E&M-EST. PATIENT-LVL II: CPT | Mod: PBBFAC,HCNC,, | Performed by: STUDENT IN AN ORGANIZED HEALTH CARE EDUCATION/TRAINING PROGRAM

## 2024-11-04 PROCEDURE — 1159F MED LIST DOCD IN RCRD: CPT | Mod: HCNC,CPTII,S$GLB, | Performed by: STUDENT IN AN ORGANIZED HEALTH CARE EDUCATION/TRAINING PROGRAM

## 2024-11-04 PROCEDURE — 3288F FALL RISK ASSESSMENT DOCD: CPT | Mod: HCNC,CPTII,S$GLB, | Performed by: STUDENT IN AN ORGANIZED HEALTH CARE EDUCATION/TRAINING PROGRAM

## 2024-11-04 PROCEDURE — 99203 OFFICE O/P NEW LOW 30 MIN: CPT | Mod: HCNC,S$GLB,, | Performed by: STUDENT IN AN ORGANIZED HEALTH CARE EDUCATION/TRAINING PROGRAM

## 2024-11-04 PROCEDURE — 1160F RVW MEDS BY RX/DR IN RCRD: CPT | Mod: HCNC,CPTII,S$GLB, | Performed by: STUDENT IN AN ORGANIZED HEALTH CARE EDUCATION/TRAINING PROGRAM

## 2024-11-04 PROCEDURE — 1101F PT FALLS ASSESS-DOCD LE1/YR: CPT | Mod: HCNC,CPTII,S$GLB, | Performed by: STUDENT IN AN ORGANIZED HEALTH CARE EDUCATION/TRAINING PROGRAM

## 2024-11-04 NOTE — PATIENT INSTRUCTIONS
Sunscreen Guidelines  Sunscreen protects your skin by absorbing and reflecting ultraviolet rays. All sunscreens have a sun protection factor (SPF) rating that indicates how long a sunscreen will remain effective on the skin.    Why protect your skin?  The sun's rays are composed of many different wavelengths, including UVA, UVB, and visible light that each affect the skin differently.    UVB: sunburn, photoaging, skin cancer (melanoma, basal cell, and squamous cell carcinomas) and modulation of the skin's immune system.    UVA: similar to above but thought to contribute more to aging; at the same dose of UVB it is less powerful however the sun has 10-20 times the levels of UVA as compared with UVB.  Visible light: implicated in causing unwanted darkening of skin, such as melasma and post-inflammatory hyperpigmentation in darker skin types     If I have dark skin, do I need to worry about the sun?    More darkly pigmented skin is more protected against UV-induced skin cancer, sunburn, and photoaging, though may still suffer from sun-related conditions, including melasma, hyperpigmentation, and other dark spots.    Sun avoidance  As a general rule, stay out of the sun as much as possible between 10 a.m. - 4 p.m.    Download the EPA UV index gilmar to track the UV index by hour in your zip code.      Which sunscreen should I choose?  The best sunscreen to use varies by individual. The one that feels best on your skin and fits your lifestyle will be the one you will likely use most regularly.   Active ingredients of sunscreen vary by , and may be a chemical (such as avobenzone or oxybenzone) or physical agent (such as zinc oxide or titanium dioxide). I recommend a physical agent.  A water-resistant sunscreen is one that maintains the SPF level after 40 minutes of water exposure. A very water-resistant sunscreen maintains the SPF level after 80 minutes of water exposure.    Sunscreen: this is the last layer in  "sun protection   Be generous: 1 shot glass of sunscreen for your body, ½ teaspoon for your face/neck  Reapply every 2 hours  Broad spectrum (provides UVA/UVB protection), SPF 50 or above  Avoid spray sunscreens: less effective and have been found to contain benzene (carcinogen)    Sun protective clothing  Although sunscreen helps minimize sun damage, no sunscreen completely blocks all wavelengths of UV light. Wearing sun protective clothing such as hats, rashguards or swim shirts, and long sleeves and/or pants, as well as avoiding sun exposure from 10 a.m. to 4 p.m. will help protect your skin from overexposure and minimize sun damage. Seek shade.  Long sleeved clothing, hats, and sunglasses: makes sun protection easier, more effective, and can even be more affordable, since sunscreen needs to be reapplied frequently.    [employer] (www.sunprectautions.BringMeTheNews)  [employer] (www."[employer]")  Cool[employer]ar (www.[employer].BringMeTheNews)  [employer] (www.Open mHealth)  Hats from [employer] (www.helenkaminski.com)    My Favorite Sunscreens:  Physical blockers: Can have a "white case" but in general are more effective  - Face: CeraVe tinted mineral sunscreen, Bare Minerals complexion rescue (20 shades), Elta MD (UV elements, UV physical, UV restore, etc), Tizo ultra zinc tinted, Cetaphil Sheer Mineral Face Liquid Sunscreen  - Body: Blue Lizard, Neutrogena Sheer Zinc, Eucerin Daily Protection, Aveeno Baby   "

## 2024-11-04 NOTE — PROGRESS NOTES
Subjective:      Patient ID:  Oneyda Shah is a 83 y.o. female who presents for No chief complaint on file.    Pt using  # [id number]- pt here w/ son in law     Pt here for TBSE     Pt denies personal h/o skin cancer but reports family h/o skin cancer     Pt concerned about lesion on abdomen         Review of Systems    Objective:   Physical Exam   Constitutional: She appears well-developed and well-nourished. No distress.   Neurological: She is alert and oriented to person, place, and time. She is not disoriented.   Psychiatric: She has a normal mood and affect.   Skin:   Areas Examined (abnormalities noted in diagram):   Scalp / Hair Palpated and Inspected  Head / Face Inspection Performed  Neck Inspection Performed  Chest / Axilla Inspection Performed  Abdomen Inspection Performed  Back Inspection Performed  RUE Inspected  LUE Inspection Performed  RLE Inspected  LLE Inspection Performed  Nails and Digits Inspection Performed                 Diagram Legend     Erythematous scaling macule/papule c/w actinic keratosis       Vascular papule c/w angioma      Pigmented verrucoid papule/plaque c/w seborrheic keratosis      Yellow umbilicated papule c/w sebaceous hyperplasia      Irregularly shaped tan macule c/w lentigo     1-2 mm smooth white papules consistent with Milia      Movable subcutaneous cyst with punctum c/w epidermal inclusion cyst      Subcutaneous movable cyst c/w pilar cyst      Firm pink to brown papule c/w dermatofibroma      Pedunculated fleshy papule(s) c/w skin tag(s)      Evenly pigmented macule c/w junctional nevus     Mildly variegated pigmented, slightly irregular-bordered macule c/w mildly atypical nevus      Flesh colored to evenly pigmented papule c/w intradermal nevus       Pink pearly papule/plaque c/w basal cell carcinoma      Erythematous hyperkeratotic cursted plaque c/w SCC      Surgical scar with no sign of skin cancer recurrence      Open and closed comedones       Inflammatory papules and pustules      Verrucoid papule consistent consistent with wart     Erythematous eczematous patches and plaques     Dystrophic onycholytic nail with subungual debris c/w onychomycosis     Umbilicated papule    Erythematous-base heme-crusted tan verrucoid plaque consistent with inflamed seborrheic keratosis     Erythematous Silvery Scaling Plaque c/w Psoriasis     See annotation      Assessment / Plan:        Multiple benign nevi  Seborrheic keratoses  Cherry angioma  Lentigo  Reassurance given to patient. No treatment is necessary.   Treatment of benign, asymptomatic lesions may be considered cosmetic.    Screening exam for skin cancer  Total body skin examination performed today including at least 12 points as noted in physical examination. No lesions suspicious for malignancy noted.    Recommend daily sun protection/avoidance, use of at least SPF 30, broad spectrum sunscreen (OTC drug), skin self examinations, and routine physician surveillance to optimize early detection             Follow up if symptoms worsen or fail to improve.

## 2024-11-15 ENCOUNTER — LAB VISIT (OUTPATIENT)
Dept: LAB | Facility: HOSPITAL | Age: 84
End: 2024-11-15
Attending: INTERNAL MEDICINE
Payer: MEDICARE

## 2024-11-15 DIAGNOSIS — N18.31 STAGE 3A CHRONIC KIDNEY DISEASE: ICD-10-CM

## 2024-11-15 LAB
ALBUMIN/CREAT UR: 483.3 UG/MG (ref 0–30)
BACTERIA #/AREA URNS HPF: ABNORMAL /HPF
BILIRUB UR QL STRIP: NEGATIVE
CLARITY UR: ABNORMAL
COLOR UR: YELLOW
CREAT UR-MCNC: 80.9 MG/DL (ref 15–325)
CREAT UR-MCNC: 80.9 MG/DL (ref 15–325)
GLUCOSE UR QL STRIP: NEGATIVE
HGB UR QL STRIP: NEGATIVE
HYALINE CASTS #/AREA URNS LPF: 0 /LPF
KETONES UR QL STRIP: NEGATIVE
LEUKOCYTE ESTERASE UR QL STRIP: ABNORMAL
MICROALBUMIN UR DL<=1MG/L-MCNC: 391 UG/ML
MICROSCOPIC COMMENT: ABNORMAL
NITRITE UR QL STRIP: NEGATIVE
PH UR STRIP: 6 [PH] (ref 5–8)
PROT UR QL STRIP: ABNORMAL
PROT UR-MCNC: 58 MG/DL (ref 0–15)
PROT/CREAT UR: 0.72 MG/G{CREAT} (ref 0–0.2)
RBC #/AREA URNS HPF: 3 /HPF (ref 0–4)
SP GR UR STRIP: 1.01 (ref 1–1.03)
SQUAMOUS #/AREA URNS HPF: 26 /HPF
URN SPEC COLLECT METH UR: ABNORMAL
UROBILINOGEN UR STRIP-ACNC: NEGATIVE EU/DL
WBC #/AREA URNS HPF: 13 /HPF (ref 0–5)

## 2024-11-15 PROCEDURE — 81000 URINALYSIS NONAUTO W/SCOPE: CPT | Mod: HCNC | Performed by: INTERNAL MEDICINE

## 2024-11-15 PROCEDURE — 82043 UR ALBUMIN QUANTITATIVE: CPT | Mod: HCNC | Performed by: INTERNAL MEDICINE

## 2024-11-15 PROCEDURE — 82570 ASSAY OF URINE CREATININE: CPT | Mod: HCNC | Performed by: INTERNAL MEDICINE

## 2024-12-02 ENCOUNTER — HOSPITAL ENCOUNTER (OUTPATIENT)
Dept: RADIOLOGY | Facility: HOSPITAL | Age: 84
Discharge: HOME | End: 2024-12-02
Attending: INTERNAL MEDICINE
Payer: MEDICARE

## 2024-12-02 DIAGNOSIS — Q61.3 PKD (POLYCYSTIC KIDNEY DISEASE): ICD-10-CM

## 2024-12-02 PROCEDURE — 76770 US EXAM ABDO BACK WALL COMP: CPT | Mod: TC,HCNC

## 2024-12-02 PROCEDURE — 76770 US EXAM ABDO BACK WALL COMP: CPT | Mod: 26,HCNC,, | Performed by: RADIOLOGY

## 2024-12-04 ENCOUNTER — OFFICE VISIT (OUTPATIENT)
Dept: FAMILY MEDICINE | Facility: CLINIC | Age: 84
End: 2024-12-04
Payer: MEDICARE

## 2024-12-04 VITALS
DIASTOLIC BLOOD PRESSURE: 60 MMHG | WEIGHT: 130.06 LBS | HEIGHT: 61 IN | SYSTOLIC BLOOD PRESSURE: 118 MMHG | BODY MASS INDEX: 24.55 KG/M2 | TEMPERATURE: 98 F | HEART RATE: 77 BPM | OXYGEN SATURATION: 99 %

## 2024-12-04 DIAGNOSIS — I10 PRIMARY HYPERTENSION: ICD-10-CM

## 2024-12-04 DIAGNOSIS — N18.32 STAGE 3B CHRONIC KIDNEY DISEASE: ICD-10-CM

## 2024-12-04 DIAGNOSIS — E11.65 CONTROLLED TYPE 2 DIABETES MELLITUS WITH HYPERGLYCEMIA, WITHOUT LONG-TERM CURRENT USE OF INSULIN: ICD-10-CM

## 2024-12-04 DIAGNOSIS — R10.11 RIGHT UPPER QUADRANT ABDOMINAL PAIN: ICD-10-CM

## 2024-12-04 DIAGNOSIS — R93.2 ABNORMAL CT SCAN, GALLBLADDER: Primary | ICD-10-CM

## 2024-12-04 PROCEDURE — 99999 PR PBB SHADOW E&M-EST. PATIENT-LVL IV: CPT | Mod: PBBFAC,HCNC,, | Performed by: FAMILY MEDICINE

## 2024-12-04 PROCEDURE — G2211 COMPLEX E/M VISIT ADD ON: HCPCS | Mod: HCNC,S$GLB,, | Performed by: FAMILY MEDICINE

## 2024-12-04 PROCEDURE — 3078F DIAST BP <80 MM HG: CPT | Mod: HCNC,CPTII,S$GLB, | Performed by: FAMILY MEDICINE

## 2024-12-04 PROCEDURE — 1159F MED LIST DOCD IN RCRD: CPT | Mod: HCNC,CPTII,S$GLB, | Performed by: FAMILY MEDICINE

## 2024-12-04 PROCEDURE — 1101F PT FALLS ASSESS-DOCD LE1/YR: CPT | Mod: HCNC,CPTII,S$GLB, | Performed by: FAMILY MEDICINE

## 2024-12-04 PROCEDURE — 1126F AMNT PAIN NOTED NONE PRSNT: CPT | Mod: HCNC,CPTII,S$GLB, | Performed by: FAMILY MEDICINE

## 2024-12-04 PROCEDURE — 3074F SYST BP LT 130 MM HG: CPT | Mod: HCNC,CPTII,S$GLB, | Performed by: FAMILY MEDICINE

## 2024-12-04 PROCEDURE — 3288F FALL RISK ASSESSMENT DOCD: CPT | Mod: HCNC,CPTII,S$GLB, | Performed by: FAMILY MEDICINE

## 2024-12-04 PROCEDURE — 99215 OFFICE O/P EST HI 40 MIN: CPT | Mod: HCNC,S$GLB,, | Performed by: FAMILY MEDICINE

## 2024-12-04 RX ORDER — INSULIN PUMP SYRINGE, 3 ML
EACH MISCELLANEOUS
Qty: 1 EACH | Refills: 0 | Status: SHIPPED | OUTPATIENT
Start: 2024-12-04

## 2024-12-04 RX ORDER — LANCETS
EACH MISCELLANEOUS
Qty: 100 EACH | Refills: 11 | Status: SHIPPED | OUTPATIENT
Start: 2024-12-04

## 2024-12-04 NOTE — PROGRESS NOTES
(Portions of this note were dictated using voice recognition software and may contain dictation related errors in spelling/grammar/syntax not found on text review)     Chief Complaint   Patient presents with    Hypertension       HPI:  Last visit March of 2024     Hypertension    Current blood pressure medications   Losartan 100 mg daily   Amlodipine 10 mg daily       Diabetes with CKD 3 on metformin 1000 mg b.i.d. ,nephrology did start on Jardiance 25 mg daily.  Saw Nephrology recently.  Ultrasound shows some renal cysts, recommend repeat ultrasound 1 year.    Hyperlipidemia intolerant to all statins along with Zetia     Hypothyroidism on Synthroid 50 mcg daily     Fatty liver disease    Went to ED in October 2024 for abdominal pain, nausea and vomiting, and syncope.  CT abdomen pelvis showed gallbladder wall edema and gallbladder distention, ultrasound showed distention but without wall thickening or stones or sludge.  Denies any similar symptoms since but every now and then she will get some right upper quadrant pain, mild.  This morning she noticed a little bit of pain right upper quadrant.  No nausea or vomiting.  No postprandial pain.    Past Medical History:   Diagnosis Date    Anemia, unspecified     Aortic atherosclerosis     Bilateral renal cysts     CKD (chronic kidney disease), stage III     Diabetes mellitus type II     Fatty liver     High cholesterol     HLD (hyperlipidemia)     HTN (hypertension)     Hypothyroidism     Hypothyroidism     Osteopenia     Proteinuria     Statin myopathy        Past Surgical History:   Procedure Laterality Date    BREAST BIOPSY      BREAST SURGERY      biopsy    HYSTERECTOMY      still w/ovaries    ROTATOR CUFF REPAIR Left        Family History   Problem Relation Name Age of Onset    Diabetes Mother      Hypertension Mother      Blindness Father      Coronary artery disease Sister x3 55        MI    Liver cancer Sister x3     Diabetes Brother x2     No Known Problems  Brother      Diabetes Daughter x5     No Known Problems Son x3     Amblyopia Neg Hx      Cataracts Neg Hx      Glaucoma Neg Hx      Macular degeneration Neg Hx      Retinal detachment Neg Hx      Strabismus Neg Hx         Social History     Socioeconomic History    Marital status:    Tobacco Use    Smoking status: Never     Passive exposure: Never    Smokeless tobacco: Never   Substance and Sexual Activity    Alcohol use: No    Drug use: No    Sexual activity: Yes     Partners: Male     Social Drivers of Health     Financial Resource Strain: Low Risk  (1/13/2022)    Overall Financial Resource Strain (CARDIA)     Difficulty of Paying Living Expenses: Not very hard   Food Insecurity: No Food Insecurity (1/13/2022)    Hunger Vital Sign     Worried About Running Out of Food in the Last Year: Never true     Ran Out of Food in the Last Year: Never true   Housing Stability: Low Risk  (1/13/2022)    Housing Stability Vital Sign     Unable to Pay for Housing in the Last Year: No     Number of Places Lived in the Last Year: 1     Unstable Housing in the Last Year: No             Lab Results   Component Value Date    WBC 9.59 11/15/2024    HGB 11.8 (L) 11/15/2024    HCT 35.4 (L) 11/15/2024    MCV 89 11/15/2024     11/15/2024    CHOL 239 (H) 10/28/2024    TRIG 149 10/28/2024    HDL 47 10/28/2024    ALT 19 10/28/2024    AST 21 10/28/2024    BILITOT 0.4 10/28/2024    ALKPHOS 97 10/28/2024     11/15/2024    K 4.5 11/15/2024     11/15/2024    CREATININE 1.3 11/15/2024    ESTGFRAFRICA >60 04/27/2022    EGFRNONAA 54 (A) 04/27/2022    EGFRNORACEVR 41 (A) 11/15/2024    CALCIUM 10.2 11/15/2024    ALBUMIN 4.0 11/15/2024    BUN 21 11/15/2024    CO2 26 11/15/2024    TSH 2.411 10/28/2024    INR 1.0 01/12/2022    HGBA1C 7.0 (H) 10/28/2024    MICALBCREAT 483.3 (H) 11/15/2024    LDLCALC 162.2 (H) 10/28/2024     (H) 11/15/2024    MUFUQXWB20JP 30 11/15/2024                Hemoglobin (g/dL)   Date Value  "  11/15/2024 11.8 (L)   10/28/2024 11.6 (L)   05/24/2024 10.7 (L)   03/26/2024 11.6 (L)   11/06/2023 11.8 (L)   02/27/2023 11.6 (L)   08/19/2022 11.5 (L)   08/15/2022 10.9 (L)   04/27/2022 11.0 (L)   01/12/2022 11.7 (L)     Hemoglobin A1C (%)   Date Value   10/28/2024 7.0 (H)   05/24/2024 6.6 (H)   03/26/2024 6.7 (H)   11/06/2023 6.9 (H)   02/27/2023 7.1 (H)   08/15/2022 6.4 (H)     eGFR if non African American (mL/min/1.73 m^2)   Date Value   04/27/2022 54 (A)   01/11/2022 43 (A)   12/20/2021 54 (A)   11/26/2021 48 (A)   06/11/2021 48 (A)     eGFR (mL/min/1.73 m^2)   Date Value   11/15/2024 41 (A)   10/28/2024 41 (A)   05/24/2024 38 (A)   04/29/2024 45 (A)   03/26/2024 45 (A)   11/06/2023 46 (A)          Vitals:    12/04/24 1312   BP: 118/60   BP Location: Left arm   Patient Position: Sitting   Pulse: 77   Temp: 98.2 °F (36.8 °C)   SpO2: 99%   Weight: 59 kg (130 lb 1.1 oz)   Height: 5' 1" (1.549 m)             Wt Readings from Last 5 Encounters:   12/04/24 59 kg (130 lb 1.1 oz)   11/19/24 60.3 kg (133 lb)   10/25/24 59.8 kg (131 lb 13.4 oz)   05/28/24 59.4 kg (131 lb)   05/17/24 59.4 kg (130 lb 13.5 oz)     Vital signs reviewed  PE:   APPEARANCE: Well nourished, well developed, in no acute distress.    HEAD: Normocephalic, atraumatic.  EYES:     Conjunctivae noninjected.  NECK: Supple with no cervical lymphadenopathy.  No carotid bruits.  No thyromegaly  CHEST: Good inspiratory effort. Lungs clear to auscultation with no wheezes or crackles.  CARDIOVASCULAR: Normal S1, S2. No rubs, murmurs, or gallops.  ABDOMEN: Bowel sounds normal. Not distended. Soft.     Minor right upper quadrant discomfort to palpation no rebound or guarding  EXTREMITIES: No edema   DIABETIC FOOT EXAM:  Up-to-date March of 2024                      IMPRESSION  1. Abnormal CT scan, gallbladder    2. Primary hypertension    3. Controlled type 2 diabetes mellitus with hyperglycemia, without long-term current use of insulin    4. Right upper " quadrant abdominal pain    5. Stage 3b chronic kidney disease                PLAN  Reviewed outside hospital documentation, imaging.  Reviewed lab work, additional chart records        Type 2 Diabetes with CKD 3:  Monitor labs  Continue metformin 2 g daily .  Have advise staying back on  Jardiance 25 mg daily to help with diabetes control and help with CKD.  Can notify if she is having any issues with hypotension once starting         Hypertension:  Stable.  Continue losartan 100 mg daily and amlodipine 10 mg daily.     Hypothyroidism .  TSH stable      Aortic atherosclerosis, dyslipidemia.  Unfortunately intolerant to all statins and also Zetia.    Abnormal gallbladder imaging with distention on CT and ultrasound but thickening only noted on CT not on ultrasound.  Will re ultrasound in February , 2025  If any recurrent pain symptoms would advise reimaging sooner or HIDA scan      Digital medicine enrollment for hypertension    Refill diabetic supplies     Labs along with ultrasound in February of 2025 with visit to follow.     Orders Placed This Encounter   Procedures    US Abdomen Limited_Gallbladder    Comprehensive Metabolic Panel    Lipid Panel    Hemoglobin A1C    Microalbumin/Creatinine Ratio, Urine    Hypertension Digital Medicine (HDMP) Enrollment Order                SCREENINGS      DEXA scan:  03/28/2024.  L-spine T-score was-0.2.  Left femoral neck T-score was -1.5, right neck T-score is -0.8.  FRAX is 12.8/3.5.  Mammogram 12/19/16, declines rpt  Colonoscopy: 2009, Dr. Wright, normal.  Declined repeat     immunizations   Tetanus :Check with your pharmacy regarding getting the tetanus (Tdap) vaccine (once every 10 years)  PVX: 2017   Prevnar: 1/2016  Flu: declines  Zoster: utd  COVID (05/19/2021, 06/19/2021 (Pfizer), has not had booster yet but do encourage getting booster shot

## 2025-01-08 RX ORDER — METFORMIN HYDROCHLORIDE 1000 MG/1
TABLET ORAL
Qty: 180 TABLET | Refills: 1 | Status: SHIPPED | OUTPATIENT
Start: 2025-01-08

## 2025-01-08 NOTE — TELEPHONE ENCOUNTER
Refill Routing Note   Medication(s) are not appropriate for processing by [facility] for the following reason(s):        Required labs abnormal:     [facility] action(s):  Defer         Pharmacist review requested: Yes     Appointments  past 12m or future 3m with PCP    Date Provider   Last Visit   12/4/2024 Zion Villavicencio MD   Next Visit   2/11/2025 Zion Villavicencio MD   ED visits in past 90 days: 0        Note composed:9:06 AM 01/08/2025

## 2025-01-08 NOTE — TELEPHONE ENCOUNTER
Refill Decision Note   Virginia Pablo  is requesting a refill authorization.  Brief Assessment and Rationale for Refill:  Approve     Medication Therapy Plan:        Pharmacist review requested: Yes   Extended chart review required: Yes   Comments:     Note composed:12:58 PM 01/08/2025

## 2025-01-08 NOTE — TELEPHONE ENCOUNTER
No care due was identified.  [facility] Embedded Care Due Messages. Reference number: [id number].   1/08/2025 3:45:08 AM CST

## 2025-02-04 ENCOUNTER — HOSPITAL ENCOUNTER (OUTPATIENT)
Dept: RADIOLOGY | Facility: HOSPITAL | Age: 85
Discharge: HOME | End: 2025-02-04
Attending: FAMILY MEDICINE
Payer: MEDICARE

## 2025-02-04 DIAGNOSIS — R93.2 ABNORMAL CT SCAN, GALLBLADDER: ICD-10-CM

## 2025-02-04 PROCEDURE — 76705 ECHO EXAM OF ABDOMEN: CPT | Mod: 26,HCNC,, | Performed by: RADIOLOGY

## 2025-02-04 PROCEDURE — 76705 ECHO EXAM OF ABDOMEN: CPT | Mod: TC,HCNC

## 2025-02-21 ENCOUNTER — OFFICE VISIT (OUTPATIENT)
Dept: FAMILY MEDICINE | Facility: CLINIC | Age: 85
End: 2025-02-21
Payer: MEDICARE

## 2025-02-21 VITALS
BODY MASS INDEX: 24.64 KG/M2 | WEIGHT: 130.5 LBS | TEMPERATURE: 98 F | DIASTOLIC BLOOD PRESSURE: 60 MMHG | HEIGHT: 61 IN | SYSTOLIC BLOOD PRESSURE: 120 MMHG

## 2025-02-21 DIAGNOSIS — N18.32 STAGE 3B CHRONIC KIDNEY DISEASE: ICD-10-CM

## 2025-02-21 DIAGNOSIS — T46.6X5A STATIN MYOPATHY: ICD-10-CM

## 2025-02-21 DIAGNOSIS — E11.22 TYPE 2 DIABETES MELLITUS WITH STAGE 3A CHRONIC KIDNEY DISEASE, WITHOUT LONG-TERM CURRENT USE OF INSULIN: Primary | ICD-10-CM

## 2025-02-21 DIAGNOSIS — Z00.00 ENCOUNTER FOR MEDICARE ANNUAL WELLNESS EXAM: ICD-10-CM

## 2025-02-21 DIAGNOSIS — N18.31 TYPE 2 DIABETES MELLITUS WITH STAGE 3A CHRONIC KIDNEY DISEASE, WITHOUT LONG-TERM CURRENT USE OF INSULIN: Primary | ICD-10-CM

## 2025-02-21 DIAGNOSIS — G72.0 STATIN MYOPATHY: ICD-10-CM

## 2025-02-21 NOTE — PROGRESS NOTES
(Portions of this note were dictated using voice recognition software and may contain dictation related errors in spelling/grammar/syntax not found on text review)     Chief Complaint   Patient presents with    Follow-up       HPI:  Last visit 12/04/2024     Hypertension    Current blood pressure medications   Losartan 100 mg daily   Amlodipine 10 mg daily       Diabetes with CKD 3 on metformin 1000 mg b.i.d. ,nephrology did start on Jardiance 25 mg daily.  Saw Nephrology 11/19/24.  Ultrasound shows some renal cysts, recommend repeat ultrasound 1 year.    Hyperlipidemia intolerant to all statins along with Zetia     Hypothyroidism on Synthroid 50 mcg daily     Fatty liver disease    Went to ED in October 2024 for abdominal pain, nausea and vomiting, and syncope.  CT abdomen pelvis showed gallbladder wall edema and gallbladder distention, ultrasound showed distention but without wall thickening or stones or sludge.  Denies any similar symptoms since but every now and then she will get some right upper quadrant pain, mild.  Had a repeat ultrasound done 02/04/2025, no evidence of acute cholecystitis or biliary obstruction.  Echogenic foci noted along gallbladder wall 3 mm x 2, could reflect polyps or adherent stones.  Denies any further abdominal pain symptoms    Past Medical History:   Diagnosis Date    Anemia, unspecified     Aortic atherosclerosis     Bilateral renal cysts     CKD (chronic kidney disease), stage III     Diabetes mellitus type II     Fatty liver     High cholesterol     HLD (hyperlipidemia)     HTN (hypertension)     Hypothyroidism     Hypothyroidism     Osteopenia     Proteinuria     Statin myopathy        Past Surgical History:   Procedure Laterality Date    BREAST BIOPSY      BREAST SURGERY      biopsy    HYSTERECTOMY      still w/ovaries    ROTATOR CUFF REPAIR Left        Family History   Problem Relation Name Age of Onset    Diabetes Mother      Hypertension Mother      Blindness Father       Coronary artery disease Sister x3 55        MI    Liver cancer Sister x3     Diabetes Brother x2     No Known Problems Brother      Diabetes Daughter x5     No Known Problems Son x3     Amblyopia Neg Hx      Cataracts Neg Hx      Glaucoma Neg Hx      Macular degeneration Neg Hx      Retinal detachment Neg Hx      Strabismus Neg Hx         Social History     Socioeconomic History    Marital status:    Tobacco Use    Smoking status: Never     Passive exposure: Never    Smokeless tobacco: Never   Substance and Sexual Activity    Alcohol use: No    Drug use: No    Sexual activity: Yes     Partners: Male     Social Drivers of Health     Financial Resource Strain: Low Risk  (1/13/2022)    Overall Financial Resource Strain (CARDIA)     Difficulty of Paying Living Expenses: Not very hard   Food Insecurity: No Food Insecurity (1/13/2022)    Hunger Vital Sign     Worried About Running Out of Food in the Last Year: Never true     Ran Out of Food in the Last Year: Never true   Housing Stability: Low Risk  (1/13/2022)    Housing Stability Vital Sign     Unable to Pay for Housing in the Last Year: No     Number of Places Lived in the Last Year: 1     Unstable Housing in the Last Year: No             Lab Results   Component Value Date    WBC 9.59 11/15/2024    HGB 11.8 (L) 11/15/2024    HCT 35.4 (L) 11/15/2024    MCV 89 11/15/2024     11/15/2024    CHOL 246 (H) 02/04/2025    TRIG 163 (H) 02/04/2025    HDL 51 02/04/2025    ALT 21 02/04/2025    AST 20 02/04/2025    BILITOT 0.3 02/04/2025    ALKPHOS 95 02/04/2025     02/04/2025    K 5.2 (H) 02/04/2025     02/04/2025    CREATININE 1.5 (H) 02/04/2025    ESTGFRAFRICA >60 04/27/2022    EGFRNONAA 54 (A) 04/27/2022    EGFRNORACEVR 35 (A) 02/04/2025    CALCIUM 9.8 02/04/2025    ALBUMIN 3.9 02/04/2025    BUN 24 (H) 02/04/2025    CO2 23 02/04/2025    TSH 2.411 10/28/2024    INR 1.0 01/12/2022    HGBA1C 7.0 (H) 02/04/2025    MICALBCREAT 300.0 (H) 02/04/2025    LDLCALC  "162.4 (H) 02/04/2025     (H) 02/04/2025    [id number] 30 11/15/2024                Hemoglobin (g/dL)   Date Value   11/15/2024 11.8 (L)   10/28/2024 11.6 (L)   05/24/2024 10.7 (L)   03/26/2024 11.6 (L)   11/06/2023 11.8 (L)   02/27/2023 11.6 (L)   08/19/2022 11.5 (L)   08/15/2022 10.9 (L)   04/27/2022 11.0 (L)   01/12/2022 11.7 (L)     Hemoglobin A1C (%)   Date Value   02/04/2025 7.0 (H)   10/28/2024 7.0 (H)   05/24/2024 6.6 (H)   03/26/2024 6.7 (H)   11/06/2023 6.9 (H)   02/27/2023 7.1 (H)     eGFR if non African American (mL/min/1.73 m^2)   Date Value   04/27/2022 54 (A)   01/11/2022 43 (A)   12/20/2021 54 (A)   11/26/2021 48 (A)   06/11/2021 48 (A)     eGFR (mL/min/1.73 m^2)   Date Value   02/04/2025 35 (A)   11/15/2024 41 (A)   10/28/2024 41 (A)   05/24/2024 38 (A)   04/29/2024 45 (A)   03/26/2024 45 (A)          Vitals:    02/21/25 1543   BP: 120/60   BP Location: Left arm   Patient Position: Sitting   Temp: 98.3 °F (36.8 °C)   TempSrc: Oral   Weight: 59.2 kg (130 lb 8.2 oz)   Height: 5' 1" (1.549 m)               Wt Readings from Last 5 Encounters:   02/21/25 59.2 kg (130 lb 8.2 oz)   12/04/24 59 kg (130 lb 1.1 oz)   11/19/24 60.3 kg (133 lb)   10/25/24 59.8 kg (131 lb 13.4 oz)   05/28/24 59.4 kg (131 lb)     Vital signs reviewed  PE:   APPEARANCE: Well nourished, well developed, in no acute distress.    HEAD: Normocephalic, atraumatic.  EYES:     Conjunctivae noninjected.  NECK: Supple with no cervical lymphadenopathy.  No carotid bruits.  No thyromegaly  CHEST: Good inspiratory effort. Lungs clear to auscultation with no wheezes or crackles.  CARDIOVASCULAR: Normal S1, S2. No rubs, murmurs, or gallops.  ABDOMEN: Bowel sounds normal. Not distended. Soft.     Minor right upper quadrant discomfort to palpation no rebound or guarding  EXTREMITIES: No edema   DIABETIC FOOT EXAM:  Up-to-date March of 2024                      IMPRESSION  1. Type 2 diabetes mellitus with stage 3a chronic kidney disease, " without long-term current use of insulin                  PLAN  Reviewed outside hospital documentation, imaging.  Reviewed lab work, additional chart records        Type 2 Diabetes with CKD 3:  Monitor labs  Continue metformin 2 g daily, Jardiance 25 mg daily to help with diabetes control and help with CKD.   Drink plenty of fluids   Ophthalmology referral     Hypertension:  Stable.  Continue losartan 100 mg daily and amlodipine 10 mg daily.     Hypothyroidism .  TSH stable      Aortic atherosclerosis, dyslipidemia.  Unfortunately intolerant to all statins and also Zetia.    Abnormal gallbladder imaging with distention on CT and ultrasound but thickening only noted on CT not on ultrasound.  February 20, 2025 ultrasound shows gallbladder stones but no wall thickening.  Denies any current symptoms.  Notify for any recurrence               Orders Placed This Encounter   Procedures    Comprehensive Metabolic Panel    CBC Auto Differential    Hemoglobin A1C    TSH    Ambulatory referral/consult to Ophthalmology                SCREENINGS      DEXA scan:  03/28/2024.  L-spine T-score was-0.2.  Left femoral neck T-score was -1.5, right neck T-score is -0.8.  FRAX is 12.8/3.5.  Mammogram 12/19/16, declines rpt  Colonoscopy: 2009, Dr. Wright, normal.  Declined repeat     immunizations   Tetanus :Check with your pharmacy regarding getting the tetanus (Tdap) vaccine (once every 10 years)  PVX: 2017   Prevnar: 1/2016  Flu: declines  Zoster: utd  COVID (05/19/2021, 06/19/2021 (Pfizer), has not had booster yet but do encourage getting booster shot

## 2025-03-18 DIAGNOSIS — I10 ESSENTIAL HYPERTENSION: ICD-10-CM

## 2025-03-18 NOTE — TELEPHONE ENCOUNTER
No care due was identified.  [facility] Embedded Care Due Messages. Reference number: [id number].   3/18/2025 6:26:45 PM CDT

## 2025-03-19 RX ORDER — LOSARTAN POTASSIUM 100 MG/1
TABLET ORAL
Qty: 90 TABLET | Refills: 0 | OUTPATIENT
Start: 2025-03-19

## 2025-03-19 NOTE — TELEPHONE ENCOUNTER
Refill Decision Note   Oneyda Shah  is requesting a refill authorization.  Brief Assessment and Rationale for Refill:  Quick Discontinue     Medication Therapy Plan:   Pharmacy is requesting new scripts for the following medications without required information, (sig/ frequency/qty/etc)        Comments: Pharmacies have been requesting medications for patients without required information, (sig, frequency, qty, etc.). In addition, requests are sent for medication(s) pt. are currently not taking, and medications patients have never taken.    We have spoken to the pharmacies about these request types and advised their teams previously that we are unable to assess these New Script requests and require all details for these requests. This is a known issue and has been reported.     Note composed:7:20 AM 03/19/2025

## 2025-03-30 NOTE — TELEPHONE ENCOUNTER
No care due was identified.  [facility] Embedded Care Due Messages. Reference number: [id number].   3/30/2025 3:41:32 PM CDT

## 2025-03-31 RX ORDER — METFORMIN HYDROCHLORIDE 1000 MG/1
1000 TABLET ORAL 2 TIMES DAILY
Qty: 200 TABLET | Refills: 3 | Status: SHIPPED | OUTPATIENT
Start: 2025-03-31

## 2025-03-31 NOTE — TELEPHONE ENCOUNTER
Refill Routing Note   Medication(s) are not appropriate for processing by [facility] for the following reason(s):        Required labs abnormal    [facility] action(s):  Defer             Appointments  past 12m or future 3m with PCP    Date Provider   Last Visit   2/21/2025 Zion Villavicencio MD   Next Visit   8/25/2025 Zion Villavicencio MD   ED visits in past 90 days: 0        Note composed:2:30 PM 03/31/2025

## 2025-04-07 DIAGNOSIS — I10 ESSENTIAL HYPERTENSION: ICD-10-CM

## 2025-04-07 NOTE — TELEPHONE ENCOUNTER
No care due was identified.  [facility] Embedded Care Due Messages. Reference number: [id number].   4/07/2025 4:07:26 PM CDT

## 2025-04-07 NOTE — TELEPHONE ENCOUNTER
No care due was identified.  [facility] Embedded Care Due Messages. Reference number: [id number].   4/07/2025 5:09:15 PM CDT

## 2025-04-08 RX ORDER — ISOPROPYL ALCOHOL 70 ML/100ML
SWAB TOPICAL
Refills: 0 | OUTPATIENT
Start: 2025-04-08

## 2025-04-08 RX ORDER — LOSARTAN POTASSIUM 100 MG/1
100 TABLET ORAL DAILY
Qty: 90 TABLET | Refills: 3 | Status: SHIPPED | OUTPATIENT
Start: 2025-04-08

## 2025-04-08 NOTE — TELEPHONE ENCOUNTER
Refill Decision Note  Quick DC. Request already responded to by other means (e.g. phone or fax)    Oneyda Shah  is requesting a refill authorization.  Brief Assessment and Rationale for Refill:  Quick Discontinue     Medication Therapy Plan:       Medication Reconciliation Completed: No   Comments:           Note composed:10:37 AM 04/08/2025

## 2025-04-08 NOTE — TELEPHONE ENCOUNTER
Refill Routing Note   Medication(s) are not appropriate for processing by [facility] for the following reason(s):        Required labs abnormal: Cr, K    [facility] action(s):  Defer             Appointments  past 12m or future 3m with PCP    Date Provider   Last Visit   2/21/2025 Zion Villavicencio MD   Next Visit   8/25/2025 Zion Villavicencio MD   ED visits in past 90 days: 0        Note composed:10:49 AM 04/08/2025

## 2025-04-09 DIAGNOSIS — E11.65 CONTROLLED TYPE 2 DIABETES MELLITUS WITH HYPERGLYCEMIA, WITHOUT LONG-TERM CURRENT USE OF INSULIN: ICD-10-CM

## 2025-04-09 NOTE — TELEPHONE ENCOUNTER
No care due was identified.  [facility] Embedded Care Due Messages. Reference number: [id number].   4/09/2025 10:53:55 AM CDT

## 2025-04-09 NOTE — TELEPHONE ENCOUNTER
----- Message from Nury sent at 4/7/2025  4:16 PM CDT -----  Type:  RX Refill RequestWho Called: Pt Refill or New Rx: Refill RX Name and Strength:blood sugar diagnostic (ACCU-CHEK GERTRUDE PLUS TEST STRP) Strplevothyroxine (SYNTHROID) 50 MCG tabletlosartan (COZAAR) 100 MG tabletPreferred Pharmacy with phone number:[facility] - [facility] [address] [facility] [address]Snlsx: [phone] Fax: [phone] or Mail Order: Local Ordering Provider: Saud Would the patient rather a call back or a response via [facility]? Call back Best Call Back Number:[phone] Information: Please be advised, pt states that it has been about 2 weeks now and she still hasn't heard anything back, pt would like a call as soon as possible once they have been sent over

## 2025-04-23 LAB
LEFT EYE DM RETINOPATHY: NEGATIVE
RIGHT EYE DM RETINOPATHY: NEGATIVE

## 2025-05-19 ENCOUNTER — LAB VISIT (OUTPATIENT)
Dept: LAB | Facility: HOSPITAL | Age: 85
End: 2025-05-19
Attending: INTERNAL MEDICINE
Payer: MEDICARE

## 2025-05-19 DIAGNOSIS — N18.30 DIABETES MELLITUS WITH STAGE 3 CHRONIC KIDNEY DISEASE: ICD-10-CM

## 2025-05-19 DIAGNOSIS — E11.22 DIABETES MELLITUS WITH STAGE 3 CHRONIC KIDNEY DISEASE: ICD-10-CM

## 2025-05-19 DIAGNOSIS — N18.31 STAGE 3A CHRONIC KIDNEY DISEASE: ICD-10-CM

## 2025-05-19 PROBLEM — Q61.3 PKD (POLYCYSTIC KIDNEY DISEASE): Status: ACTIVE | Noted: 2025-05-19

## 2025-05-19 PROBLEM — E55.9 VITAMIN D DEFICIENCY: Status: ACTIVE | Noted: 2025-05-19

## 2025-05-19 PROBLEM — E83.9 CHRONIC KIDNEY DISEASE-MINERAL AND BONE DISORDER: Status: ACTIVE | Noted: 2025-05-19

## 2025-05-19 PROBLEM — M89.9 CHRONIC KIDNEY DISEASE-MINERAL AND BONE DISORDER: Status: ACTIVE | Noted: 2025-05-19

## 2025-05-19 PROBLEM — N18.9 CHRONIC KIDNEY DISEASE-MINERAL AND BONE DISORDER: Status: ACTIVE | Noted: 2025-05-19

## 2025-05-19 LAB
ABSOLUTE EOSINOPHIL (OHS): 0.12 K/UL
ABSOLUTE MONOCYTE (OHS): 0.62 K/UL (ref 0.3–1)
ABSOLUTE NEUTROPHIL COUNT (OHS): 5.71 K/UL (ref 1.8–7.7)
ALBUMIN SERPL BCP-MCNC: 3.8 G/DL (ref 3.5–5.2)
ALBUMIN/CREAT UR: 321.1 UG/MG
ANION GAP (OHS): 8 MMOL/L (ref 8–16)
BACTERIA #/AREA URNS AUTO: ABNORMAL /HPF
BASOPHILS # BLD AUTO: 0.04 K/UL
BASOPHILS NFR BLD AUTO: 0.5 %
BILIRUB UR QL STRIP.AUTO: NEGATIVE
BUN SERPL-MCNC: 25 MG/DL (ref 8–23)
CALCIUM SERPL-MCNC: 9.7 MG/DL (ref 8.7–10.5)
CHLORIDE SERPL-SCNC: 105 MMOL/L (ref 95–110)
CLARITY UR: ABNORMAL
CO2 SERPL-SCNC: 26 MMOL/L (ref 23–29)
COLOR UR AUTO: YELLOW
CREAT SERPL-MCNC: 1.4 MG/DL (ref 0.5–1.4)
CREAT UR-MCNC: 123 MG/DL (ref 15–325)
CREAT UR-MCNC: 127 MG/DL (ref 15–325)
EAG (OHS): 157 MG/DL (ref 68–131)
ERYTHROCYTE [DISTWIDTH] IN BLOOD BY AUTOMATED COUNT: 14.3 % (ref 11.5–14.5)
GFR SERPLBLD CREATININE-BSD FMLA CKD-EPI: 38 ML/MIN/1.73/M2
GLUCOSE SERPL-MCNC: 171 MG/DL (ref 70–110)
GLUCOSE UR QL STRIP: ABNORMAL
HBA1C MFR BLD: 7.1 % (ref 4–5.6)
HCT VFR BLD AUTO: 36.1 % (ref 37–48.5)
HGB BLD-MCNC: 11.9 GM/DL (ref 12–16)
HGB UR QL STRIP: NEGATIVE
IMM GRANULOCYTES # BLD AUTO: 0.02 K/UL (ref 0–0.04)
IMM GRANULOCYTES NFR BLD AUTO: 0.2 % (ref 0–0.5)
KETONES UR QL STRIP: NEGATIVE
LEUKOCYTE ESTERASE UR QL STRIP: ABNORMAL
LYMPHOCYTES # BLD AUTO: 2.28 K/UL (ref 1–4.8)
MAGNESIUM SERPL-MCNC: 2 MG/DL (ref 1.6–2.6)
MCH RBC QN AUTO: 29.3 PG (ref 27–31)
MCHC RBC AUTO-ENTMCNC: 33 G/DL (ref 32–36)
MCV RBC AUTO: 89 FL (ref 82–98)
MICROALBUMIN UR-MCNC: 395 UG/ML (ref ?–5000)
MICROSCOPIC COMMENT: ABNORMAL
NITRITE UR QL STRIP: NEGATIVE
NUCLEATED RBC (/100WBC) (OHS): 0 /100 WBC
PH UR STRIP: 6 [PH]
PHOSPHATE SERPL-MCNC: 3.4 MG/DL (ref 2.7–4.5)
PLATELET # BLD AUTO: 283 K/UL (ref 150–450)
PMV BLD AUTO: 11.4 FL (ref 9.2–12.9)
POTASSIUM SERPL-SCNC: 4 MMOL/L (ref 3.5–5.1)
PROT UR QL STRIP: ABNORMAL
PROT UR-MCNC: 64 MG/DL
PROT/CREAT UR: 0.5 MG/G{CREAT}
PTH-INTACT SERPL-MCNC: 139.6 PG/ML (ref 9–77)
RBC # BLD AUTO: 4.06 M/UL (ref 4–5.4)
RBC #/AREA URNS AUTO: 4 /HPF (ref 0–4)
RELATIVE EOSINOPHIL (OHS): 1.4 %
RELATIVE LYMPHOCYTE (OHS): 25.9 % (ref 18–48)
RELATIVE MONOCYTE (OHS): 7.1 % (ref 4–15)
RELATIVE NEUTROPHIL (OHS): 64.9 % (ref 38–73)
SODIUM SERPL-SCNC: 139 MMOL/L (ref 136–145)
SP GR UR STRIP: 1.01
SQUAMOUS #/AREA URNS AUTO: 5 /HPF
URATE SERPL-MCNC: 5.3 MG/DL (ref 2.4–5.7)
UROBILINOGEN UR STRIP-ACNC: NEGATIVE EU/DL
WBC # BLD AUTO: 8.79 K/UL (ref 3.9–12.7)
WBC #/AREA URNS AUTO: 6 /HPF (ref 0–5)

## 2025-05-19 PROCEDURE — 83970 ASSAY OF PARATHORMONE: CPT | Mod: HCNC

## 2025-05-19 PROCEDURE — 81001 URINALYSIS AUTO W/SCOPE: CPT | Mod: HCNC

## 2025-05-19 PROCEDURE — 85025 COMPLETE CBC W/AUTO DIFF WBC: CPT | Mod: HCNC

## 2025-05-19 PROCEDURE — 82570 ASSAY OF URINE CREATININE: CPT | Mod: 59,HCNC

## 2025-05-19 PROCEDURE — 82310 ASSAY OF CALCIUM: CPT | Mod: HCNC

## 2025-05-19 PROCEDURE — 84550 ASSAY OF BLOOD/URIC ACID: CPT | Mod: HCNC

## 2025-05-19 PROCEDURE — 36415 COLL VENOUS BLD VENIPUNCTURE: CPT | Mod: HCNC

## 2025-05-19 PROCEDURE — 83036 HEMOGLOBIN GLYCOSYLATED A1C: CPT | Mod: HCNC

## 2025-05-19 PROCEDURE — 83735 ASSAY OF MAGNESIUM: CPT | Mod: HCNC

## 2025-05-19 PROCEDURE — 84156 ASSAY OF PROTEIN URINE: CPT | Mod: HCNC

## 2025-05-29 ENCOUNTER — OFFICE VISIT (OUTPATIENT)
Dept: PODIATRY | Facility: CLINIC | Age: 85
End: 2025-05-29
Payer: MEDICARE

## 2025-05-29 ENCOUNTER — HOSPITAL ENCOUNTER (OUTPATIENT)
Dept: RADIOLOGY | Facility: HOSPITAL | Age: 85
Discharge: HOME | End: 2025-05-29
Attending: STUDENT IN AN ORGANIZED HEALTH CARE EDUCATION/TRAINING PROGRAM
Payer: MEDICARE

## 2025-05-29 VITALS
BODY MASS INDEX: 24.55 KG/M2 | DIASTOLIC BLOOD PRESSURE: 78 MMHG | WEIGHT: 130.06 LBS | HEIGHT: 61 IN | HEART RATE: 78 BPM | SYSTOLIC BLOOD PRESSURE: 155 MMHG

## 2025-05-29 DIAGNOSIS — M54.42 CHRONIC LOW BACK PAIN WITH LEFT-SIDED SCIATICA, UNSPECIFIED BACK PAIN LATERALITY: ICD-10-CM

## 2025-05-29 DIAGNOSIS — E11.9 ENCOUNTER FOR DIABETIC FOOT EXAM: ICD-10-CM

## 2025-05-29 DIAGNOSIS — G89.29 CHRONIC LOW BACK PAIN WITH LEFT-SIDED SCIATICA, UNSPECIFIED BACK PAIN LATERALITY: ICD-10-CM

## 2025-05-29 DIAGNOSIS — M79.672 PAIN OF LEFT HEEL: Primary | ICD-10-CM

## 2025-05-29 DIAGNOSIS — E11.65 CONTROLLED TYPE 2 DIABETES MELLITUS WITH HYPERGLYCEMIA, WITHOUT LONG-TERM CURRENT USE OF INSULIN: ICD-10-CM

## 2025-05-29 DIAGNOSIS — M79.672 PAIN OF LEFT HEEL: ICD-10-CM

## 2025-05-29 PROCEDURE — 73630 X-RAY EXAM OF FOOT: CPT | Mod: 26,LT,, | Performed by: RADIOLOGY

## 2025-05-29 PROCEDURE — 73630 X-RAY EXAM OF FOOT: CPT | Mod: TC,FY,LT

## 2025-05-29 PROCEDURE — 99204 OFFICE O/P NEW MOD 45 MIN: CPT | Mod: S$GLB,,, | Performed by: STUDENT IN AN ORGANIZED HEALTH CARE EDUCATION/TRAINING PROGRAM

## 2025-05-29 PROCEDURE — 3078F DIAST BP <80 MM HG: CPT | Mod: CPTII,S$GLB,, | Performed by: STUDENT IN AN ORGANIZED HEALTH CARE EDUCATION/TRAINING PROGRAM

## 2025-05-29 PROCEDURE — 1101F PT FALLS ASSESS-DOCD LE1/YR: CPT | Mod: CPTII,S$GLB,, | Performed by: STUDENT IN AN ORGANIZED HEALTH CARE EDUCATION/TRAINING PROGRAM

## 2025-05-29 PROCEDURE — 3077F SYST BP >= 140 MM HG: CPT | Mod: CPTII,S$GLB,, | Performed by: STUDENT IN AN ORGANIZED HEALTH CARE EDUCATION/TRAINING PROGRAM

## 2025-05-29 PROCEDURE — 99999 PR PBB SHADOW E&M-EST. PATIENT-LVL IV: CPT | Mod: PBBFAC,,, | Performed by: STUDENT IN AN ORGANIZED HEALTH CARE EDUCATION/TRAINING PROGRAM

## 2025-05-29 PROCEDURE — 1125F AMNT PAIN NOTED PAIN PRSNT: CPT | Mod: CPTII,S$GLB,, | Performed by: STUDENT IN AN ORGANIZED HEALTH CARE EDUCATION/TRAINING PROGRAM

## 2025-05-29 PROCEDURE — 1159F MED LIST DOCD IN RCRD: CPT | Mod: CPTII,S$GLB,, | Performed by: STUDENT IN AN ORGANIZED HEALTH CARE EDUCATION/TRAINING PROGRAM

## 2025-05-29 PROCEDURE — 3288F FALL RISK ASSESSMENT DOCD: CPT | Mod: CPTII,S$GLB,, | Performed by: STUDENT IN AN ORGANIZED HEALTH CARE EDUCATION/TRAINING PROGRAM

## 2025-05-29 RX ORDER — LIDOCAINE AND PRILOCAINE 25; 25 MG/G; MG/G
CREAM TOPICAL
Qty: 30 G | Refills: 0 | Status: SHIPPED | OUTPATIENT
Start: 2025-05-29

## 2025-05-29 NOTE — PROGRESS NOTES
Subjective:     Patient ID: Oneyda Shah is a 84 y.o. female.    Chief Complaint: Heel Pain (Left heel worst in morning )    Virginia is a 84 y.o. female who presents to the podiatry clinic  with complaint of  left foot pain. Onset of the symptoms was several months ago. Precipitating event: none known. Current symptoms include: pain all around her left heel. Aggravating factors: any weight bearing. Symptoms have gradually worsened. Patient has had no prior foot problems. Evaluation to date: none. Treatment to date: none. Patients rates pain 5/10 on pain scale. Relates to history of lower back pain with pain radiating down to her foot. Here for annual diabetic foot exam.    Hemoglobin A1C   Date Value Ref Range Status   02/04/2025 7.0 (H) 4.0 - 5.6 % Final     Comment:     ADA Screening Guidelines:  5.7-6.4%  Consistent with prediabetes  >or=6.5%  Consistent with diabetes    High levels of fetal hemoglobin interfere with the HbA1C  assay. Heterozygous hemoglobin variants (HbS, HgC, etc)do  not significantly interfere with this assay.   However, presence of multiple variants may affect accuracy.     10/28/2024 7.0 (H) 4.0 - 5.6 % Final     Comment:     ADA Screening Guidelines:  5.7-6.4%  Consistent with prediabetes  >or=6.5%  Consistent with diabetes    High levels of fetal hemoglobin interfere with the HbA1C  assay. Heterozygous hemoglobin variants (HbS, HgC, etc)do  not significantly interfere with this assay.   However, presence of multiple variants may affect accuracy.     05/24/2024 6.6 (H) 4.0 - 5.6 % Final     Comment:     ADA Screening Guidelines:  5.7-6.4%  Consistent with prediabetes  >or=6.5%  Consistent with diabetes    High levels of fetal hemoglobin interfere with the HbA1C  assay. Heterozygous hemoglobin variants (HbS, HgC, etc)do  not significantly interfere with this assay.   However, presence of multiple variants may affect accuracy.       Hemoglobin A1c   Date Value Ref Range Status    05/19/2025 7.1 (H) 4.0 - 5.6 % Final     Comment:     ADA Screening Guidelines:  5.7-6.4%  Consistent with prediabetes  >=6.5%  Consistent with diabetes    High levels of fetal hemoglobin interfere with the HbA1C  assay. Heterozygous hemoglobin variants (HbS, HgC, etc)do  not significantly interfere with this assay.   However, presence of multiple variants may affect accuracy.       Review of Systems   Constitutional: Negative for chills, decreased appetite, diaphoresis and fever.   HENT:  Positive for hearing loss. Negative for congestion.    Cardiovascular:  Negative for chest pain, claudication, leg swelling and syncope.   Respiratory:  Negative for cough and shortness of breath.    Skin:  Negative for color change, dry skin, flushing, itching, poor wound healing and rash.   Musculoskeletal:  Positive for joint pain. Negative for back pain and joint swelling.   Gastrointestinal:  Negative for nausea and vomiting.   Neurological:  Negative for focal weakness and weakness.   Psychiatric/Behavioral:  Negative for altered mental status. The patient is not nervous/anxious.         Objective:     Physical Exam  Constitutional:       General: She is not in acute distress.     Appearance: She is well-developed. She is not diaphoretic.   Cardiovascular:      Comments: Dorsalis pedis and posterior tibial pulses are within normal limits. Skin temperature is within normal limits. Toes are cool to touch and feet are warm proximally. Hair growth is within normal limits. Skin is normotrophic and without hyperpigmentation. No edema noted. No spider veins or varicosities noted, bilaterally.   Musculoskeletal:         General: Tenderness present.      Comments: Adequate joint range of motion without pain, limitation, nor crepitation to bilateral feet and ankle joints. Muscle strength is 5/5 in all groups bilaterally.      Tenderness to heel with side to side squeeze and to plantar aspect. Subjective radiating pain from lower  back to heel.      Lymphadenopathy:      Comments: Negative lymphangitic streaking    Skin:     General: Skin is warm and dry.      Findings: No lesion.      Comments: Skin is warm and dry, no acute signs of infection noted. No open wounds, macerations or hyperkeratotic lesions, bilaterally.     Toenails are well trimmed and of normal morphology, bilaterally.    Neurological:      Mental Status: She is alert and oriented to person, place, and time.      Motor: No abnormal muscle tone.      Comments: Light touch within normal limits.    Psychiatric:         Behavior: Behavior normal.         Thought Content: Thought content normal.         Judgment: Judgment normal.           Assessment:      Encounter Diagnoses   Name Primary?    Pain of left heel Yes    Chronic low back pain with left-sided sciatica, unspecified back pain laterality     Controlled type 2 diabetes mellitus with hyperglycemia, without long-term current use of insulin     Encounter for diabetic foot exam      Plan:     Virginia was seen today for heel pain.    Diagnoses and all orders for this visit:    Pain of left heel  -     X-Ray Foot Complete Left; Future    Chronic low back pain with left-sided sciatica, unspecified back pain laterality  -     X-Ray Foot Complete Left; Future    Controlled type 2 diabetes mellitus with hyperglycemia, without long-term current use of insulin  -     Foot Exam Performed    Encounter for diabetic foot exam    Other orders  -     LIDOcaine-prilocaine (EMLA) cream; Apply topically as needed (3x per day as needed).      I counseled the patient on her conditions, their implications and medical management.  Xray ordered, consider MRI to rule out possible stress fracture  Rest, elevate OTC tylenol PRN pain  Supportive tennis shoes with arch supports at all times while ambulating  Annual diabetic foot exam performed today. Shoe inspection. Diabetic Foot Education. Patient reminded of the importance of good nutrition and  blood sugar control to help prevent podiatric complications of diabetes. Patient instructed on proper foot hygeine. We discussed wearing proper shoe gear, daily foot inspections, never walking without protective shoe gear, never putting sharp instruments to feet.  Return to clinic in 2-4 weeks, sooner PRN

## 2025-05-29 NOTE — PATIENT INSTRUCTIONS
[employer], arch supports. [employer] shoes, New Balance. Consider [employer] tennis shoes. [employer] tennis shoes.     [employer] by [facility]:  [address], [place] [address]

## 2025-06-03 RX ORDER — LEVOTHYROXINE SODIUM 50 UG/1
50 TABLET ORAL
Qty: 90 TABLET | Refills: 1 | Status: SHIPPED | OUTPATIENT
Start: 2025-06-03

## 2025-06-03 RX ORDER — AMLODIPINE BESYLATE 10 MG/1
10 TABLET ORAL
Qty: 90 TABLET | Refills: 3 | Status: SHIPPED | OUTPATIENT
Start: 2025-06-03

## 2025-06-10 ENCOUNTER — PATIENT OUTREACH (OUTPATIENT)
Dept: ADMINISTRATIVE | Facility: HOSPITAL | Age: 85
End: 2025-06-10
Payer: MEDICARE

## 2025-06-10 NOTE — PROGRESS NOTES
Population Health Chart Review & Patient Outreach Details      Additional [facility] Health Notes:               Updates Requested / Reviewed:      Updated Care Coordination Note         Health Maintenance Topics Overdue:      VBHM Score: 0     Uncontrolled BP  Patient is not due for any topics at this time.    Tetanus Vaccine  Shingles/Zoster Vaccine  RSV Vaccine                  Health Maintenance Topic(s) Outreach Outcomes & Actions Taken:    Eye Exam - Outreach Outcomes & Actions Taken  : Diabetic Eye External Records Uploaded, Care Team & History Updated if Applicable

## 2025-07-17 ENCOUNTER — OFFICE VISIT (OUTPATIENT)
Dept: PODIATRY | Facility: CLINIC | Age: 85
End: 2025-07-17
Payer: MEDICARE

## 2025-07-17 VITALS
SYSTOLIC BLOOD PRESSURE: 151 MMHG | WEIGHT: 130.06 LBS | BODY MASS INDEX: 24.55 KG/M2 | DIASTOLIC BLOOD PRESSURE: 77 MMHG | HEART RATE: 69 BPM | HEIGHT: 61 IN

## 2025-07-17 DIAGNOSIS — G57.63 MORTON'S NEUROMA OF BOTH FEET: ICD-10-CM

## 2025-07-17 DIAGNOSIS — M72.2 PLANTAR FASCIITIS: Primary | ICD-10-CM

## 2025-07-17 PROCEDURE — 99999 PR PBB SHADOW E&M-EST. PATIENT-LVL V: CPT | Mod: PBBFAC,HCNC,, | Performed by: STUDENT IN AN ORGANIZED HEALTH CARE EDUCATION/TRAINING PROGRAM

## 2025-07-17 PROCEDURE — 3078F DIAST BP <80 MM HG: CPT | Mod: CPTII,HCNC,S$GLB, | Performed by: STUDENT IN AN ORGANIZED HEALTH CARE EDUCATION/TRAINING PROGRAM

## 2025-07-17 PROCEDURE — 1159F MED LIST DOCD IN RCRD: CPT | Mod: CPTII,HCNC,S$GLB, | Performed by: STUDENT IN AN ORGANIZED HEALTH CARE EDUCATION/TRAINING PROGRAM

## 2025-07-17 PROCEDURE — 1125F AMNT PAIN NOTED PAIN PRSNT: CPT | Mod: CPTII,HCNC,S$GLB, | Performed by: STUDENT IN AN ORGANIZED HEALTH CARE EDUCATION/TRAINING PROGRAM

## 2025-07-17 PROCEDURE — 3077F SYST BP >= 140 MM HG: CPT | Mod: CPTII,HCNC,S$GLB, | Performed by: STUDENT IN AN ORGANIZED HEALTH CARE EDUCATION/TRAINING PROGRAM

## 2025-07-17 PROCEDURE — 3288F FALL RISK ASSESSMENT DOCD: CPT | Mod: CPTII,HCNC,S$GLB, | Performed by: STUDENT IN AN ORGANIZED HEALTH CARE EDUCATION/TRAINING PROGRAM

## 2025-07-17 PROCEDURE — 99214 OFFICE O/P EST MOD 30 MIN: CPT | Mod: HCNC,S$GLB,, | Performed by: STUDENT IN AN ORGANIZED HEALTH CARE EDUCATION/TRAINING PROGRAM

## 2025-07-17 PROCEDURE — 1101F PT FALLS ASSESS-DOCD LE1/YR: CPT | Mod: CPTII,HCNC,S$GLB, | Performed by: STUDENT IN AN ORGANIZED HEALTH CARE EDUCATION/TRAINING PROGRAM

## 2025-07-17 NOTE — PROGRESS NOTES
Subjective:     Patient ID: Oneyda Shah is a 84 y.o. female.    Chief Complaint: Follow-up     Virginia is a 84 y.o. female who presents to the podiatry clinic  with complaint of  left foot pain. Onset of the symptoms was several months ago. Precipitating event: none known. Current symptoms include: pain all around her left heel. Aggravating factors: any weight bearing. Symptoms have gradually worsened. Patient has had no prior foot problems. Evaluation to date: none. Treatment to date: none. Patients rates pain 5/10 on pain scale. Relates to history of lower back pain with pain radiating down to her foot. Here for annual diabetic foot exam.    7/17/25: Seen today for continued left heel pain. Relates has some numbness in between 3rd and 4th toes of both feet as well. History of right heel pain that has resolved. Has bought new [employer] tennis shoes, feels that they are helping.     Hemoglobin A1C   Date Value Ref Range Status   02/04/2025 7.0 (H) 4.0 - 5.6 % Final     Comment:     ADA Screening Guidelines:  5.7-6.4%  Consistent with prediabetes  >or=6.5%  Consistent with diabetes    High levels of fetal hemoglobin interfere with the HbA1C  assay. Heterozygous hemoglobin variants (HbS, HgC, etc)do  not significantly interfere with this assay.   However, presence of multiple variants may affect accuracy.     10/28/2024 7.0 (H) 4.0 - 5.6 % Final     Comment:     ADA Screening Guidelines:  5.7-6.4%  Consistent with prediabetes  >or=6.5%  Consistent with diabetes    High levels of fetal hemoglobin interfere with the HbA1C  assay. Heterozygous hemoglobin variants (HbS, HgC, etc)do  not significantly interfere with this assay.   However, presence of multiple variants may affect accuracy.     05/24/2024 6.6 (H) 4.0 - 5.6 % Final     Comment:     ADA Screening Guidelines:  5.7-6.4%  Consistent with prediabetes  >or=6.5%  Consistent with diabetes    High levels of fetal hemoglobin interfere with the HbA1C  assay.  Heterozygous hemoglobin variants (HbS, HgC, etc)do  not significantly interfere with this assay.   However, presence of multiple variants may affect accuracy.       Hemoglobin A1c   Date Value Ref Range Status   05/19/2025 7.1 (H) 4.0 - 5.6 % Final     Comment:     ADA Screening Guidelines:  5.7-6.4%  Consistent with prediabetes  >=6.5%  Consistent with diabetes    High levels of fetal hemoglobin interfere with the HbA1C  assay. Heterozygous hemoglobin variants (HbS, HgC, etc)do  not significantly interfere with this assay.   However, presence of multiple variants may affect accuracy.       Review of Systems   Constitutional: Negative for chills, decreased appetite, diaphoresis and fever.   HENT:  Positive for hearing loss. Negative for congestion.    Cardiovascular:  Negative for chest pain, claudication, leg swelling and syncope.   Respiratory:  Negative for cough and shortness of breath.    Skin:  Negative for color change, dry skin, flushing, itching, poor wound healing and rash.   Musculoskeletal:  Positive for joint pain. Negative for back pain and joint swelling.   Gastrointestinal:  Negative for nausea and vomiting.   Neurological:  Negative for focal weakness and weakness.   Psychiatric/Behavioral:  Negative for altered mental status. The patient is not nervous/anxious.         Objective:     Physical Exam  Constitutional:       General: She is not in acute distress.     Appearance: She is well-developed. She is not diaphoretic.   Cardiovascular:      Comments: Dorsalis pedis and posterior tibial pulses are within normal limits. Skin temperature is within normal limits. Toes are cool to touch and feet are warm proximally. Hair growth is within normal limits. Skin is normotrophic and without hyperpigmentation. No edema noted. No spider veins or varicosities noted, bilaterally.   Musculoskeletal:         General: Tenderness present.      Comments: Adequate joint range of motion without pain, limitation, nor  crepitation to bilateral feet and ankle joints. Muscle strength is 5/5 in all groups bilaterally.      Tenderness left heel at insertion of plantar fascia to medial tuberosity of calcaneus      Lymphadenopathy:      Comments: Negative lymphangitic streaking    Skin:     General: Skin is warm and dry.      Findings: No lesion.      Comments: Skin is warm and dry, no acute signs of infection noted. No open wounds, macerations or hyperkeratotic lesions, bilaterally.     Toenails are well trimmed and of normal morphology, bilaterally.    Neurological:      Mental Status: She is alert and oriented to person, place, and time.      Motor: No abnormal muscle tone.      Comments: Light touch within normal limits.    Psychiatric:         Behavior: Behavior normal.         Thought Content: Thought content normal.         Judgment: Judgment normal.           Assessment:      Encounter Diagnoses   Name Primary?    Plantar fasciitis Yes    Diaz's neuroma of both feet        Plan:     Virginia was seen today for follow-up and numbness.    Diagnoses and all orders for this visit:    Plantar fasciitis  -     Ambulatory Referral/Consult to Physical Therapy    Diaz's neuroma of both feet  -     Ambulatory Referral/Consult to Physical Therapy        I counseled the patient on her conditions, their implications and medical management.  Xray independently reviewed with patient noting minimal heel spur  Rest, elevate OTC tylenol PRN pain  Supportive tennis shoes with arch supports at all times while ambulating  Rx PT  Return to clinic PRN

## 2025-07-31 ENCOUNTER — CLINICAL SUPPORT (OUTPATIENT)
Dept: REHABILITATION | Facility: HOSPITAL | Age: 85
End: 2025-07-31
Attending: STUDENT IN AN ORGANIZED HEALTH CARE EDUCATION/TRAINING PROGRAM
Payer: MEDICARE

## 2025-07-31 DIAGNOSIS — R53.1 DECREASED STRENGTH, ENDURANCE, AND MOBILITY: ICD-10-CM

## 2025-07-31 DIAGNOSIS — Z74.09 DECREASED STRENGTH, ENDURANCE, AND MOBILITY: ICD-10-CM

## 2025-07-31 DIAGNOSIS — R29.3 POSTURAL IMBALANCE: Primary | ICD-10-CM

## 2025-07-31 DIAGNOSIS — R26.89 ANTALGIC GAIT: ICD-10-CM

## 2025-07-31 DIAGNOSIS — R68.89 DECREASED STRENGTH, ENDURANCE, AND MOBILITY: ICD-10-CM

## 2025-07-31 PROCEDURE — 97161 PT EVAL LOW COMPLEX 20 MIN: CPT | Mod: HCNC,PN

## 2025-07-31 PROCEDURE — 97530 THERAPEUTIC ACTIVITIES: CPT | Mod: HCNC,PN

## 2025-07-31 NOTE — PROGRESS NOTES
Outpatient Rehab    Physical Therapy Evaluation    Patient Name: Oneyda Shah  MRN: [id number]  YOB: 1940  Encounter Date: 7/31/2025    Therapy Diagnosis:   Encounter Diagnoses   Name Primary?    Postural imbalance Yes    Decreased strength, endurance, and mobility     Antalgic gait      Physician: Mónica Olvera DPM    Physician Orders: Eval and Treat  Medical Diagnosis: Plantar fasciitis  Diaz's neuroma of both feet  Surgical Diagnosis: Not applicable for this Episode   Surgical Date: Not applicable for this Episode  Days Since Last Surgery: Not applicable for this Episode    Visit # / Visits Authorized:  1 / 1  Insurance Authorization Period: 7/17/2025 to 7/17/2026  Date of Evaluation: 7/31/2025  Plan of Care Certification: 7/31/2025 to 9/25/25     Time In: 1405   Time Out: 1500  Total Time (in minutes): 55   Total Billable Time (in minutes): 55    Intake Outcome Measure for FOTO Survey    Therapist reviewed FOTO scores for Oneyda Shah on 7/31/2025.   FOTO report - see Media section or FOTO account episode details.     Intake Score (%): 59    Precautions:       Subjective   History of Present Illness  Virginia is a 84 y.o. female who reports to physical therapy with a chief concern of bilateral foot pain/numbness.     The patient reports a medical diagnosis of M72.2 (ICD-10-CM) - Plantar fasciitis  G57.63 (ICD-10-CM) - Diaz's neuroma of both feet.            Diagnostic tests related to this condition: X-ray.     X-Ray Details: FINDINGS:  Mild hallux valgus with some early bunion changes of the 1st metatarsal head.  Joint spaces are preserved.  Minimal plantar and dorsal calcaneal enthesophyte formation.  No soft tissue swelling.    History of Present Condition/Illness: She was having some pain in her Right heel. That has got better but now she has some numbness in her 2nd- 4th toes that has been going on for about 2 weeks. She also started to have pain in her Left heel and 2nd-4th  toe on the Left. She also has some tenderness to bilateral anterior tibialis when she rubs her hand along the muscle. She does not have pain in the anterior leg with walking. She denies any recent falls. However, she does sometimes have near falls where if she does not hold onto something then she would have fallen.          Activities of Daily Living  Social history was obtained from Patient.                   Pain     Patient reports a current pain level of 4/10. Pain at best is reported as 4/10. Pain at worst is reported as 4/10.   Location: L heel  Pain Qualities: Aching, Other (Comment)  Other Pain Qualities: tingling  Pain-Relieving Factors: Rest, Relaxation, Change in position, Massage  Pain-Aggravating Factors: Walking         Living Arrangements  Living Situation  Housing: Home independently  Living Arrangements: Family members  Support Systems: Family members    Home Setup  Type of Structure: House  Home Access: Level entry  Number of Levels in Home: One level        Employment     Employment Status: Retired         Past Medical History/Physical Systems Review:   Oneyda Shah  has a past medical history of Anemia, unspecified, Aortic atherosclerosis, Bilateral renal cysts, CKD (chronic kidney disease), stage III, Diabetes mellitus type II, Fatty liver, High cholesterol, HLD (hyperlipidemia), HTN (hypertension), Hypothyroidism, Hypothyroidism, Osteopenia, Proteinuria, and Statin myopathy.    Oneyda Shah  has a past surgical history that includes Hysterectomy; Breast biopsy; Breast surgery; and Rotator cuff repair (Left).    Virginia has a current medication list which includes the following prescription(s): amlodipine, aspirin, b complex vitamins, blood sugar diagnostic, blood sugar diagnostic, blood-glucose meter, cholecalciferol (vitamin d3), dropsafe alcohol prep pads, empagliflozin, lancets, levothyroxine, lidocaine-prilocaine, losartan, metformin, and fish oil-omega-3 fatty acids, and the  following Facility-Administered Medications: acetaminophen.    Review of patient's allergies indicates:   Allergen Reactions    Penicillins Other (See Comments)    Ace inhibitors      cough    Statins-hmg-coa reductase inhibitors      Myalgias,        Objective   Posture                 Bilateral ankles/feet exhibit: Foot Hallux Valgus and Foot Pes Planus       Ankle/Foot Palpation     Muscle tenderness to Right anterior tibialis       Muscle tenderness to Left  anterior tibialis         Ankle/Foot Range of Motion   Right Ankle/Foot   Active (deg) Passive (deg) Pain   Dorsiflexion (KE) 8       Dorsiflexion (KF)         Plantar Flexion 50       Ankle Inversion 25       Ankle Eversion 15       Subtalar Inversion         Subtalar Eversion         Great Toe MTP Flexion         Great Toe MTP Extension         Great Toe IP Flexion             Left Ankle/Foot   Active (deg) Passive (deg) Pain   Dorsiflexion (KE) 10       Dorsiflexion (KF)         Plantar Flexion 58       Ankle Inversion 25       Ankle Eversion 15       Subtalar Inversion         Subtalar Eversion         Great Toe MTP Flexion         Great Toe MTP Extension         Great Toe IP Flexion                            Hip Strength - Planes of Motion   Right Strength Right Pain Left Strength Left  Pain   Flexion (L2) 4   4     Extension           ABduction           ADduction           Internal Rotation 4   4     External Rotation 4+             Knee Strength   Right Strength Right Pain Left Strength Left  Pain   Flexion (S2) 4+   4+     Prone Flexion           Extension (L3) 4   4            Ankle/Foot Strength - Planes of Motion   Right Strength Right Pain Left Strength Left  Pain   Dorsiflexion (L4) 4+   4+     Plantar Flexion (S1) 5   5     Inversion           Eversion           Great Toe Flexion           Great Toe Extension (L5) 4   4     Lesser Toes Flexion           Lesser Toes Extension                  Ankle/Foot Special Tests  Ankle/Foot Osseous  "Tests  Negative: Right Impingement Sign and Left Impingement Sign  Other Ankle/Foot Tests  Negative: Right Windlass and Left Windlass           Ankle/Foot Joint Mobility  Right Ankle/Foot Joint Mobility  Hypomobile: Subtalar Joint and Midfoot  Left Ankle/Foot Joint Mobility  Hypomobile: Subtalar Joint and Midfoot              Fall Risk  Functional mobility test results suggest the patient is: At Risk for Falls  Four Stage Balance Test                 Single Leg Stand - Right Foot: 5 sec  Single Leg Stand - Left Foot: 5 sec            Gait Analysis  Gait Pattern: Antalgic                   Treatment:  Therapeutic Activity  TA 1: seated toe abduction x20  TA 2: towel scrunches x 3 minutes  TA 3: great toe stretch 10"x10 ea    Time Entry(in minutes):  PT Evaluation (Low) Time Entry: 45  Therapeutic Activity Time Entry: 10    Assessment & Plan   Assessment  Virginia presents with a condition of Low complexity.   Presentation of Symptoms: Stable  Will Comorbidities Impact Care: Yes  history of Anemia, unspecified, Aortic atherosclerosis, Bilateral renal cysts, CKD (chronic kidney disease), stage III, Diabetes mellitus type II, Fatty liver, High cholesterol, HLD (hyperlipidemia), HTN (hypertension), Hypothyroidism, Hypothyroidism, Osteopenia, Proteinuria, and Statin myopathy.    Functional Limitations: Activity tolerance, Completing self-care activities, Proprioception, Range of motion, Participating in leisure activities, Pain with ADLs/IADLs, Gross motor coordination  Impairments: Pain with functional activity, Impaired physical strength  Personal Factors Affecting Prognosis: Pain    Patient Goal for Therapy (PT): be able to walk without pain  Prognosis: Fair  Assessment Details: Patient is a 84 y.o. female with medical diagnosis of Plantar fasciitis and Diaz's neuroma of both feet. She displayed bilateral pes planus with hallux valgus (Right>Left). She displayed good active range of motion of bilateral talocrural " joints. However, some hypomobility noted in subtalar and midfoot. PT was unable to reproduce increase in plantar surface pain with palpation or special tests. Patient only had increase in pain with weight bearing. Patient's numbness/tingling did not change with movement or special testing. She would benefit from skilled PT to address foot intrinsic strength for foot posture as well as improve overall balance.     Plan  From a physical therapy perspective, the patient would benefit from: Skilled Rehab Services    Planned therapy interventions include: Therapeutic exercise, Therapeutic activities, Neuromuscular re-education, Manual therapy, ADLs/IADLs, Other (Comment), and Gait training. Dry Needling (prn)  Planned modalities to include: Biofeedback, Electrical stimulation - attended, Electrical stimulation - passive/unattended, Thermotherapy (hot pack), and Cryotherapy (cold pack).        Visit Frequency: 1 times Per Week for 8 Weeks.       This plan was discussed with Patient.   Discussion participants: Agreed Upon Plan of Care  Plan details: Frequency and duration of treatment to be adjusted as needed          The patient's spiritual, cultural, and educational needs were considered, and the patient is agreeable to the plan of care and goals.           Goals:   Active       long term goals       patient will be independent with Home exercise program to supplement therapy        Start:  07/31/25    Expected End:  09/25/25            Patient will improve FOTO score to 67% to improve self perceived ability to perform functional tasks         Start:  07/31/25    Expected End:  09/25/25            Patient will be able to walk 2 blocks without difficulty to improve overall ability to perform functional tasks        Start:  07/31/25    Expected End:  09/25/25            Patient will be able to perform light activities around her home without difficulty to improve ability to perform functional tasks        Start:  07/31/25     Expected End:  09/25/25            Patient will be able to maintain single leg stance for 20 seconds to improve balance        Start:  07/31/25    Expected End:  09/25/25                Dee Granados, PT

## 2025-08-04 PROBLEM — Z74.09 DECREASED STRENGTH, ENDURANCE, AND MOBILITY: Status: RESOLVED | Noted: 2025-07-31 | Resolved: 2025-08-04

## 2025-08-04 PROBLEM — R53.1 DECREASED STRENGTH, ENDURANCE, AND MOBILITY: Status: RESOLVED | Noted: 2025-07-31 | Resolved: 2025-08-04

## 2025-08-04 PROBLEM — R68.89 DECREASED STRENGTH, ENDURANCE, AND MOBILITY: Status: RESOLVED | Noted: 2025-07-31 | Resolved: 2025-08-04

## 2025-08-07 ENCOUNTER — CLINICAL SUPPORT (OUTPATIENT)
Dept: REHABILITATION | Facility: HOSPITAL | Age: 85
End: 2025-08-07
Payer: MEDICARE

## 2025-08-07 DIAGNOSIS — R26.89 ANTALGIC GAIT: ICD-10-CM

## 2025-08-07 DIAGNOSIS — R29.3 POSTURAL IMBALANCE: Primary | ICD-10-CM

## 2025-08-07 PROCEDURE — 97112 NEUROMUSCULAR REEDUCATION: CPT | Mod: HCNC,PN,CQ

## 2025-08-07 PROCEDURE — 97530 THERAPEUTIC ACTIVITIES: CPT | Mod: HCNC,PN,CQ

## 2025-08-14 ENCOUNTER — CLINICAL SUPPORT (OUTPATIENT)
Dept: REHABILITATION | Facility: HOSPITAL | Age: 85
End: 2025-08-14
Payer: MEDICARE

## 2025-08-14 DIAGNOSIS — R29.3 POSTURAL IMBALANCE: Primary | ICD-10-CM

## 2025-08-14 DIAGNOSIS — R26.89 ANTALGIC GAIT: ICD-10-CM

## 2025-08-14 PROCEDURE — 97112 NEUROMUSCULAR REEDUCATION: CPT | Mod: HCNC,PN

## 2025-08-21 ENCOUNTER — LAB VISIT (OUTPATIENT)
Dept: LAB | Facility: HOSPITAL | Age: 85
End: 2025-08-21
Attending: FAMILY MEDICINE
Payer: MEDICARE

## 2025-08-21 ENCOUNTER — CLINICAL SUPPORT (OUTPATIENT)
Dept: REHABILITATION | Facility: HOSPITAL | Age: 85
End: 2025-08-21
Payer: MEDICARE

## 2025-08-21 DIAGNOSIS — R29.3 POSTURAL IMBALANCE: Primary | ICD-10-CM

## 2025-08-21 DIAGNOSIS — E11.22 TYPE 2 DIABETES MELLITUS WITH STAGE 3A CHRONIC KIDNEY DISEASE, WITHOUT LONG-TERM CURRENT USE OF INSULIN: ICD-10-CM

## 2025-08-21 DIAGNOSIS — R26.89 ANTALGIC GAIT: ICD-10-CM

## 2025-08-21 DIAGNOSIS — N18.31 TYPE 2 DIABETES MELLITUS WITH STAGE 3A CHRONIC KIDNEY DISEASE, WITHOUT LONG-TERM CURRENT USE OF INSULIN: ICD-10-CM

## 2025-08-21 LAB
ABSOLUTE EOSINOPHIL (OHS): 0.16 K/UL
ABSOLUTE MONOCYTE (OHS): 0.72 K/UL (ref 0.3–1)
ABSOLUTE NEUTROPHIL COUNT (OHS): 4.72 K/UL (ref 1.8–7.7)
ALBUMIN SERPL BCP-MCNC: 4.1 G/DL (ref 3.5–5.2)
ALP SERPL-CCNC: 75 UNIT/L (ref 40–150)
ALT SERPL W/O P-5'-P-CCNC: 17 UNIT/L (ref 10–44)
ANION GAP (OHS): 9 MMOL/L (ref 8–16)
AST SERPL-CCNC: 27 UNIT/L (ref 11–45)
BASOPHILS # BLD AUTO: 0.03 K/UL
BASOPHILS NFR BLD AUTO: 0.3 %
BILIRUB SERPL-MCNC: 0.3 MG/DL (ref 0.1–1)
BUN SERPL-MCNC: 26 MG/DL (ref 8–23)
CALCIUM SERPL-MCNC: 10.1 MG/DL (ref 8.7–10.5)
CHLORIDE SERPL-SCNC: 106 MMOL/L (ref 95–110)
CO2 SERPL-SCNC: 26 MMOL/L (ref 23–29)
CREAT SERPL-MCNC: 1.5 MG/DL (ref 0.5–1.4)
EAG (OHS): 148 MG/DL (ref 68–131)
ERYTHROCYTE [DISTWIDTH] IN BLOOD BY AUTOMATED COUNT: 13.3 % (ref 11.5–14.5)
GFR SERPLBLD CREATININE-BSD FMLA CKD-EPI: 35 ML/MIN/1.73/M2
GLUCOSE SERPL-MCNC: 125 MG/DL (ref 70–110)
HBA1C MFR BLD: 6.8 % (ref 4–5.6)
HCT VFR BLD AUTO: 37 % (ref 37–48.5)
HGB BLD-MCNC: 12.4 GM/DL (ref 12–16)
IMM GRANULOCYTES # BLD AUTO: 0.03 K/UL (ref 0–0.04)
IMM GRANULOCYTES NFR BLD AUTO: 0.3 % (ref 0–0.5)
LYMPHOCYTES # BLD AUTO: 3.62 K/UL (ref 1–4.8)
MCH RBC QN AUTO: 30 PG (ref 27–31)
MCHC RBC AUTO-ENTMCNC: 33.5 G/DL (ref 32–36)
MCV RBC AUTO: 89 FL (ref 82–98)
NUCLEATED RBC (/100WBC) (OHS): 0 /100 WBC
PLATELET # BLD AUTO: 297 K/UL (ref 150–450)
PMV BLD AUTO: 10.6 FL (ref 9.2–12.9)
POTASSIUM SERPL-SCNC: 5.2 MMOL/L (ref 3.5–5.1)
PROT SERPL-MCNC: 8 GM/DL (ref 6–8.4)
RBC # BLD AUTO: 4.14 M/UL (ref 4–5.4)
RELATIVE EOSINOPHIL (OHS): 1.7 %
RELATIVE LYMPHOCYTE (OHS): 39 % (ref 18–48)
RELATIVE MONOCYTE (OHS): 7.8 % (ref 4–15)
RELATIVE NEUTROPHIL (OHS): 50.9 % (ref 38–73)
SODIUM SERPL-SCNC: 141 MMOL/L (ref 136–145)
TSH SERPL-ACNC: 2.83 UIU/ML (ref 0.4–4)
WBC # BLD AUTO: 9.28 K/UL (ref 3.9–12.7)

## 2025-08-21 PROCEDURE — 83036 HEMOGLOBIN GLYCOSYLATED A1C: CPT | Mod: HCNC

## 2025-08-21 PROCEDURE — 84443 ASSAY THYROID STIM HORMONE: CPT | Mod: HCNC

## 2025-08-21 PROCEDURE — 97530 THERAPEUTIC ACTIVITIES: CPT | Mod: HCNC,PN,CQ

## 2025-08-21 PROCEDURE — 97112 NEUROMUSCULAR REEDUCATION: CPT | Mod: HCNC,PN,CQ

## 2025-08-21 PROCEDURE — 36415 COLL VENOUS BLD VENIPUNCTURE: CPT | Mod: HCNC

## 2025-08-21 PROCEDURE — 97140 MANUAL THERAPY 1/> REGIONS: CPT | Mod: HCNC,PN,CQ

## 2025-08-21 PROCEDURE — 85025 COMPLETE CBC W/AUTO DIFF WBC: CPT | Mod: HCNC

## 2025-08-21 PROCEDURE — 80053 COMPREHEN METABOLIC PANEL: CPT | Mod: HCNC

## 2025-08-25 ENCOUNTER — OFFICE VISIT (OUTPATIENT)
Dept: FAMILY MEDICINE | Facility: CLINIC | Age: 85
End: 2025-08-25
Payer: MEDICARE

## 2025-08-25 VITALS
HEART RATE: 77 BPM | HEIGHT: 61 IN | BODY MASS INDEX: 24.31 KG/M2 | TEMPERATURE: 98 F | WEIGHT: 128.75 LBS | SYSTOLIC BLOOD PRESSURE: 132 MMHG | DIASTOLIC BLOOD PRESSURE: 60 MMHG | OXYGEN SATURATION: 96 %

## 2025-08-25 DIAGNOSIS — K21.9 GASTROESOPHAGEAL REFLUX DISEASE, UNSPECIFIED WHETHER ESOPHAGITIS PRESENT: Primary | ICD-10-CM

## 2025-08-25 DIAGNOSIS — N18.32 TYPE 2 DIABETES MELLITUS WITH STAGE 3B CHRONIC KIDNEY DISEASE, WITHOUT LONG-TERM CURRENT USE OF INSULIN: ICD-10-CM

## 2025-08-25 DIAGNOSIS — E11.22 TYPE 2 DIABETES MELLITUS WITH STAGE 3B CHRONIC KIDNEY DISEASE, WITHOUT LONG-TERM CURRENT USE OF INSULIN: ICD-10-CM

## 2025-08-25 PROCEDURE — 3078F DIAST BP <80 MM HG: CPT | Mod: CPTII,HCNC,S$GLB, | Performed by: FAMILY MEDICINE

## 2025-08-25 PROCEDURE — 1159F MED LIST DOCD IN RCRD: CPT | Mod: CPTII,HCNC,S$GLB, | Performed by: FAMILY MEDICINE

## 2025-08-25 PROCEDURE — 2023F DILAT RTA XM W/O RTNOPTHY: CPT | Mod: CPTII,HCNC,S$GLB, | Performed by: FAMILY MEDICINE

## 2025-08-25 PROCEDURE — 3288F FALL RISK ASSESSMENT DOCD: CPT | Mod: CPTII,HCNC,S$GLB, | Performed by: FAMILY MEDICINE

## 2025-08-25 PROCEDURE — 3075F SYST BP GE 130 - 139MM HG: CPT | Mod: CPTII,HCNC,S$GLB, | Performed by: FAMILY MEDICINE

## 2025-08-25 PROCEDURE — 99999 PR PBB SHADOW E&M-EST. PATIENT-LVL V: CPT | Mod: PBBFAC,HCNC,, | Performed by: FAMILY MEDICINE

## 2025-08-25 PROCEDURE — 99214 OFFICE O/P EST MOD 30 MIN: CPT | Mod: HCNC,S$GLB,, | Performed by: FAMILY MEDICINE

## 2025-08-25 PROCEDURE — 1101F PT FALLS ASSESS-DOCD LE1/YR: CPT | Mod: CPTII,HCNC,S$GLB, | Performed by: FAMILY MEDICINE

## 2025-08-25 PROCEDURE — G2211 COMPLEX E/M VISIT ADD ON: HCPCS | Mod: HCNC,S$GLB,, | Performed by: FAMILY MEDICINE

## 2025-08-25 PROCEDURE — 1126F AMNT PAIN NOTED NONE PRSNT: CPT | Mod: CPTII,HCNC,S$GLB, | Performed by: FAMILY MEDICINE

## 2025-08-25 PROCEDURE — 1160F RVW MEDS BY RX/DR IN RCRD: CPT | Mod: CPTII,HCNC,S$GLB, | Performed by: FAMILY MEDICINE

## 2025-08-25 RX ORDER — FAMOTIDINE 20 MG/1
20 TABLET, FILM COATED ORAL DAILY PRN
Qty: 30 TABLET | Refills: 3 | Status: SHIPPED | OUTPATIENT
Start: 2025-08-25

## 2025-09-02 ENCOUNTER — CLINICAL SUPPORT (OUTPATIENT)
Dept: REHABILITATION | Facility: HOSPITAL | Age: 85
End: 2025-09-02
Payer: MEDICARE

## 2025-09-02 DIAGNOSIS — R26.89 ANTALGIC GAIT: ICD-10-CM

## 2025-09-02 DIAGNOSIS — R29.3 POSTURAL IMBALANCE: Primary | ICD-10-CM

## 2025-09-02 PROCEDURE — 97530 THERAPEUTIC ACTIVITIES: CPT | Mod: HCNC,PN
